# Patient Record
Sex: FEMALE | Race: WHITE | NOT HISPANIC OR LATINO | Employment: OTHER | URBAN - NONMETROPOLITAN AREA
[De-identification: names, ages, dates, MRNs, and addresses within clinical notes are randomized per-mention and may not be internally consistent; named-entity substitution may affect disease eponyms.]

---

## 2023-01-01 ENCOUNTER — APPOINTMENT (OUTPATIENT)
Dept: GENERAL RADIOLOGY | Facility: HOSPITAL | Age: 88
End: 2023-01-01
Payer: MEDICARE

## 2023-01-01 ENCOUNTER — DOCUMENTATION (OUTPATIENT)
Dept: SOCIAL WORK | Facility: HOSPITAL | Age: 88
End: 2023-01-01
Payer: MEDICARE

## 2023-01-01 ENCOUNTER — APPOINTMENT (OUTPATIENT)
Dept: CT IMAGING | Facility: HOSPITAL | Age: 88
End: 2023-01-01
Payer: MEDICARE

## 2023-01-01 ENCOUNTER — TELEPHONE (OUTPATIENT)
Dept: CARDIOLOGY | Facility: CLINIC | Age: 88
End: 2023-01-01

## 2023-01-01 ENCOUNTER — HOSPITAL ENCOUNTER (EMERGENCY)
Facility: HOSPITAL | Age: 88
End: 2023-10-27
Attending: STUDENT IN AN ORGANIZED HEALTH CARE EDUCATION/TRAINING PROGRAM
Payer: MEDICARE

## 2023-01-01 ENCOUNTER — HOSPITAL ENCOUNTER (EMERGENCY)
Facility: HOSPITAL | Age: 88
Discharge: HOME | End: 2023-10-24
Attending: EMERGENCY MEDICINE | Admitting: EMERGENCY MEDICINE
Payer: MEDICARE

## 2023-01-01 VITALS
HEIGHT: 65 IN | TEMPERATURE: 97.8 F | OXYGEN SATURATION: 52 % | DIASTOLIC BLOOD PRESSURE: 104 MMHG | BODY MASS INDEX: 27.49 KG/M2 | WEIGHT: 165 LBS | RESPIRATION RATE: 10 BRPM | SYSTOLIC BLOOD PRESSURE: 166 MMHG | HEART RATE: 95 BPM

## 2023-01-01 VITALS
RESPIRATION RATE: 20 BRPM | BODY MASS INDEX: 27.49 KG/M2 | DIASTOLIC BLOOD PRESSURE: 75 MMHG | SYSTOLIC BLOOD PRESSURE: 113 MMHG | TEMPERATURE: 97.9 F | HEIGHT: 65 IN | WEIGHT: 165 LBS | HEART RATE: 60 BPM | OXYGEN SATURATION: 95 %

## 2023-01-01 DIAGNOSIS — K59.00 CONSTIPATION, UNSPECIFIED CONSTIPATION TYPE: ICD-10-CM

## 2023-01-01 DIAGNOSIS — I46.9 DEATH DUE TO CARDIAC ARREST: Primary | ICD-10-CM

## 2023-01-01 DIAGNOSIS — I25.10 ATHEROSCLEROSIS OF CORONARY ARTERY OF NATIVE HEART, UNSPECIFIED VESSEL OR LESION TYPE, UNSPECIFIED WHETHER ANGINA PRESENT: ICD-10-CM

## 2023-01-01 DIAGNOSIS — N18.4 STAGE 4 CHRONIC KIDNEY DISEASE: ICD-10-CM

## 2023-01-01 DIAGNOSIS — R33.8 ACUTE URINARY RETENTION: Primary | ICD-10-CM

## 2023-01-01 LAB
ALBUMIN SERPL-MCNC: 3.4 G/DL (ref 3.5–5.2)
ALBUMIN/GLOB SERPL: 1 G/DL
ALP SERPL-CCNC: 86 U/L (ref 39–117)
ALT SERPL W P-5'-P-CCNC: 11 U/L (ref 1–33)
ANION GAP SERPL CALCULATED.3IONS-SCNC: 10.4 MMOL/L (ref 5–15)
AST SERPL-CCNC: 19 U/L (ref 1–32)
BASOPHILS # BLD AUTO: 0.03 10*3/MM3 (ref 0–0.2)
BASOPHILS NFR BLD AUTO: 0.3 % (ref 0–1.5)
BILIRUB SERPL-MCNC: 0.3 MG/DL (ref 0–1.2)
BILIRUB UR QL STRIP: NEGATIVE
BUN SERPL-MCNC: 42 MG/DL (ref 8–23)
BUN/CREAT SERPL: 43.3 (ref 7–25)
CALCIUM SPEC-SCNC: 9.5 MG/DL (ref 8.6–10.5)
CHLORIDE SERPL-SCNC: 110 MMOL/L (ref 98–107)
CLARITY UR: CLEAR
CO2 SERPL-SCNC: 20.6 MMOL/L (ref 22–29)
COLOR UR: YELLOW
CREAT SERPL-MCNC: 0.97 MG/DL (ref 0.57–1)
CRP SERPL-MCNC: 2.38 MG/DL (ref 0–0.5)
DEPRECATED RDW RBC AUTO: 50.1 FL (ref 37–54)
EGFRCR SERPLBLD CKD-EPI 2021: 56.3 ML/MIN/1.73
EOSINOPHIL # BLD AUTO: 0.21 10*3/MM3 (ref 0–0.4)
EOSINOPHIL NFR BLD AUTO: 2.4 % (ref 0.3–6.2)
ERYTHROCYTE [DISTWIDTH] IN BLOOD BY AUTOMATED COUNT: 14.9 % (ref 12.3–15.4)
FLUAV RNA RESP QL NAA+PROBE: NOT DETECTED
FLUBV RNA RESP QL NAA+PROBE: NOT DETECTED
GEN 5 2HR TROPONIN T REFLEX: 12 NG/L
GLOBULIN UR ELPH-MCNC: 3.4 GM/DL
GLUCOSE SERPL-MCNC: 135 MG/DL (ref 65–99)
GLUCOSE UR STRIP-MCNC: ABNORMAL MG/DL
HCT VFR BLD AUTO: 50.4 % (ref 34–46.6)
HGB BLD-MCNC: 15.7 G/DL (ref 12–15.9)
HGB UR QL STRIP.AUTO: NEGATIVE
HOLD SPECIMEN: NORMAL
HOLD SPECIMEN: NORMAL
IMM GRANULOCYTES # BLD AUTO: 0.03 10*3/MM3 (ref 0–0.05)
IMM GRANULOCYTES NFR BLD AUTO: 0.3 % (ref 0–0.5)
KETONES UR QL STRIP: NEGATIVE
LEUKOCYTE ESTERASE UR QL STRIP.AUTO: NEGATIVE
LYMPHOCYTES # BLD AUTO: 1.15 10*3/MM3 (ref 0.7–3.1)
LYMPHOCYTES NFR BLD AUTO: 13.2 % (ref 19.6–45.3)
MCH RBC QN AUTO: 28.3 PG (ref 26.6–33)
MCHC RBC AUTO-ENTMCNC: 31.2 G/DL (ref 31.5–35.7)
MCV RBC AUTO: 91 FL (ref 79–97)
MONOCYTES # BLD AUTO: 0.5 10*3/MM3 (ref 0.1–0.9)
MONOCYTES NFR BLD AUTO: 5.7 % (ref 5–12)
NEUTROPHILS NFR BLD AUTO: 6.79 10*3/MM3 (ref 1.7–7)
NEUTROPHILS NFR BLD AUTO: 78.1 % (ref 42.7–76)
NITRITE UR QL STRIP: NEGATIVE
NRBC BLD AUTO-RTO: 0 /100 WBC (ref 0–0.2)
PH UR STRIP.AUTO: 5.5 [PH] (ref 5–8)
PLATELET # BLD AUTO: 240 10*3/MM3 (ref 140–450)
PMV BLD AUTO: 10.5 FL (ref 6–12)
POTASSIUM SERPL-SCNC: 4.2 MMOL/L (ref 3.5–5.2)
PROT SERPL-MCNC: 6.8 G/DL (ref 6–8.5)
PROT UR QL STRIP: NEGATIVE
QT INTERVAL: 424 MS
QT INTERVAL: 452 MS
QTC INTERVAL: 440 MS
QTC INTERVAL: 452 MS
RBC # BLD AUTO: 5.54 10*6/MM3 (ref 3.77–5.28)
SARS-COV-2 RNA RESP QL NAA+PROBE: NOT DETECTED
SODIUM SERPL-SCNC: 141 MMOL/L (ref 136–145)
SP GR UR STRIP: 1.03 (ref 1–1.03)
TROPONIN T DELTA: -1 NG/L
TROPONIN T SERPL HS-MCNC: 13 NG/L
UROBILINOGEN UR QL STRIP: ABNORMAL
WBC NRBC COR # BLD: 8.71 10*3/MM3 (ref 3.4–10.8)
WHOLE BLOOD HOLD COAG: NORMAL
WHOLE BLOOD HOLD SPECIMEN: NORMAL

## 2023-01-01 PROCEDURE — 84484 ASSAY OF TROPONIN QUANT: CPT | Performed by: PHYSICIAN ASSISTANT

## 2023-01-01 PROCEDURE — 71045 X-RAY EXAM CHEST 1 VIEW: CPT

## 2023-01-01 PROCEDURE — 71045 X-RAY EXAM CHEST 1 VIEW: CPT | Performed by: RADIOLOGY

## 2023-01-01 PROCEDURE — 93005 ELECTROCARDIOGRAM TRACING: CPT | Performed by: PHYSICIAN ASSISTANT

## 2023-01-01 PROCEDURE — 25010000002 EPINEPHRINE 1 MG/ML SOLUTION: Performed by: STUDENT IN AN ORGANIZED HEALTH CARE EDUCATION/TRAINING PROGRAM

## 2023-01-01 PROCEDURE — 74177 CT ABD & PELVIS W/CONTRAST: CPT

## 2023-01-01 PROCEDURE — 25010000002 EPINEPHRINE PER 0.1 MG: Performed by: STUDENT IN AN ORGANIZED HEALTH CARE EDUCATION/TRAINING PROGRAM

## 2023-01-01 PROCEDURE — 51702 INSERT TEMP BLADDER CATH: CPT

## 2023-01-01 PROCEDURE — 93010 ELECTROCARDIOGRAM REPORT: CPT | Performed by: SPECIALIST

## 2023-01-01 PROCEDURE — 36415 COLL VENOUS BLD VENIPUNCTURE: CPT

## 2023-01-01 PROCEDURE — 25510000001 IOPAMIDOL 61 % SOLUTION: Performed by: EMERGENCY MEDICINE

## 2023-01-01 PROCEDURE — 99285 EMERGENCY DEPT VISIT HI MDM: CPT

## 2023-01-01 PROCEDURE — 94799 UNLISTED PULMONARY SVC/PX: CPT

## 2023-01-01 PROCEDURE — 85025 COMPLETE CBC W/AUTO DIFF WBC: CPT | Performed by: PHYSICIAN ASSISTANT

## 2023-01-01 PROCEDURE — 92950 HEART/LUNG RESUSCITATION CPR: CPT

## 2023-01-01 PROCEDURE — 86140 C-REACTIVE PROTEIN: CPT | Performed by: PHYSICIAN ASSISTANT

## 2023-01-01 PROCEDURE — 25010000002 DIAZEPAM PER 5 MG: Performed by: EMERGENCY MEDICINE

## 2023-01-01 PROCEDURE — 25010000002 MORPHINE PER 10 MG: Performed by: EMERGENCY MEDICINE

## 2023-01-01 PROCEDURE — 74177 CT ABD & PELVIS W/CONTRAST: CPT | Performed by: RADIOLOGY

## 2023-01-01 PROCEDURE — 96374 THER/PROPH/DIAG INJ IV PUSH: CPT

## 2023-01-01 PROCEDURE — 87636 SARSCOV2 & INF A&B AMP PRB: CPT | Performed by: PHYSICIAN ASSISTANT

## 2023-01-01 PROCEDURE — 25010000002 METHYLPREDNISOLONE PER 125 MG: Performed by: PHYSICIAN ASSISTANT

## 2023-01-01 PROCEDURE — 80053 COMPREHEN METABOLIC PANEL: CPT | Performed by: PHYSICIAN ASSISTANT

## 2023-01-01 PROCEDURE — 96375 TX/PRO/DX INJ NEW DRUG ADDON: CPT

## 2023-01-01 PROCEDURE — 81003 URINALYSIS AUTO W/O SCOPE: CPT | Performed by: PHYSICIAN ASSISTANT

## 2023-01-01 PROCEDURE — 51798 US URINE CAPACITY MEASURE: CPT

## 2023-01-01 PROCEDURE — 93005 ELECTROCARDIOGRAM TRACING: CPT | Performed by: STUDENT IN AN ORGANIZED HEALTH CARE EDUCATION/TRAINING PROGRAM

## 2023-01-01 PROCEDURE — 25010000002 ONDANSETRON PER 1 MG: Performed by: EMERGENCY MEDICINE

## 2023-01-01 PROCEDURE — 25810000003 SODIUM CHLORIDE 0.9 % SOLUTION: Performed by: STUDENT IN AN ORGANIZED HEALTH CARE EDUCATION/TRAINING PROGRAM

## 2023-01-01 PROCEDURE — 25010000002 DIAZEPAM PER 5 MG: Performed by: STUDENT IN AN ORGANIZED HEALTH CARE EDUCATION/TRAINING PROGRAM

## 2023-01-01 PROCEDURE — 96376 TX/PRO/DX INJ SAME DRUG ADON: CPT

## 2023-01-01 RX ORDER — OXYBUTYNIN CHLORIDE 5 MG/1
10 TABLET ORAL ONCE
Status: COMPLETED | OUTPATIENT
Start: 2023-01-01 | End: 2023-01-01

## 2023-01-01 RX ORDER — POLYETHYLENE GLYCOL 3350 17 G/17G
17 POWDER, FOR SOLUTION ORAL ONCE
Status: COMPLETED | OUTPATIENT
Start: 2023-01-01 | End: 2023-01-01

## 2023-01-01 RX ORDER — AMOXICILLIN 250 MG
1 CAPSULE ORAL ONCE
Status: COMPLETED | OUTPATIENT
Start: 2023-01-01 | End: 2023-01-01

## 2023-01-01 RX ORDER — DIAZEPAM 5 MG/ML
2.5 INJECTION, SOLUTION INTRAMUSCULAR; INTRAVENOUS ONCE
Status: COMPLETED | OUTPATIENT
Start: 2023-01-01 | End: 2023-01-01

## 2023-01-01 RX ORDER — ONDANSETRON 2 MG/ML
4 INJECTION INTRAMUSCULAR; INTRAVENOUS ONCE
Status: COMPLETED | OUTPATIENT
Start: 2023-01-01 | End: 2023-01-01

## 2023-01-01 RX ORDER — LACTULOSE 10 G/15ML
30 SOLUTION ORAL ONCE
Status: COMPLETED | OUTPATIENT
Start: 2023-01-01 | End: 2023-01-01

## 2023-01-01 RX ORDER — NOREPINEPHRINE BITARTRATE 0.03 MG/ML
INJECTION, SOLUTION INTRAVENOUS
Status: COMPLETED
Start: 2023-01-01 | End: 2023-01-01

## 2023-01-01 RX ORDER — SODIUM CHLORIDE 0.9 % (FLUSH) 0.9 %
10 SYRINGE (ML) INJECTION AS NEEDED
Status: DISCONTINUED | OUTPATIENT
Start: 2023-01-01 | End: 2023-01-01 | Stop reason: HOSPADM

## 2023-01-01 RX ORDER — METHYLPREDNISOLONE SODIUM SUCCINATE 125 MG/2ML
80 INJECTION, POWDER, LYOPHILIZED, FOR SOLUTION INTRAMUSCULAR; INTRAVENOUS ONCE
Status: COMPLETED | OUTPATIENT
Start: 2023-01-01 | End: 2023-01-01

## 2023-01-01 RX ORDER — NOREPINEPHRINE BITARTRATE 0.03 MG/ML
INJECTION, SOLUTION INTRAVENOUS
Status: COMPLETED | OUTPATIENT
Start: 2023-01-01 | End: 2023-01-01

## 2023-01-01 RX ORDER — EPINEPHRINE 1 MG/ML
INJECTION, SOLUTION, CONCENTRATE INTRAVENOUS
Status: COMPLETED | OUTPATIENT
Start: 2023-01-01 | End: 2023-01-01

## 2023-01-01 RX ORDER — MORPHINE SULFATE 2 MG/ML
2 INJECTION, SOLUTION INTRAMUSCULAR; INTRAVENOUS ONCE
Status: COMPLETED | OUTPATIENT
Start: 2023-01-01 | End: 2023-01-01

## 2023-01-01 RX ORDER — OXYBUTYNIN CHLORIDE 10 MG/1
10 TABLET, EXTENDED RELEASE ORAL DAILY
Qty: 30 TABLET | Refills: 0 | Status: SHIPPED | OUTPATIENT
Start: 2023-01-01

## 2023-01-01 RX ORDER — POLYETHYLENE GLYCOL 3350 17 G/17G
17 POWDER, FOR SOLUTION ORAL 2 TIMES DAILY
Qty: 225 G | Refills: 0 | Status: SHIPPED | OUTPATIENT
Start: 2023-01-01

## 2023-01-01 RX ORDER — AMOXICILLIN 250 MG
1 CAPSULE ORAL 2 TIMES DAILY
Qty: 15 TABLET | Refills: 0 | Status: SHIPPED | OUTPATIENT
Start: 2023-01-01

## 2023-01-01 RX ORDER — LACTULOSE 10 G/15ML
20 SOLUTION ORAL 2 TIMES DAILY
Qty: 300 ML | Refills: 0 | Status: SHIPPED | OUTPATIENT
Start: 2023-01-01

## 2023-01-01 RX ADMIN — MORPHINE SULFATE 2 MG: 2 INJECTION, SOLUTION INTRAMUSCULAR; INTRAVENOUS at 16:18

## 2023-01-01 RX ADMIN — Medication 0.02 MCG/KG/MIN: at 14:42

## 2023-01-01 RX ADMIN — EPINEPHRINE 1 MG: 1 INJECTION, SOLUTION, CONCENTRATE INTRAVENOUS at 14:15

## 2023-01-01 RX ADMIN — DIAZEPAM 2.5 MG: 5 INJECTION, SOLUTION INTRAMUSCULAR; INTRAVENOUS at 16:32

## 2023-01-01 RX ADMIN — DOCUSATE SODIUM 50 MG AND SENNOSIDES 8.6 MG 1 TABLET: 8.6; 5 TABLET, FILM COATED ORAL at 21:43

## 2023-01-01 RX ADMIN — ONDANSETRON 4 MG: 2 INJECTION INTRAMUSCULAR; INTRAVENOUS at 16:18

## 2023-01-01 RX ADMIN — Medication 0.02 MCG/KG/MIN: at 14:22

## 2023-01-01 RX ADMIN — LACTULOSE 30 G: 20 SOLUTION ORAL at 21:47

## 2023-01-01 RX ADMIN — EPINEPHRINE 1 MG: 1 INJECTION, SOLUTION, CONCENTRATE INTRAVENOUS at 14:35

## 2023-01-01 RX ADMIN — DIAZEPAM 2.5 MG: 5 INJECTION, SOLUTION INTRAMUSCULAR; INTRAVENOUS at 20:40

## 2023-01-01 RX ADMIN — SODIUM BICARBONATE 50 MEQ: 84 INJECTION INTRAVENOUS at 14:14

## 2023-01-01 RX ADMIN — IOPAMIDOL 100 ML: 612 INJECTION, SOLUTION INTRAVENOUS at 14:29

## 2023-01-01 RX ADMIN — POLYETHYLENE GLYCOL (3350) 17 G: 17 POWDER, FOR SOLUTION ORAL at 21:45

## 2023-01-01 RX ADMIN — EPINEPHRINE 1 MG: 1 INJECTION, SOLUTION, CONCENTRATE INTRAVENOUS at 14:11

## 2023-01-01 RX ADMIN — METHYLPREDNISOLONE SODIUM SUCCINATE 80 MG: 125 INJECTION, POWDER, FOR SOLUTION INTRAMUSCULAR; INTRAVENOUS at 16:45

## 2023-01-01 RX ADMIN — OXYBUTYNIN CHLORIDE 10 MG: 5 TABLET ORAL at 21:43

## 2023-01-15 ENCOUNTER — HOSPITAL ENCOUNTER (INPATIENT)
Facility: HOSPITAL | Age: 88
LOS: 2 days | Discharge: HOME HEALTH | DRG: 391 | End: 2023-01-19
Attending: STUDENT IN AN ORGANIZED HEALTH CARE EDUCATION/TRAINING PROGRAM | Admitting: INTERNAL MEDICINE
Payer: MEDICARE

## 2023-01-15 DIAGNOSIS — R10.32 LEFT LOWER QUADRANT ABDOMINAL PAIN: Primary | ICD-10-CM

## 2023-01-15 DIAGNOSIS — N17.9 AKI (ACUTE KIDNEY INJURY): ICD-10-CM

## 2023-01-15 DIAGNOSIS — R19.7 NAUSEA VOMITING AND DIARRHEA: ICD-10-CM

## 2023-01-15 DIAGNOSIS — I10 PRIMARY HYPERTENSION: ICD-10-CM

## 2023-01-15 DIAGNOSIS — R53.81 DEBILITY: ICD-10-CM

## 2023-01-15 DIAGNOSIS — R11.2 NAUSEA VOMITING AND DIARRHEA: ICD-10-CM

## 2023-01-15 PROCEDURE — 99223 1ST HOSP IP/OBS HIGH 75: CPT | Performed by: PHYSICIAN ASSISTANT

## 2023-01-15 PROCEDURE — 99285 EMERGENCY DEPT VISIT HI MDM: CPT

## 2023-01-16 ENCOUNTER — APPOINTMENT (OUTPATIENT)
Dept: CT IMAGING | Facility: HOSPITAL | Age: 88
DRG: 391 | End: 2023-01-16
Payer: MEDICARE

## 2023-01-16 ENCOUNTER — APPOINTMENT (OUTPATIENT)
Dept: CARDIOLOGY | Facility: HOSPITAL | Age: 88
DRG: 391 | End: 2023-01-16
Payer: MEDICARE

## 2023-01-16 PROBLEM — R10.32 LEFT LOWER QUADRANT ABDOMINAL PAIN: Status: ACTIVE | Noted: 2023-01-16

## 2023-01-16 LAB
027 TOXIN: NORMAL
ADV 40+41 DNA STL QL NAA+NON-PROBE: NOT DETECTED
ALBUMIN SERPL-MCNC: 4.3 G/DL (ref 3.5–5.2)
ALBUMIN/GLOB SERPL: 1.1 G/DL
ALP SERPL-CCNC: 86 U/L (ref 39–117)
ALT SERPL W P-5'-P-CCNC: 11 U/L (ref 1–33)
AMMONIA BLD-SCNC: 23 UMOL/L (ref 11–51)
AMPHET+METHAMPHET UR QL: NEGATIVE
AMPHETAMINES UR QL: NEGATIVE
ANION GAP SERPL CALCULATED.3IONS-SCNC: 19 MMOL/L (ref 5–15)
AST SERPL-CCNC: 17 U/L (ref 1–32)
ASTRO TYP 1-8 RNA STL QL NAA+NON-PROBE: NOT DETECTED
ATMOSPHERIC PRESS: 729 MMHG
BACTERIA UR QL AUTO: ABNORMAL /HPF
BARBITURATES UR QL SCN: NEGATIVE
BASE EXCESS BLDV CALC-SCNC: -1.1 MMOL/L (ref 0–2)
BASOPHILS # BLD AUTO: 0.04 10*3/MM3 (ref 0–0.2)
BASOPHILS NFR BLD AUTO: 0.4 % (ref 0–1.5)
BDY SITE: ABNORMAL
BENZODIAZ UR QL SCN: NEGATIVE
BH CV ECHO MEAS - ACS: 1.8 CM
BH CV ECHO MEAS - AO MAX PG: 6.5 MMHG
BH CV ECHO MEAS - AO MEAN PG: 5 MMHG
BH CV ECHO MEAS - AO ROOT DIAM: 3.1 CM
BH CV ECHO MEAS - AO V2 MAX: 127 CM/SEC
BH CV ECHO MEAS - AO V2 VTI: 18.6 CM
BH CV ECHO MEAS - EDV(CUBED): 110.6 ML
BH CV ECHO MEAS - EDV(MOD-SP4): 40.6 ML
BH CV ECHO MEAS - EF(MOD-SP4): 70.7 %
BH CV ECHO MEAS - ESV(CUBED): 15.6 ML
BH CV ECHO MEAS - ESV(MOD-SP4): 11.9 ML
BH CV ECHO MEAS - FS: 47.9 %
BH CV ECHO MEAS - IVS/LVPW: 0.92 CM
BH CV ECHO MEAS - IVSD: 1.2 CM
BH CV ECHO MEAS - LA DIMENSION: 3.6 CM
BH CV ECHO MEAS - LAT PEAK E' VEL: 12.3 CM/SEC
BH CV ECHO MEAS - LV DIASTOLIC VOL/BSA (35-75): 22.5 CM2
BH CV ECHO MEAS - LV MASS(C)D: 232.2 GRAMS
BH CV ECHO MEAS - LV SYSTOLIC VOL/BSA (12-30): 6.6 CM2
BH CV ECHO MEAS - LVIDD: 4.8 CM
BH CV ECHO MEAS - LVIDS: 2.5 CM
BH CV ECHO MEAS - LVOT AREA: 3.1 CM2
BH CV ECHO MEAS - LVOT DIAM: 2 CM
BH CV ECHO MEAS - LVPWD: 1.3 CM
BH CV ECHO MEAS - MED PEAK E' VEL: 8.9 CM/SEC
BH CV ECHO MEAS - MV A MAX VEL: 111 CM/SEC
BH CV ECHO MEAS - MV E MAX VEL: 48.7 CM/SEC
BH CV ECHO MEAS - MV E/A: 0.44
BH CV ECHO MEAS - PA ACC TIME: 0.09 SEC
BH CV ECHO MEAS - PA PR(ACCEL): 37.6 MMHG
BH CV ECHO MEAS - RAP SYSTOLE: 10 MMHG
BH CV ECHO MEAS - RVSP: 35 MMHG
BH CV ECHO MEAS - SI(MOD-SP4): 15.9 ML/M2
BH CV ECHO MEAS - SV(MOD-SP4): 28.7 ML
BH CV ECHO MEAS - TAPSE (>1.6): 1.99 CM
BH CV ECHO MEAS - TR MAX PG: 25 MMHG
BH CV ECHO MEAS - TR MAX VEL: 250 CM/SEC
BH CV ECHO MEASUREMENTS AVERAGE E/E' RATIO: 4.59
BILIRUB SERPL-MCNC: 0.6 MG/DL (ref 0–1.2)
BILIRUB UR QL STRIP: NEGATIVE
BODY TEMPERATURE: 37 C
BUN SERPL-MCNC: 50 MG/DL (ref 8–23)
BUN/CREAT SERPL: 26.9 (ref 7–25)
BUPRENORPHINE SERPL-MCNC: NEGATIVE NG/ML
C CAYETANENSIS DNA STL QL NAA+NON-PROBE: NOT DETECTED
C COLI+JEJ+UPSA DNA STL QL NAA+NON-PROBE: NOT DETECTED
C DIFF TOX GENS STL QL NAA+PROBE: NEGATIVE
CALCIUM SPEC-SCNC: 11 MG/DL (ref 8.6–10.5)
CANNABINOIDS SERPL QL: NEGATIVE
CHLORIDE SERPL-SCNC: 97 MMOL/L (ref 98–107)
CLARITY UR: CLEAR
CO2 BLDA-SCNC: 23.1 MMOL/L (ref 22–33)
CO2 SERPL-SCNC: 21 MMOL/L (ref 22–29)
COCAINE UR QL: NEGATIVE
COHGB MFR BLD: 0.9 % (ref 0–5)
COLOR UR: YELLOW
CREAT SERPL-MCNC: 1.86 MG/DL (ref 0.57–1)
CREAT UR-MCNC: 72.7 MG/DL
CRP SERPL-MCNC: 0.81 MG/DL (ref 0–0.5)
CRYPTOSP DNA STL QL NAA+NON-PROBE: NOT DETECTED
D-LACTATE SERPL-SCNC: 2 MMOL/L (ref 0.5–2)
D-LACTATE SERPL-SCNC: 2.4 MMOL/L (ref 0.5–2)
DEPRECATED RDW RBC AUTO: 47.4 FL (ref 37–54)
E HISTOLYT DNA STL QL NAA+NON-PROBE: NOT DETECTED
EAEC PAA PLAS AGGR+AATA ST NAA+NON-PRB: NOT DETECTED
EC STX1+STX2 GENES STL QL NAA+NON-PROBE: NOT DETECTED
EGFRCR SERPLBLD CKD-EPI 2021: 25.9 ML/MIN/1.73
EOSINOPHIL # BLD AUTO: 0.03 10*3/MM3 (ref 0–0.4)
EOSINOPHIL NFR BLD AUTO: 0.3 % (ref 0.3–6.2)
EPEC EAE GENE STL QL NAA+NON-PROBE: NOT DETECTED
ERYTHROCYTE [DISTWIDTH] IN BLOOD BY AUTOMATED COUNT: 13.6 % (ref 12.3–15.4)
ERYTHROCYTE [SEDIMENTATION RATE] IN BLOOD: 24 MM/HR (ref 0–30)
ETEC LTA+ST1A+ST1B TOX ST NAA+NON-PROBE: NOT DETECTED
FLUAV RNA RESP QL NAA+PROBE: NOT DETECTED
FLUBV RNA RESP QL NAA+PROBE: NOT DETECTED
G LAMBLIA DNA STL QL NAA+NON-PROBE: NOT DETECTED
GLOBULIN UR ELPH-MCNC: 4 GM/DL
GLUCOSE BLDC GLUCOMTR-MCNC: 115 MG/DL (ref 70–130)
GLUCOSE SERPL-MCNC: 134 MG/DL (ref 65–99)
GLUCOSE UR STRIP-MCNC: NEGATIVE MG/DL
HBA1C MFR BLD: 5.5 % (ref 4.8–5.6)
HCO3 BLDV-SCNC: 22.2 MMOL/L (ref 22–28)
HCT VFR BLD AUTO: 50.7 % (ref 34–46.6)
HGB BLD-MCNC: 16 G/DL (ref 12–15.9)
HGB BLDA-MCNC: 14.1 G/DL (ref 13.5–17.5)
HGB UR QL STRIP.AUTO: NEGATIVE
HYALINE CASTS UR QL AUTO: ABNORMAL /LPF
HYPOCHROMIA BLD QL: NORMAL
IMM GRANULOCYTES # BLD AUTO: 0.04 10*3/MM3 (ref 0–0.05)
IMM GRANULOCYTES NFR BLD AUTO: 0.4 % (ref 0–0.5)
INHALED O2 CONCENTRATION: 21 %
KETONES UR QL STRIP: NEGATIVE
LARGE PLATELETS: NORMAL
LEUKOCYTE ESTERASE UR QL STRIP.AUTO: ABNORMAL
LYMPHOCYTES # BLD AUTO: 2.01 10*3/MM3 (ref 0.7–3.1)
LYMPHOCYTES NFR BLD AUTO: 19.5 % (ref 19.6–45.3)
Lab: ABNORMAL
MAGNESIUM SERPL-MCNC: 2 MG/DL (ref 1.6–2.4)
MAXIMAL PREDICTED HEART RATE: 133 BPM
MCH RBC QN AUTO: 29.9 PG (ref 26.6–33)
MCHC RBC AUTO-ENTMCNC: 31.6 G/DL (ref 31.5–35.7)
MCV RBC AUTO: 94.6 FL (ref 79–97)
METHADONE UR QL SCN: NEGATIVE
METHGB BLD QL: 0.3 % (ref 0–3)
MODALITY: ABNORMAL
MONOCYTES # BLD AUTO: 0.62 10*3/MM3 (ref 0.1–0.9)
MONOCYTES NFR BLD AUTO: 6 % (ref 5–12)
NEUTROPHILS NFR BLD AUTO: 7.56 10*3/MM3 (ref 1.7–7)
NEUTROPHILS NFR BLD AUTO: 73.4 % (ref 42.7–76)
NITRITE UR QL STRIP: NEGATIVE
NOROVIRUS GI+II RNA STL QL NAA+NON-PROBE: NOT DETECTED
NOTIFIED BY: ABNORMAL
NOTIFIED WHO: ABNORMAL
NRBC BLD AUTO-RTO: 0 /100 WBC (ref 0–0.2)
OPIATES UR QL: POSITIVE
OXYCODONE UR QL SCN: NEGATIVE
OXYHGB MFR BLDV: 89.9 % (ref 45–75)
P SHIGELLOIDES DNA STL QL NAA+NON-PROBE: NOT DETECTED
PCO2 BLDV: 32.2 MM HG (ref 41–51)
PCP UR QL SCN: NEGATIVE
PH BLDV: 7.45 PH UNITS (ref 7.32–7.42)
PH UR STRIP.AUTO: <=5 [PH] (ref 5–8)
PLATELET # BLD AUTO: 269 10*3/MM3 (ref 140–450)
PMV BLD AUTO: 11.6 FL (ref 6–12)
PO2 BLDV: 57.1 MM HG (ref 27–53)
POTASSIUM SERPL-SCNC: 3.9 MMOL/L (ref 3.5–5.2)
PROCALCITONIN SERPL-MCNC: 0.08 NG/ML (ref 0–0.25)
PROPOXYPH UR QL: NEGATIVE
PROT ?TM UR-MCNC: 11 MG/DL
PROT ?TM UR-MCNC: 8.3 MG/DL
PROT SERPL-MCNC: 8.3 G/DL (ref 6–8.5)
PROT UR QL STRIP: NEGATIVE
PROT/CREAT UR: 114.2 MG/G CREA (ref 0–200)
QT INTERVAL: 372 MS
QT INTERVAL: 374 MS
QTC INTERVAL: 474 MS
QTC INTERVAL: 477 MS
RBC # BLD AUTO: 5.36 10*6/MM3 (ref 3.77–5.28)
RBC # UR STRIP: ABNORMAL /HPF
REF LAB TEST METHOD: ABNORMAL
RVA RNA STL QL NAA+NON-PROBE: NOT DETECTED
S ENT+BONG DNA STL QL NAA+NON-PROBE: NOT DETECTED
SAO2 % BLDCOV: 91 % (ref 45–75)
SAPO I+II+IV+V RNA STL QL NAA+NON-PROBE: NOT DETECTED
SARS-COV-2 RNA RESP QL NAA+PROBE: NOT DETECTED
SHIGELLA SP+EIEC IPAH ST NAA+NON-PROBE: NOT DETECTED
SODIUM SERPL-SCNC: 137 MMOL/L (ref 136–145)
SP GR UR STRIP: >1.03 (ref 1–1.03)
SQUAMOUS #/AREA URNS HPF: ABNORMAL /HPF
STRESS TARGET HR: 113 BPM
TRICYCLICS UR QL SCN: NEGATIVE
TROPONIN T SERPL-MCNC: <0.01 NG/ML (ref 0–0.03)
TSH SERPL DL<=0.05 MIU/L-ACNC: 0.78 UIU/ML (ref 0.27–4.2)
UROBILINOGEN UR QL STRIP: ABNORMAL
V CHOL+PARA+VUL DNA STL QL NAA+NON-PROBE: NOT DETECTED
V CHOLERAE DNA STL QL NAA+NON-PROBE: NOT DETECTED
VENTILATOR MODE: ABNORMAL
WBC # UR STRIP: ABNORMAL /HPF
WBC NRBC COR # BLD: 10.3 10*3/MM3 (ref 3.4–10.8)
Y ENTEROCOL DNA STL QL NAA+NON-PROBE: NOT DETECTED

## 2023-01-16 PROCEDURE — G0378 HOSPITAL OBSERVATION PER HR: HCPCS

## 2023-01-16 PROCEDURE — 97166 OT EVAL MOD COMPLEX 45 MIN: CPT

## 2023-01-16 PROCEDURE — 85025 COMPLETE CBC W/AUTO DIFF WBC: CPT | Performed by: STUDENT IN AN ORGANIZED HEALTH CARE EDUCATION/TRAINING PROGRAM

## 2023-01-16 PROCEDURE — 86140 C-REACTIVE PROTEIN: CPT | Performed by: STUDENT IN AN ORGANIZED HEALTH CARE EDUCATION/TRAINING PROGRAM

## 2023-01-16 PROCEDURE — 87493 C DIFF AMPLIFIED PROBE: CPT | Performed by: STUDENT IN AN ORGANIZED HEALTH CARE EDUCATION/TRAINING PROGRAM

## 2023-01-16 PROCEDURE — 92610 EVALUATE SWALLOWING FUNCTION: CPT

## 2023-01-16 PROCEDURE — 25010000002 HEPARIN (PORCINE) PER 1000 UNITS: Performed by: INTERNAL MEDICINE

## 2023-01-16 PROCEDURE — 25010000002 IOPAMIDOL 61 % SOLUTION: Performed by: STUDENT IN AN ORGANIZED HEALTH CARE EDUCATION/TRAINING PROGRAM

## 2023-01-16 PROCEDURE — 36415 COLL VENOUS BLD VENIPUNCTURE: CPT

## 2023-01-16 PROCEDURE — 87636 SARSCOV2 & INF A&B AMP PRB: CPT | Performed by: STUDENT IN AN ORGANIZED HEALTH CARE EDUCATION/TRAINING PROGRAM

## 2023-01-16 PROCEDURE — 87507 IADNA-DNA/RNA PROBE TQ 12-25: CPT | Performed by: INTERNAL MEDICINE

## 2023-01-16 PROCEDURE — 94799 UNLISTED PULMONARY SVC/PX: CPT

## 2023-01-16 PROCEDURE — 84145 PROCALCITONIN (PCT): CPT | Performed by: STUDENT IN AN ORGANIZED HEALTH CARE EDUCATION/TRAINING PROGRAM

## 2023-01-16 PROCEDURE — 83036 HEMOGLOBIN GLYCOSYLATED A1C: CPT | Performed by: PHYSICIAN ASSISTANT

## 2023-01-16 PROCEDURE — 80053 COMPREHEN METABOLIC PANEL: CPT | Performed by: STUDENT IN AN ORGANIZED HEALTH CARE EDUCATION/TRAINING PROGRAM

## 2023-01-16 PROCEDURE — 93005 ELECTROCARDIOGRAM TRACING: CPT | Performed by: STUDENT IN AN ORGANIZED HEALTH CARE EDUCATION/TRAINING PROGRAM

## 2023-01-16 PROCEDURE — 84484 ASSAY OF TROPONIN QUANT: CPT | Performed by: EMERGENCY MEDICINE

## 2023-01-16 PROCEDURE — 93005 ELECTROCARDIOGRAM TRACING: CPT | Performed by: PHYSICIAN ASSISTANT

## 2023-01-16 PROCEDURE — 82140 ASSAY OF AMMONIA: CPT | Performed by: STUDENT IN AN ORGANIZED HEALTH CARE EDUCATION/TRAINING PROGRAM

## 2023-01-16 PROCEDURE — 82962 GLUCOSE BLOOD TEST: CPT

## 2023-01-16 PROCEDURE — 93306 TTE W/DOPPLER COMPLETE: CPT

## 2023-01-16 PROCEDURE — 93306 TTE W/DOPPLER COMPLETE: CPT | Performed by: INTERNAL MEDICINE

## 2023-01-16 PROCEDURE — 93010 ELECTROCARDIOGRAM REPORT: CPT | Performed by: INTERNAL MEDICINE

## 2023-01-16 PROCEDURE — 25010000002 MORPHINE PER 10 MG: Performed by: STUDENT IN AN ORGANIZED HEALTH CARE EDUCATION/TRAINING PROGRAM

## 2023-01-16 PROCEDURE — 99232 SBSQ HOSP IP/OBS MODERATE 35: CPT | Performed by: HOSPITALIST

## 2023-01-16 PROCEDURE — 84443 ASSAY THYROID STIM HORMONE: CPT | Performed by: PHYSICIAN ASSISTANT

## 2023-01-16 PROCEDURE — 82805 BLOOD GASES W/O2 SATURATION: CPT

## 2023-01-16 PROCEDURE — 84156 ASSAY OF PROTEIN URINE: CPT | Performed by: PHYSICIAN ASSISTANT

## 2023-01-16 PROCEDURE — 81001 URINALYSIS AUTO W/SCOPE: CPT | Performed by: STUDENT IN AN ORGANIZED HEALTH CARE EDUCATION/TRAINING PROGRAM

## 2023-01-16 PROCEDURE — 82820 HEMOGLOBIN-OXYGEN AFFINITY: CPT

## 2023-01-16 PROCEDURE — 87040 BLOOD CULTURE FOR BACTERIA: CPT | Performed by: STUDENT IN AN ORGANIZED HEALTH CARE EDUCATION/TRAINING PROGRAM

## 2023-01-16 PROCEDURE — 84484 ASSAY OF TROPONIN QUANT: CPT | Performed by: PHYSICIAN ASSISTANT

## 2023-01-16 PROCEDURE — 74177 CT ABD & PELVIS W/CONTRAST: CPT

## 2023-01-16 PROCEDURE — 25010000002 CYANOCOBALAMIN PER 1000 MCG: Performed by: HOSPITALIST

## 2023-01-16 PROCEDURE — 25010000002 ONDANSETRON PER 1 MG: Performed by: STUDENT IN AN ORGANIZED HEALTH CARE EDUCATION/TRAINING PROGRAM

## 2023-01-16 PROCEDURE — 85652 RBC SED RATE AUTOMATED: CPT | Performed by: STUDENT IN AN ORGANIZED HEALTH CARE EDUCATION/TRAINING PROGRAM

## 2023-01-16 PROCEDURE — 82570 ASSAY OF URINE CREATININE: CPT | Performed by: PHYSICIAN ASSISTANT

## 2023-01-16 PROCEDURE — 93005 ELECTROCARDIOGRAM TRACING: CPT | Performed by: EMERGENCY MEDICINE

## 2023-01-16 PROCEDURE — 85007 BL SMEAR W/DIFF WBC COUNT: CPT | Performed by: STUDENT IN AN ORGANIZED HEALTH CARE EDUCATION/TRAINING PROGRAM

## 2023-01-16 PROCEDURE — 97161 PT EVAL LOW COMPLEX 20 MIN: CPT

## 2023-01-16 PROCEDURE — 83605 ASSAY OF LACTIC ACID: CPT | Performed by: STUDENT IN AN ORGANIZED HEALTH CARE EDUCATION/TRAINING PROGRAM

## 2023-01-16 PROCEDURE — 84484 ASSAY OF TROPONIN QUANT: CPT | Performed by: STUDENT IN AN ORGANIZED HEALTH CARE EDUCATION/TRAINING PROGRAM

## 2023-01-16 PROCEDURE — 83735 ASSAY OF MAGNESIUM: CPT | Performed by: HOSPITALIST

## 2023-01-16 PROCEDURE — 80306 DRUG TEST PRSMV INSTRMNT: CPT | Performed by: PHYSICIAN ASSISTANT

## 2023-01-16 RX ORDER — HEPARIN SODIUM 5000 [USP'U]/ML
5000 INJECTION, SOLUTION INTRAVENOUS; SUBCUTANEOUS EVERY 12 HOURS SCHEDULED
Status: DISCONTINUED | OUTPATIENT
Start: 2023-01-16 | End: 2023-01-19 | Stop reason: HOSPADM

## 2023-01-16 RX ORDER — ACETAMINOPHEN 325 MG/1
650 TABLET ORAL EVERY 6 HOURS PRN
Status: DISCONTINUED | OUTPATIENT
Start: 2023-01-16 | End: 2023-01-19 | Stop reason: HOSPADM

## 2023-01-16 RX ORDER — LOSARTAN POTASSIUM 25 MG/1
25 TABLET ORAL 2 TIMES DAILY
COMMUNITY
End: 2023-01-19 | Stop reason: HOSPADM

## 2023-01-16 RX ORDER — CYANOCOBALAMIN 1000 UG/ML
1000 INJECTION, SOLUTION INTRAMUSCULAR; SUBCUTANEOUS
COMMUNITY

## 2023-01-16 RX ORDER — SODIUM CHLORIDE 9 MG/ML
50 INJECTION, SOLUTION INTRAVENOUS CONTINUOUS
Status: DISCONTINUED | OUTPATIENT
Start: 2023-01-16 | End: 2023-01-18

## 2023-01-16 RX ORDER — MORPHINE SULFATE 2 MG/ML
2 INJECTION, SOLUTION INTRAMUSCULAR; INTRAVENOUS ONCE
Status: COMPLETED | OUTPATIENT
Start: 2023-01-16 | End: 2023-01-16

## 2023-01-16 RX ORDER — NITROGLYCERIN 0.4 MG/1
0.4 TABLET SUBLINGUAL
Status: DISCONTINUED | OUTPATIENT
Start: 2023-01-16 | End: 2023-01-19 | Stop reason: HOSPADM

## 2023-01-16 RX ORDER — ATORVASTATIN CALCIUM 40 MG/1
40 TABLET, FILM COATED ORAL NIGHTLY
Status: DISCONTINUED | OUTPATIENT
Start: 2023-01-16 | End: 2023-01-19 | Stop reason: HOSPADM

## 2023-01-16 RX ORDER — CHLORAL HYDRATE 500 MG
1000 CAPSULE ORAL 2 TIMES DAILY WITH MEALS
COMMUNITY

## 2023-01-16 RX ORDER — CALCIUM CARBONATE 200(500)MG
2 TABLET,CHEWABLE ORAL 3 TIMES DAILY PRN
Status: DISCONTINUED | OUTPATIENT
Start: 2023-01-16 | End: 2023-01-19 | Stop reason: HOSPADM

## 2023-01-16 RX ORDER — AMLODIPINE BESYLATE 5 MG/1
2.5 TABLET ORAL DAILY
Status: CANCELLED | OUTPATIENT
Start: 2023-01-17

## 2023-01-16 RX ORDER — NAPROXEN 250 MG/1
500 TABLET ORAL 2 TIMES DAILY PRN
Status: CANCELLED | OUTPATIENT
Start: 2023-01-16

## 2023-01-16 RX ORDER — NISOLDIPINE 8.5 MG/1
8.5 TABLET, FILM COATED, EXTENDED RELEASE ORAL 2 TIMES DAILY
Status: ON HOLD | COMMUNITY
End: 2023-01-19 | Stop reason: SDUPTHER

## 2023-01-16 RX ORDER — CHOLECALCIFEROL (VITAMIN D3) 125 MCG
10 CAPSULE ORAL NIGHTLY PRN
Status: DISCONTINUED | OUTPATIENT
Start: 2023-01-16 | End: 2023-01-17

## 2023-01-16 RX ORDER — CIPROFLOXACIN 500 MG/1
500 TABLET, FILM COATED ORAL 2 TIMES DAILY
COMMUNITY
End: 2023-01-19 | Stop reason: HOSPADM

## 2023-01-16 RX ORDER — LOSARTAN POTASSIUM 25 MG/1
25 TABLET ORAL 2 TIMES DAILY
Status: CANCELLED | OUTPATIENT
Start: 2023-01-16

## 2023-01-16 RX ORDER — ASPIRIN 81 MG/1
81 TABLET, CHEWABLE ORAL DAILY
Status: DISCONTINUED | OUTPATIENT
Start: 2023-01-17 | End: 2023-01-19 | Stop reason: HOSPADM

## 2023-01-16 RX ORDER — POLYETHYLENE GLYCOL 3350 17 G/17G
17 POWDER, FOR SOLUTION ORAL 2 TIMES DAILY PRN
Status: DISCONTINUED | OUTPATIENT
Start: 2023-01-16 | End: 2023-01-19 | Stop reason: HOSPADM

## 2023-01-16 RX ORDER — SODIUM CHLORIDE 0.9 % (FLUSH) 0.9 %
3-10 SYRINGE (ML) INJECTION AS NEEDED
Status: DISCONTINUED | OUTPATIENT
Start: 2023-01-16 | End: 2023-01-19 | Stop reason: HOSPADM

## 2023-01-16 RX ORDER — ASPIRIN 81 MG/1
324 TABLET, CHEWABLE ORAL ONCE
Status: COMPLETED | OUTPATIENT
Start: 2023-01-16 | End: 2023-01-16

## 2023-01-16 RX ORDER — DICLOFENAC SODIUM 75 MG/1
75 TABLET, DELAYED RELEASE ORAL 2 TIMES DAILY PRN
COMMUNITY
End: 2023-01-19 | Stop reason: HOSPADM

## 2023-01-16 RX ORDER — HYDRALAZINE HYDROCHLORIDE 20 MG/ML
20 INJECTION INTRAMUSCULAR; INTRAVENOUS ONCE
Status: DISCONTINUED | OUTPATIENT
Start: 2023-01-16 | End: 2023-01-16

## 2023-01-16 RX ORDER — CETIRIZINE HYDROCHLORIDE 10 MG/1
10 TABLET ORAL 2 TIMES DAILY
Status: ON HOLD | COMMUNITY
End: 2023-01-19 | Stop reason: SDUPTHER

## 2023-01-16 RX ORDER — ONDANSETRON 2 MG/ML
4 INJECTION INTRAMUSCULAR; INTRAVENOUS ONCE
Status: COMPLETED | OUTPATIENT
Start: 2023-01-16 | End: 2023-01-16

## 2023-01-16 RX ORDER — CETIRIZINE HYDROCHLORIDE 10 MG/1
5 TABLET ORAL DAILY
Status: DISCONTINUED | OUTPATIENT
Start: 2023-01-17 | End: 2023-01-19 | Stop reason: HOSPADM

## 2023-01-16 RX ORDER — ONDANSETRON 4 MG/1
4 TABLET, ORALLY DISINTEGRATING ORAL 2 TIMES DAILY PRN
Status: CANCELLED | OUTPATIENT
Start: 2023-01-16

## 2023-01-16 RX ORDER — SODIUM CHLORIDE 9 MG/ML
40 INJECTION, SOLUTION INTRAVENOUS AS NEEDED
Status: DISCONTINUED | OUTPATIENT
Start: 2023-01-16 | End: 2023-01-19 | Stop reason: HOSPADM

## 2023-01-16 RX ORDER — METRONIDAZOLE 500 MG/1
500 TABLET ORAL 2 TIMES DAILY
COMMUNITY
End: 2023-01-19 | Stop reason: HOSPADM

## 2023-01-16 RX ORDER — SODIUM CHLORIDE 0.9 % (FLUSH) 0.9 %
3 SYRINGE (ML) INJECTION EVERY 12 HOURS SCHEDULED
Status: DISCONTINUED | OUTPATIENT
Start: 2023-01-16 | End: 2023-01-19 | Stop reason: HOSPADM

## 2023-01-16 RX ORDER — CYANOCOBALAMIN 1000 UG/ML
1000 INJECTION, SOLUTION INTRAMUSCULAR; SUBCUTANEOUS
Status: DISCONTINUED | OUTPATIENT
Start: 2023-01-16 | End: 2023-01-19 | Stop reason: HOSPADM

## 2023-01-16 RX ORDER — AMLODIPINE BESYLATE 5 MG/1
2.5 TABLET ORAL 2 TIMES DAILY
Status: CANCELLED | OUTPATIENT
Start: 2023-01-16

## 2023-01-16 RX ORDER — ONDANSETRON 4 MG/1
4 TABLET, ORALLY DISINTEGRATING ORAL 2 TIMES DAILY PRN
COMMUNITY

## 2023-01-16 RX ORDER — HYDROXYZINE HYDROCHLORIDE 25 MG/1
25 TABLET, FILM COATED ORAL 3 TIMES DAILY PRN
Status: DISCONTINUED | OUTPATIENT
Start: 2023-01-16 | End: 2023-01-19 | Stop reason: HOSPADM

## 2023-01-16 RX ORDER — POLYETHYLENE GLYCOL 3350 17 G/17G
17 POWDER, FOR SOLUTION ORAL 2 TIMES DAILY PRN
COMMUNITY

## 2023-01-16 RX ORDER — ONDANSETRON 2 MG/ML
4 INJECTION INTRAMUSCULAR; INTRAVENOUS EVERY 6 HOURS PRN
Status: DISCONTINUED | OUTPATIENT
Start: 2023-01-16 | End: 2023-01-19 | Stop reason: HOSPADM

## 2023-01-16 RX ADMIN — HEPARIN SODIUM 5000 UNITS: 5000 INJECTION INTRAVENOUS; SUBCUTANEOUS at 20:01

## 2023-01-16 RX ADMIN — CYANOCOBALAMIN 1000 MCG: 1000 INJECTION, SOLUTION INTRAMUSCULAR; SUBCUTANEOUS at 22:15

## 2023-01-16 RX ADMIN — ATORVASTATIN CALCIUM 40 MG: 40 TABLET, FILM COATED ORAL at 20:01

## 2023-01-16 RX ADMIN — MORPHINE SULFATE 2 MG: 2 INJECTION, SOLUTION INTRAMUSCULAR; INTRAVENOUS at 01:38

## 2023-01-16 RX ADMIN — SODIUM CHLORIDE, PRESERVATIVE FREE 3 ML: 5 INJECTION INTRAVENOUS at 20:01

## 2023-01-16 RX ADMIN — SODIUM CHLORIDE, PRESERVATIVE FREE 3 ML: 5 INJECTION INTRAVENOUS at 09:36

## 2023-01-16 RX ADMIN — SODIUM CHLORIDE 1000 ML: 9 INJECTION, SOLUTION INTRAVENOUS at 00:33

## 2023-01-16 RX ADMIN — HEPARIN SODIUM 5000 UNITS: 5000 INJECTION INTRAVENOUS; SUBCUTANEOUS at 09:34

## 2023-01-16 RX ADMIN — ASPIRIN 324 MG: 81 TABLET, CHEWABLE ORAL at 04:46

## 2023-01-16 RX ADMIN — IOPAMIDOL 60 ML: 612 INJECTION, SOLUTION INTRAVENOUS at 01:47

## 2023-01-16 RX ADMIN — ONDANSETRON 4 MG: 2 INJECTION INTRAMUSCULAR; INTRAVENOUS at 01:38

## 2023-01-16 RX ADMIN — SODIUM CHLORIDE 75 ML/HR: 9 INJECTION, SOLUTION INTRAVENOUS at 09:34

## 2023-01-16 NOTE — THERAPY EVALUATION
Acute Care - Occupational Therapy Initial Evaluation   [facility]     Patient Name: Azra Ohara  : 1935  MRN: [id number]  Today's Date: 2023             Admit Date: 1/15/2023       ICD-10-CM ICD-9-CM   1. Left lower quadrant abdominal pain  R10.32 789.04   2. Nausea vomiting and diarrhea  R11.2 787.91    R19.7 787.01   3. CLAYTON (acute kidney injury) (HCC)  N17.9 584.9     Patient Active Problem List   Diagnosis   • Left lower quadrant abdominal pain     Past Medical History:   Diagnosis Date   • Essential hypertension    • History of colon cancer      Past Surgical History:   Procedure Laterality Date   • COLON RESECTION     • PARATHYROIDECTOMY Right     Parathyroid adenoma         OT ASSESSMENT FLOWSHEET (last 12 hours)     OT Evaluation and Treatment     Row Name 23 1054                   OT Time and Intention    Document Type evaluation  -KR        Mode of Treatment occupational therapy  -KR        Patient Effort good  -KR           Living Environment    Current Living Arrangements home  -KR        People in Home other relative(s)  per pt report  -KR           Cognition    Affect/Mental Status (Cognition) WFL  -KR        Behavioral Issues (Cognition) --  pt became emotional when discussing  family members  -KR        Orientation Status (Cognition) oriented to;person;place;situation  -KR        Follows Commands (Cognition) WFL  -KR           Range of Motion Comprehensive    Comment, General Range of Motion BUE WFL  -KR           Strength Comprehensive (MMT)    Comment, General Manual Muscle Testing (MMT) Assessment BUE WFL  -KR           Activities of Daily Living    BADL Assessment/Intervention bathing;upper body dressing;lower body dressing;grooming;feeding;toileting  -KR           Bathing Assessment/Intervention    Bastian Level (Bathing) bathing skills;minimum assist (75% patient effort)  -KR           Upper Body Dressing Assessment/Training    Bastian Level (Upper Body  Dressing) upper body dressing skills;set up  -KR           Lower Body Dressing Assessment/Training    Robeson Level (Lower Body Dressing) lower body dressing skills;minimum assist (75% patient effort)  -KR           Grooming Assessment/Training    Robeson Level (Grooming) grooming skills;set up  -KR           Self-Feeding Assessment/Training    Robeson Level (Feeding) feeding skills;set up  -KR           Toileting Assessment/Training    Robeson Level (Toileting) toileting skills;minimum assist (75% patient effort)  -KR           Plan of Care Review    Plan of Care Reviewed With patient  -KR           Therapy Assessment/Plan (OT)    Planned Therapy Interventions (OT) adaptive equipment training;BADL retraining  -KR           OT Goals    Dressing Goal Selection (OT) dressing, OT goal 1  -KR           Dressing Goal 1 (OT)    Activity/Device (Dressing Goal 1, OT) dressing skills, all  -KR        Robeson/Cues Needed (Dressing Goal 1, OT) standby assist  -KR        Time Frame (Dressing Goal 1, OT) by discharge  -KR           Patient Education Goal (OT)    Activity (Patient Education Goal, OT) AE/DME training to enhance safety/independence in home environment  -KR        Robeson/Cues/Accuracy (Memory Goal 2, OT) verbalizes understanding  -KR        Time Frame (Patient Education Goal, OT) by discharge  -KR              User Key  (r) = Recorded By, (t) = Taken By, (c) = Cosigned By    Initials Name Effective Dates    Dalton Woodward OT 06/16/21 -                        OT Recommendation and Plan  Planned Therapy Interventions (OT): adaptive equipment training, BADL retraining  Plan of Care Review  Plan of Care Reviewed With: patient  Plan of Care Reviewed With: patient        Time Calculation:     Therapy Charges for Today     Code Description Service Date Service Provider Modifiers Qty    71157574674  OT EVAL MOD COMPLEXITY 4 1/16/2023 Dalton Calle OT GO 1               Dalton Calle  OT  1/16/2023

## 2023-01-16 NOTE — H&P
"     [facility] Medicine Services  HISTORY & PHYSICAL    Patient Identification:  Name:  Azra Ohara  Age:  87 y.o.  Sex:  female  :  1935  MRN:  [id number]   Visit Number:  [id number]  Admit Date: 1/15/2023   Primary Care Physician:  Minal Salazar APRN     Subjective     Chief complaint:   Chief Complaint   Patient presents with   • Nausea   • Abdominal Pain     History of presenting illness:   Patient is a 87 y.o. female with past medical history significant for essential hypertension that presented to the [facility] emergency department for evaluation of nausea and abdominal pain.    The patient states she developed a sharp left lower quadrant abdominal pain around 7 days ago.  She saw her PCP who stated she had a \"abdominal infection\" and gave her 2 prescriptions for antibiotics and a solution to \"make her poop.\"  She reports despite taking the medication her abdominal discomfort has continued to worsen.  She endorses diarrhea for the past 2 weeks and believes she has around 2 episodes of watery diarrhea a day.  She denies taking any antibiotics prior to having the abdominal discomfort.  She also denies any recent travel or sick contacts.  She admits to a decreased appetite, diaphoresis, nausea, vomiting, and generalized weakness.  She denies any fever, chills, hematochezia/melena, dizziness or lightheadedness.    Upon arrival to the ED, vitals were temperature 97.4 °F, pulse 111, respirations 20, /88, SPO2 98% room air    In the emergency department the patient received p.o. aspirin 324 mg, IV morphine 2 mg, IV Zofran 4 mg, NS 1 L bolus.    Patient has been admitted to the telemetry floor as an observation patient for further evaluation and treatment.  ---------------------------------------------------------------------------------------------------------------------   Review of Systems   Constitutional: Positive for appetite change (decreased) and " diaphoresis. Negative for fever.   HENT: Negative for congestion and sore throat.    Respiratory: Negative for cough, shortness of breath and wheezing.    Cardiovascular: Negative for chest pain and palpitations.   Gastrointestinal: Positive for abdominal pain (LLQ), diarrhea, nausea and vomiting. Negative for constipation.   Genitourinary: Negative for dysuria and frequency.   Musculoskeletal: Positive for gait problem (uses cane ).   Skin: Negative for wound.   Neurological: Positive for weakness (generalized). Negative for dizziness and light-headedness.   Psychiatric/Behavioral: Negative for confusion.      ---------------------------------------------------------------------------------------------------------------------   Past Medical History:   Diagnosis Date   • Essential hypertension    • History of colon cancer      Past Surgical History:   Procedure Laterality Date   • COLON RESECTION  2004   • PARATHYROIDECTOMY Right     Parathyroid adenoma     Family History   Problem Relation Age of Onset   • Heart disease Mother    • Heart disease Father      Social History     Socioeconomic History   • Marital status: Single   Tobacco Use   • Smoking status: Never   Substance and Sexual Activity   • Alcohol use: Never   • Drug use: Never     ---------------------------------------------------------------------------------------------------------------------   Allergies:  Penicillins  ---------------------------------------------------------------------------------------------------------------------   Medications below are reported home medications pulling from within the system; at this time, these medications have not been reconciled unless otherwise specified and are in the verification process for further verifcation as current home medications.    Prior to Admission Medications     Prescriptions Last Dose Informant Patient Reported? Taking?    ciprofloxacin (CIPRO) 500 MG tablet   Yes No    Take 500 mg by mouth  2 (Two) Times a Day.    losartan (COZAAR) 25 MG tablet   Yes No    Take 25 mg by mouth 2 (Two) Times a Day.    metroNIDAZOLE (FLAGYL) 500 MG tablet   Yes No    Take 500 mg by mouth 2 (Two) Times a Day.    nisoldipine (SULAR) 8.5 MG 24 hr tablet   Yes No    Take 8.5 mg by mouth 3 (Three) Times a Day.        ---------------------------------------------------------------------------------------------------------------------    Objective     Hospital Scheduled Meds:    No current facility-administered medications for this encounter.      Current listed hospital scheduled medications may not yet reflect those currently placed in orders that are signed and held, awaiting patient's arrival to floor/unit.    ---------------------------------------------------------------------------------------------------------------------   Vital Signs:  Temp:  [97.4 °F (36.3 °C)] 97.4 °F (36.3 °C)  Heart Rate:  [] 97  Resp:  [20] 20  BP: (108-129)/(62-88) 125/66  Mean Arterial Pressure (Non-Invasive) for the past 24 hrs (Last 3 readings):   Noninvasive MAP (mmHg)   01/16/23 0426 84   01/16/23 0411 93   01/16/23 0356 83     SpO2 Percentage    01/16/23 0356 01/16/23 0411 01/16/23 0426   SpO2: 97% 94% 97%     SpO2:  [94 %-100 %] 97 %  on   ;   Device (Oxygen Therapy): room air    Body mass index is 26.63 kg/m².  Wt Readings from Last 3 Encounters:   01/15/23 72.6 kg (160 lb)     ---------------------------------------------------------------------------------------------------------------------   Physical Exam:  Physical Exam  Constitutional:       General: She is awake.      Appearance: Normal appearance. She is well-developed and normal weight.   HENT:      Head: Normocephalic and atraumatic.   Eyes:      General: Lids are normal.   Cardiovascular:      Rate and Rhythm: Normal rate and regular rhythm.      Pulses: Normal pulses.           Dorsalis pedis pulses are 2+ on the right side and 2+ on the left side.        Posterior  tibial pulses are 2+ on the right side and 2+ on the left side.      Heart sounds: Normal heart sounds. No murmur heard.    No friction rub. No gallop.   Pulmonary:      Effort: Pulmonary effort is normal.      Breath sounds: Normal breath sounds.   Abdominal:      General: Bowel sounds are normal.      Palpations: Abdomen is soft.      Tenderness: There is abdominal tenderness (Mild) in the left lower quadrant. There is no guarding.   Musculoskeletal:      Right lower leg: No edema.      Left lower leg: No edema.   Skin:     Findings: No ecchymosis or erythema.   Neurological:      General: No focal deficit present.      Mental Status: She is alert and oriented to person, place, and time. Mental status is at baseline.   Psychiatric:         Speech: Speech normal.         Behavior: Behavior is cooperative.         Cognition and Memory: Cognition normal.       ---------------------------------------------------------------------------------------------------------------------  EKG:    Pending cardiology read.  Per my evaluation, initial EKG with sinus rhythm with PACs, left bundle branch block, heart rate 98, QTc 474 MS.  Repeat EKG shows normal sinus rhythm with left bundle branch block, heart rate 98, QTc 477 MS.  No prior EKGs to compare to    Telemetry:    Normal sinus rhythm with occasional PACs, heart rate 90, SPO2 94% on room air    I have personally reviewed the EKG/Telemetry strip  ---------------------------------------------------------------------------------------------------------------------   Results from last 7 days   Lab Units 01/16/23  0444 01/16/23  0030   TROPONIN T ng/mL <0.010 <0.010           Results from last 7 days   Lab Units 01/16/23  0334 01/16/23  0030   CRP mg/dL  --  0.81*   LACTATE mmol/L 2.0 2.4*   WBC 10*3/mm3  --  10.30   HEMOGLOBIN g/dL  --  16.0*   HEMATOCRIT %  --  50.7*   MCV fL  --  94.6   MCHC g/dL  --  31.6   PLATELETS 10*3/mm3  --  269     Results from last 7 days   Lab  Units 01/16/23  0030   SODIUM mmol/L 137   POTASSIUM mmol/L 3.9   CHLORIDE mmol/L 97*   CO2 mmol/L 21.0*   BUN mg/dL 50*   CREATININE mg/dL 1.86*   CALCIUM mg/dL 11.0*   GLUCOSE mg/dL 134*   ALBUMIN g/dL 4.3   BILIRUBIN mg/dL 0.6   ALK PHOS U/L 86   AST (SGOT) U/L 17   ALT (SGPT) U/L 11   Estimated Creatinine Clearance: 21.3 mL/min (A) (by C-G formula based on SCr of 1.86 mg/dL (H)).  Ammonia   Date Value Ref Range Status   01/16/2023 23 11 - 51 umol/L Final     Pain Management Panel    There is no flowsheet data to display.       I have personally reviewed the above laboratory results.   ---------------------------------------------------------------------------------------------------------------------  Imaging Results (Last 7 Days)     Procedure Component Value Units Date/Time    CT Abdomen Pelvis With Contrast [[id number]] Collected: 01/16/23 0155     Updated: 01/16/23 0157    Narrative:      CT Abdomen Pelvis W    INDICATION:   Left lower quadrant pain with nausea and diarrhea    TECHNIQUE:   CT of the abdomen and pelvis with 100 cc of Isovue-300 IV contrast. Coronal and sagittal reconstructions were obtained.  Radiation dose reduction techniques included automated exposure control or exposure modulation based on body size. Count of known CT  and cardiac nuc med studies performed in previous 12 months: 0.     COMPARISON:   None available.    FINDINGS:  Abdomen: There is atelectasis with volume loss at both lung bases. 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 No upper abdominal solid organ abnormalities are seen.  Noted incidentally are bilateral renal cysts. No evidence of hydronephrosis or kidney stone disease. No evidence of retroperitoneal adenopathy. No distended bowel loops. No inflammatory changes around the gallbladder.    Pelvis: Normal appendix. Diverticulosis is seen but no acute inflammatory changes are seen in  the bowel. Postoperative changes are seen in the sigmoid. There are a few diverticula. There is advanced degenerative change in the lower thoracic and lumbar  discs. There is a grade 2 spondylolisthesis at L5-S1 with bilateral L5 pars defects.      Impression:      Diverticulosis. Postoperative abdomen. No acute inflammatory changes are seen.          Signer Name: Mumtaz Farias MD   Signed: 1/16/2023 1:55 AM   Workstation Name: [id number]    [facility]        I have personally reviewed the above radiology results.     ---------------------------------------------------------------------------------------------------------------------    Assessment & Plan      Active Hospital Problems    Diagnosis  POA   • **Left lower quadrant abdominal pain [R10.32]  Yes     #? gastroenteritis   #Metabolic anion gap acidosis  #Lactic acidosis  #History of colon cancer status post partial colon resection (2004), full details unknown  -Etiology of N/V/D is unclear at this time  -CT of abdomen/pelvis unremarkable  -GI panel and C. difficile ordered; patient has been unable to provide stool sample thus far  -Hold on antibiotics for now  -Anion gap is elevated at 19.0, suspect secondary to lactic acidosis  -Obtain venous blood gas to rule out systemic acidosis  -Initial lactate 2.4, has improved to 2.0  -Maintenance fluids ordered at 75 mL/h  -Repeat a.m. CMP and monitor for anion gap closure  -Supportive care for N/B with IV Zofran (corrected  MS in setting of LBBB)  -Monitor closely    #CLAYTON versus CKD stage IV  -No prior labs to compare to  -Creatinine admission 1.86, GFR 25.9  -Urine electrolytes ordered  -Continue with maintenance fluids at 75 mL/h as outlined above  -Attempt to obtain records from PCP to determine baseline creatinine  -Repeat a.m. CMP monitor renal function closely  -Avoid nephrotoxic agents as much as possible    #?  New left bundle branch block  -Initial and repeat EKG shows left  bundle branch block; no prior EKGs to compare to determine if this is new  -Patient denies any chest pain; troponin T negative x2  -Received loading dose aspirin in the ED  -Continue with daily aspirin and statin  -A.m. lipid panel ordered  -As stated above we will attempt to obtain records from PCP  -Monitor on telemetry  -May consider obtaining cardiology consult    #Hyperglycemia with no known history of diabetes mellitus  -Obtain hemoglobin A1c    #Polycythemia  -RBC 5.36, hemoglobin 16, hematocrit 50.7; suspect secondary to intravascular depletion  -Continue with maintenance fluids as outlined above  -Obtain peripheral blood smear    #Essential hypertension  -Review home medications once reconciled per pharmacy    #Advanced age  #Generalized weakness  -PT/OT   -Up with assistance, fall precautions    F/E/N: NS 75 mL/h. Replace electrolytes as necessary.  Clear liquid diet, advance as tolerated  ---------------------------------------------------  DVT Prophylaxis: Subcutaneous heparin  GI Prophylaxis: Protonix  Activity: Up with assistance, fall precautions    OBSERVATION status, however if further evaluation or treatment plans warrant, status may change.  Based upon current information, I predict patient's care encounter to be less than or equal to 2 midnights.    Code Status: FULL CODE     Disposition/Discharge planning: Plan for home at discharge.  Pending clinical course    I have discussed the patient's assessment and plan with the patient and attending physician      Alyce Lau PA-C  Hospitalist Service -- [facility]       01/16/23  05:57 EST    Attending Physician: Rick Dejesus MD

## 2023-01-16 NOTE — THERAPY EVALUATION
Acute Care - Speech Language Pathology   Swallow Initial Evaluation [facility]   CLINICAL DYSPHAGIA ASSESSMENT     Patient Name: Azra Ohara  : 1935  MRN: [id number]  Today's Date: 2023  Onset of Illness/Injury or Date of Surgery: 01/15/23     Referring Physician: Dr. Dejesus    Admit Date: 1/15/2023    Azra Ohara  is seen at bedside this pm on 3N to assess safety/efficacy of swallowing fnx, determine safest/least restrictive diet tolerance.     Ms. Ohara presented to [facility] with nausea and vomiting, left lower quadrant abdominal discomfort. She is admitted for further evaluation and management. She is currently pending C-Difficile rule out, in contact spore isolation.     PMH is significant for HTN. No previous SLP notes found in [facility] EMR.     She is referred to rule out dysphagia. She is currently on a full liquids po diet per GI status.     Social History     Socioeconomic History   • Marital status: Single   Tobacco Use   • Smoking status: Never   Substance and Sexual Activity   • Alcohol use: Never   • Drug use: Never      Imaging:   CT Abdomen Pelvis W     INDICATION:   Left lower quadrant pain with nausea and diarrhea     TECHNIQUE:   CT of the abdomen and pelvis with 100 cc of Isovue-300 IV contrast. Coronal and sagittal reconstructions were obtained.  Radiation dose reduction techniques included automated exposure control or exposure modulation based on body size. Count of known CT  and cardiac nuc med studies performed in previous 12 months: 0.      COMPARISON:   None available.     FINDINGS:  Abdomen: There is atelectasis with volume loss at both lung bases. 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 No upper abdominal solid organ abnormalities are seen.  Noted incidentally are bilateral renal cysts. No evidence of hydronephrosis or kidney stone disease. No evidence of retroperitoneal adenopathy. No  "distended bowel loops. No inflammatory changes around the gallbladder.     Pelvis: Normal appendix. Diverticulosis is seen but no acute inflammatory changes are seen in the bowel. Postoperative changes are seen in the sigmoid. There are a few diverticula. There is advanced degenerative change in the lower thoracic and lumbar  discs. There is a grade 2 spondylolisthesis at L5-S1 with bilateral L5 pars defects.     IMPRESSION:  Diverticulosis. Postoperative abdomen. No acute inflammatory changes are seen.      Signer Name: Mumtaz Farias MD   Signed: 1/16/2023 1:55 AM   Workstation Name: [id number]    [facility]    Diet Orders (active) (From admission, onward)     Start     Ordered    01/16/23 0710  Diet: Liquid Diets; Clear Liquid; Texture: Regular Texture (IDDSI 7); Fluid Consistency: Thin (IDDSI 0)  Diet Effective Now         01/16/23 0709              She is observed on ra w/o complications.     Ms. Ohara is positioned upright and centered in bed to accept multiple po presentations of ice chips, jell-o from her lunch tray, and thin water via spoon, cup and straw.  She is able to self feed.     Facial/oral structures are symmetrical upon observation. Lingual protrusion reveals no deviation. Oral mucosa are moist, pink, and clean. Secretions are clear, thin, and well controlled. OROM/BLAINE is generally weak to imitate oral postures. Gag is not assessed. Volitional cough is intact w/ adequate  intensity, clear in quality, non-productive. Voice is adequate in intensity, clear in quality w/ intelligible speech. She is a/a and interactive, follows directives, participates in conversational exchanges. She denies any h/o dysphagia, current dysphagia or odynophagia. She does endorse recent nausea with po intake,however, she states, \"I'm about 100% better today than I was before I came in here.\"     Upon po presentations, adequate bolus anticipation and acceptance w/ good labial seal for bolus " clearance via spoon bowl, cup rim stability and suction via straw. Bolus formation, manipulation and control are wfl. A-p transit is timely w/o oral residue. No overt s/s aspiration before the swallow.     Pharyngeal swallow is timely w/ adequate hyolaryngeal elevation per palpation. No overt s/s aspiration evidenced across this evaluation. No silent aspiration suspected. She denies odynophagia.    Visit Dx:     ICD-10-CM ICD-9-CM   1. Left lower quadrant abdominal pain  R10.32 789.04   2. Nausea vomiting and diarrhea  R11.2 787.91    R19.7 787.01   3. CLAYTON (acute kidney injury) (HCC)  N17.9 584.9     Patient Active Problem List   Diagnosis   • Left lower quadrant abdominal pain     Past Medical History:   Diagnosis Date   • Essential hypertension    • History of colon cancer      Past Surgical History:   Procedure Laterality Date   • COLON RESECTION  2004   • PARATHYROIDECTOMY Right     Parathyroid adenoma     EDUCATION  The patient has been educated in the following areas:   Dysphagia (Swallowing Impairment) Oral Care/Hydration upright for all po intake.    Impression: Ms. Ohara presents w/ wfl oropharyngeal swallow w/o s/s aspiration per this limited assessment with liquids/jell-o per current modified po diet per GI status. No s/s indicative of silent aspiration.  No odynophagia reported.      She is felt to most benefit from continued liquids po diet, advance as allowed per MD only as her GI symptoms allow. Advance to mechanical soft textures, whole foods, thin liquids. Medications whole in puree/thin liquids, single pill presentations only, please.Upright and centered for all po intake.     SLP Recommendation and Plan    1. Continue thin liquids po diet, advance as allowed per MD as her GI symptoms allow. Advance to mechanical soft textures, whole foods, thin liquids. This is her reported premorbid baseline po diet.   2. Medications whole in puree/thins. Single pill presentations only, please.   3. Upright and  centered for all po intake with universal aspiration precautions.   4. JEWELL precautions.  5. Oral care protocol.    D/w Ms. Ohara results and recommendations w/ verbal agreement.    Thank you for allowing me to participate in the care of your patient-  Jane Ventura M.S,. CCC/SLP                                                                                               Time Calculation:       Therapy Charges for Today     Code Description Service Date Service Provider Modifiers Qty    80984099901 HC ST EVAL ORAL PHARYNG SWALLOW 3 1/16/2023 Jane Ventura, MS CCC-SLP GN 1               Jane Ventura, MS CCC-SLP  1/16/2023

## 2023-01-16 NOTE — PLAN OF CARE
Goal Outcome Evaluation:           Progress: no change  Outcome Evaluation: Patient was a new admit this AM from the ER.  Assessment complete.  Continue current plan of care.

## 2023-01-16 NOTE — ED NOTES
Called [facility] medical records spoke with Luciana requesting labs and EKG per Dr. Dejesus from Pre epic 2008. Unable to obtain labs or EKG at this time.

## 2023-01-16 NOTE — PROGRESS NOTES
[facility] HOSPITALIST PROGRESS NOTE     Patient Identification:  Name:  Azra Ohara  Age:  87 y.o.  Sex:  female  :  1935  MRN:  [id number]  Visit Number:  [id number]  ROOM: [address] [place] [address]     Primary Care Provider:  Minal Salazar APRN    Length of stay:  0    Subjective        Chief Compliant Follow up left lower quadrant abdominal pain with nausea vomiting and diarrhea    Admitted after midnight. Patient seen and examined this afternoon with no family at bedside.  Patient states that she feels much better.  Left lower quadrant abdominal pain is almost resolved and denies any nausea vomiting.  No diarrhea today.  Tolerating diet.  Would like to advance to regular diet.  Afebrile no events overnight.  On room air.  Getting out of bed independently.       Objective     Current Hospital Meds:[START ON 2023] aspirin, 81 mg, Oral, Daily  atorvastatin, 40 mg, Oral, Nightly  [START ON 2023] cetirizine, 5 mg, Oral, Daily  cyanocobalamin, 1,000 mcg, Intramuscular, Q30 Days  heparin (porcine), 5,000 Units, Subcutaneous, Q12H  sodium chloride, 3 mL, Intravenous, Q12H    sodium chloride, 60 mL/hr, Last Rate: 60 mL/hr (23 4005)      ----------------------------------------------------------------------------------------------------------------------  Vital Signs:  Temp:  [97.2 °F (36.2 °C)-98.9 °F (37.2 °C)] 97.2 °F (36.2 °C)  Heart Rate:  [] 93  Resp:  [18-20] 18  BP: ()/(53-88) 96/66  SpO2:  [90 %-100 %] 90 %  on   ;   Device (Oxygen Therapy): room air  Body mass index is 26.92 kg/m².    Wt Readings from Last 3 Encounters:   23 73.4 kg (161 lb 12.8 oz)       Intake/Output Summary (Last 24 hours) at 2023  Last data filed at 2023  Gross per 24 hour   Intake 1960 ml   Output 20 ml   Net 1940 ml     Diet: Regular/House Diet, Cardiac Diets; Healthy Heart (2-3 Na+); Texture: Regular Texture (IDDSI 7); Fluid Consistency: Thin (IDDSI 0)  NPO Diet NPO  Type: Strict NPO  ----------------------------------------------------------------------------------------------------------------------  Physical exam:  General: Comfortable, Not in distress.  Well-developed and well-nourished.   HENT:  Head:  Normocephalic and atraumatic.  Mouth:  Moist mucous membranes.    Eyes:  Conjunctivae and EOM are normal.  Pupils are equal, round, and reactive to light.  No scleral icterus.    Neck:  Neck supple.  No JVD present.    Cardiovascular:  Normal rate, regular rhythm with no murmur.  Pulmonary/Chest:  No respiratory distress, no wheezes, no crackles, with normal breath sounds and good air movement.  Abdomen:  Soft.  Bowel sounds are normal.  No distension and no tenderness.   Musculoskeletal:  no tenderness, and no deformity.  No red or swollen joints anywhere.    Neurological:  Alert and oriented to person, place, and time.  No cranial nerve deficit. No focal deficits. No facial droop.  No slurred speech.   Skin:  Skin is warm and dry. No rash noted. No pallor.   Peripheral vascular:  pulses in all 4 extremities with no clubbing, no cyanosis, no edema.  Genitourinary: No grimaldo  ----------------------------------------------------------------------------------------------------------------------  ----------------------------------------------------------------------------------------------------------------------  Results from last 7 days   Lab Units 01/16/23  0334 01/16/23  0030   CRP mg/dL  --  0.81*   LACTATE mmol/L 2.0 2.4*   WBC 10*3/mm3  --  10.30   HEMOGLOBIN g/dL  --  16.0*   HEMATOCRIT %  --  50.7*   MCV fL  --  94.6   MCHC g/dL  --  31.6   PLATELETS 10*3/mm3  --  269     Results from last 7 days   Lab Units 01/16/23  0815 01/16/23  0030   SODIUM mmol/L  --  137   POTASSIUM mmol/L  --  3.9   MAGNESIUM mg/dL 2.0  --    CHLORIDE mmol/L  --  97*   CO2 mmol/L  --  21.0*   BUN mg/dL  --  50*   CREATININE mg/dL  --  1.86*   CALCIUM mg/dL  --  11.0*   GLUCOSE mg/dL  --  134*    ALBUMIN g/dL  --  4.3   BILIRUBIN mg/dL  --  0.6   ALK PHOS U/L  --  86   AST (SGOT) U/L  --  17   ALT (SGPT) U/L  --  11   Estimated Creatinine Clearance: 21.4 mL/min (A) (by C-G formula based on SCr of 1.86 mg/dL (H)).  Results from last 7 days   Lab Units 01/16/23  0815 01/16/23  0444 01/16/23  0030   TROPONIN T ng/mL <0.010 <0.010 <0.010     Hemoglobin A1C   Date/Time Value Ref Range Status   01/16/2023 0815 5.50 4.80 - 5.60 % Final     Glucose   Date/Time Value Ref Range Status   01/16/2023 0712 115 70 - 130 mg/dL Final     Comment:     Meter: [id number] : [id number] ADI CAGE     Ammonia   Date Value Ref Range Status   01/16/2023 23 11 - 51 umol/L Final       Results from last 7 days   Lab Units 01/16/23  0334   NITRITE UA  Negative   WBC UA /HPF 3-5*   BACTERIA UA /HPF None Seen   SQUAM EPITHEL UA /HPF 0-2     No results found for: BLOODCXNo results found for: RESPCXNo results found for: URINECXNo results found for: WOUNDCXNo results found for: BODYFLDCXNo results found for: STOOLCX  I have personally looked at the labs and they are summarized above.  ----------------------------------------------------------------------------------------------------------------------  Imaging Results (Last 24 Hours)     Procedure Component Value Units Date/Time    CT Abdomen Pelvis With Contrast [[id number]] Collected: 01/16/23 0155     Updated: 01/16/23 0157    Narrative:      CT Abdomen Pelvis W    INDICATION:   Left lower quadrant pain with nausea and diarrhea    TECHNIQUE:   CT of the abdomen and pelvis with 100 cc of Isovue-300 IV contrast. Coronal and sagittal reconstructions were obtained.  Radiation dose reduction techniques included automated exposure control or exposure modulation based on body size. Count of known CT  and cardiac nuc med studies performed in previous 12 months: 0.     COMPARISON:   None available.    FINDINGS:  Abdomen: There is atelectasis with volume loss at both lung bases. 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 No upper abdominal solid organ abnormalities are seen.  Noted incidentally are bilateral renal cysts. No evidence of hydronephrosis or kidney stone disease. No evidence of retroperitoneal adenopathy. No distended bowel loops. No inflammatory changes around the gallbladder.    Pelvis: Normal appendix. Diverticulosis is seen but no acute inflammatory changes are seen in the bowel. Postoperative changes are seen in the sigmoid. There are a few diverticula. There is advanced degenerative change in the lower thoracic and lumbar  discs. There is a grade 2 spondylolisthesis at L5-S1 with bilateral L5 pars defects.      Impression:      Diverticulosis. Postoperative abdomen. No acute inflammatory changes are seen.          Signer Name: Mumtaz Farias MD   Signed: 1/16/2023 1:55 AM   Workstation Name: [id number]    [facility] have personally reviewed the radiology images and read the final radiology report.    Assessment & Plan      Assessment:  Abdominal pain with diarrhea with H/O colon cancer, s/p partial colon resection (2004), full details unknown  CLAYTON with AG  Dehydration  LBBB on EKG  Essential HTN  Advanced age  Generalized weakness    Plan:  Abdominal pain with diarrhea with H/O colon cancer, s/p partial colon resection (2004), full details unknown, currently symptoms resolved.  We will advance diet to regular diet and monitor.  CT abdomen unrevealing.  C. difficile PCR and gastro PCR done this evening, report pending. patient afebrile and VSS.  No leukocytosis.  CRP minimally elevated and Pro-Carlos A normal.  Blood cultures pending.  Patient did take 2 rounds of antibiotic outpatient.     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  Baseline creatinine 1.4 in 09/22.  Currently 1.8.  We will continue with IV fluids and decrease it to 6 cc an hour.  Monitor labs in a.m. takes Voltaren  at home which will be discontinued at the time of discharge.    Dehydration, continue with IV fluids.  Monitor    LBBB on EKG, no chest pain and troponin negative*3.  No previous EKG available for comparison. continue with aspirin and statin.  2D echocardiogram with EF 56 to 60%.The following left ventricular wall segments are hypokinetic: apical anterior, apical lateral, apical inferior, apical septal and apex hypokinetic.  Since 2D echo with wall motion abnormality, will keep patient n.p.o. after midnight and order stress test for a.m. consult cardiology.  Systolic blood pressure 90 this evening so we will hold off on starting beta-blocker.     Essential HTN, BP overall controlled.  Hold losartan and amlodipine.     Generalized weakness, improving. Monitor.    Heparin subcu for DVT prophylaxis.    Management and plans discussed in detail with patient and nursing.     The patient is high risk due to the following diagnoses/reasons:  Abdominal pain with diarrhea    Disposition Home in 48 hrs if stable.     Trinh Toro MD  01/16/23  21:02 EST

## 2023-01-16 NOTE — ED PROVIDER NOTES
"Subjective   History of Present Illness  87-year-old female who is fully independent and lives at home alone presents by EMS for \"severe abdominal pain that I could just not take it anymore.\"  Patient states that she went to her PCP on Friday and patient physical examination was diagnosed with possible diverticulitis and placed on Cipro and Flagyl.  She been taking the medication daily as prescribed but states that she has continued to get worse and the pain is not improving.  She also reports nausea, watery diarrhea and decreased intake for roughly 6 to 7 days. Denies a fever or flank pain, or difficulty voiding.  Reports that she is very weak and dizzy when she attempts to stand.  She specifically denies chest pain or shortness of breath.  She denies a smoking history.  Patient's sister and brother also present at bedside who have been assisting in trying to take care of her for the past several days and states that patient has progressively worsened not been to tolerate oral intake due to the recurrent nausea and diarrhea.        Review of Systems   Constitutional: Positive for appetite change and fatigue.   Gastrointestinal: Positive for abdominal pain, diarrhea and nausea.   Neurological: Positive for weakness.   All other systems reviewed and are negative.      No past medical history on file.    Allergies   Allergen Reactions   • Penicillins Hives       No past surgical history on file.    No family history on file.    Social History     Socioeconomic History   • Marital status: Single           Objective   Physical Exam  Vitals and nursing note reviewed.   Constitutional:       General: She is not in acute distress.     Appearance: She is well-developed. She is not diaphoretic.      Comments: Patient does not appear clinically ill, but does express that she is in significant amount of pain on the left side of her abdomen.   HENT:      Head: Normocephalic and atraumatic.      Right Ear: External ear normal. "      Left Ear: External ear normal.      Nose: Nose normal.      Mouth/Throat:      Comments: Oral cavity is extremely dry with cracking and scaling lips  Eyes:      Extraocular Movements: Extraocular movements intact.      Conjunctiva/sclera: Conjunctivae normal.      Pupils: Pupils are equal, round, and reactive to light.   Neck:      Vascular: No JVD.      Trachea: No tracheal deviation.   Cardiovascular:      Rate and Rhythm: Normal rate and regular rhythm.      Heart sounds: Normal heart sounds. No murmur heard.  Pulmonary:      Effort: Pulmonary effort is normal. No respiratory distress.      Breath sounds: Normal breath sounds. No wheezing.   Abdominal:      General: Abdomen is flat. Bowel sounds are normal.      Palpations: Abdomen is soft.      Tenderness: There is abdominal tenderness in the left lower quadrant. There is no guarding or rebound.   Musculoskeletal:         General: No deformity. Normal range of motion.      Cervical back: Normal range of motion and neck supple.   Skin:     General: Skin is warm and dry.      Coloration: Skin is not pale.      Findings: No erythema or rash.   Neurological:      Mental Status: She is alert and oriented to person, place, and time.      Cranial Nerves: No cranial nerve deficit.   Psychiatric:         Behavior: Behavior normal.         Thought Content: Thought content normal.         Procedures        Results for orders placed or performed during the hospital encounter of 01/15/23   COVID-19 and FLU A/B PCR - Swab, Nasopharynx    Specimen: Nasopharynx; Swab   Result Value Ref Range    COVID19 Not Detected Not Detected - Ref. Range    Influenza A PCR Not Detected Not Detected    Influenza B PCR Not Detected Not Detected   Comprehensive Metabolic Panel    Specimen: Arm, Right; Blood   Result Value Ref Range    Glucose 134 (H) 65 - 99 mg/dL    BUN 50 (H) 8 - 23 mg/dL    Creatinine 1.86 (H) 0.57 - 1.00 mg/dL    Sodium 137 136 - 145 mmol/L    Potassium 3.9 3.5 - 5.2  mmol/L    Chloride 97 (L) 98 - 107 mmol/L    CO2 21.0 (L) 22.0 - 29.0 mmol/L    Calcium 11.0 (H) 8.6 - 10.5 mg/dL    Total Protein 8.3 6.0 - 8.5 g/dL    Albumin 4.3 3.5 - 5.2 g/dL    ALT (SGPT) 11 1 - 33 U/L    AST (SGOT) 17 1 - 32 U/L    Alkaline Phosphatase 86 39 - 117 U/L    Total Bilirubin 0.6 0.0 - 1.2 mg/dL    Globulin 4.0 gm/dL    A/G Ratio 1.1 g/dL    BUN/Creatinine Ratio 26.9 (H) 7.0 - 25.0    Anion Gap 19.0 (H) 5.0 - 15.0 mmol/L    eGFR 25.9 (L) >60.0 mL/min/1.73   Ammonia    Specimen: Hand, Right; Blood   Result Value Ref Range    Ammonia 23 11 - 51 umol/L   Lactic Acid, Plasma    Specimen: Arm, Right; Blood   Result Value Ref Range    Lactate 2.4 (C) 0.5 - 2.0 mmol/L   Procalcitonin    Specimen: Arm, Right; Blood   Result Value Ref Range    Procalcitonin 0.08 0.00 - 0.25 ng/mL   Sedimentation Rate    Specimen: Arm, Right; Blood   Result Value Ref Range    Sed Rate 24 0 - 30 mm/hr   C-reactive Protein    Specimen: Arm, Right; Blood   Result Value Ref Range    C-Reactive Protein 0.81 (H) 0.00 - 0.50 mg/dL   CBC Auto Differential    Specimen: Arm, Right; Blood   Result Value Ref Range    WBC 10.30 3.40 - 10.80 10*3/mm3    RBC 5.36 (H) 3.77 - 5.28 10*6/mm3    Hemoglobin 16.0 (H) 12.0 - 15.9 g/dL    Hematocrit 50.7 (H) 34.0 - 46.6 %    MCV 94.6 79.0 - 97.0 fL    MCH 29.9 26.6 - 33.0 pg    MCHC 31.6 31.5 - 35.7 g/dL    RDW 13.6 12.3 - 15.4 %    RDW-SD 47.4 37.0 - 54.0 fl    MPV 11.6 6.0 - 12.0 fL    Platelets 269 140 - 450 10*3/mm3    Neutrophil % 73.4 42.7 - 76.0 %    Lymphocyte % 19.5 (L) 19.6 - 45.3 %    Monocyte % 6.0 5.0 - 12.0 %    Eosinophil % 0.3 0.3 - 6.2 %    Basophil % 0.4 0.0 - 1.5 %    Immature Grans % 0.4 0.0 - 0.5 %    Neutrophils, Absolute 7.56 (H) 1.70 - 7.00 10*3/mm3    Lymphocytes, Absolute 2.01 0.70 - 3.10 10*3/mm3    Monocytes, Absolute 0.62 0.10 - 0.90 10*3/mm3    Eosinophils, Absolute 0.03 0.00 - 0.40 10*3/mm3    Basophils, Absolute 0.04 0.00 - 0.20 10*3/mm3    Immature Grans, Absolute  0.04 0.00 - 0.05 10*3/mm3    nRBC 0.0 0.0 - 0.2 /100 WBC   Scan Slide    Specimen: Arm, Right; Blood   Result Value Ref Range    Hypochromia Slight/1+ None Seen    Large Platelets Slight/1+ None Seen   ECG 12 Lead Other; abd pain   Result Value Ref Range    QT Interval 372 ms    QTC Interval 474 ms           ED Course  ED Course as of 01/25/23 1622   Mon Jan 16, 2023   0048 ECG 12 Lead Other; abd pain  Normal sinus rhythm with fusion complexes and premature atrial complexes with aberrant conduction  Left axis deviation with a left bundle branch block  Patient does not have a pacemaker  Ventricular rate 98     Electronically signed by Arabella Manrique DO, 01/16/23, 12:48 AM EST.    No previous EKG for comparison   [LK]   0101 Patient was given 4 mg of Zofran IVP x1 and 2 mg of morphine IVP x1 she continues to complain of lower abdominal pain but also complains of knee pain which is a chronic medical condition.      [LK]   0101 Patient was extremely dry on physical examination with grossly tracking the lips.  1 L of normal saline has been initiated also in the setting of decreased fluid intake and diarrhea for several days. [LK]   0123 Patient is creatinine today is 1.86 with a GFR 26 there is no previous labs for comparison however patient has had prominent amount of diarrhea in the past several days    Pt is currently receiving 1 L of normal saline  CT abdomen pelvis performed with contrast in the setting of severe left-sided abdominal pain that is worsening per patient's report and patient is acutely tender on examination.    Radiology confirmed that I wanted to proceed with contrast despite GFR of 26.  Patient is likely intravascularly volume depleted and hemoconcentrated given hemoglobin of 16 hematocrit 50.7, patiently patient's anion gap is elevated at 19 supporting the patient has intravascular volume depletion.  Lactic acid was also slightly elevated at 2.4. [LK]   0152 Sign out to   [facility]  -CT abdomen pelvis is pending-left lower quadrant laverne pain possible diverticulitis    Receiving 1 L of normal saline    Received 2 mg IV morphine for pain and 4 mg of Zofran [LK]   7581 I assumed patient's care from Dr. Manrique at the end of her shift.  Patient is resting, awake and alert, in no apparent acute distress.  There is moderate tenderness in the left lower quadrant, no other tenderness, no acute peritoneal signs.  I texted Dr. Dejesus for admission.  We have no previous labs on file for the patient.  Dr. 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 [CM]   2797 [facility] does not have these records anymore.  Patient states she has not been to any other facilities in the interim.  Dr. Dejesus now that this left bundle branch block has to be treated like an MI.  States that she needs aspirin and repeat EKG and troponin.  Patient has had no chest symptoms of any kind.  I offered to call cardiology for her, she did not request for me to do this. [CM]   5379 EKG #204 40 showed sinus rhythm, rate 98.  CT interval 186, QRS duration 134, QTc 477 ms.  Baseline artifact.  Left bundle branch block.  No evidence for STEMI. [CM]   0510 Second troponin is also less than 0.010.  I spoke with cardiology on-call, Dr. Padilla regarding the patient's situation.  He advises that he is not concerned about an acute cardiac event.  Dr. Dejesus notified of this. [CM]   9098 Oleg called, we discussed the case.  She is admitting patient to the hospitalist service. [CM]      ED Course User Index  [CM] Amador Escoto MD  [LK] Arabella Manrique DO                                           [facility]    Final diagnoses:   Left lower quadrant abdominal pain   Nausea   Decreased oral intake    Functional diarrhea       ED Disposition  ED Disposition     None          No follow-up provider specified.       Medication List      No changes were made to your prescriptions during this visit.          Arabella Manrique DO  01/25/23 4030

## 2023-01-16 NOTE — THERAPY EVALUATION
"Acute Care - Physical Therapy Initial Evaluation   [facility]     Patient Name: Azra Ohara  : 1935  MRN: [id number]  Today's Date: 2023   Onset of Illness/Injury or Date of Surgery: 01/15/23  Visit Dx:     ICD-10-CM ICD-9-CM   1. Left lower quadrant abdominal pain  R10.32 789.04   2. Nausea vomiting and diarrhea  R11.2 787.91    R19.7 787.01   3. LCAYTON (acute kidney injury) ([facility])  N17.9 584.9     Patient Active Problem List   Diagnosis   • Left lower quadrant abdominal pain     Past Medical History:   Diagnosis Date   • Essential hypertension    • History of colon cancer      Past Surgical History:   Procedure Laterality Date   • COLON RESECTION     • PARATHYROIDECTOMY Right     Parathyroid adenoma     PT Assessment (last 12 hours)     PT Evaluation and Treatment     Row Name 23 1319          Physical Therapy Time and Intention    Subjective Information no complaints  pt. reports she feelings \"100% better today\" and states [place] [facility] [place]  -AG     Document Type evaluation  -AG     Mode of Treatment physical therapy  -AG     Patient Effort good  -AG     Symptoms Noted During/After Treatment fatigue  -AG     Row Name 23 1319          General Information    Patient Profile Reviewed yes  -AG     Onset of Illness/Injury or Date of Surgery 01/15/23  -AG     Referring Physician Dr. Dejesus  -     Patient Observations agree to therapy;cooperative;alert  -AG     Prior Level of Function independent:;community mobility  reports PLOF as community distances with straight cane; drove, performed ADL independently.  -AG     Equipment Currently Used at Home cane, straight  -AG     Pertinent History of Current Functional Problem pt. admitted with L lower quadrant abdominal pain  -AG     Existing Precautions/Restrictions fall  -AG     Equipment Issued to Patient gait belt  -AG     Risks Reviewed patient:;increased discomfort;LOB;dizziness  -AG     Benefits Reviewed " patient:;improve function;increase independence;increase strength;increase balance;increase knowledge;decrease risk of DVT  -AG     Barriers to Rehab hearing deficit  -AG     Row Name 01/16/23 1319          Previous Level of Function/Home Environm    BADLs, Premorbid Functional Level independent  -AG     Bed Mobility, Premorbid Functional Level independent  -AG     Transfers, Premorbid Functional Level independent  -AG     Household Ambulation, Premorbid Functional Level independent;uses device or equipment  -AG     Community Ambulation, Premorbid Functional Level independent;uses device or equipment  -AG     Row Name 01/16/23 1319          Living Environment    Current Living Arrangements home  -AG     People in Home sibling(s)  -AG     Primary Care Provided by self  -AG     Row Name 01/16/23 1319          Home Use of Assistive/Adaptive Equipment    Equipment Currently Used at Home canewillian  -     Row Name 01/16/23 1319          Pain    Additional Documentation Pain Scale: FACES Pre/Post-Treatment (Group)  -AG     Row Name 01/16/23 1319          Pain Scale: FACES Pre/Post-Treatment    Pain: FACES Scale, Pretreatment 0-->no hurt  -AG     Posttreatment Pain Rating 0-->no hurt  -AG     Row Name 01/16/23 1319          Cognition    Affect/Mental Status (Cognition) WFL  -AG     Follows Commands (Cognition) WFL  -AG     Personal Safety Interventions fall prevention program maintained;gait belt;nonskid shoes/slippers when out of bed;supervised activity  -     Row Name 01/16/23 1319          Range of Motion (ROM)    Range of Motion --  B UE AROM WFL; B knee extension limited d/t flexion contractures and pain.  Pt. reports she has been told she needs knee replacements.  -AG     Row Name 01/16/23 1319          Sensory    Hearing Status hearing impairment, bilaterally  -AG     Row Name 01/16/23 1319          Vision Assessment/Intervention    Visual Impairment/Limitations WFL  -AG     Row Name 01/16/23 1319           Sensory Assessment (Somatosensory)    Sensory Assessment (Somatosensory) LE sensation intact  -AG     Row Name 01/16/23 1319          Mobility    Extremity Weight-bearing Status left lower extremity;right lower extremity  -AG     Left Lower Extremity (Weight-bearing Status) full weight-bearing (FWB)  -AG     Right Lower Extremity (Weight-bearing Status) full weight-bearing (FWB)  -AG     Row Name 01/16/23 1319          Bed Mobility    Bed Mobility rolling left;rolling right;scooting/bridging;supine-sit;sit-supine  -AG     Rolling Left [place] (Bed Mobility) independent  -AG     Scooting/Bridging [place] (Bed Mobility) independent  -AG     Supine-Sit [place] (Bed Mobility) independent  -AG     Assistive Device (Bed Mobility) bed rails  -AG     Row Name 01/16/23 1319          Transfers    Transfers sit-stand transfer;stand-sit transfer;stand pivot/stand step transfer  -AG     Row Name 01/16/23 1319          Sit-Stand Transfer    Sit-Stand [place] (Transfers) minimum assist (75% patient effort)  -AG     Assistive Device (Sit-Stand Transfers) walker, front-wheeled  -AG     Row Name 01/16/23 1319          Stand-Sit Transfer    Stand-Sit [place] (Transfers) nonverbal cues (demo/gesture);verbal cues;minimum assist (75% patient effort)  -AG     Assistive Device (Stand-Sit Transfers) walker, front-wheeled  -AG     Row Name 01/16/23 1319          Stand Pivot/Stand Step Transfer    Stand Pivot/Stand Step [place] (Transfers) verbal cues;nonverbal cues (demo/gesture);contact guard  -AG     Assistive Device (Stand Pivot Stand Step Transfer) walker, front-wheeled  -AG     Row Name 01/16/23 1319          Gait/Stairs (Locomotion)    [place] Level (Gait) verbal cues;nonverbal cues (demo/gesture);contact guard  -AG     Assistive Device (Gait) walker, front-wheeled  -AG     Distance in Feet (Gait) 40  -AG     Pattern (Gait) step-through  -AG     Deviations/Abnormal Patterns (Gait) base of support,  wide;stride length decreased  -AG     Bilateral Gait Deviations forward flexed posture  -AG     Comment, (Gait/Stairs) pt. ambulated numerous laps within room d/t contact/ spore precautions.  Verbal cues required to keep JAZLYN within walker and for safe navigation around obstacles.  One occasional of mild LOB while turning with CGA/ min A to correct.  -     Row Name 01/16/23 1319          Safety Issues, Functional Mobility    Impairments Affecting Function (Mobility) balance;endurance/activity tolerance;range of motion (ROM)  -     Row Name 01/16/23 1319          Balance    Balance Assessment sitting static balance;sitting dynamic balance;sit to stand dynamic balance;standing dynamic balance;standing static balance  -     Static Sitting Balance independent  -AG     Position, Sitting Balance sitting edge of bed;sitting in chair  -AG     Static Standing Balance verbal cues;non-verbal cues (demo/gesture);contact guard;standby assist  -AG     Dynamic Standing Balance verbal cues;non-verbal cues (demo/gesture);contact guard  -AG     Position/Device Used, Standing Balance walker, front-wheeled  -     Row Name 01/16/23 1319          Motor Skills    Motor Skills coordination;neuro-muscular function;motor control/coordination interventions  -     Coordination gross motor deficit;bilateral;minimal impairment  -     Row Name 01/16/23 1319          Coping    Observed Emotional State calm;cooperative  -     Verbalized Emotional State acceptance  -     Trust Relationship/Rapport care explained;choices provided;thoughts/feelings acknowledged  -     Family/Support Persons family;sibling  -AG     Involvement in Care not present at bedside  -     Family/Support System Care support provided;self-care encouraged  -     Row Name 01/16/23 1319          Plan of Care Review    Plan of Care Reviewed With patient  -AG     Outcome Evaluation PT evaluation complete.  Pt. independent for bed mobility, min A for STS and  ambulate within room w/ RW, cues for balance and gait safety.  Pt. will benefit from continued skilled PT to reduce fall risk.  Recommend outpatient PT following d/c if transportation is available.  -AG     Row Name 01/16/23 1319          Positioning and Restraints    Pre-Treatment Position in bed  -AG     Post Treatment Position chair  -AG     In Chair sitting;call light within reach;encouraged to call for assist;notified nsg  -AG     Row Name 01/16/23 1319          Therapy Assessment/Plan (PT)    Patient/Family Therapy Goals Statement (PT) return home with assistance from family  -AG     Functional Level at Time of Evaluation (PT) min A  -AG     PT Diagnosis (PT) decr balance and gait safety  -AG     Rehab Potential (PT) good, to achieve stated therapy goals  -AG     Criteria for Skilled Interventions Met (PT) yes;meets criteria  -AG     Therapy Frequency (PT) 3 times/wk  -AG     Predicted Duration of Therapy Intervention (PT) LOS  -AG     Problem List (PT) problems related to;balance;mobility;hearing;strength;range of motion (ROM);coordination  -AG     Activity Limitations Related to Problem List (PT) unable to ambulate safely;unable to transfer safely;BADLs not performed adequately or safely  -AG     Row Name 01/16/23 1319          Therapy Plan Review/Discharge Plan (PT)    Therapy Plan Review (PT) evaluation/treatment results reviewed;care plan/treatment goals reviewed;risks/benefits reviewed;current/potential barriers reviewed;participants voiced agreement with care plan;participants included;patient  -AG     Patient/Family Concerns, Equipment Needs at Discharge (PT) needs RW, BS  -AG     Row Name 01/16/23 1319          Physical Therapy Goals    Transfer Goal Selection (PT) transfer, PT goal 1  -AG     Gait Training Goal Selection (PT) gait training, PT goal 1  -AG     Row Name 01/16/23 1319          Transfer Goal 1 (PT)    Activity/Assistive Device (Transfer Goal 1, PT)  sit-to-stand/stand-to-sit;bed-to-chair/chair-to-bed;toilet  -AG     [place] Level/Cues Needed (Transfer Goal 1, PT) standby assist  -AG     Time Frame (Transfer Goal 1, PT) by discharge  -     Row Name 01/16/23 1319          Gait Training Goal 1 (PT)    Activity/Assistive Device (Gait Training Goal 1, PT) gait (walking locomotion);assistive device use;decrease fall risk;diminish gait deviation;improve balance and speed;increase endurance/gait distance;walker, rolling  -AG     [place] Level (Gait Training Goal 1, PT) standby assist  -AG     Distance (Gait Training Goal 1, PT) 60  -AG     Time Frame (Gait Training Goal 1, PT) by discharge  -AG           User Key  (r) = Recorded By, (t) = Taken By, (c) = Cosigned By    Initials Name Provider Type    Montserrat Nails, PT Physical Therapist                Physical Therapy Education     Title: PT OT SLP Therapies (Done)     Topic: Physical Therapy (Done)     Point: Mobility training (Done)     Learning Progress Summary           Patient Acceptance, E,D, VU,NR by  at 1/16/2023 1318                   Point: Home exercise program (Done)     Learning Progress Summary           Patient Acceptance, E,D, VU,NR by  at 1/16/2023 1318                   Point: Body mechanics (Done)     Learning Progress Summary           Patient Acceptance, E,D, VU,NR by  at 1/16/2023 1318                   Point: Precautions (Done)     Learning Progress Summary           Patient Acceptance, E,D, VU,NR by  at 1/16/2023 1318                               User Key     Initials Effective Dates Name Provider Type [facility] 06/16/21 -  Montserrat Gale, PT Physical Therapist PT              PT Recommendation and Plan  Anticipated Discharge Disposition (PT): home with assist, home with outpatient therapy services  Planned Therapy Interventions (PT): balance training, bed mobility training, gait training, home exercise program, transfer training, strengthening, ROM (range of  motion), postural re-education, patient/family education, neuromuscular re-education  Therapy Frequency (PT): 3 times/wk  Plan of Care Reviewed With: patient  Outcome Evaluation: PT evaluation complete.  Pt. independent for bed mobility, min A for STS and ambulate within room w/ RW, cues for balance and gait safety.  Pt. will benefit from continued skilled PT to reduce fall risk.  Recommend outpatient PT following d/c if transportation is available.       Time Calculation:    PT Charges     Row Name 01/16/23 1317             Time Calculation    PT Received On 01/16/23 -AG      PT Goal Re-Cert Due Date 01/30/23 -AG            User Key  (r) = Recorded By, (t) = Taken By, (c) = Cosigned By    Initials Name Provider Type     Montserrat Gale, PT Physical Therapist              Therapy Charges for Today     Code Description Service Date Service Provider Modifiers Qty    29312145599 HC PT EVAL LOW COMPLEXITY 4 1/16/2023 Montserrat Gale, PT GP 1          PT G-Codes  AM-PAC 6 Clicks Score (PT): 18    Montserrat Gale PT  1/16/2023

## 2023-01-16 NOTE — ED NOTES
Report given to Emily on [address], nurse requesting for me to give her time to get room clean, she would call back once it is clean to bring patient up.

## 2023-01-17 LAB
A-A DO2: 21.4 MMHG (ref 0–300)
ALBUMIN SERPL-MCNC: 2 G/DL (ref 3.5–5.2)
ALBUMIN/GLOB SERPL: 0.7 G/DL
ALP SERPL-CCNC: 72 U/L (ref 39–117)
ALT SERPL W P-5'-P-CCNC: 11 U/L (ref 1–33)
AMMONIA BLD-SCNC: 13 UMOL/L (ref 11–51)
ANION GAP SERPL CALCULATED.3IONS-SCNC: 10 MMOL/L (ref 5–15)
ARTERIAL PATENCY WRIST A: ABNORMAL
AST SERPL-CCNC: 21 U/L (ref 1–32)
ATMOSPHERIC PRESS: 722 MMHG
BASE EXCESS BLDA CALC-SCNC: -1 MMOL/L (ref 0–2)
BASOPHILS # BLD AUTO: 0.03 10*3/MM3 (ref 0–0.2)
BASOPHILS NFR BLD AUTO: 0.3 % (ref 0–1.5)
BDY SITE: ABNORMAL
BILIRUB SERPL-MCNC: 0.2 MG/DL (ref 0–1.2)
BODY TEMPERATURE: 0 C
BUN SERPL-MCNC: 41 MG/DL (ref 8–23)
BUN/CREAT SERPL: 26.3 (ref 7–25)
CALCIUM SPEC-SCNC: 8 MG/DL (ref 8.6–10.5)
CHLORIDE SERPL-SCNC: 114 MMOL/L (ref 98–107)
CHOLEST SERPL-MCNC: 78 MG/DL (ref 0–200)
CO2 BLDA-SCNC: 23.2 MMOL/L (ref 22–33)
CO2 SERPL-SCNC: 16 MMOL/L (ref 22–29)
COHGB MFR BLD: 0.8 % (ref 0–5)
CREAT SERPL-MCNC: 1.56 MG/DL (ref 0.57–1)
DEPRECATED RDW RBC AUTO: 47.7 FL (ref 37–54)
EGFRCR SERPLBLD CKD-EPI 2021: 32 ML/MIN/1.73
EOSINOPHIL # BLD AUTO: 0.11 10*3/MM3 (ref 0–0.4)
EOSINOPHIL NFR BLD AUTO: 1.1 % (ref 0.3–6.2)
ERYTHROCYTE [DISTWIDTH] IN BLOOD BY AUTOMATED COUNT: 14 % (ref 12.3–15.4)
GLOBULIN UR ELPH-MCNC: 2.8 GM/DL
GLUCOSE SERPL-MCNC: 96 MG/DL (ref 65–99)
HCO3 BLDA-SCNC: 22.2 MMOL/L (ref 20–26)
HCT VFR BLD AUTO: 40.2 % (ref 34–46.6)
HCT VFR BLD CALC: 43.7 % (ref 38–51)
HDLC SERPL-MCNC: 35 MG/DL (ref 40–60)
HGB BLD-MCNC: 13.1 G/DL (ref 12–15.9)
HGB BLDA-MCNC: 14.3 G/DL (ref 13.5–17.5)
IMM GRANULOCYTES # BLD AUTO: 0.04 10*3/MM3 (ref 0–0.05)
IMM GRANULOCYTES NFR BLD AUTO: 0.4 % (ref 0–0.5)
INHALED O2 CONCENTRATION: 21 %
LDLC SERPL CALC-MCNC: 24 MG/DL (ref 0–100)
LDLC/HDLC SERPL: 0.67 {RATIO}
LYMPHOCYTES # BLD AUTO: 2.06 10*3/MM3 (ref 0.7–3.1)
LYMPHOCYTES NFR BLD AUTO: 21.3 % (ref 19.6–45.3)
Lab: ABNORMAL
MAGNESIUM SERPL-MCNC: 2 MG/DL (ref 1.6–2.4)
MCH RBC QN AUTO: 30.5 PG (ref 26.6–33)
MCHC RBC AUTO-ENTMCNC: 32.6 G/DL (ref 31.5–35.7)
MCV RBC AUTO: 93.7 FL (ref 79–97)
METHGB BLD QL: 0.6 % (ref 0–3)
MODALITY: ABNORMAL
MONOCYTES # BLD AUTO: 0.58 10*3/MM3 (ref 0.1–0.9)
MONOCYTES NFR BLD AUTO: 6 % (ref 5–12)
NEUTROPHILS NFR BLD AUTO: 6.87 10*3/MM3 (ref 1.7–7)
NEUTROPHILS NFR BLD AUTO: 70.9 % (ref 42.7–76)
NOTE: ABNORMAL
NRBC BLD AUTO-RTO: 0 /100 WBC (ref 0–0.2)
OXYHGB MFR BLDV: 95.6 % (ref 94–99)
PCO2 BLDA: 32.3 MM HG (ref 35–45)
PCO2 TEMP ADJ BLD: ABNORMAL MM[HG]
PH BLDA: 7.45 PH UNITS (ref 7.35–7.45)
PH, TEMP CORRECTED: ABNORMAL
PLATELET # BLD AUTO: 208 10*3/MM3 (ref 140–450)
PMV BLD AUTO: 11.1 FL (ref 6–12)
PO2 BLDA: 83.9 MM HG (ref 83–108)
PO2 TEMP ADJ BLD: ABNORMAL MM[HG]
POTASSIUM SERPL-SCNC: 4.1 MMOL/L (ref 3.5–5.2)
PROT SERPL-MCNC: 4.8 G/DL (ref 6–8.5)
QT INTERVAL: 380 MS
QTC INTERVAL: 454 MS
RBC # BLD AUTO: 4.29 10*6/MM3 (ref 3.77–5.28)
SAO2 % BLDCOA: 97 % (ref 94–99)
SODIUM SERPL-SCNC: 140 MMOL/L (ref 136–145)
TRIGL SERPL-MCNC: 98 MG/DL (ref 0–150)
VENTILATOR MODE: ABNORMAL
VLDLC SERPL-MCNC: 19 MG/DL (ref 5–40)
WBC NRBC COR # BLD: 9.69 10*3/MM3 (ref 3.4–10.8)

## 2023-01-17 PROCEDURE — 85025 COMPLETE CBC W/AUTO DIFF WBC: CPT | Performed by: PHYSICIAN ASSISTANT

## 2023-01-17 PROCEDURE — 83050 HGB METHEMOGLOBIN QUAN: CPT

## 2023-01-17 PROCEDURE — 82805 BLOOD GASES W/O2 SATURATION: CPT

## 2023-01-17 PROCEDURE — 25010000002 HEPARIN (PORCINE) PER 1000 UNITS: Performed by: INTERNAL MEDICINE

## 2023-01-17 PROCEDURE — 82375 ASSAY CARBOXYHB QUANT: CPT

## 2023-01-17 PROCEDURE — 99232 SBSQ HOSP IP/OBS MODERATE 35: CPT | Performed by: HOSPITALIST

## 2023-01-17 PROCEDURE — 83735 ASSAY OF MAGNESIUM: CPT | Performed by: PHYSICIAN ASSISTANT

## 2023-01-17 PROCEDURE — 80053 COMPREHEN METABOLIC PANEL: CPT | Performed by: PHYSICIAN ASSISTANT

## 2023-01-17 PROCEDURE — 99222 1ST HOSP IP/OBS MODERATE 55: CPT | Performed by: INTERNAL MEDICINE

## 2023-01-17 PROCEDURE — 80061 LIPID PANEL: CPT | Performed by: PHYSICIAN ASSISTANT

## 2023-01-17 PROCEDURE — 82140 ASSAY OF AMMONIA: CPT | Performed by: PHYSICIAN ASSISTANT

## 2023-01-17 PROCEDURE — 36600 WITHDRAWAL OF ARTERIAL BLOOD: CPT

## 2023-01-17 RX ORDER — CHOLECALCIFEROL (VITAMIN D3) 125 MCG
5 CAPSULE ORAL NIGHTLY
Status: DISCONTINUED | OUTPATIENT
Start: 2023-01-17 | End: 2023-01-19 | Stop reason: HOSPADM

## 2023-01-17 RX ORDER — OLANZAPINE 5 MG/1
5 TABLET, ORALLY DISINTEGRATING ORAL NIGHTLY PRN
Status: DISCONTINUED | OUTPATIENT
Start: 2023-01-17 | End: 2023-01-19 | Stop reason: HOSPADM

## 2023-01-17 RX ADMIN — ATORVASTATIN CALCIUM 40 MG: 40 TABLET, FILM COATED ORAL at 21:03

## 2023-01-17 RX ADMIN — SODIUM CHLORIDE, PRESERVATIVE FREE 3 ML: 5 INJECTION INTRAVENOUS at 08:49

## 2023-01-17 RX ADMIN — HEPARIN SODIUM 5000 UNITS: 5000 INJECTION INTRAVENOUS; SUBCUTANEOUS at 08:49

## 2023-01-17 RX ADMIN — SODIUM CHLORIDE, PRESERVATIVE FREE 3 ML: 5 INJECTION INTRAVENOUS at 21:04

## 2023-01-17 RX ADMIN — SODIUM CHLORIDE 50 ML/HR: 9 INJECTION, SOLUTION INTRAVENOUS at 17:47

## 2023-01-17 NOTE — PLAN OF CARE
Goal Outcome Evaluation:  Pt resting in bed. Pt confused this shift. Pt pulled out IV and tried several times to get out of bed. Will continue plan of care.

## 2023-01-17 NOTE — PLAN OF CARE
"Goal Outcome Evaluation:              Outcome Evaluation: Pt AxOx4, but making comments about relatives and other people that are not in the room. Pt denies visual hallucinations, when asked pt if she was hearing things, pt said \"not anymore.\" PA notified; see orders. No other acute changes noted. Will cont POC  "

## 2023-01-17 NOTE — CONSULTS
"Date of Admit: 1/15/2023  Date of Consult: 01/17/23  No ref. provider found          Assessment      1. Left bundle branch block of unknown duration.  Patient denies any recent anginal symptoms  2. Abnormal echo Doppler study with regional wall motion abnormalities with hypokinesis of the apical anterior, apical lateral and apical inferior myocardial segments which in part could be due to left bundle branch block although cannot exclude underlying occult ischemic/coronary artery disease  3. Intermittent confusion of unclear etiology.  4. Abdominal pains of unclear etiology.    Recommendations     1. With patient's confusion and acute kidney injury and having no anginal symptoms and negative cardiac markers x3, there is no urgent need for ischemic evaluation at this time.  We will consider this later on as patient's mental status and kidney function improves adequately.  2. For now we will go ahead and add a low-dose aspirin as tolerated.  3. Check fasting lipid panel and consider adding a statin if needed.    Reason for consultation: Abnormal ECHO and LBBB    Subjective     Subjective     History of Present Illness    Azra Ohara is a 87 y.o. 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 Patient presented to [facility] ([facility] emergency room (ER) on 1/15/2023 with complaints of nausea and left lower abdominal pain that started about 7 days PTA. She reported she seen her PCP for this and was treated for an \"abodominal infection\" with antibiotics and some solution to help her have a bowel movement. Despite this treatment, her symptoms did not get better and has actually got worse over time.  She did report a history of diarrhea x 2 weeks prior to seeing her PCP.  She reported she had at least 2 episodes of watery diarrhea / day.  She denies prior antibiotics other than " "the ones in the last week, denies recent travel or exposures to illnesses, fever, chills dizziness, lightheadedness, hematochezia or melena.   EKG x 2 in the ER revealed a LBBB and were SR with PACs noted. Patient was admitted. Along with the LBBB, she had an abnormal ECHO done on 01/15/2023 and Cardiology has been consulted for further evaluation and management.   Patient is lying in bed resting quietly with her eyes closed. She was easily awakened with verbal and tactile stimuli.  She denies chest pain and shortness of breath at this time. She denies any past heart conditions such as an MI or heart blockages, She reports being see here at [facility] about 6 weeks ago for diarrhea but there no record of that visit in the chart. She reports she moved to [place], [place] from [place] in June of 2022 and started to almost cry when she mentioned this. She repeatedly mentions how she cannot trust people stating, \"I don't associate with people I cannot trust and you cannot trust just anyone.\" Her nurse come to bedside and reports she has had increased confusion since the evening on 01/16/2023.     Cardiac risk factors:hypertension    Last Echo: Results for orders placed during the hospital encounter of 01/15/23    Adult Transthoracic Echo Complete W/ Cont if Necessary Per Protocol    Interpretation Summary  •  Normal left ventricular cavity size and wall thickness noted  •  Left ventricular ejection fraction appears to be 56 - 60%.  •  The following left ventricular wall segments are hypokinetic: apical anterior, apical lateral, apical inferior, apical septal and apex hypokinetic.  •  Left ventricular diastolic function is consistent with (grade I) impaired relaxation.  •  There is mild calcification of the aortic valve. There is mild thickening of the non-coronary, left coronary and right coronary cusp(s) of the aortic valve. The aortic valve appears trileaflet.  •  There is mild calcification of the mitral valve posterior " leaflet(s). Trace mitral valve regurgitation is present. No significant mitral valve stenosis is present.  •  Trace tricuspid valve regurgitation is present. Estimated right ventricular systolic pressure from tricuspid regurgitation is mildly elevated (35-45 mmHg).  •  There is no evidence of pericardial effusion.     Last Stress: No results found for this or any previous visit.    Last Cath: No results found for this or any previous visit.     Past Medical History:   Diagnosis Date   • Essential hypertension    • History of colon cancer      Past Surgical History:   Procedure Laterality Date   • COLON RESECTION  2004   • PARATHYROIDECTOMY Right     Parathyroid adenoma     Family History   Problem Relation Age of Onset   • Heart disease Mother    • Heart disease Father      Social History     Tobacco Use   • Smoking status: Never   Substance Use Topics   • Alcohol use: Never   • Drug use: Never     Medications Prior to Admission   Medication Sig Dispense Refill Last Dose   • cetirizine (zyrTEC) 10 MG tablet Take 10 mg by mouth 2 (Two) Times a Day.   Unknown   • ciprofloxacin (CIPRO) 500 MG tablet Take 500 mg by mouth 2 (Two) Times a Day.   Unknown   • cyanocobalamin 1000 MCG/ML injection Inject 1,000 mcg into the appropriate muscle as directed by prescriber Every 30 (Thirty) Days.   Unknown   • diclofenac (VOLTAREN) 75 MG EC tablet Take 75 mg by mouth 2 (Two) Times a Day As Needed.   Unknown   • Diclofenac Sodium (VOLTAREN) 1 % gel gel Apply 2 g topically to the appropriate area as directed 4 (Four) Times a Day As Needed.   Unknown   • losartan (COZAAR) 25 MG tablet Take 25 mg by mouth 2 (Two) Times a Day.   Unknown   • metroNIDAZOLE (FLAGYL) 500 MG tablet Take 500 mg by mouth 2 (Two) Times a Day.   Unknown   • nisoldipine (SULAR) 8.5 MG 24 hr tablet Take 8.5 mg by mouth 2 (Two) Times a Day.   Unknown   • Omega-3 Fatty Acids (fish oil) 1000 MG capsule capsule Take 1,000 mg by mouth 2 (Two) Times a Day With Meals.    "Unknown   • ondansetron ODT (ZOFRAN-ODT) 4 MG disintegrating tablet Place 4 mg on the tongue 2 (Two) Times a Day As Needed for Nausea or Vomiting.   Unknown   • polyethylene glycol (MIRALAX) 17 g packet Take 17 g by mouth 2 (Two) Times a Day As Needed.   Unknown     Allergies:  Penicillins    Review of Systems   Constitutional: Positive for chills and fever. Negative for activity change and diaphoresis.        Patient reported intermittent fever and chills \"all the time.\"   HENT: Negative for facial swelling and trouble swallowing.    Eyes: Negative for visual disturbance.   Respiratory: Negative for shortness of breath.    Cardiovascular: Positive for chest pain. Negative for palpitations and leg swelling.   Gastrointestinal: Positive for abdominal pain, diarrhea, nausea and vomiting.   Genitourinary: Negative for hematuria.   Musculoskeletal: Negative for arthralgias.   Skin: Negative for color change.   Neurological: Negative for dizziness, syncope and light-headedness.   Hematological: Negative for adenopathy.   Psychiatric/Behavioral: Negative for agitation and behavioral problems.       Objective     Objective      Vital Signs  Temp:  [97.2 °F (36.2 °C)] 97.2 °F (36.2 °C)  Heart Rate:  [] 88  Resp:  [18] 18  BP: ()/(65-74) 133/65  Vital Signs (last 72 hrs)       01/14 0700  01/15 0659 01/15 0700  01/16 0659 01/16 0700  01/17 0659 01/17 0700  01/17 0757   Most Recent      Temp (°F)     97.4    97.2 -  98.9       97.2 (36.2) 01/17 0247    Heart Rate   90 -  111    87 -  110       88 01/17 0247    Resp     20    18 -  20       18 01/17 0247    BP   108/67 -  129/72    96/66 -  148/74       133/65 01/17 0247    SpO2 (%)   94 -  100    90 -  96       90 01/16 1845        Body mass index is 27.59 kg/m².    Intake/Output Summary (Last 24 hours) at 1/17/2023 0757  Last data filed at 1/16/2023 2100  Gross per 24 hour   Intake 1080 ml   Output 20 ml   Net 1060 ml     Physical Exam  Constitutional:       " Appearance: Normal appearance.   HENT:      Head: Normocephalic and atraumatic.      Comments: Hard of hearing     Nose: Nose normal.      Mouth/Throat:      Mouth: Mucous membranes are moist.      Pharynx: Oropharynx is clear.   Eyes:      Conjunctiva/sclera: Conjunctivae normal.   Cardiovascular:      Rate and Rhythm: Normal rate and regular rhythm.      Pulses: Normal pulses.      Heart sounds: Normal heart sounds.   Pulmonary:      Effort: Pulmonary effort is normal.      Breath sounds: Normal breath sounds.   Abdominal:      General: Bowel sounds are normal.      Palpations: Abdomen is soft.   Musculoskeletal:         General: Normal range of motion.      Cervical back: Normal range of motion.   Skin:     General: Skin is warm and dry.   Neurological:      General: No focal deficit present.      Mental Status: She is alert.      Comments: Seems alert and oriented to person and some past events    Psychiatric:         Mood and Affect: Mood normal.         Behavior: Behavior normal.           Results review     Results Review:    I have reviewed the patient's new clinical results.  Results from last 7 days   Lab Units 01/16/23  0815 01/16/23  0444 01/16/23  0030   TROPONIN T ng/mL <0.010 <0.010 <0.010     Results from last 7 days   Lab Units 01/17/23  0304 01/16/23  0030   WBC 10*3/mm3 9.69 10.30   HEMOGLOBIN g/dL 13.1 16.0*   PLATELETS 10*3/mm3 208 269     Results from last 7 days   Lab Units 01/17/23  0231 01/16/23  0030   SODIUM mmol/L 140 137   POTASSIUM mmol/L 4.1 3.9   CHLORIDE mmol/L 114* 97*   CO2 mmol/L 16.0* 21.0*   BUN mg/dL 41* 50*   CREATININE mg/dL 1.56* 1.86*   CALCIUM mg/dL 8.0* 11.0*   GLUCOSE mg/dL 96 134*   ALT (SGPT) U/L 11 11   AST (SGOT) U/L 21 17     No results found for: INR  Lab Results   Component Value Date    MG 2.0 01/17/2023    MG 2.0 01/16/2023     Lab Results   Component Value Date    TSH 0.784 01/16/2023    TRIG 98 01/17/2023    HDL 35 (L) 01/17/2023    LDL 24 01/17/2023      No  results found for: PROBNP    EC2023 @ 00:48      2023 @ 13:07      ECG/EMG Results (last 24 hours)     Procedure Component Value Units Date/Time    ECG 12 Lead Other; abd pain [[id number]] Collected: 23 0048     Updated: 23 1213     QT Interval 372 ms      QTC Interval 474 ms     Narrative:      Test Reason : Other~  Blood Pressure :   */*   mmHG  Vent. Rate :  98 BPM     Atrial Rate :  98 BPM     P-R Int : 186 ms          QRS Dur : 140 ms      QT Int : 372 ms       P-R-T Axes :  -8 -65  90 degrees     QTc Int : 474 ms    Sinus rhythm with premature atrial complexes with aberrant conduction  Left axis deviation  Left bundle branch block  Abnormal ECG  No previous ECGs available  Confirmed by Nichole Jean () on 2023 12:13:17 PM    Referred By:            Confirmed By: Nichole Jean    ECG 12 Lead Other; Requested by Dr. Dejesus [[id number]] Collected: 23     Updated: 23     QT Interval 374 ms      QTC Interval 477 ms     Narrative:      Test Reason : Other~  Blood Pressure :   */*   mmHG  Vent. Rate :  98 BPM     Atrial Rate :  98 BPM     P-R Int : 186 ms          QRS Dur : 134 ms      QT Int : 374 ms       P-R-T Axes :   2 -51  97 degrees     QTc Int : 477 ms    Normal sinus rhythm  Left axis deviation  Left bundle branch block  Abnormal ECG  When compared with ECG of 2023 00:48, (Unconfirmed)  fusion complexes are no longer present  aberrant conduction is no longer present  Confirmed by Nichole Jean () on 2023 12:17:48 PM    Referred By: AURELIA           Confirmed By: Nichole Jean    ECG 12 Lead Other; ? new LBBB [[id number]] Collected: 23 1307     Updated: 23 1412     QT Interval 380 ms      QTC Interval 454 ms     Narrative:      Test Reason : Other~  Blood Pressure :   */*   mmHG  Vent. Rate :  86 BPM     Atrial Rate :  86 BPM     P-R Int : 176 ms          QRS Dur : 136 ms      QT Int : 380 ms       P-R-T  Axes :  64 -21 122 degrees     QTc Int : 454 ms    Sinus rhythm with occasional premature ventricular complexes  Left bundle branch block  Abnormal ECG  When compared with ECG of 16-JAN-2023 13:07, (Unconfirmed)  fusion complexes are no longer present    Referred By: KRANTHI           Confirmed By:     Adult Transthoracic Echo Complete W/ Cont if Necessary Per Protocol [[id number]] Resulted: 01/16/23 1826     Updated: 01/16/23 1838     Target HR (85%) 113 bpm      Max. Pred. HR (100%) 133 bpm      LVIDd 4.8 cm      LVIDs 2.5 cm      IVSd 1.20 cm      LVPWd 1.30 cm      FS 47.9 %      IVS/LVPW 0.92 cm      ESV(cubed) 15.6 ml      LV Sys Vol (BSA corrected) 6.6 cm2      EDV(cubed) 110.6 ml      LV Chavez Vol (BSA corrected) 22.5 cm2      LVOT area 3.1 cm2      LV mass(C)d 232.2 grams      LVOT diam 2.00 cm      EDV(MOD-sp4) 40.6 ml      ESV(MOD-sp4) 11.9 ml      SV(MOD-sp4) 28.7 ml      SI(MOD-sp4) 15.9 ml/m2      EF(MOD-sp4) 70.7 %      MV E max delmy 48.7 cm/sec      MV A max delmy 111.0 cm/sec      MV E/A 0.44     Med Peak E' Delmy 8.9 cm/sec      Lat Peak E' Delmy 12.3 cm/sec      Avg E/e' ratio 4.59     TAPSE (>1.6) 1.99 cm      LA dimension (2D)  3.6 cm      Ao pk delmy 127.0 cm/sec      Ao max PG 6.5 mmHg      Ao mean PG 5.0 mmHg      Ao V2 VTI 18.6 cm      TR max delmy 250.0 cm/sec      TR max PG 25.0 mmHg      RVSP(TR) 35.0 mmHg      RAP systole 10.0 mmHg      PA acc time 0.09 sec      PA pr(Accel) 37.6 mmHg      Ao root diam 3.1 cm      ACS 1.80 cm     Narrative:      •  Normal left ventricular cavity size and wall thickness noted  •  Left ventricular ejection fraction appears to be 56 - 60%.  •  The following left ventricular wall segments are hypokinetic: apical   anterior, apical lateral, apical inferior, apical septal and apex   hypokinetic.  •  Left ventricular diastolic function is consistent with (grade I)   impaired relaxation.  •  There is mild calcification of the aortic valve. There is mild   thickening of the  non-coronary, left coronary and right coronary cusp(s)   of the aortic valve. The aortic valve appears trileaflet.  •  There is mild calcification of the mitral valve posterior leaflet(s).   Trace mitral valve regurgitation is present. No significant mitral valve   stenosis is present.  •  Trace tricuspid valve regurgitation is present. Estimated right   ventricular systolic pressure from tricuspid regurgitation is mildly   elevated (35-45 mmHg).  •  There is no evidence of pericardial effusion.          TELEMETRY: SR 90's with a BBB      Patient had her telemetry off and laying on her bedside table during her exam today.    Imaging Results (Last 72 Hours)     Procedure Component Value Units Date/Time    CT Abdomen Pelvis With Contrast [[id number]] Collected: 01/16/23 0155     Updated: 01/16/23 0157    Narrative:      CT Abdomen Pelvis W    INDICATION:   Left lower quadrant pain with nausea and diarrhea    TECHNIQUE:   CT of the abdomen and pelvis with 100 cc of Isovue-300 IV contrast. Coronal and sagittal reconstructions were obtained.  Radiation dose reduction techniques included automated exposure control or exposure modulation based on body size. Count of known CT  and cardiac nuc med studies performed in previous 12 months: 0.     COMPARISON:   None available.    FINDINGS:  Abdomen: There is atelectasis with volume loss at both lung bases. 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 No upper abdominal solid organ abnormalities are seen.  Noted incidentally are bilateral renal cysts. No evidence of hydronephrosis or kidney stone disease. No evidence of retroperitoneal adenopathy. No distended bowel loops. No inflammatory changes around the gallbladder.    Pelvis: Normal appendix. Diverticulosis is seen but no acute inflammatory changes are seen in the bowel. Postoperative changes are seen in the sigmoid. There are a few  diverticula. There is advanced degenerative change in the lower thoracic and lumbar  discs. There is a grade 2 spondylolisthesis at L5-S1 with bilateral L5 pars defects.      Impression:      Diverticulosis. Postoperative abdomen. No acute inflammatory changes are seen.          Signer Name: Mumtaz Farias MD   Signed: 1/16/2023 1:55 AM   Workstation Name: [email or web address]    [facility]          I have discussed my impression and recommendations with the patient and family.    Thank you very much for asking us to be involved in this patient's care.  We will follow along with you.    Electronically signed by WASHINGTON [place], 01/17/23, 1:09 PM EST.    Electronically signed by Sampson Garvin MD, 01/17/23, 5:23 PM EST.            Please note that portions of this note were copied and has been reviewed and is accurate as of date.      Please note that portions of this note were completed with a voice recognition program.

## 2023-01-17 NOTE — PROGRESS NOTES
[facility] HOSPITALIST PROGRESS NOTE     Patient Identification:  Name:  Azra Ohara  Age:  87 y.o.  Sex:  female  :  1935  MRN:  [id number]  Visit Number:  [id number]  ROOM: [address] [place] [address]     Primary Care Provider:  Minal Salazar APRN    Length of stay:  0    Subjective        Chief Compliant Follow up left lower quadrant abdominal pain with nausea vomiting and diarrhea    Patient seen and examined this morning with RN ED and no family at bedside.  Last night patient noted to be agitated. Pulled her iv out. Patient is awake, alert to her name but confused. She does not know where she is. She thinks she is at her brothers house. dnies any nausea vomiting.  No diarrhea today.  Tolerating diet. Denies chest pain and sob. Afebrile no events overnight.  On room air.        Objective     Current Hospital Meds:aspirin, 81 mg, Oral, Daily  atorvastatin, 40 mg, Oral, Nightly  cetirizine, 5 mg, Oral, Daily  cyanocobalamin, 1,000 mcg, Intramuscular, Q30 Days  heparin (porcine), 5,000 Units, Subcutaneous, Q12H  melatonin, 5 mg, Oral, Nightly  sodium chloride, 3 mL, Intravenous, Q12H    sodium chloride, 50 mL/hr, Last Rate: 50 mL/hr (23)      ----------------------------------------------------------------------------------------------------------------------  Vital Signs:  Temp:  [97.2 °F (36.2 °C)-98 °F (36.7 °C)] 98 °F (36.7 °C)  Heart Rate:  [88-93] 91  Resp:  [18] 18  BP: ()/(65-78) 155/78  SpO2:  [90 %] 90 %  on   ;   Device (Oxygen Therapy): room air  Body mass index is 27.59 kg/m².    Wt Readings from Last 3 Encounters:   23 75.2 kg (165 lb 12.8 oz)       Intake/Output Summary (Last 24 hours) at 2023 1833  Last data filed at 2023 1700  Gross per 24 hour   Intake 600 ml   Output 120 ml   Net 480 ml     Diet: Cardiac Diets; Healthy Heart (2-3 Na+); Texture: Regular Texture (IDDSI 7); Fluid Consistency: Thin (IDDSI 0)  NPO Diet NPO Type: Strict  NPO  ----------------------------------------------------------------------------------------------------------------------  Physical exam:  General: Comfortable, Not in distress.  Well-developed and well-nourished.   HENT:  Head:  Normocephalic and atraumatic.  Mouth:  Moist mucous membranes.    Eyes:  Conjunctivae and EOM are normal.  Pupils are equal, round, and reactive to light.  No scleral icterus.    Neck:  Neck supple.  No JVD present.    Cardiovascular:  Normal rate, regular rhythm with no murmur.  Pulmonary/Chest:  No respiratory distress, no wheezes, no crackles, with normal breath sounds and good air movement.  Abdomen:  Soft.  Bowel sounds are normal.  No distension and no tenderness.   Musculoskeletal:  no tenderness, and no deformity.  No red or swollen joints anywhere.    Neurological:  Alert, awake and confused. No cranial nerve deficit. No focal deficits. No facial droop.  No slurred speech.   Skin:  Skin is warm and dry. No rash noted. No pallor.   Peripheral vascular:  pulses in all 4 extremities with no clubbing, no cyanosis, no edema.  Genitourinary: No grimaldo  ----------------------------------------------------------------------------------------------------------------------  ----------------------------------------------------------------------------------------------------------------------  Results from last 7 days   Lab Units 01/17/23  0304 01/16/23  0334 01/16/23  0030   CRP mg/dL  --   --  0.81*   LACTATE mmol/L  --  2.0 2.4*   WBC 10*3/mm3 9.69  --  10.30   HEMOGLOBIN g/dL 13.1  --  16.0*   HEMATOCRIT % 40.2  --  50.7*   MCV fL 93.7  --  94.6   MCHC g/dL 32.6  --  31.6   PLATELETS 10*3/mm3 208  --  269     Results from last 7 days   Lab Units 01/17/23  0231 01/16/23  0815 01/16/23  0030   SODIUM mmol/L 140  --  137   POTASSIUM mmol/L 4.1  --  3.9   MAGNESIUM mg/dL 2.0 2.0  --    CHLORIDE mmol/L 114*  --  97*   CO2 mmol/L 16.0*  --  21.0*   BUN mg/dL 41*  --  50*   CREATININE mg/dL  1.56*  --  1.86*   CALCIUM mg/dL 8.0*  --  11.0*   GLUCOSE mg/dL 96  --  134*   ALBUMIN g/dL 2.0*  --  4.3   BILIRUBIN mg/dL 0.2  --  0.6   ALK PHOS U/L 72  --  86   AST (SGOT) U/L 21  --  17   ALT (SGPT) U/L 11  --  11   Estimated Creatinine Clearance: 25.8 mL/min (A) (by C-G formula based on SCr of 1.56 mg/dL (H)).  Results from last 7 days   Lab Units 01/16/23  0815 01/16/23  0444 01/16/23  0030   TROPONIN T ng/mL <0.010 <0.010 <0.010     Hemoglobin A1C   Date/Time Value Ref Range Status   01/16/2023 0815 5.50 4.80 - 5.60 % Final     Glucose   Date/Time Value Ref Range Status   01/16/2023 0712 115 70 - 130 mg/dL Final     Comment:     Meter: [id number] : [id number] [facility]   Date Value Ref Range Status   01/17/2023 13 11 - 51 umol/L Final   01/16/2023 23 11 - 51 umol/L Final     Results from last 7 days   Lab Units 01/17/23  0231   CHOLESTEROL mg/dL 78   TRIGLYCERIDES mg/dL 98   HDL CHOL mg/dL 35*   LDL CHOL mg/dL 24     Results from last 7 days   Lab Units 01/16/23  0334   NITRITE UA  Negative   WBC UA /HPF 3-5*   BACTERIA UA /HPF None Seen   SQUAM EPITHEL UA /HPF 0-2     No results found for: BLOODCXNo results found for: RESPCXNo results found for: URINECXNo results found for: WOUNDCXNo results found for: BODYFLDCXNo results found for: STOOLCX  I have personally looked at the labs and they are summarized above.  ----------------------------------------------------------------------------------------------------------------------  Imaging Results (Last 24 Hours)     ** No results found for the last 24 hours. **        I have personally reviewed the radiology images and read the final radiology report.    Assessment & Plan      Assessment:  Abdominal pain with diarrhea with H/O colon cancer, s/p partial colon resection (2004), full details unknown  LBBB on EKG with abnormal Echo  CLAYTON with AG  Acute metabolic encephalopathy  Dehydration  LBBB on EKG  Essential HTN  Advanced age  Generalized  weakness    Plan:  Abdominal pain with diarrhea with H/O colon cancer, s/p partial colon resection (2004), full details unknown, currently symptoms resolved.  tolerating diet.  CT abdomen unrevealing.  C. difficile PCR and gastro PCR negative. patient afebrile and VSS.  No leukocytosis.  CRP minimally elevated and Pro-Carlos A normal.  Blood cultures prelim no growth.  Patient did take 2 rounds of antibiotic outpatient.     LBBB on EKG, no chest pain and troponin negative*3.  No previous EKG available for comparison. continue with aspirin and statin.  2D echocardiogram with EF 56 to 60%.The following left ventricular wall segments are hypokinetic: apical anterior, apical lateral, apical inferior, apical septal and apex hypokinetic.  Stress test could not be done since patient is confused today. will keep patient n.p.o. after midnight and plan stress test in a.m. cardiology on board.  Systolic blood pressure fluctuating so we will hold off on starting beta-blocker. Lipid panel with low LDL.     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  Baseline creatinine 1.4 in 09/22. Improving to 1.5 from 1.8.  On IV fluids and decrease it to 50 cc an hour.  Monitor labs in a.m. takes Voltaren at home which will be discontinued at the time of discharge.    Acute metabolic encephalopathy, ABG normal this am. Ammonia normal. Will start on melatonin nightly and add Zyprexa prn.     Dehydration, continue with IV fluids.  Monitor    Essential HTN, BP overall controlled.  Hold losartan and amlodipine.     Generalized weakness, improving. Monitor.    Heparin subcu for DVT prophylaxis.  Change to inpatient.     Management and plans discussed in detail with nursing.     The patient is high risk due to the following diagnoses/reasons:  Abdominal pain with diarrhea    Disposition Home in 48 hrs if stable.     Trinh Toro MD  01/17/23  18:33 EST

## 2023-01-17 NOTE — CASE MANAGEMENT/SOCIAL WORK
Discharge Planning Assessment  [facility]     Patient Name: Azra Ohara  MRN: [id number]  Today's Date: 1/17/2023    Admit Date: 1/15/2023    Plan: Pt lives alone. Pt is alert and oriented at baseline per sister. Sister plans for pt to go home with her and her brother in law for a few days after discharge ([address] [place], [place]). Pt didn't utilize home health services prior to admission. Pt has an quad cane via [employer]. Pt will need a rolling walker and bedside commode arranged prior to returning home. Sister does not have a preference for DME company. Pt would benefit from home health services and sister does not have a preference of agency. Home health to be arranged with order prior to discharge. Pt receives house cleaning once every two weeks that is paid for by her sister. Sister and brother in law do pt's grocery shopping. PCP is Minal Salazar. Pt does not have a POA or living will. SS to follow and assist as needed with discharge planning.   Discharge Needs Assessment     Row Name 01/17/23 1357       Living Environment    People in Home alone    Primary Care Provided by self    Family Caregiver if Needed other relative(s);sibling(s)    Quality of Family Relationships helpful;involved;supportive    Able to Return to Prior Arrangements other (see comments)  Sister plans for pt to go home with her and her  for a few days prior to returning home.       Transition Planning    Patient/Family Anticipates Transition to home with family;home with help/services    Transportation Anticipated family or friend will provide       Discharge Needs Assessment    Equipment Currently Used at Home cane, quad  [employer]    Equipment Needed After Discharge commode;walker, rolling  no DME company preference               Discharge Plan     Row Name 01/17/23 1400       Plan    Plan Pt lives alone. Pt is alert and oriented at baseline per sister. Sister plans for pt to go home with her and her brother in law for a few  days after discharge ([address] [place], [place]). Pt didn't utilize home health services prior to admission. Pt has an quad cane via [employer]. Pt will need a rolling walker and bedside commode arranged prior to returning home. Sister does not have a preference for DME company. Pt would benefit from home health services and sister does not have a preference of agency. Home health to be arranged with order prior to discharge. Pt receives house cleaning once every two weeks that is paid for by her sister. Sister and brother in law do pt's grocery shopping. PCP is Minal Salazar. Pt does not have a POA or living will. SS to follow and assist as needed with discharge planning.    Patient/Family in Agreement with Plan yes                Expected Discharge Date and Time     Expected Discharge Date Expected Discharge Time    Jan 18, 2023          Demographic Summary     Row Name 01/17/23 8485       General Information    Referral Source physician    Reason for Consult --  SS received consult for Advanced age; may need discharge disposition services help. SS visited pt and was informed by RN that pt is confused. SS contacted sister, Doreen Schmidt at [phone].              ANNEL [place]

## 2023-01-17 NOTE — PROGRESS NOTES
"ROUNDING NOTE    Name: Azra Ohara  Age/Sex: 87 y.o. female  :  1935        PCP: Minal Salazar APRN  REF: No ref. provider found  3340/1S  Admission Diagnosis: Principal Problem:    Left lower quadrant abdominal pain      Reason for Consult: Abnormal ECHO and LBBB    Interval HX:  2023  Azra Ohara is a 87 y.o. 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 Patient presented to [facility] ([facility] [place]) emergency room (ER) on 1/15/2023 with complaints of nausea and abdominal pain that started about 7 days PTA. She reported she seen her PCP for this and was treated for an \"ABodominal infection\" with antibiotics and some solution to help her have a bowel movement. Despite this treatment, her symptoms did not get better and has actually got worse over time.  She did report a history of diarrhea x 2 weeks prior to seeing her PCP.  She reported she had at least 2 episodes of watery diarrhea / day.  She denies prior antibiotics than the ones in the last week, denies recent travel or exposures to illnesses, fever, chills dizziness, lightheadedness, hematochezia or melena.   EKG x 2 in the ER revealed a LBBB and were SR with PACs noted. Patient was admitted. Along with the LBBB, she had an abnormal ECHO done on 01/15/2023 and Cardiology has been consulted for further evaluation and management.       2023  Her HGB dropped from 16.0 to 13.1overnight. CRT is 0.99 which is improved from 1.86 on admission. Her flow sheet reveals a +190 ml. Only events noted for her overnight is she has remained confused per nursing notes. Patient has meka NPO since midnight planning for a Stress Test today if patient can cooperate for the test.      PMH:  Past Medical History:   Diagnosis Date   • Essential hypertension    • History of colon cancer         Echo: Results for orders " placed during the hospital encounter of 01/15/23    Adult Transthoracic Echo Complete W/ Cont if Necessary Per Protocol    Interpretation Summary  •  Normal left ventricular cavity size and wall thickness noted  •  Left ventricular ejection fraction appears to be 56 - 60%.  •  The following left ventricular wall segments are hypokinetic: apical anterior, apical lateral, apical inferior, apical septal and apex hypokinetic.  •  Left ventricular diastolic function is consistent with (grade I) impaired relaxation.  •  There is mild calcification of the aortic valve. There is mild thickening of the non-coronary, left coronary and right coronary cusp(s) of the aortic valve. The aortic valve appears trileaflet.  •  There is mild calcification of the mitral valve posterior leaflet(s). Trace mitral valve regurgitation is present. No significant mitral valve stenosis is present.  •  Trace tricuspid valve regurgitation is present. Estimated right ventricular systolic pressure from tricuspid regurgitation is mildly elevated (35-45 mmHg).  •  There is no evidence of pericardial effusion.       Stress Test: No results found for this or any previous visit.   Scheduled for today (01/18/2023) if she can cooperate.     Heart Cath: No results found for this or any previous visit.        Vital Signs  Temp:  [97.5 °F (36.4 °C)-98.3 °F (36.8 °C)] 98.3 °F (36.8 °C)  Heart Rate:  [76-93] 81  Resp:  [18] 18  BP: (123-144)/(76-82) 144/82  Vital Signs (last 72 hrs)       01/14 0700  01/15 0659 01/15 0700  01/16 0659 01/16 0700  01/17 0659 01/17 0700  01/17 0814   Most Recent      Temp (°F)     97.4    97.2 -  98.9       98 (36.7) 01/17 0600    Heart Rate   90 -  111    87 -  110       91 01/17 0600    Resp     20    18 -  20       18 01/17 0600    BP   108/67 -  129/72    96/66 -  155/78       155/78 01/17 0600    SpO2 (%)   94 -  100    90 -  96       90 01/16 1845        Body mass index is 26.61 kg/m².    Intake/Output Summary (Last 24  hours) at 1/18/2023 0835  Last data filed at 1/18/2023 0326  Gross per 24 hour   Intake 540 ml   Output 350 ml   Net 190 ml     Objective    Labs:  Results from last 7 days   Lab Units 01/17/23  0304 01/16/23  0030   WBC 10*3/mm3 9.69 10.30   HEMOGLOBIN g/dL 13.1 16.0*   PLATELETS 10*3/mm3 208 269     Results from last 7 days   Lab Units 01/18/23  0211 01/17/23  0231 01/16/23  0030   SODIUM mmol/L 142 140 137   POTASSIUM mmol/L 3.6 4.1 3.9   CHLORIDE mmol/L 108* 114* 97*   CO2 mmol/L 22.8 16.0* 21.0*   BUN mg/dL 30* 41* 50*   CREATININE mg/dL 0.99 1.56* 1.86*   CALCIUM mg/dL 9.2 8.0* 11.0*   GLUCOSE mg/dL 98 96 134*   ALT (SGPT) U/L  --  11 11   AST (SGOT) U/L  --  21 17     Results from last 7 days   Lab Units 01/16/23  0815 01/16/23  0444 01/16/23  0030   TROPONIN T ng/mL <0.010 <0.010 <0.010     No results found for: INR  Lab Results   Component Value Date    MG 2.0 01/17/2023    MG 2.0 01/16/2023     No results found for: PROBNP     Lab Results   Component Value Date    TSH 0.784 01/16/2023    TRIG 98 01/17/2023    HDL 35 (L) 01/17/2023    LDL 24 01/17/2023        Current Medications: aspirin, 81 mg, Oral, Daily  atorvastatin, 40 mg, Oral, Nightly  cetirizine, 5 mg, Oral, Daily  cyanocobalamin, 1,000 mcg, Intramuscular, Q30 Days  heparin (porcine), 5,000 Units, Subcutaneous, Q12H  melatonin, 5 mg, Oral, Nightly  OLANZapine zydis, 5 mg, Oral, Once  sodium chloride, 3 mL, Intravenous, Q12H       Home Medications:   Medications Prior to Admission   Medication Sig Dispense Refill Last Dose   • cetirizine (zyrTEC) 10 MG tablet Take 10 mg by mouth 2 (Two) Times a Day.   Unknown   • ciprofloxacin (CIPRO) 500 MG tablet Take 500 mg by mouth 2 (Two) Times a Day.   Unknown   • cyanocobalamin 1000 MCG/ML injection Inject 1,000 mcg into the appropriate muscle as directed by prescriber Every 30 (Thirty) Days.   Unknown   • diclofenac (VOLTAREN) 75 MG EC tablet Take 75 mg by mouth 2 (Two) Times a Day As Needed.   Unknown   •  Diclofenac Sodium (VOLTAREN) 1 % gel gel Apply 2 g topically to the appropriate area as directed 4 (Four) Times a Day As Needed.   Unknown   • losartan (COZAAR) 25 MG tablet Take 25 mg by mouth 2 (Two) Times a Day.   Unknown   • metroNIDAZOLE (FLAGYL) 500 MG tablet Take 500 mg by mouth 2 (Two) Times a Day.   Unknown   • nisoldipine (SULAR) 8.5 MG 24 hr tablet Take 8.5 mg by mouth 2 (Two) Times a Day.   Unknown   • Omega-3 Fatty Acids (fish oil) 1000 MG capsule capsule Take 1,000 mg by mouth 2 (Two) Times a Day With Meals.   Unknown   • ondansetron ODT (ZOFRAN-ODT) 4 MG disintegrating tablet Place 4 mg on the tongue 2 (Two) Times a Day As Needed for Nausea or Vomiting.   Unknown   • polyethylene glycol (MIRALAX) 17 g packet Take 17 g by mouth 2 (Two) Times a Day As Needed.   Unknown         TELEMETRY:    01/18/202  SR with BBB 90's- Pt is on and off the monitor which maybe R/T her noted confusion      Electronically signed by WASHINGTON Henderson, 01/18/23, 8:42 AM EST.

## 2023-01-18 ENCOUNTER — APPOINTMENT (OUTPATIENT)
Dept: NUCLEAR MEDICINE | Facility: HOSPITAL | Age: 88
DRG: 391 | End: 2023-01-18
Payer: MEDICARE

## 2023-01-18 ENCOUNTER — APPOINTMENT (OUTPATIENT)
Dept: CARDIOLOGY | Facility: HOSPITAL | Age: 88
DRG: 391 | End: 2023-01-18
Payer: MEDICARE

## 2023-01-18 LAB
ANION GAP SERPL CALCULATED.3IONS-SCNC: 11.2 MMOL/L (ref 5–15)
BUN SERPL-MCNC: 30 MG/DL (ref 8–23)
BUN/CREAT SERPL: 30.3 (ref 7–25)
CALCIUM SPEC-SCNC: 9.2 MG/DL (ref 8.6–10.5)
CHLORIDE SERPL-SCNC: 108 MMOL/L (ref 98–107)
CO2 SERPL-SCNC: 22.8 MMOL/L (ref 22–29)
CREAT SERPL-MCNC: 0.99 MG/DL (ref 0.57–1)
EGFRCR SERPLBLD CKD-EPI 2021: 55.3 ML/MIN/1.73
GLUCOSE SERPL-MCNC: 98 MG/DL (ref 65–99)
POTASSIUM SERPL-SCNC: 3.6 MMOL/L (ref 3.5–5.2)
QT INTERVAL: 402 MS
QTC INTERVAL: 458 MS
SODIUM SERPL-SCNC: 142 MMOL/L (ref 136–145)

## 2023-01-18 PROCEDURE — 97116 GAIT TRAINING THERAPY: CPT

## 2023-01-18 PROCEDURE — 93010 ELECTROCARDIOGRAM REPORT: CPT | Performed by: INTERNAL MEDICINE

## 2023-01-18 PROCEDURE — 93017 CV STRESS TEST TRACING ONLY: CPT

## 2023-01-18 PROCEDURE — 0 TECHNETIUM SESTAMIBI: Performed by: HOSPITALIST

## 2023-01-18 PROCEDURE — 93018 CV STRESS TEST I&R ONLY: CPT | Performed by: INTERNAL MEDICINE

## 2023-01-18 PROCEDURE — 99231 SBSQ HOSP IP/OBS SF/LOW 25: CPT | Performed by: INTERNAL MEDICINE

## 2023-01-18 PROCEDURE — A9500 TC99M SESTAMIBI: HCPCS | Performed by: HOSPITALIST

## 2023-01-18 PROCEDURE — 25010000002 HEPARIN (PORCINE) PER 1000 UNITS: Performed by: INTERNAL MEDICINE

## 2023-01-18 PROCEDURE — 78451 HT MUSCLE IMAGE SPECT SING: CPT

## 2023-01-18 PROCEDURE — 93005 ELECTROCARDIOGRAM TRACING: CPT | Performed by: HOSPITALIST

## 2023-01-18 PROCEDURE — 25010000002 REGADENOSON 0.4 MG/5ML SOLUTION: Performed by: HOSPITALIST

## 2023-01-18 PROCEDURE — 97110 THERAPEUTIC EXERCISES: CPT

## 2023-01-18 PROCEDURE — 80048 BASIC METABOLIC PNL TOTAL CA: CPT | Performed by: HOSPITALIST

## 2023-01-18 PROCEDURE — 78451 HT MUSCLE IMAGE SPECT SING: CPT | Performed by: INTERNAL MEDICINE

## 2023-01-18 PROCEDURE — 99232 SBSQ HOSP IP/OBS MODERATE 35: CPT | Performed by: HOSPITALIST

## 2023-01-18 RX ORDER — OLANZAPINE 5 MG/1
5 TABLET, ORALLY DISINTEGRATING ORAL ONCE
Status: COMPLETED | OUTPATIENT
Start: 2023-01-18 | End: 2023-01-18

## 2023-01-18 RX ORDER — POTASSIUM CHLORIDE 20 MEQ/1
40 TABLET, EXTENDED RELEASE ORAL ONCE
Status: COMPLETED | OUTPATIENT
Start: 2023-01-18 | End: 2023-01-18

## 2023-01-18 RX ADMIN — TECHNETIUM TC 99M SESTAMIBI 1 DOSE: 1 INJECTION INTRAVENOUS at 12:50

## 2023-01-18 RX ADMIN — HEPARIN SODIUM 5000 UNITS: 5000 INJECTION INTRAVENOUS; SUBCUTANEOUS at 20:51

## 2023-01-18 RX ADMIN — SODIUM CHLORIDE, PRESERVATIVE FREE 3 ML: 5 INJECTION INTRAVENOUS at 20:51

## 2023-01-18 RX ADMIN — REGADENOSON 0.4 MG: 0.08 INJECTION, SOLUTION INTRAVENOUS at 12:50

## 2023-01-18 RX ADMIN — POTASSIUM CHLORIDE 40 MEQ: 1500 TABLET, EXTENDED RELEASE ORAL at 20:51

## 2023-01-18 RX ADMIN — HEPARIN SODIUM 5000 UNITS: 5000 INJECTION INTRAVENOUS; SUBCUTANEOUS at 09:06

## 2023-01-18 RX ADMIN — TECHNETIUM TC 99M SESTAMIBI 1 DOSE: 1 INJECTION INTRAVENOUS at 11:45

## 2023-01-18 RX ADMIN — ATORVASTATIN CALCIUM 40 MG: 40 TABLET, FILM COATED ORAL at 20:51

## 2023-01-18 RX ADMIN — OLANZAPINE 5 MG: 5 TABLET, ORALLY DISINTEGRATING ORAL at 09:06

## 2023-01-18 RX ADMIN — Medication 5 MG: at 20:51

## 2023-01-18 NOTE — PLAN OF CARE
Goal Outcome Evaluation:              Outcome Evaluation: Pt remains confused this shift. Pt has removed IV access, telemetry, and gown several times this shift. Pt refusing an IV at this time; PA aware. Pt continues to get out of bed; bed alarm on. No other acute changes noted. Will cont POC

## 2023-01-18 NOTE — PROGRESS NOTES
" LOS: 1 day     Name: Azra Ohara  Age/Sex: 87 y.o. female  :  1935        PCP: Minal Salazar APRN  REF: No ref. provider found    Principal Problem:    Left lower quadrant abdominal pain      Reason for follow-up: Abnormal ECHO and LBBB    Subjective   HPI:  Subjective   Azra Ohara is a 87 y.o. 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 Patient presented to [facility] ([facility] emergency room (ER) on 1/15/2023 with complaints of nausea and left lower abdominal pain that started about 7 days PTA. She reported she seen her PCP for this and was treated for an \"abodominal infection\" with antibiotics and some solution to help her have a bowel movement. Despite this treatment, her symptoms did not get better and has actually got worse over time.  She did report a history of diarrhea x 2 weeks prior to seeing her PCP.  She reported she had at least 2 episodes of watery diarrhea / day.  She denied prior antibiotics other than the ones in the last week. She denied recent travel or exposures to illnesses, fever, chills, dizziness, lightheadedness, hematochezia or melena.   EKG x 2 in the ER revealed a LBBB and were SR with PACs noted. No previous EKG's to compare to. Patient was admitted. Along with the LBBB, she had an abnormal ECHO done on 01/15/2023 and Cardiology has been consulted for further evaluation and management.     Interval History:   Patient is lying in bed resting quietly. Head of bed is at about 15 degrees. No acute distress is noted. She denies chest pain, shortness of breath, palpitations, nausea, vomiting or abdominal pain. She reports, \"I am doing better\". She currently is oriented to person, place and some events. She denies being afraid of people today like she was reporting yesterday. We will go forward with planning to try to have the " stress test today as planned.     Vital Signs  Temp:  [97.5 °F (36.4 °C)-98.3 °F (36.8 °C)] 98.3 °F (36.8 °C)  Heart Rate:  [76-93] 81  Resp:  [18] 18  BP: (123-144)/(76-82) 144/82  Vital Signs (last 72 hrs)       01/15 0700  01/16 0659 01/16 0700  01/17 0659 01/17 0700  01/18 0659 01/18 0700  01/18 0844   Most Recent      Temp (°F)   97.4    97.2 -  98.9    97.5 -  98.3       98.3 (36.8) 01/18 0626    Heart Rate 90 -  111    87 -  110    76 -  93       81 01/18 0626    Resp   20    18 -  20      18       18 01/18 0626    /67 -  129/72    96/66 -  155/78    123/76 -  144/82       144/82 01/18 0626    SpO2 (%) 94 -  100    90 -  96         90 01/16 1845        Body mass index is 26.61 kg/m².    Intake/Output Summary (Last 24 hours) at 1/18/2023 0844  Last data filed at 1/18/2023 0326  Gross per 24 hour   Intake 540 ml   Output 350 ml   Net 190 ml     Objective  Objective     I have seen and examined Ms. Ohara today.  Physical Exam:     General Appearance:    Alert, cooperative, in no acute distress. Hard of hearing.    Head:    Normocephalic, without obvious abnormality, atraumatic.   Eyes:                          Conjunctivae and sclerae normal, no icterus, no pallor, corneas clear.   Neck:   No adenopathy, supple, trachea midline, no thyromegaly, no carotid bruit, no JVD.   Lungs:     Clear to auscultation, respirations regular, even and             unlabored.    Heart:    Regular rhythm and normal rate, normal S1 and S2, no          murmur, no gallop, no rub, no click   Chest Wall:    No abnormalities observed.   Abdomen:     Normal bowel sounds, no masses, no organomegaly, soft      non-tender, non-distended, no guarding, no rebound                tenderness.   Extremities:   Moves all extremities well, no edema, no cyanosis, no            redness.   Pulses:   Pulses palpable and equal bilaterally.   Skin:   No bleeding, bruising or rash.   Neurologic:   Alert and Oriented person, place and some events,  Speech Clear & comprehensive.       Results review   Results Review:   Results from last 7 days   Lab Units 01/17/23  0304 01/16/23  0030   WBC 10*3/mm3 9.69 10.30   HEMOGLOBIN g/dL 13.1 16.0*   PLATELETS 10*3/mm3 208 269     Results from last 7 days   Lab Units 01/18/23  0211 01/17/23  0231 01/16/23  0030   SODIUM mmol/L 142 140 137   POTASSIUM mmol/L 3.6 4.1 3.9   CHLORIDE mmol/L 108* 114* 97*   CO2 mmol/L 22.8 16.0* 21.0*   BUN mg/dL 30* 41* 50*   CREATININE mg/dL 0.99 1.56* 1.86*   CALCIUM mg/dL 9.2 8.0* 11.0*   GLUCOSE mg/dL 98 96 134*   ALT (SGPT) U/L  --  11 11   AST (SGOT) U/L  --  21 17     Results from last 7 days   Lab Units 01/16/23  0815 01/16/23  0444 01/16/23  0030   TROPONIN T ng/mL <0.010 <0.010 <0.010     No results found for: PROBNP     No results found for: INR  Lab Results   Component Value Date    MG 2.0 01/17/2023    MG 2.0 01/16/2023     Lab Results   Component Value Date    TSH 0.784 01/16/2023    TRIG 98 01/17/2023    HDL 35 (L) 01/17/2023    LDL 24 01/17/2023      Imaging Results (Last 48 Hours)     ** No results found for the last 48 hours. **        ECHO:  Results for orders placed during the hospital encounter of 01/15/23    Adult Transthoracic Echo Complete W/ Cont if Necessary Per Protocol    Interpretation Summary  •  Normal left ventricular cavity size and wall thickness noted  •  Left ventricular ejection fraction appears to be 56 - 60%.  •  The following left ventricular wall segments are hypokinetic: apical anterior, apical lateral, apical inferior, apical septal and apex hypokinetic.  •  Left ventricular diastolic function is consistent with (grade I) impaired relaxation.  •  There is mild calcification of the aortic valve. There is mild thickening of the non-coronary, left coronary and right coronary cusp(s) of the aortic valve. The aortic valve appears trileaflet.  •  There is mild calcification of the mitral valve posterior leaflet(s). Trace mitral valve regurgitation is  present. No significant mitral valve stenosis is present.  •  Trace tricuspid valve regurgitation is present. Estimated right ventricular systolic pressure from tricuspid regurgitation is mildly elevated (35-45 mmHg).  •  There is no evidence of pericardial effusion.    [id number]      Telemetry:  SR with BBB 90's- Pt is on and off the monitor which maybe R/T her noted confusion.    I reviewed the patient's new clinical results.    Medication Review:   aspirin, 81 mg, Oral, Daily  atorvastatin, 40 mg, Oral, Nightly  cetirizine, 5 mg, Oral, Daily  cyanocobalamin, 1,000 mcg, Intramuscular, Q30 Days  heparin (porcine), 5,000 Units, Subcutaneous, Q12H  melatonin, 5 mg, Oral, Nightly  OLANZapine zydis, 5 mg, Oral, Once  sodium chloride, 3 mL, Intravenous, Q12H         Assessment      1. Left bundle branch block of unknown duration.  Patient denies any recent anginal symptoms  2. Abnormal echo Doppler study with regional wall motion abnormalities with hypokinesis of the apical anterior, apical lateral and apical inferior myocardial segments which in part could be due to left bundle branch block although cannot exclude underlying occult ischemic/coronary artery disease  3. Intermittent confusion of unclear etiology.  4. Abdominal pains of unclear etiology, being evaluated by the hospitalist service..      Plan     1. Continue with low-dose aspirin and statin  2. Evaluate further with a Lexiscan sestamibi study today as patient is alert and oriented x3 today.      I discussed the patients findings and my recommendations with patient.    Electronically signed by WASHINGTON Henderson, 23, 11:04 AM EST.    Electronically signed by Sampson Garvin MD, 23, 12:12 PM EST.            Please note that portions of this note were completed with a voice recognition program.    Please note that portions of this note were copied and has been reviewed and is accurate as of date.

## 2023-01-18 NOTE — THERAPY TREATMENT NOTE
Acute Care - Physical Therapy Treatment Note   [place]     Patient Name: Azra Ohara  : 1935  MRN: [id number]  Today's Date: 2023   Onset of Illness/Injury or Date of Surgery: 01/15/23  Visit Dx:     ICD-10-CM ICD-9-CM   1. Left lower quadrant abdominal pain  R10.32 789.04   2. Nausea vomiting and diarrhea  R11.2 787.91    R19.7 787.01   3. [place] (acute kidney injury) (HCC)  N17.9 584.9     Patient Active Problem List   Diagnosis   • Left lower quadrant abdominal pain     Past Medical History:   Diagnosis Date   • Essential hypertension    • History of colon cancer      Past Surgical History:   Procedure Laterality Date   • COLON RESECTION     • PARATHYROIDECTOMY Right     Parathyroid adenoma     PT Assessment (last 12 hours)     PT Evaluation and Treatment     Row Name 23 1448          Physical Therapy Time and Intention    Subjective Information complains of;weakness;fatigue  -HR     Document Type therapy note (daily note)  -HR     Mode of Treatment physical therapy  -HR     Patient Effort good  -HR     Comment Pt walked aprox 80' with RW min A. BSC transfer min/mod A. Pt fatiqued quickly and was unsteady with standing and walking. EOB exercises completed until tolerance.  -HR     Row Name 23 1448          General Information    Patient Profile Reviewed yes  -HR     Equipment Currently Used at Home cane, straight  -HR     Existing Precautions/Restrictions fall  -HR     Row Name 23 1448          Living Environment    Primary Care Provided by self  -HR     Row Name 23 1448          Home Use of Assistive/Adaptive Equipment    Equipment Currently Used at Home cane, straight  -HR     Row Name 23 1448          Pain Scale: FACES Pre/Post-Treatment    Pain: FACES Scale, Pretreatment 0-->no hurt  -HR     Posttreatment Pain Rating 0-->no hurt  -HR     Row Name 23 1448          Cognition    Affect/Mental Status (Cognition) WFL  -HR     Follows Commands (Cognition) WFL  -HR      Personal Safety Interventions fall prevention program maintained;gait belt;nonskid shoes/slippers when out of bed;supervised activity  -HR     Row Name 01/18/23 1448          Sensory    Hearing Status hearing impairment, bilaterally  -HR     [facility] Name 01/18/23 1448          Vision Assessment/Intervention    Visual Impairment/Limitations WFL  -HR     Row Name 01/18/23 1448          Mobility    Extremity Weight-bearing Status left lower extremity;right lower extremity  -HR     Left Lower Extremity (Weight-bearing Status) full weight-bearing (FWB)  -HR     Right Lower Extremity (Weight-bearing Status) full weight-bearing (FWB)  -HR     [facility] Name 01/18/23 1448          Bed Mobility    Rolling Left [place] (Bed Mobility) independent  -HR     Supine-Sit [place] (Bed Mobility) minimum assist (75% patient effort);contact guard  -HR     Sit-Supine [place] (Bed Mobility) minimum assist (75% patient effort)  -HR     Assistive Device (Bed Mobility) bed rails  -HR     Row Name 01/18/23 1448          Transfers    Transfers sit-stand transfer;stand-sit transfer;stand pivot/stand step transfer;toilet transfer  -HR     Row Name 01/18/23 1448          Sit-Stand Transfer    Sit-Stand [place] (Transfers) minimum assist (75% patient effort)  -HR     Assistive Device (Sit-Stand Transfers) walker, front-wheeled  -HR     Row Name 01/18/23 1448          Stand-Sit Transfer    Stand-Sit [place] (Transfers) nonverbal cues (demo/gesture);verbal cues;minimum assist (75% patient effort)  -HR     Assistive Device (Stand-Sit Transfers) walker, front-wheeled  -HR     Row Name 01/18/23 1448          Stand Pivot/Stand Step Transfer    Stand Pivot/Stand Step [place] (Transfers) verbal cues;nonverbal cues (demo/gesture);contact guard;minimum assist (75% patient effort)  -HR     Assistive Device (Stand Pivot Stand Step Transfer) walker, front-wheeled  -HR     Row Name 01/18/23 1448          Toilet Transfer    Type (Toilet  Transfer) stand pivot/stand step  -HR     [place] (Toilet Transfer) minimum assist (75% patient effort)  -HR     Row Name 01/18/23 1448          Gait/Stairs (Locomotion)    [place] (Gait) verbal cues;nonverbal cues (demo/gesture);minimum assist (75% patient effort);contact guard  -HR     Assistive Device (Gait) walker, front-wheeled  -HR     Distance in Feet (Gait) 80' aprox  -HR     Pattern (Gait) step-through  -HR     Deviations/Abnormal Patterns (Gait) base of support, wide;stride length decreased  -HR     Bilateral Gait Deviations forward flexed posture  -HR     Row Name 01/18/23 1448          Safety Issues, Functional Mobility    Impairments Affecting Function (Mobility) balance;endurance/activity tolerance;range of motion (ROM)  -HR     Row Name 01/18/23 1448          Motor Skills    Therapeutic Exercise other (see comments)  EOB: AP, LAQ, March, Knees in/out  -HR     Row Name 01/18/23 1448          Coping    Observed Emotional State calm;cooperative  -HR     Verbalized Emotional State acceptance  -HR     Family/Support Persons family;sibling  -HR     Involvement in Care not present at bedside  -HR     Row Name 01/18/23 1448          Positioning and Restraints    Pre-Treatment Position in bed  -HR     Post Treatment Position bed  -HR     In Bed notified nsg;fowlers;call light within reach;encouraged to call for assist;side rails up x2  -HR     Row Name 01/18/23 1448          Therapy Assessment/Plan (PT)    Rehab Potential (PT) good, to achieve stated therapy goals  -HR     Criteria for Skilled Interventions Met (PT) yes;meets criteria  -HR     Therapy Frequency (PT) 3 times/wk  -HR     Problem List (PT) problems related to;balance;mobility;hearing;strength;range of motion (ROM);coordination  -HR     Activity Limitations Related to Problem List (PT) unable to ambulate safely;unable to transfer safely;BADLs not performed adequately or safely  -HR     Row Name 01/18/23 1448          Physical  Therapy Goals    Transfer Goal Selection (PT) transfer, PT goal 1  -HR     Gait Training Goal Selection (PT) gait training, PT goal 1  -HR     Row Name 01/18/23 1448          Transfer Goal 1 (PT)    Activity/Assistive Device (Transfer Goal 1, PT) sit-to-stand/stand-to-sit;bed-to-chair/chair-to-bed;toilet  -HR     [place] Level/Cues Needed (Transfer Goal 1, PT) standby assist  -HR     Time Frame (Transfer Goal 1, PT) by discharge  -HR     Row Name 01/18/23 1448          Gait Training Goal 1 (PT)    Activity/Assistive Device (Gait Training Goal 1, PT) gait (walking locomotion);assistive device use;decrease fall risk;diminish gait deviation;improve balance and speed;increase endurance/gait distance;walker, rolling  -HR     [place] Level (Gait Training Goal 1, PT) standby assist  -HR     Distance (Gait Training Goal 1, PT) 60  -HR     Time Frame (Gait Training Goal 1, PT) by discharge  -HR           User Key  (r) = Recorded By, (t) = Taken By, (c) = Cosigned By    Initials Name Provider Type    HR Ingris Rdz PTA Physical Therapist Assistant                Physical Therapy Education     Title: PT OT SLP Therapies (Done)     Topic: Physical Therapy (Done)     Point: Mobility training (Done)     Learning Progress Summary           Patient Acceptance, E, VU by EB at 1/17/2023 1704    Acceptance, E,D, VU,NR by AG at 1/16/2023 1318                   Point: Home exercise program (Done)     Learning Progress Summary           Patient Acceptance, E, VU by EB at 1/17/2023 1704    Acceptance, E,D, VU,NR by AG at 1/16/2023 1318                   Point: Body mechanics (Done)     Learning Progress Summary           Patient Acceptance, E, VU by EB at 1/17/2023 1704    Acceptance, E,D, VU,NR by AG at 1/16/2023 1318                   Point: Precautions (Done)     Learning Progress Summary           Patient Acceptance, E, VU by EB at 1/17/2023 1704    Acceptance, E,D, VU,NR by AG at 1/16/2023 1318                                User Key     Initials Effective Dates Name Provider Type Discipline    AG 06/16/21 -  Montserrat Gale, PT Physical Therapist PT    EB 09/22/22 -  Brendan Capone, RN Registered Nurse Nurse              PT Recommendation and Plan  Anticipated Discharge Disposition (PT): home with assist, home with outpatient therapy services  Therapy Frequency (PT): 3 times/wk          Time Calculation:    PT Charges     Row Name 01/18/23 1458             Time Calculation    Start Time 1115  -HR      PT Received On 01/18/23  -HR         Time Calculation- PT    Total Timed Code Minutes- PT 24 minute(s)  -HR            User Key  (r) = Recorded By, (t) = Taken By, (c) = Cosigned By    Initials Name Provider Type    HR Ingirs Rdz PTA Physical Therapist Assistant              Therapy Charges for Today     Code Description Service Date Service Provider Modifiers Qty    61105342632 HC GAIT TRAINING EA 15 MIN 1/18/2023 Ingris Rdz PTA GP, CQ 1    82598126843 HC PT THER PROC EA 15 MIN 1/18/2023 Ingris Rdz PTA GP, CQ 1          PT G-Codes  AM-PAC 6 Clicks Score (PT): 18    Ingris Rdz PTA  1/18/2023

## 2023-01-18 NOTE — PLAN OF CARE
Goal Outcome Evaluation:  Pt resting in bed. Pt received one time dose of zyprexa this shift. Stress test completed. Pt more oriented this shift. Will continue plan of care.

## 2023-01-18 NOTE — CASE MANAGEMENT/SOCIAL WORK
Discharge Planning Assessment   [place]     Patient Name: Azra Ohara  MRN: [id number]  Today's Date: 1/18/2023    Admit Date: 1/15/2023     Discharge Plan     Row Name 01/18/23 0936       Plan    Plan SS completed initial consult yesterday. SS discussed pt with RN and pt continues to be confused. Pt lives alone. Pt is alert and oriented at baseline per sister. Sister plans for pt to go home with her and her brother in law for a few days after discharge ([address] [place], [place]). Pt didn't utilize home health services prior to admission. Pt has an quad cane via [employer]. Pt will need a rolling walker and bedside commode arranged prior to returning home. Sister does not have a preference for DME company. Pt would benefit from home health services and sister does not have a preference of agency. Home health to be arranged with order prior to discharge. Pt receives house cleaning once every two weeks that is paid for by her sister. Sister and brother in law do pt's grocery shopping. PCP is Minal Salazar. Pt does not have a POA or living will. SS to follow and assist as needed with discharge planning.              Expected Discharge Date and Time     Expected Discharge Date Expected Discharge Time    Jan 19, 2023         [place]

## 2023-01-19 VITALS
HEART RATE: 77 BPM | TEMPERATURE: 98.2 F | OXYGEN SATURATION: 98 % | DIASTOLIC BLOOD PRESSURE: 71 MMHG | SYSTOLIC BLOOD PRESSURE: 145 MMHG | WEIGHT: 158.51 LBS | RESPIRATION RATE: 16 BRPM | BODY MASS INDEX: 26.41 KG/M2 | HEIGHT: 65 IN

## 2023-01-19 LAB
ANION GAP SERPL CALCULATED.3IONS-SCNC: 12.2 MMOL/L (ref 5–15)
BASOPHILS # BLD AUTO: 0.05 10*3/MM3 (ref 0–0.2)
BASOPHILS NFR BLD AUTO: 0.5 % (ref 0–1.5)
BH CV NUCLEAR PRIOR STUDY: 3
BH CV REST NUCLEAR ISOTOPE DOSE: 8.9 MCI
BH CV STRESS BP STAGE 1: NORMAL
BH CV STRESS COMMENTS STAGE 1: NORMAL
BH CV STRESS DOSE REGADENOSON STAGE 1: 0.4
BH CV STRESS DURATION MIN STAGE 1: 0
BH CV STRESS DURATION SEC STAGE 1: 10
BH CV STRESS HR STAGE 1: 127
BH CV STRESS NUCLEAR ISOTOPE DOSE: 28.8 MCI
BH CV STRESS PROTOCOL 1: NORMAL
BH CV STRESS RECOVERY BP: NORMAL MMHG
BH CV STRESS RECOVERY HR: 116 BPM
BH CV STRESS STAGE 1: 1
BUN SERPL-MCNC: 30 MG/DL (ref 8–23)
BUN/CREAT SERPL: 31.9 (ref 7–25)
CALCIUM SPEC-SCNC: 9.4 MG/DL (ref 8.6–10.5)
CHLORIDE SERPL-SCNC: 111 MMOL/L (ref 98–107)
CO2 SERPL-SCNC: 18.8 MMOL/L (ref 22–29)
CREAT SERPL-MCNC: 0.94 MG/DL (ref 0.57–1)
DEPRECATED RDW RBC AUTO: 50.6 FL (ref 37–54)
EGFRCR SERPLBLD CKD-EPI 2021: 58.8 ML/MIN/1.73
EOSINOPHIL # BLD AUTO: 0.07 10*3/MM3 (ref 0–0.4)
EOSINOPHIL NFR BLD AUTO: 0.7 % (ref 0.3–6.2)
ERYTHROCYTE [DISTWIDTH] IN BLOOD BY AUTOMATED COUNT: 14.3 % (ref 12.3–15.4)
GLUCOSE SERPL-MCNC: 90 MG/DL (ref 65–99)
HCT VFR BLD AUTO: 41.5 % (ref 34–46.6)
HGB BLD-MCNC: 13 G/DL (ref 12–15.9)
IMM GRANULOCYTES # BLD AUTO: 0.05 10*3/MM3 (ref 0–0.05)
IMM GRANULOCYTES NFR BLD AUTO: 0.5 % (ref 0–0.5)
LYMPHOCYTES # BLD AUTO: 1.88 10*3/MM3 (ref 0.7–3.1)
LYMPHOCYTES NFR BLD AUTO: 18.2 % (ref 19.6–45.3)
MAXIMAL PREDICTED HEART RATE: 133 BPM
MCH RBC QN AUTO: 30.2 PG (ref 26.6–33)
MCHC RBC AUTO-ENTMCNC: 31.3 G/DL (ref 31.5–35.7)
MCV RBC AUTO: 96.5 FL (ref 79–97)
MONOCYTES # BLD AUTO: 0.66 10*3/MM3 (ref 0.1–0.9)
MONOCYTES NFR BLD AUTO: 6.4 % (ref 5–12)
NEUTROPHILS NFR BLD AUTO: 7.61 10*3/MM3 (ref 1.7–7)
NEUTROPHILS NFR BLD AUTO: 73.7 % (ref 42.7–76)
NRBC BLD AUTO-RTO: 0 /100 WBC (ref 0–0.2)
PERCENT MAX PREDICTED HR: 95.49 %
PLATELET # BLD AUTO: 206 10*3/MM3 (ref 140–450)
PMV BLD AUTO: 11.9 FL (ref 6–12)
POTASSIUM SERPL-SCNC: 4.1 MMOL/L (ref 3.5–5.2)
RBC # BLD AUTO: 4.3 10*6/MM3 (ref 3.77–5.28)
SODIUM SERPL-SCNC: 142 MMOL/L (ref 136–145)
STRESS BASELINE BP: NORMAL MMHG
STRESS BASELINE HR: 91 BPM
STRESS PERCENT HR: 112 %
STRESS POST PEAK BP: NORMAL MMHG
STRESS POST PEAK HR: 127 BPM
STRESS TARGET HR: 113 BPM
WBC NRBC COR # BLD: 10.32 10*3/MM3 (ref 3.4–10.8)

## 2023-01-19 PROCEDURE — 99239 HOSP IP/OBS DSCHRG MGMT >30: CPT | Performed by: HOSPITALIST

## 2023-01-19 PROCEDURE — 25010000002 HEPARIN (PORCINE) PER 1000 UNITS: Performed by: INTERNAL MEDICINE

## 2023-01-19 PROCEDURE — 85025 COMPLETE CBC W/AUTO DIFF WBC: CPT | Performed by: HOSPITALIST

## 2023-01-19 PROCEDURE — 80048 BASIC METABOLIC PNL TOTAL CA: CPT | Performed by: HOSPITALIST

## 2023-01-19 RX ORDER — NISOLDIPINE 8.5 MG/1
17 TABLET, FILM COATED, EXTENDED RELEASE ORAL DAILY
Start: 2023-01-19

## 2023-01-19 RX ORDER — ATORVASTATIN CALCIUM 20 MG/1
20 TABLET, FILM COATED ORAL NIGHTLY
Qty: 30 TABLET | Refills: 0 | Status: SHIPPED | OUTPATIENT
Start: 2023-01-19 | End: 2023-01-19 | Stop reason: HOSPADM

## 2023-01-19 RX ORDER — CETIRIZINE HYDROCHLORIDE 10 MG/1
10 TABLET ORAL DAILY
Start: 2023-01-19

## 2023-01-19 RX ORDER — NISOLDIPINE 8.5 MG/1
17 TABLET, FILM COATED, EXTENDED RELEASE ORAL DAILY
COMMUNITY
End: 2023-01-19

## 2023-01-19 RX ORDER — NISOLDIPINE 8.5 MG/1
8.5 TABLET, FILM COATED, EXTENDED RELEASE ORAL
COMMUNITY
End: 2023-01-19

## 2023-01-19 RX ORDER — METOPROLOL SUCCINATE 25 MG/1
25 TABLET, EXTENDED RELEASE ORAL
Qty: 30 TABLET | Refills: 0 | Status: SHIPPED | OUTPATIENT
Start: 2023-01-19

## 2023-01-19 RX ORDER — ASPIRIN 81 MG/1
81 TABLET, CHEWABLE ORAL DAILY
Qty: 30 TABLET | Refills: 0 | Status: SHIPPED | OUTPATIENT
Start: 2023-01-20

## 2023-01-19 RX ORDER — METOPROLOL SUCCINATE 25 MG/1
25 TABLET, EXTENDED RELEASE ORAL
Status: DISCONTINUED | OUTPATIENT
Start: 2023-01-19 | End: 2023-01-19 | Stop reason: HOSPADM

## 2023-01-19 RX ADMIN — DICLOFENAC 2 G: 10 GEL TOPICAL at 08:30

## 2023-01-19 RX ADMIN — SODIUM CHLORIDE, PRESERVATIVE FREE 3 ML: 5 INJECTION INTRAVENOUS at 08:22

## 2023-01-19 RX ADMIN — ASPIRIN 81 MG: 81 TABLET, CHEWABLE ORAL at 08:21

## 2023-01-19 RX ADMIN — HEPARIN SODIUM 5000 UNITS: 5000 INJECTION INTRAVENOUS; SUBCUTANEOUS at 08:21

## 2023-01-19 RX ADMIN — METOPROLOL SUCCINATE 25 MG: 25 TABLET, EXTENDED RELEASE ORAL at 10:08

## 2023-01-19 RX ADMIN — CETIRIZINE HYDROCHLORIDE 5 MG: 10 TABLET, FILM COATED ORAL at 08:21

## 2023-01-19 NOTE — DISCHARGE SUMMARY
[facility] HOSPITALISTS DISCHARGE SUMMARY    Patient Identification:  Name:  Azra Ohara  Age:  87 y.o.  Sex:  female  :  1935  MRN:  [id number]  Visit Number:  [id number]    Date of Admission: 1/15/2023  Date of Discharge:  2023     PCP: Minal Salazar, [place]    DISCHARGE DIAGNOSIS  Abdominal pain with diarrhea with H/O colon cancer, s/p partial colon resection (), full details unknown, resolved  LBBB on EKG with abnormal Echo with Intermediate Nuclear stress test  CLAYTON with AG on CKD stage 3A, resolved  Acute metabolic encephalopathy, CLAYTON with AG resolved  Dehydration, resolved  Essential HTN  Advanced age  Generalized weakness, resolved      CONSULTS   Cardiology      PROCEDURES PERFORMED  Nuclear Stress Test    HOSPITAL COURSE  Ms. Ohara is a 87 y.o. female with past medical history significant for essential hypertension that presented to the [facility] emergency department for evaluation of nausea and abdominal pain.  Please see the admitting history and physical for further details.  Admitted with Abdominal pain with diarrhea with H/O colon cancer, s/p partial colon resection (), full details unknown and CLAYTON with AG. Started on IVF.  Had CT abdomen and pelvis which showed Diverticulosis. Postoperative abdomen. No acute inflammatory changes are seen.  Symptoms completely resolved.  Patient tolerating diet.  Noted to have LBBB on EKG with no symptom of chest pain.  Troponin x3 negative. No previous EKG available for comparison. started on aspirin and statin.  2D echocardiogram with EF 56 to 60%.The following left ventricular wall segments are hypokinetic: apical anterior, apical lateral, apical inferior, apical septal and apex hypokinetic.  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 Gated SPECT scanning was of suboptimal quality and hence  could not comment upon the LV wall motion and systolic function accurately. Impressions are consistent with an intermediate risk study.  Discussed with Dr. Garvin and he advised medical management. lipid panel with low LDL, and patient has history of intolerance to statin and does not want to take it, so statin was discontinued at the time of discharge.  For CLAYTON on CkD stage 3A with AG.  Improved to 0.94 from 1.8. takes Voltaren at home which was discontinued at the time of discharge.  Patient did have episode of acute metabolic encephalopathy, completely resolved with 1 dose of sublingual Zyprexa.  Started on beta-blocker and blood pressure remained overall controlled.  Continued on home Nisoldipine and losartan discontinued at the time of discharge.  Patient remained afebrile VSS and improved symptomatically significantly and ambulating.  Medication changes were discussed in detail with patient and she verbalized understanding. Discussed with sister Doreen regarding the discharge planning and she is agreeable.  Patient to stay with her sister after discharge.  Since patient is vitally stable, improved symptomatically and cleared by cardiology and would like to go home, it was decided to discharge patient to home with home health.  Discharge disposition stable.  Patient was advised to follow-up outpatient with cardiology.         VITAL SIGNS:  Temp:  [97.9 °F (36.6 °C)-98.8 °F (37.1 °C)] 98.2 °F (36.8 °C)  Heart Rate:  [77-94] 77  Resp:  [16-20] 16  BP: (115-158)/(70-99) 145/71  SpO2:  [96 %-98 %] 98 %  on   ;   Device (Oxygen Therapy): room air    Body mass index is 26.38 kg/m².  Wt Readings from Last 3 Encounters:   01/19/23 71.9 kg (158 lb 8.2 oz)       PHYSICAL EXAM:  General: Comfortable, Not in distress.  Well-developed and well-nourished.   HENT:  Head:  Normocephalic and atraumatic.  Mouth:  Moist mucous membranes.    Eyes:  Conjunctivae and EOM are normal.  Pupils are equal, round, and reactive to light.   No scleral icterus.    Neck:  Neck supple.  No JVD present.    Cardiovascular:  Normal rate, regular rhythm with no murmur.  Pulmonary/Chest:  No respiratory distress, no wheezes, no crackles, with normal breath sounds and good air movement.  Abdomen:  Soft.  Bowel sounds are normal.  No distension and no tenderness.   Musculoskeletal:  no tenderness, and no deformity.  No red or swollen joints anywhere.    Neurological:  Alert, awake and oriented. No cranial nerve deficit. No focal deficits. No facial droop.  No slurred speech.   Skin:  Skin is warm and dry. No rash noted. No pallor.   Peripheral vascular:  pulses in all 4 extremities with no clubbing, no cyanosis, no edema.  Genitourinary: No grimaldo    DISCHARGE DISPOSITION   Home with     DISCHARGE MEDICATIONS:     Discharge Medications      New Medications      Instructions Start Date   aspirin 81 MG chewable tablet   Chew and swallow 1 tablet Daily.   Start Date: January 20, 2023     metoprolol succinate XL 25 MG 24 hr tablet  Commonly known as: TOPROL-XL   25 mg, Oral, Every 24 Hours Scheduled         Changes to Medications      Instructions Start Date   cetirizine 10 MG tablet  Commonly known as: zyrTEC  What changed: when to take this   10 mg, Oral, Daily      nisoldipine 8.5 MG 24 hr tablet  Commonly known as: SULAR  What changed:   · how much to take  · when to take this  · Another medication with the same name was removed. Continue taking this medication, and follow the directions you see here.   17 mg, Oral, Daily         Continue These Medications      Instructions Start Date   cyanocobalamin 1000 MCG/ML injection   1,000 mcg, Intramuscular, Every 30 Days      Diclofenac Sodium 1 % gel gel  Commonly known as: VOLTAREN   2 g, Topical, 4 Times Daily PRN      fish oil 1000 MG capsule capsule   1,000 mg, Oral, 2 Times Daily With Meals      ondansetron ODT 4 MG disintegrating tablet  Commonly known as: ZOFRAN-ODT   4 mg, Translingual, 2 Times Daily PRN       polyethylene glycol 17 g packet  Commonly known as: MIRALAX   17 g, Oral, 2 Times Daily PRN         Stop These Medications    ciprofloxacin 500 MG tablet  Commonly known as: CIPRO     diclofenac 75 MG EC tablet  Commonly known as: VOLTAREN     losartan 25 MG tablet  Commonly known as: COZAAR     metroNIDAZOLE 500 MG tablet  Commonly known as: FLAGYL            Diet Instructions     Diet: Cardiac; Thin      Discharge Diet: Cardiac    Fluid Consistency: Thin        Activity Instructions     Activity as Tolerated          Future Appointments   Date Time Provider Department Center   2/21/2023 12:45 PM Sampson Garvin MD [facility]     Your Scheduled Appointments    Feb 21, 2023 12:45 PM  Follow Up with Sampson Garvin MD  [facility] CARDIOLOGY ([place]) [address] [place] [address] [phone]   -Bring photo ID, insurance card, and list of medications to appointment  -If testing was completed outside of [facility] then patient must bring images on a disc  -Copay will be collected at time of appointment  -Established patients should arrive 10 minutes prior to appointment     Additional instructions:    You have a follow-up appointment with Ainsley Hernandez on 1-26-23 at 2:00. Office number [phone]         Additional Instructions for the Follow-ups that You Need to Schedule     Ambulatory Referral to Home Health   As directed      Face to Face Visit Date: 1/19/2023    Follow-up provider for Plan of Care?: I treated the patient in an acute care facility and will not continue treatment after discharge.    Follow-up provider: AINSLEY MCCAIN [[id number]]    Reason/Clinical Findings: debility    Describe mobility limitations that make leaving home difficult: debility    Nursing/Therapeutic Services Requested: Physical Therapy    PT orders: Therapeutic exercise Gait Training Transfer training Strengthening    Weight Bearing Status: As Tolerated    Frequency: Other (2-3  times per week)         Discharge Follow-up with PCP   As directed       Currently Documented PCP:    Minal Salazar APRN    PCP Phone Number:    [phone]     Follow Up Details: Within 1 week         Discharge Follow-up with Specialty: Cardiology Dr. Garvin; 1 Month   As directed      Specialty: Cardiology Dr. Garvin    Follow Up: 1 Month    Follow Up Details: abnormal Echo            Follow-up Information     Minal Salazar APRN .    Specialty: Family Medicine  Why: Within 1 week  Contact information:  [address]  [place] [address]  [phone]                          TEST  RESULTS PENDING AT DISCHARGE  Pending Labs     Order Current Status    PERIPHERAL SMEAR, [facility] In process    Blood Culture - Blood, Arm, Right Preliminary result    Blood Culture - Blood, Hand, Right Preliminary result           CODE STATUS  Code Status and Medical Interventions:   Ordered at: 01/16/23 0543     Level Of Support Discussed With:    Patient     Code Status (Patient has no pulse and is not breathing):    CPR (Attempt to Resuscitate)     Medical Interventions (Patient has pulse or is breathing):    Full Support       The ASCVD Risk score (Zackary DK, et al., 2019) failed to calculate for the following reasons:    The 2019 ASCVD risk score is only valid for ages 40 to 79     Trinh Toro MD  01/19/23  11:23 EST     Time: I spent  35  minutes on this discharge activity which included: face-to-face encounter with the patient, reviewing the data in the system, coordination of the care with the nursing staff as well as consultants, documentation, and entering orders.  '  Please note that this discharge summary required more than 30 minutes to complete.    Please send a copy of this dictation to the following providers:  Minal Salazar APRN

## 2023-01-19 NOTE — CASE MANAGEMENT/SOCIAL WORK
Discharge Planning Assessment   [place]     Patient Name: Azra Ohara  MRN: [id number]  Today's Date: 1/19/2023    Admit Date: 1/15/2023     Discharge Plan     Row Name 01/19/23 1148       Plan    Final Note Pt is being discharged home today. SS received a home health order for PT services. SS contacted sisterDoreen who states she will provide transportation for pt and does not have a preference of home health agency. Pt will be going home with her sister and brother in law for a few days. SS faxed home health referral to [facility]. SS notified Angila at [facility]. No other needs identified.               Continued Care and Services - Admitted Since 1/15/2023     Home Medical Care     Service Provider Request Status Selected Services Address Phone Fax Patient Preferred    [facility]  Selected Home Health Services [address] [facility] [address] [phone] --              Expected Discharge Date and Time     Expected Discharge Date Expected Discharge Time    Jan 19, 2023         ANNEL [place]

## 2023-01-19 NOTE — PLAN OF CARE
Goal Outcome Evaluation:               Pt being dcd home with sister. Sister here for transportation home.

## 2023-01-19 NOTE — CASE MANAGEMENT/SOCIAL WORK
Discharge Planning Assessment   [place]     Patient Name: Azra Ohara  MRN: [id number]  Today's Date: 1/19/2023    Admit Date: 1/15/2023       Discharge Plan     Row Name 01/19/23 1119       Plan    Final Note CM received orders for b/p cuff, RW and BSC. CM spoke to pt's sister Doreen and she does not have a preference for DME provider. CM contacted [facility] to arrange and faxed referral. DME to be delivered to sister's home at [address] [place] [address]. Pt's sister has a b/p cuff that she can use until they can get her one. Message left with pharmacy to check on b/p cuff. Lead RN Zoila sutherland.              Expected Discharge Date and Time     Expected Discharge Date Expected Discharge Time    Jan 19, 2023         Mulu Chavarria, RN

## 2023-01-19 NOTE — PROGRESS NOTES
" LOS: 2 days     Name: Azra Ohara  Age/Sex: 87 y.o. female  :  1935        PCP: Minal Salazar APRN  REF: No ref. provider found    Principal Problem:    Left lower quadrant abdominal pain      Reason for follow-up: LBBB and Abnormal ECHO    Subjective   HPI:  Subjective   Azra Ohara is a 87 y.o. 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 Patient presented to [facility] emergency room (ER) on 1/15/2023 with complaints of nausea and left lower abdominal pain that started about 7 days PTA. She reported she seen her PCP for this and was treated for an \"abodominal infection\" with antibiotics and some solution to help her have a bowel movement. Despite this treatment, her symptoms did not get better and has actually got worse over time.  She did report a history of diarrhea x 2 weeks prior to seeing her PCP.  She reported she had at least 2 episodes of watery diarrhea / day.  She denied prior antibiotics other than the ones in the last week. She denied recent travel or exposures to illnesses, fever, chills, dizziness, lightheadedness, hematochezia or melena.   EKG x 2 in the ER revealed a LBBB and were SR with PACs noted. No previous EKG's to compare to. Patient was admitted. Along with the LBBB, she had an abnormal ECHO done on 01/15/2023 and Cardiology has been consulted for further evaluation and management.    Interval History:   Patient was able to complete a stress test on 2023. No noted events overnight in the nurses notes or on Telemetry strips. Vital signs and lab results are stable. Patient is lying in bed resting quietly. Head of bed is elevated to about 75 degrees. She denies any chest pain, shortness of breath or palpitations. She is anticipating going home today and is trying to understand the medication changes. Pharmacy, me " and Dr. Garvin have discussed these with her today.     Vital Signs  Temp:  [97.9 °F (36.6 °C)-98.8 °F (37.1 °C)] 98.2 °F (36.8 °C)  Heart Rate:  [77-94] 77  Resp:  [16-20] 16  BP: (115-158)/(70-99) 145/71  Vital Signs (last 72 hrs)       01/16 0700  01/17 0659 01/17 0700  01/18 0659 01/18 0700  01/19 0659 01/19 0700  01/19 0734   Most Recent      Temp (°F) 97.2 -  98.9    97.5 -  98.3    97.9 -  98.8       98.2 (36.8) 01/19 0623    Heart Rate 87 -  110    76 -  93    77 -  94       77 01/19 0623    Resp 18 -  20      18    16 -  20       16 01/19 0623    BP 96/66 -  155/78    123/76 -  144/82    115/70 -  158/76       145/71 01/19 0623    SpO2 (%) 90 -  96      96 -  98       98 01/19 0623        Body mass index is 26.38 kg/m².    Intake/Output Summary (Last 24 hours) at 1/19/2023 1053  Last data filed at 1/19/2023 0800  Gross per 24 hour   Intake 480 ml   Output 400 ml   Net 80 ml     Objective  Objective     Physical Exam:      General Appearance:    Alert, cooperative, in no acute distress.   Head:    Normocephalic, without obvious abnormality, atraumatic.   Eyes:                          Conjunctivae and sclerae normal, no icterus, no pallor, corneas clear.   Neck:   No adenopathy, supple, trachea midline, no thyromegaly, no carotid bruit, no JVD.   Lungs:     Clear to auscultation, respirations regular, even and             unlabored.    Heart:    Regular rhythm and normal rate, normal S1 and S2, no          murmur, no gallop, no rub, no click   Chest Wall:    No abnormalities observed.   Abdomen:     Normal bowel sounds, no masses, no organomegaly, soft      non-tender, non-distended, no guarding, no rebound                tenderness.   Extremities:   Moves all extremities well, no edema, no cyanosis, no            redness.   Pulses:   Pulses palpable and equal bilaterally.   Skin:   No bleeding, bruising or rash.   Neurologic:   Alert and Oriented x 3 today, Speech Clear & comprehensive.       Results review    Results Review:   Results from last 7 days   Lab Units 01/19/23  0201 01/17/23  0304 01/16/23  0030   WBC 10*3/mm3 10.32 9.69 10.30   HEMOGLOBIN g/dL 13.0 13.1 16.0*   PLATELETS 10*3/mm3 206 208 269     Results from last 7 days   Lab Units 01/19/23  0201 01/18/23  0211 01/17/23  0231 01/16/23  0030   SODIUM mmol/L 142 142 140 137   POTASSIUM mmol/L 4.1 3.6 4.1 3.9   CHLORIDE mmol/L 111* 108* 114* 97*   CO2 mmol/L 18.8* 22.8 16.0* 21.0*   BUN mg/dL 30* 30* 41* 50*   CREATININE mg/dL 0.94 0.99 1.56* 1.86*   CALCIUM mg/dL 9.4 9.2 8.0* 11.0*   GLUCOSE mg/dL 90 98 96 134*   ALT (SGPT) U/L  --   --  11 11   AST (SGOT) U/L  --   --  21 17     Results from last 7 days   Lab Units 01/16/23  0815 01/16/23  0444 01/16/23  0030   TROPONIN T ng/mL <0.010 <0.010 <0.010     No results found for: PROBNP     No results found for: INR  Lab Results   Component Value Date    MG 2.0 01/17/2023    MG 2.0 01/16/2023     Lab Results   Component Value Date    TSH 0.784 01/16/2023    TRIG 98 01/17/2023    HDL 35 (L) 01/17/2023    LDL 24 01/17/2023      Imaging Results (Last 48 Hours)     ** No results found for the last 48 hours. **        ECHO:  Results for orders placed during the hospital encounter of 01/15/23    Adult Transthoracic Echo Complete W/ Cont if Necessary Per Protocol    Interpretation Summary  •  Normal left ventricular cavity size and wall thickness noted  •  Left ventricular ejection fraction appears to be 56 - 60%.  •  The following left ventricular wall segments are hypokinetic: apical anterior, apical lateral, apical inferior, apical septal and apex hypokinetic.  •  Left ventricular diastolic function is consistent with (grade I) impaired relaxation.  •  There is mild calcification of the aortic valve. There is mild thickening of the non-coronary, left coronary and right coronary cusp(s) of the aortic valve. The aortic valve appears trileaflet.  •  There is mild calcification of the mitral valve posterior leaflet(s).  Trace mitral valve regurgitation is present. No significant mitral valve stenosis is present.  •  Trace tricuspid valve regurgitation is present. Estimated right ventricular systolic pressure from tricuspid regurgitation is mildly elevated (35-45 mmHg).  •  There is no evidence of pericardial effusion.    STRESS TEST:     01/18/2023.      Telemetry: SR 60's with a BBB.      I reviewed the patient's new clinical results.    Medication Review:   aspirin, 81 mg, Oral, Daily  atorvastatin, 40 mg, Oral, Nightly  cetirizine, 5 mg, Oral, Daily  cyanocobalamin, 1,000 mcg, Intramuscular, Q30 Days  heparin (porcine), 5,000 Units, Subcutaneous, Q12H  melatonin, 5 mg, Oral, Nightly  metoprolol succinate XL, 25 mg, Oral, Q24H  sodium chloride, 3 mL, Intravenous, Q12H         Assessment    Assessment:  1.          Plan   Recommendations:  1.      I discussed the patients findings and my recommendations with patient and family.    Electronically signed by WASHINGTON Henderson, 01/19/23, 12:04 PM EST.          Please note that portions of this note were completed with a voice recognition program.    Please note that portions of this note were copied and has been reviewed and is accurate as of date.

## 2023-01-19 NOTE — PROGRESS NOTES
[facility] HOSPITALIST PROGRESS NOTE     Patient Identification:  Name:  Azra Ohara  Age:  87 y.o.  Sex:  female  :  1935  MRN:  [id number]  Visit Number:  [id number]  ROOM: [address] [place] [address]     Primary Care Provider:  Minal Salazar APRN    Length of stay:  1    Subjective        Chief Compliant Follow up left lower quadrant abdominal pain with nausea vomiting and diarrhea    Patient seen and examined this morning with RN Ed and no family at bedside.  Last night patient noted to be confused and confused this am.  Received a dose of sublingual Zyprexa this morning and currently patient during my evaluation   Is completely alert awake oriented x4.  Patient had nuclear stress test done today and tolerated well.  Denies any chest pain shortness of breath cough.  Denies any nausea vomiting abdominal pain diarrhea.  Afebrile and no events overnight.  On room air.       Objective     Current Hospital Meds:aspirin, 81 mg, Oral, Daily  atorvastatin, 40 mg, Oral, Nightly  cetirizine, 5 mg, Oral, Daily  cyanocobalamin, 1,000 mcg, Intramuscular, Q30 Days  heparin (porcine), 5,000 Units, Subcutaneous, Q12H  melatonin, 5 mg, Oral, Nightly  sodium chloride, 3 mL, Intravenous, Q12H       ----------------------------------------------------------------------------------------------------------------------  Vital Signs:  Temp:  [97.5 °F (36.4 °C)-98.3 °F (36.8 °C)] 97.9 °F (36.6 °C)  Heart Rate:  [76-94] 94  Resp:  [18] 18  BP: (115-144)/(70-82) 115/70  SpO2:  [96 %] 96 %  on   ;   Device (Oxygen Therapy): room air  Body mass index is 26.61 kg/m².    Wt Readings from Last 3 Encounters:   23 72.5 kg (159 lb 14.4 oz)       Intake/Output Summary (Last 24 hours) at 2023  Last data filed at 2023 0900  Gross per 24 hour   Intake 60 ml   Output 450 ml   Net -390 ml     Diet: Cardiac Diets; Healthy Heart (2-3 Na+); Texture: Regular Texture (IDDSI 7); Fluid Consistency: Thin (IDDSI  0)  ----------------------------------------------------------------------------------------------------------------------  Physical exam:  General: Comfortable, Not in distress.  Well-developed and well-nourished.   HENT:  Head:  Normocephalic and atraumatic.  Mouth:  Moist mucous membranes.    Eyes:  Conjunctivae and EOM are normal.  Pupils are equal, round, and reactive to light.  No scleral icterus.    Neck:  Neck supple.  No JVD present.    Cardiovascular:  Normal rate, regular rhythm with no murmur.  Pulmonary/Chest:  No respiratory distress, no wheezes, no crackles, with normal breath sounds and good air movement.  Abdomen:  Soft.  Bowel sounds are normal.  No distension and no tenderness.   Musculoskeletal:  no tenderness, and no deformity.  No red or swollen joints anywhere.    Neurological:  Alert, awake and oriented. No cranial nerve deficit. No focal deficits. No facial droop.  No slurred speech.   Skin:  Skin is warm and dry. No rash noted. No pallor.   Peripheral vascular:  pulses in all 4 extremities with no clubbing, no cyanosis, no edema.  Genitourinary: No grimaldo  ----------------------------------------------------------------------------------------------------------------------  ----------------------------------------------------------------------------------------------------------------------  Results from last 7 days   Lab Units 01/17/23  0304 01/16/23  0334 01/16/23  0030   CRP mg/dL  --   --  0.81*   LACTATE mmol/L  --  2.0 2.4*   WBC 10*3/mm3 9.69  --  10.30   HEMOGLOBIN g/dL 13.1  --  16.0*   HEMATOCRIT % 40.2  --  50.7*   MCV fL 93.7  --  94.6   MCHC g/dL 32.6  --  31.6   PLATELETS 10*3/mm3 208  --  269     Results from last 7 days   Lab Units 01/18/23  0211 01/17/23  0231 01/16/23  0815 01/16/23  0030   SODIUM mmol/L 142 140  --  137   POTASSIUM mmol/L 3.6 4.1  --  3.9   MAGNESIUM mg/dL  --  2.0 2.0  --    CHLORIDE mmol/L 108* 114*  --  97*   CO2 mmol/L 22.8 16.0*  --  21.0*   BUN mg/dL  30* 41*  --  50*   CREATININE mg/dL 0.99 1.56*  --  1.86*   CALCIUM mg/dL 9.2 8.0*  --  11.0*   GLUCOSE mg/dL 98 96  --  134*   ALBUMIN g/dL  --  2.0*  --  4.3   BILIRUBIN mg/dL  --  0.2  --  0.6   ALK PHOS U/L  --  72  --  86   AST (SGOT) U/L  --  21  --  17   ALT (SGPT) U/L  --  11  --  11   Estimated Creatinine Clearance: 39.9 mL/min (by C-G formula based on SCr of 0.99 mg/dL).  Results from last 7 days   Lab Units 01/16/23  0815 01/16/23  0444 01/16/23  0030   TROPONIN T ng/mL <0.010 <0.010 <0.010     Hemoglobin A1C   Date/Time Value Ref Range Status   01/16/2023 0815 5.50 4.80 - 5.60 % Final     Glucose   Date/Time Value Ref Range Status   01/16/2023 0712 115 70 - 130 mg/dL Final     Comment:     Meter: [id number] : [id number]   Date Value Ref Range Status   01/17/2023 13 11 - 51 umol/L Final   01/16/2023 23 11 - 51 umol/L Final     Results from last 7 days   Lab Units 01/17/23  0231   CHOLESTEROL mg/dL 78   TRIGLYCERIDES mg/dL 98   HDL CHOL mg/dL 35*   LDL CHOL mg/dL 24     Results from last 7 days   Lab Units 01/16/23  0334   NITRITE UA  Negative   WBC UA /HPF 3-5*   BACTERIA UA /HPF None Seen   SQUAM EPITHEL UA /HPF 0-2     No results found for: BLOODCXNo results found for: RESPCXNo results found for: URINECXNo results found for: WOUNDCXNo results found for: BODYFLDCXNo results found for: STOOLCX  I have personally looked at the labs and they are summarized above.  ----------------------------------------------------------------------------------------------------------------------  Imaging Results (Last 24 Hours)     ** No results found for the last 24 hours. **        I have personally reviewed the radiology images and read the final radiology report.    Assessment & Plan      Assessment:  Abdominal pain with diarrhea with H/O colon cancer, s/p partial colon resection (2004), full details unknown  LBBB on EKG with abnormal Echo  CLAYTON with AG  Acute metabolic  encephalopathy  Dehydration  LBBB on EKG  Essential HTN  Advanced age  Generalized weakness    Plan:  Abdominal pain with diarrhea with H/O colon cancer, s/p partial colon resection (2004), full details unknown, currently symptoms resolved.  tolerating diet.  CT abdomen unrevealing.  C. difficile PCR and gastro PCR negative. patient afebrile and VSS.  No leukocytosis.  CRP minimally elevated and Pro-Carlos A normal.  Blood cultures prelim no growth.  Patient did take 2 rounds of antibiotic outpatient.     LBBB on EKG, no chest pain and troponin negative*3.  No previous EKG available for comparison. continue with aspirin and statin.  2D echocardiogram with EF 56 to 60%.The following left ventricular wall segments are hypokinetic: apical anterior, apical lateral, apical inferior, apical septal and apex hypokinetic.  Stress test done today and report pending. cardiology on board.  Systolic blood pressure fluctuating so we will hold off on starting beta-blocker. Lipid panel with low LDL.     CLAYTON on CkD stage 3A with AG.  Improving to 0.99 from 1.8.  DC IV fluids and  Monitor labs in a.m. takes Voltaren at home which will be discontinued at the time of discharge.    Acute metabolic encephalopathy, resolved after receiving 1 dose of sublingual Zyprexa.  ABG normal this am. Ammonia normal. on melatonin nightly and add Zyprexa prn.     Dehydration, received IVF.  Monitor    Essential HTN, BP overall controlled.  Hold losartan and amlodipine.     Generalized weakness, improving. Monitor.    Heparin subcu for DVT prophylaxis.    Management and plans discussed in detail with patient and nursing.     The patient is high risk due to the following diagnoses/reasons:  Abdominal pain with diarrhea    Disposition Home in      Trinh Toro MD  01/18/23  19:54 EST

## 2023-01-19 NOTE — DISCHARGE PLACEMENT REQUEST
"Wenceslao Gómez (87 y.o. Female)     Date of Birth   1935    Social Security Number       Address   [address] [id number]    Home Phone   [phone]    MRN   [id number]       Taoism   None    Marital Status   Single                            Admission Date   1/15/23    Admission Type   Emergency    Admitting Provider   Rick Dejesus MD    Attending Provider   Trinh Toro MD    Department, Room/Bed   [address], 3340/1S       Discharge Date       [place] Dis[place] [facility]                               Attending Provider: Trinh Toro MD    Allergies: Lipitor [Atorvastatin], Penicillins    Isolation: None   Infection: None   Code Status: CPR    Ht: 165.1 cm (65\")   Wt: 71.9 kg (158 lb 8.2 oz)    Admission Cmt: None   Principal Problem: Left lower quadrant abdominal pain [R10.32]                 Active Insurance as of 1/15/2023     Primary Coverage     Payor Plan Insurance Group Employer/Plan Group    UNITED HE[employer] MEDICARE REPLACEMENT UNITED [employer]ALTHCARE MEDICARE REPLACEMENT [id number]     Payor Plan Address Payor Plan Phone Number Payor Plan Fax Number Effective Dates    [address] [address]   2023 - None Entered    [facility] [id number]       Subscriber Name Subscriber Birth Date Member ID       WENCESLAO GÓMEZ 1935 [id number]                 Emergency Contacts      (Rel.) Home Phone Work Phone Mobile Phone    MELISSA MCGOVERN (Sister) [phone] -- --        [address] [address]  Phone:  [phone]  Fax:  [phone] Date: 2023      Ambulatory Referral to Home Health     Patient:  Wenceslao Gómez MRN:  [id number]   [address] [facility] [address] :  1935  SSN:    Phone: [phone] Sex:  F      INSURANCE PAYOR PLAN GROUP # SUBSCRIBER ID   Primary:    [place] [facility] MEDICARE REPLACEMENT [id number]    "   Referring Provider Information:  FRIDA TORO Phone: [phone] Fax: [phone]       Referral Information:   # Visits:  999 Referral Type: Home Health [42]   Urgency:  Routine Referral Reason: Specialty Services Required   Start Date: Jan 19, 2023 End Date:  To be determined by Insurer   Diagnosis: Debility (R53.81 [ICD-10-CM] 799.3 [ICD-9-CM])      Refer to Dept:   Refer to Provider:   Refer to Provider Phone:   Refer to Facility:       Face to Face Visit Date: 1/19/2023  Follow-up provider for Plan of Care? I treated the patient in an acute care facility and will not continue treatment after discharge.  Follow-up provider: AINSLEY MCCAIN [[id number]]  Reason/Clinical Findings: debility  Describe mobility limitations that make leaving home difficult: debility  Nursing/Therapeutic Services Requested: Physical Therapy  PT orders: Therapeutic exercise  PT orders: Gait Training  PT orders: Transfer training  PT orders: Strengthening  Weight Bearing Status: As Tolerated  Frequency: Other (2-3 times per week)     This document serves as a request of services and does not constitute Insurance authorization or approval of services.  To determine eligibility, please contact the members Insurance carrier to verify and review coverage.     If you have medical questions regarding this request for services. Please contact [address] at [phone] during normal business hours.        Authorizing Provider:Frida Toro MD  Authorizing Provider's NPI: [id number]  Order Entered By: Frida Toro MD 1/19/2023  9:22 AM     Electronically signed by: Frida Toro MD 1/19/2023  9:22 AM            History & Physical      Judi, Alyce Osorio PA-C at 01/16/23 0590     Attestation signed by Rick Dejesus MD at 01/16/23 0647    I have reviewed this documentation and agree.  I have discussed and formulated the assessment and plan with NEHEMIAS Osorio.                       [facility]  "[facility] Medicine Services  HISTORY & PHYSICAL    Patient Identification:  Name:  Azra Ohara  Age:  87 y.o.  Sex:  female  :  1935  MRN:  [id number]   Visit Number:  [id number]  Admit Date: 1/15/2023   Primary Care Physician:  Minal Salazar APRN     Subjective     Chief complaint:   Chief Complaint   Patient presents with   • Nausea   • Abdominal Pain     History of presenting illness:   Patient is a 87 y.o. female with past medical history significant for essential hypertension that presented to the [facility] emergency department for evaluation of nausea and abdominal pain.    The patient states she developed a sharp left lower quadrant abdominal pain around 7 days ago.  She saw her PCP who stated she had a \"abdominal infection\" and gave her 2 prescriptions for antibiotics and a solution to \"make her poop.\"  She reports despite taking the medication her abdominal discomfort has continued to worsen.  She endorses diarrhea for the past 2 weeks and believes she has around 2 episodes of watery diarrhea a day.  She denies taking any antibiotics prior to having the abdominal discomfort.  She also denies any recent travel or sick contacts.  She admits to a decreased appetite, diaphoresis, nausea, vomiting, and generalized weakness.  She denies any fever, chills, hematochezia/melena, dizziness or lightheadedness.    Upon arrival to the ED, vitals were temperature 97.4 °F, pulse 111, respirations 20, /88, SPO2 98% room air    In the emergency department the patient received p.o. aspirin 324 mg, IV morphine 2 mg, IV Zofran 4 mg, NS 1 L bolus.    Patient has been admitted to the telemetry floor as an observation patient for further evaluation and treatment.  ---------------------------------------------------------------------------------------------------------------------   Review of Systems   Constitutional: Positive for appetite change (decreased) and diaphoresis. " Negative for fever.   HENT: Negative for congestion and sore throat.    Respiratory: Negative for cough, shortness of breath and wheezing.    Cardiovascular: Negative for chest pain and palpitations.   Gastrointestinal: Positive for abdominal pain (LLQ), diarrhea, nausea and vomiting. Negative for constipation.   Genitourinary: Negative for dysuria and frequency.   Musculoskeletal: Positive for gait problem (uses cane ).   Skin: Negative for wound.   Neurological: Positive for weakness (generalized). Negative for dizziness and light-headedness.   Psychiatric/Behavioral: Negative for confusion.      ---------------------------------------------------------------------------------------------------------------------   Past Medical History:   Diagnosis Date   • Essential hypertension    • History of colon cancer      Past Surgical History:   Procedure Laterality Date   • COLON RESECTION  2004   • PARATHYROIDECTOMY Right     Parathyroid adenoma     Family History   Problem Relation Age of Onset   • Heart disease Mother    • Heart disease Father      Social History     Socioeconomic History   • Marital status: Single   Tobacco Use   • Smoking status: Never   Substance and Sexual Activity   • Alcohol use: Never   • Drug use: Never     ---------------------------------------------------------------------------------------------------------------------   Allergies:  Penicillins  ---------------------------------------------------------------------------------------------------------------------   Medications below are reported home medications pulling from within the system; at this time, these medications have not been reconciled unless otherwise specified and are in the verification process for further verifcation as current home medications.    Prior to Admission Medications     Prescriptions Last Dose Informant Patient Reported? Taking?    ciprofloxacin (CIPRO) 500 MG tablet   Yes No    Take 500 mg by mouth 2 (Two)  Times a Day.    losartan (COZAAR) 25 MG tablet   Yes No    Take 25 mg by mouth 2 (Two) Times a Day.    metroNIDAZOLE (FLAGYL) 500 MG tablet   Yes No    Take 500 mg by mouth 2 (Two) Times a Day.    nisoldipine (SULAR) 8.5 MG 24 hr tablet   Yes No    Take 8.5 mg by mouth 3 (Three) Times a Day.        ---------------------------------------------------------------------------------------------------------------------    Objective     Hospital Scheduled Meds:    No current facility-administered medications for this encounter.      Current listed hospital scheduled medications may not yet reflect those currently placed in orders that are signed and held, awaiting patient's arrival to floor/unit.    ---------------------------------------------------------------------------------------------------------------------   Vital Signs:  Temp:  [97.4 °F (36.3 °C)] 97.4 °F (36.3 °C)  Heart Rate:  [] 97  Resp:  [20] 20  BP: (108-129)/(62-88) 125/66  Mean Arterial Pressure (Non-Invasive) for the past 24 hrs (Last 3 readings):   Noninvasive MAP (mmHg)   01/16/23 0426 84   01/16/23 0411 93   01/16/23 0356 83     SpO2 Percentage    01/16/23 0356 01/16/23 0411 01/16/23 0426   SpO2: 97% 94% 97%     SpO2:  [94 %-100 %] 97 %  on   ;   Device (Oxygen Therapy): room air    Body mass index is 26.63 kg/m².  Wt Readings from Last 3 Encounters:   01/15/23 72.6 kg (160 lb)     ---------------------------------------------------------------------------------------------------------------------   Physical Exam:  Physical Exam  Constitutional:       General: She is awake.      Appearance: Normal appearance. She is well-developed and normal weight.   HENT:      Head: Normocephalic and atraumatic.   Eyes:      General: Lids are normal.   Cardiovascular:      Rate and Rhythm: Normal rate and regular rhythm.      Pulses: Normal pulses.           Dorsalis pedis pulses are 2+ on the right side and 2+ on the left side.        Posterior tibial pulses  are 2+ on the right side and 2+ on the left side.      Heart sounds: Normal heart sounds. No murmur heard.    No friction rub. No gallop.   Pulmonary:      Effort: Pulmonary effort is normal.      Breath sounds: Normal breath sounds.   Abdominal:      General: Bowel sounds are normal.      Palpations: Abdomen is soft.      Tenderness: There is abdominal tenderness (Mild) in the left lower quadrant. There is no guarding.   Musculoskeletal:      Right lower leg: No edema.      Left lower leg: No edema.   Skin:     Findings: No ecchymosis or erythema.   Neurological:      General: No focal deficit present.      Mental Status: She is alert and oriented to person, place, and time. Mental status is at baseline.   Psychiatric:         Speech: Speech normal.         Behavior: Behavior is cooperative.         Cognition and Memory: Cognition normal.       ---------------------------------------------------------------------------------------------------------------------  EKG:    Pending cardiology read.  Per my evaluation, initial EKG with sinus rhythm with PACs, left bundle branch block, heart rate 98, QTc 474 MS.  Repeat EKG shows normal sinus rhythm with left bundle branch block, heart rate 98, QTc 477 MS.  No prior EKGs to compare to    Telemetry:    Normal sinus rhythm with occasional PACs, heart rate 90, SPO2 94% on room air    I have personally reviewed the EKG/Telemetry strip  ---------------------------------------------------------------------------------------------------------------------   Results from last 7 days   Lab Units 01/16/23  0444 01/16/23  0030   TROPONIN T ng/mL <0.010 <0.010           Results from last 7 days   Lab Units 01/16/23  0334 01/16/23  0030   CRP mg/dL  --  0.81*   LACTATE mmol/L 2.0 2.4*   WBC 10*3/mm3  --  10.30   HEMOGLOBIN g/dL  --  16.0*   HEMATOCRIT %  --  50.7*   MCV fL  --  94.6   MCHC g/dL  --  31.6   PLATELETS 10*3/mm3  --  269     Results from last 7 days   Lab Units  01/16/23  0030   SODIUM mmol/L 137   POTASSIUM mmol/L 3.9   CHLORIDE mmol/L 97*   CO2 mmol/L 21.0*   BUN mg/dL 50*   CREATININE mg/dL 1.86*   CALCIUM mg/dL 11.0*   GLUCOSE mg/dL 134*   ALBUMIN g/dL 4.3   BILIRUBIN mg/dL 0.6   ALK PHOS U/L 86   AST (SGOT) U/L 17   ALT (SGPT) U/L 11   Estimated Creatinine Clearance: 21.3 mL/min (A) (by C-G formula based on SCr of 1.86 mg/dL (H)).  Ammonia   Date Value Ref Range Status   01/16/2023 23 11 - 51 umol/L Final     Pain Management Panel    There is no flowsheet data to display.       I have personally reviewed the above laboratory results.   ---------------------------------------------------------------------------------------------------------------------  Imaging Results (Last 7 Days)     Procedure Component Value Units Date/Time    CT Abdomen Pelvis With Contrast [[id number]] Collected: 01/16/23 0155     Updated: 01/16/23 0157    Narrative:      CT Abdomen Pelvis W    INDICATION:   Left lower quadrant pain with nausea and diarrhea    TECHNIQUE:   CT of the abdomen and pelvis with 100 cc of Isovue-300 IV contrast. Coronal and sagittal reconstructions were obtained.  Radiation dose reduction techniques included automated exposure control or exposure modulation based on body size. Count of known CT  and cardiac nuc med studies performed in previous 12 months: 0.     COMPARISON:   None available.    FINDINGS:  Abdomen: There is atelectasis with volume loss at both lung bases. 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 No upper abdominal solid organ abnormalities are seen.  Noted incidentally are bilateral renal cysts. No evidence of hydronephrosis or kidney stone disease. No evidence of retroperitoneal adenopathy. No distended bowel loops. No inflammatory changes around the gallbladder.    Pelvis: Normal appendix. Diverticulosis is seen but no acute inflammatory changes are seen in the  bowel. Postoperative changes are seen in the sigmoid. There are a few diverticula. There is advanced degenerative change in the lower thoracic and lumbar  discs. There is a grade 2 spondylolisthesis at L5-S1 with bilateral L5 pars defects.      Impression:      Diverticulosis. Postoperative abdomen. No acute inflammatory changes are seen.          Signer Name: Mumtaz Farias MD   Signed: 1/16/2023 1:55 AM   Workstation Name: [id number]    [facility]        I have personally reviewed the above radiology results.     ---------------------------------------------------------------------------------------------------------------------    Assessment & Plan      Active Hospital Problems    Diagnosis  POA   • **Left lower quadrant abdominal pain [R10.32]  Yes     #? gastroenteritis   #Metabolic anion gap acidosis  #Lactic acidosis  #History of colon cancer status post partial colon resection (2004), full details unknown  -Etiology of N/V/D is unclear at this time  -CT of abdomen/pelvis unremarkable  -GI panel and C. difficile ordered; patient has been unable to provide stool sample thus far  -Hold on antibiotics for now  -Anion gap is elevated at 19.0, suspect secondary to lactic acidosis  -Obtain venous blood gas to rule out systemic acidosis  -Initial lactate 2.4, has improved to 2.0  -Maintenance fluids ordered at 75 mL/h  -Repeat a.m. CMP and monitor for anion gap closure  -Supportive care for N/B with IV Zofran (corrected  MS in setting of LBBB)  -Monitor closely    #CLAYTON versus CKD stage IV  -No prior labs to compare to  -Creatinine admission 1.86, GFR 25.9  -Urine electrolytes ordered  -Continue with maintenance fluids at 75 mL/h as outlined above  -Attempt to obtain records from PCP to determine baseline creatinine  -Repeat a.m. CMP monitor renal function closely  -Avoid nephrotoxic agents as much as possible    #?  New left bundle branch block  -Initial and repeat EKG shows left  bundle branch block; no prior EKGs to compare to determine if this is new  -Patient denies any chest pain; troponin T negative x2  -Received loading dose aspirin in the ED  -Continue with daily aspirin and statin  -A.m. lipid panel ordered  -As stated above we will attempt to obtain records from PCP  -Monitor on telemetry  -May consider obtaining cardiology consult    #Hyperglycemia with no known history of diabetes mellitus  -Obtain hemoglobin A1c    #Polycythemia  -RBC 5.36, hemoglobin 16, hematocrit 50.7; suspect secondary to intravascular depletion  -Continue with maintenance fluids as outlined above  -Obtain peripheral blood smear    #Essential hypertension  -Review home medications once reconciled per pharmacy    #Advanced age  #Generalized weakness  -PT/OT   -Up with assistance, fall precautions    F/E/N: NS 75 mL/h. Replace electrolytes as necessary.  Clear liquid diet, advance as tolerated  ---------------------------------------------------  DVT Prophylaxis: Subcutaneous heparin  GI Prophylaxis: Protonix  Activity: Up with assistance, fall precautions    OBSERVATION status, however if further evaluation or treatment plans warrant, status may change.  Based upon current information, I predict patient's care encounter to be less than or equal to 2 midnights.    Code Status: FULL CODE     Disposition/Discharge planning: Plan for home at discharge.  Pending clinical course    I have discussed the patient's assessment and plan with the patient and attending physician      Alyce Lau PA-C  Hospitalist Service -- [facility]       01/16/23  05:57 EST    Attending Physician: Rick Dejesus MD       Electronically signed by Rick Dejesus MD at 01/16/23 0638       Vital Signs (last day)     Date/Time Temp Temp src Pulse Resp BP Patient Position SpO2    01/19/23 0623 98.2 (36.8) Oral 77 16 145/71 Lying 98    01/19/23 0300 98.8 (37.1) Oral 85 20 158/76 Lying 98    01/18/23 2300 98.2  (36.8) Oral 91 18 141/99 Lying 97    01/18/23 1800 97.9 (36.6) Oral 94 18 115/70 Lying 96    01/18/23 0626 98.3 (36.8) Oral 81 18 144/82 Lying --    01/18/23 0207 97.5 (36.4) Oral 76 18 123/76 Lying --          Intake & Output (last day)       01/18 0701  01/19 0700 01/19 0701  01/20 0700    P.O. 240 240    Total Intake(mL/kg) 240 (3.3) 240 (3.3)    Urine (mL/kg/hr) 300 (0.2) 400 (1.2)    Total Output 300 400    Net -60 -160          Urine Unmeasured Occurrence 2 x 1 x        Lines, Drains & Airways     Active LDAs     Name Placement date Placement time Site Days    Peripheral IV 01/18/23 1020 Right Antecubital 01/18/23  1020  Antecubital  1                  Current Facility-Administered Medications   Medication Dose Route Frequency Provider Last Rate Last Admin   • acetaminophen (TYLENOL) tablet 650 mg  650 mg Oral Q6H PRN Alyce Lau PA-C       • aspirin chewable tablet 81 mg  81 mg Oral Daily Alyce Lau PA-C   81 mg at 01/19/23 0821   • atorvastatin (LIPITOR) tablet 40 mg  40 mg Oral Nightly Alyce Lau PA-C   40 mg at 01/18/23 2051   • calcium carbonate (TUMS) chewable tablet 500 mg (200 mg elemental)  2 tablet Oral TID PRN Alyce Lau PA-C       • cetirizine (zyrTEC) tablet 5 mg  5 mg Oral Daily Trinh Toro MD   5 mg at 01/19/23 0821   • cyanocobalamin injection 1,000 mcg  1,000 mcg Intramuscular Q30 Days Trinh Toro MD   1,000 mcg at 01/16/23 2215   • Diclofenac Sodium (VOLTAREN) 1 % gel 2 g  2 g Topical 4x Daily PRN Trinh Toro MD   2 g at 01/19/23 0830   • heparin (porcine) 5000 UNIT/ML injection 5,000 Units  5,000 Units Subcutaneous Q12H Rick Dejesus MD   5,000 Units at 01/19/23 0821   • hydrOXYzine (ATARAX) tablet 25 mg  25 mg Oral TID PRN Alyce Lau PA-C       • melatonin tablet 5 mg  5 mg Oral Nightly Trinh Toro MD   5 mg at 01/18/23 2051   • metoprolol succinate XL (TOPROL-XL) 24 hr tablet 25 mg  25 mg Oral Q24H  Trinh Toro MD   25 mg at 01/19/23 1008   • nitroglycerin (NITROSTAT) SL tablet 0.4 mg  0.4 mg Sublingual Q5 Min PRN Rick Dejesus MD       • OLANZapine zydis (zyPREXA) disintegrating tablet 5 mg  5 mg Oral Nightly PRN Trinh Toro MD       • ondansetron (ZOFRAN) injection 4 mg  4 mg Intravenous Q6H PRN Alyce Lau PA-C       • polyethylene glycol (MIRALAX) packet 17 g  17 g Oral BID PRN Trinh Toro MD       • sodium chloride 0.9 % flush 3 mL  3 mL Intravenous Q12H Rick Dejesus MD   3 mL at 01/19/23 0822   • sodium chloride 0.9 % flush 3-10 mL  3-10 mL Intravenous PRN Rick Dejesus MD       • sodium chloride 0.9 % infusion 40 mL  40 mL Intravenous PRN Rick Dejesus MD         Lab Results (most recent)     Procedure Component Value Units Date/Time    Basic Metabolic Panel [[id number]]  (Abnormal) Collected: 01/19/23 0201    Specimen: Blood Updated: 01/19/23 0433     Glucose 90 mg/dL      BUN 30 mg/dL      Creatinine 0.94 mg/dL      Sodium 142 mmol/L      Potassium 4.1 mmol/L      Comment: Slight hemolysis detected by analyzer. Results may be affected.        Chloride 111 mmol/L      CO2 18.8 mmol/L      Calcium 9.4 mg/dL      BUN/Creatinine Ratio 31.9     Anion Gap 12.2 mmol/L      eGFR 58.8 mL/min/1.73     Narrative:      GFR Normal >60  Chronic Kidney Disease <60  Kidney Failure <15    The GFR formula is only valid for adults with stable renal function between ages 18 and 70.    CBC & Differential [[id number]]  (Abnormal) Collected: 01/19/23 0201    Specimen: Blood Updated: 01/19/23 0320    Narrative:      The following orders were created for panel order CBC & Differential.  Procedure                               Abnormality         Status                     ---------                               -----------         ------                     CBC Auto Differential[[id number]]        Abnormal            Final result                 Please view results for these  tests on the individual orders.    CBC Auto Differential [[id number]]  (Abnormal) Collected: 01/19/23 0201    Specimen: Blood Updated: 01/19/23 0320     WBC 10.32 10*3/mm3      RBC 4.30 10*6/mm3      Hemoglobin 13.0 g/dL      Hematocrit 41.5 %      MCV 96.5 fL      MCH 30.2 pg      MCHC 31.3 g/dL      RDW 14.3 %      RDW-SD 50.6 fl      MPV 11.9 fL      Platelets 206 10*3/mm3      Neutrophil % 73.7 %      Lymphocyte % 18.2 %      Monocyte % 6.4 %      Eosinophil % 0.7 %      Basophil % 0.5 %      Immature Grans % 0.5 %      Neutrophils, Absolute 7.61 10*3/mm3      Lymphocytes, Absolute 1.88 10*3/mm3      Monocytes, Absolute 0.66 10*3/mm3      Eosinophils, Absolute 0.07 10*3/mm3      Basophils, Absolute 0.05 10*3/mm3      Immature Grans, Absolute 0.05 10*3/mm3      nRBC 0.0 /100 WBC     Blood Culture - Blood, Arm, Right [[id number]]  (Normal) Collected: 01/16/23 0030    Specimen: Blood from Arm, Right Updated: 01/19/23 0100     Blood Culture No growth at 3 days    Blood Culture - Blood, Hand, Right [[id number]]  (Normal) Collected: 01/16/23 0041    Specimen: Blood from Hand, Right Updated: 01/19/23 0100     Blood Culture No growth at 3 days    Basic Metabolic Panel [[id number]]  (Abnormal) Collected: 01/18/23 0211    Specimen: Blood Updated: 01/18/23 0253     Glucose 98 mg/dL      BUN 30 mg/dL      Creatinine 0.99 mg/dL      Sodium 142 mmol/L      Potassium 3.6 mmol/L      Chloride 108 mmol/L      CO2 22.8 mmol/L      Calcium 9.2 mg/dL      BUN/Creatinine Ratio 30.3     Anion Gap 11.2 mmol/L      eGFR 55.3 mL/min/1.73     Narrative:      GFR Normal >60  Chronic Kidney Disease <60  Kidney Failure <15    The GFR formula is only valid for adults with stable renal function between ages 18 and 70.    Blood Gas, Arterial With Co-Ox [[id number]]  (Abnormal) Collected: 01/17/23 0604    Specimen: Arterial Blood Updated: 01/17/23 0608     Site Left Radial     Chai's Test N/A     pH, Arterial 7.447 pH units      pCO2, Arterial 32.3  mm Hg      pO2, Arterial 83.9 mm Hg      HCO3, Arterial 22.2 mmol/L      Base Excess, Arterial -1.0 mmol/L      O2 Saturation, Arterial 97.0 %      Hemoglobin, Blood Gas 14.3 g/dL      Hematocrit, Blood Gas 43.7 %      Oxyhemoglobin 95.6 %      Methemoglobin 0.60 %      Carboxyhemoglobin 0.8 %      A-a DO2 21.4 mmHg      CO2 Content 23.2 mmol/L      Temperature 0.0 C      Barometric Pressure for Blood Gas 722 mmHg      Modality Room Air     FIO2 21 %      Ventilator Mode NA     Note --     Collected by [id number]     Comment: Meter: [id number]     :  [id number]        pH, Temp Corrected --     pCO2, Temperature Corrected --     pO2, Temperature Corrected --    Ammonia [[id number]]  (Normal) Collected: 01/17/23 0431    Specimen: Blood Updated: 01/17/23 0455     Ammonia 13 umol/L     CBC & Differential [[id number]] Collected: 01/17/23 0231    Specimen: Blood Updated: 01/17/23 0318    Narrative:      The following orders were created for panel order CBC & Differential.  Procedure                               Abnormality         Status                     ---------                               -----------         ------                     CBC Auto Differential[[id number]]        Normal              Final result               Scan Slide[[id number]]                                                                    Please view results for these tests on the individual orders.    CBC Auto Differential [[id number]]  (Normal) Collected: 01/17/23 0304    Specimen: Blood Updated: 01/17/23 0318     WBC 9.69 10*3/mm3      RBC 4.29 10*6/mm3      Hemoglobin 13.1 g/dL      Hematocrit 40.2 %      MCV 93.7 fL      MCH 30.5 pg      MCHC 32.6 g/dL      RDW 14.0 %      RDW-SD 47.7 fl      MPV 11.1 fL      Platelets 208 10*3/mm3      Neutrophil % 70.9 %      Lymphocyte % 21.3 %      Monocyte % 6.0 %      Eosinophil % 1.1 %      Basophil % 0.3 %      Immature Grans % 0.4 %      Neutrophils, Absolute 6.87 10*3/mm3      Lymphocytes,  Absolute 2.06 10*3/mm3      Monocytes, Absolute 0.58 10*3/mm3      Eosinophils, Absolute 0.11 10*3/mm3      Basophils, Absolute 0.03 10*3/mm3      Immature Grans, Absolute 0.04 10*3/mm3      nRBC 0.0 /100 WBC     Comprehensive Metabolic Panel [[id number]]  (Abnormal) Collected: 01/17/23 0231    Specimen: Blood Updated: 01/17/23 0302     Glucose 96 mg/dL      BUN 41 mg/dL      Creatinine 1.56 mg/dL      Sodium 140 mmol/L      Potassium 4.1 mmol/L      Comment: Slight hemolysis detected by analyzer. Results may be affected.        Chloride 114 mmol/L      CO2 16.0 mmol/L      Calcium 8.0 mg/dL      Total Protein 4.8 g/dL      Albumin 2.0 g/dL      ALT (SGPT) 11 U/L      AST (SGOT) 21 U/L      Alkaline Phosphatase 72 U/L      Total Bilirubin 0.2 mg/dL      Globulin 2.8 gm/dL      A/G Ratio 0.7 g/dL      BUN/Creatinine Ratio 26.3     Anion Gap 10.0 mmol/L      eGFR 32.0 mL/min/1.73      Comment: National Kidney Foundation and American Society of Nephrology (ASN) Task Force recommended calculation based on the Chronic Kidney Disease Epidemiology Collaboration (CKD-EPI) equation refit without adjustment for race.       Narrative:      GFR Normal >60  Chronic Kidney Disease <60  Kidney Failure <15    The GFR formula is only valid for adults with stable renal function between ages 18 and 70.    Lipid Panel [[id number]]  (Abnormal) Collected: 01/17/23 0231    Specimen: Blood Updated: 01/17/23 0300     Total Cholesterol 78 mg/dL      Triglycerides 98 mg/dL      HDL Cholesterol 35 mg/dL      LDL Cholesterol  24 mg/dL      VLDL Cholesterol 19 mg/dL      LDL/HDL Ratio 0.67    Narrative:      Cholesterol Reference Ranges  (U.S. Department of Health and Human Services ATP III Classifications)    Desirable          <200 mg/dL  Borderline High    200-239 mg/dL  High Risk          >240 mg/dL      Triglyceride Reference Ranges  (U.S. Department of Health and Human Services ATP III Classifications)    Normal           <150  mg/dL  Borderline High  150-199 mg/dL  High             200-499 mg/dL  Very High        >500 mg/dL    HDL Reference Ranges  (U.S. Department of Health and Human Services ATP III Classifications)    Low     <40 mg/dl (major risk factor for CHD)  High    >60 mg/dl ('negative' risk factor for CHD)        LDL Reference Ranges  (U.S. Department of Health and Human Services ATP III Classifications)    Optimal          <100 mg/dL  Near Optimal     100-129 mg/dL  Borderline High  130-159 mg/dL  High             160-189 mg/dL  Very High        >189 mg/dL    Magnesium [[id number]]  (Normal) Collected: 01/17/23 0231    Specimen: Blood Updated: 01/17/23 0300     Magnesium 2.0 mg/dL     Gastrointestinal Panel, PCR - Stool, Per Rectum [[id number]]  (Normal) Collected: 01/16/23 2025    Specimen: Stool from Per Rectum Updated: 01/16/23 2218     Campylobacter Not Detected     Plesiomonas shigelloides Not Detected     Salmonella Not Detected     Vibrio Not Detected     Vibrio cholerae Not Detected     Yersinia enterocolitica Not Detected     Enteroaggregative E. coli (EAEC) Not Detected     Enteropathogenic E. coli (EPEC) Not Detected     Enterotoxigenic E. coli (ETEC) lt/st Not Detected     Shiga-like toxin-producing E. coli (STEC) stx1/stx2 Not Detected     Shigella/Enteroinvasive E. coli (EIEC) Not Detected     Cryptosporidium Not Detected     Cyclospora cayetanensis Not Detected     Entamoeba histolytica Not Detected     Giardia lamblia Not Detected     Adenovirus F40/41 Not Detected     Astrovirus Not Detected     Norovirus GI/GII Not Detected     Rotavirus A Not Detected     Sapovirus (I, II, IV or V) Not Detected    Clostridioides difficile Toxin - Stool, Per Rectum [[id number]] Collected: 01/16/23 2025    Specimen: Stool from Per Rectum Updated: 01/16/23 2148    Narrative:      The following orders were created for panel order Clostridioides difficile Toxin - Stool, Per Rectum.  Procedure                                Abnormality         Status                     ---------                               -----------         ------                     Clostridioides difficile...[[id number]]                      Final result                 Please view results for these tests on the individual orders.    Clostridioides difficile Toxin, PCR - Stool, Per Rectum [[id number]] Collected: 01/16/23 2025    Specimen: Stool from Per Rectum Updated: 01/16/23 2148     C. Difficile Toxins by PCR Negative     027 Toxin Presumptive Negative    Narrative:      The result indicates the absence of toxigenic C. difficile from stool specimen.     Protein / Creatinine Ratio, Urine - Urine, Clean Catch [[id number]] Collected: 01/16/23 0334    Specimen: Urine, Clean Catch Updated: 01/16/23 1913     Protein/Creatinine Ratio, Urine 114.2 mg/G Crea      Creatinine, Urine 72.7 mg/dL      Total Protein, Urine 8.3 mg/dL     Magnesium [[id number]]  (Normal) Collected: 01/16/23 0815    Specimen: Blood Updated: 01/16/23 1231     Magnesium 2.0 mg/dL     Blood Gas, Venous With Co-Ox [[id number]]  (Abnormal) Collected: 01/16/23 0941    Specimen: Venous Blood Updated: 01/16/23 0943     Site Lab     pH, Venous 7.446 pH Units      pCO2, Venous 32.2 mm Hg      Comment: 84 Value below reference range        pO2, Venous 57.1 mm Hg      Comment: 83 Value above reference range        HCO3, Venous 22.2 mmol/L      Base Excess, Venous -1.1 mmol/L      O2 Saturation, Venous 91.0 %      Comment: 83 Value above reference range        Hemoglobin, Blood Gas 14.1 g/dL      CO2 Content 23.1 mmol/L      Temperature 37.0 C      Barometric Pressure for Blood Gas 729 mmHg      Modality Room Air     FIO2 21 %      Ventilator Mode NA     Notified Who DR PATEL     Notified By [id number]     Collected by [id number]     Comment: Meter: [id number]     :  [id number]        Oxyhemoglobin Venous 89.9 %      Comment: 83 Value above reference range        Carboxyhemoglobin Venous 0.9 %       Comment: 84 Value below reference range        Methemoglobin Venous 0.3 %     Protein, Urine, Random - Urine, Clean Catch [[id number]] Collected: 01/16/23 0334    Specimen: Urine, Clean Catch Updated: 01/16/23 0908     Total Protein, Urine 11.0 mg/dL     Narrative:      Reference intervals for random urine have not been established.  Clinical usage is dependent upon physician's interpretation in combination with other laboratory tests.       Troponin [[id number]]  (Normal) Collected: 01/16/23 0815    Specimen: Blood Updated: 01/16/23 0903     Troponin T <0.010 ng/mL     Narrative:      Troponin T Reference Range:  <= 0.03 ng/mL-   Negative for AMI  >0.03 ng/mL-     Abnormal for myocardial necrosis.  Clinicians would have to utilize clinical acumen, EKG, Troponin and serial changes to determine if it is an Acute Myocardial Infarction or myocardial injury due to an underlying chronic condition.       Results may be falsely decreased if patient taking Biotin.      TSH [[id number]]  (Normal) Collected: 01/16/23 0815    Specimen: Blood Updated: 01/16/23 0903     TSH 0.784 uIU/mL     Hemoglobin A1c [[id number]]  (Normal) Collected: 01/16/23 0815    Specimen: Blood Updated: 01/16/23 0833     Hemoglobin A1C 5.50 %     Narrative:      Hemoglobin A1C Ranges:    Increased Risk for Diabetes  5.7% to 6.4%  Diabetes                     >= 6.5%  Diabetic Goal                < 7.0%    PERIPHERAL SMEAR, [facility] [[id number]] Collected: 01/16/23 0815    Specimen: Blood Updated: 01/16/23 0818    Urine Drug Screen - Urine, Clean Catch [[id number]]  (Abnormal) Collected: 01/16/23 0334    Specimen: Urine, Clean Catch Updated: 01/16/23 0753     THC, Screen, Urine Negative     Phencyclidine (PCP), Urine Negative     Cocaine Screen, Urine Negative     Methamphetamine, Ur Negative     Opiate Screen Positive     Amphetamine Screen, Urine Negative     Benzodiazepine Screen, Urine Negative     Tricyclic Antidepressants Screen Negative     Methadone  Screen, Urine Negative     Barbiturates Screen, Urine Negative     Oxycodone Screen, Urine Negative     Propoxyphene Screen Negative     Buprenorphine, Screen, Urine Negative    Narrative:      Cutoff For Drugs Screened:    Amphetamines               500 ng/ml  Barbiturates               200 ng/ml  Benzodiazepines            150 ng/ml  Cocaine                    150 ng/ml  Methadone                  200 ng/ml  Opiates                    100 ng/ml  Phencyclidine               25 ng/ml  THC                            50 ng/ml  Methamphetamine            500 ng/ml  Tricyclic Antidepressants  300 ng/ml  Oxycodone                  100 ng/ml  Propoxyphene               300 ng/ml  Buprenorphine               10 ng/ml    The normal value for all drugs tested is negative. This report includes unconfirmed screening results, with the cutoff values listed, to be used for medical treatment purposes only.  Unconfirmed results must not be used for non-medical purposes such as employment or legal testing.  Clinical consideration should be applied to any drug of abuse test, particularly when unconfirmed results are used.      POC Glucose Once [[id number]]  (Normal) Collected: 01/16/23 0712    Specimen: Blood Updated: 01/16/23 0718     Glucose 115 mg/dL      Comment: Meter: [id number] : [id number] ADI CAGE       Troponin [[id number]]  (Normal) Collected: 01/16/23 0444    Specimen: Blood from Hand, Left Updated: 01/16/23 0507     Troponin T <0.010 ng/mL     Narrative:      Troponin T Reference Range:  <= 0.03 ng/mL-   Negative for AMI  >0.03 ng/mL-     Abnormal for myocardial necrosis.  Clinicians would have to utilize clinical acumen, EKG, Troponin and serial changes to determine if it is an Acute Myocardial Infarction or myocardial injury due to an underlying chronic condition.       Results may be falsely decreased if patient taking Biotin.      STAT Lactic Acid, Reflex [[id number]]  (Normal) Collected: 01/16/23 0339     "Specimen: Blood from Hand, Left Updated: 01/16/23 0354     Lactate 2.0 mmol/L     Urinalysis With Culture If Indicated - Urine, Clean Catch [[id number]]  (Abnormal) Collected: 01/16/23 0334    Specimen: Urine, Clean Catch Updated: 01/16/23 0348     Color, UA Yellow     Appearance, UA Clear     pH, UA <=5.0     Specific Gravity, UA >1.030     Glucose, UA Negative     Ketones, UA Negative     Bilirubin, UA Negative     Blood, UA Negative     Protein, UA Negative     Leuk Esterase, UA Trace     Nitrite, UA Negative     Urobilinogen, UA 0.2 E.U./dL    Narrative:      In absence of clinical symptoms, the presence of pyuria, bacteria, and/or nitrites on the urinalysis result does not correlate with infection.    Urinalysis, Microscopic Only - Urine, Clean Catch [[id number]]  (Abnormal) Collected: 01/16/23 0334    Specimen: Urine, Clean Catch Updated: 01/16/23 0348     RBC, UA 3-5 /HPF      WBC, UA 3-5 /HPF      Comment: Urine culture not indicated.        Bacteria, UA None Seen /HPF      Squamous Epithelial Cells, UA 0-2 /HPF      Hyaline Casts, UA 3-6 /LPF      Methodology Automated Microscopy    Procalcitonin [[id number]]  (Normal) Collected: 01/16/23 0030    Specimen: Blood from Arm, Right Updated: 01/16/23 0119     Procalcitonin 0.08 ng/mL     Narrative:      As a Marker for Sepsis (Non-Neonates):    1. <0.5 ng/mL represents a low risk of severe sepsis and/or septic shock.  2. >2 ng/mL represents a high risk of severe sepsis and/or septic shock.    As a Marker for Lower Respiratory Tract Infections that require antibiotic therapy:    PCT on Admission    Antibiotic Therapy       6-12 Hrs later    >0.5                Strongly Recommended  >0.25 - <0.5        Recommended   0.1 - 0.25          Discouraged              Remeasure/reassess PCT  <0.1                Strongly Discouraged     Remeasure/reassess PCT    As 28 day mortality risk marker: \"Change in Procalcitonin Result\" (>80% or <=80%) if Day 0 (or Day 1) and Day 4 " values are available. Refer to http://www.[facility]-pct-calculator.com    Change in PCT <=80%  A decrease of PCT levels below or equal to 80% defines a positive change in PCT test result representing a higher risk for 28-day all-cause mortality of patients diagnosed with severe sepsis for septic shock.    Change in PCT >80%  A decrease of PCT levels of more than 80% defines a negative change in PCT result representing a lower risk for 28-day all-cause mortality of patients diagnosed with severe sepsis or septic shock.       Scan Slide [[id number]] Collected: 01/16/23 0030    Specimen: Blood from Arm, Right Updated: 01/16/23 0116     Hypochromia Slight/1+     Large Platelets Slight/1+    Ammonia [[id number]]  (Normal) Collected: 01/16/23 0041    Specimen: Blood from Hand, Right Updated: 01/16/23 0116     Ammonia 23 umol/L     Sedimentation Rate [[id number]]  (Normal) Collected: 01/16/23 0030    Specimen: Blood from Arm, Right Updated: 01/16/23 0116     Sed Rate 24 mm/hr     Lactic Acid, Plasma [[id number]]  (Abnormal) Collected: 01/16/23 0030    Specimen: Blood from Arm, Right Updated: 01/16/23 0115     Lactate 2.4 mmol/L     Comprehensive Metabolic Panel [[id number]]  (Abnormal) Collected: 01/16/23 0030    Specimen: Blood from Arm, Right Updated: 01/16/23 0113     Glucose 134 mg/dL      BUN 50 mg/dL      Creatinine 1.86 mg/dL      Sodium 137 mmol/L      Potassium 3.9 mmol/L      Comment: Slight hemolysis detected by analyzer. Results may be affected.        Chloride 97 mmol/L      CO2 21.0 mmol/L      Calcium 11.0 mg/dL      Total Protein 8.3 g/dL      Albumin 4.3 g/dL      ALT (SGPT) 11 U/L      AST (SGOT) 17 U/L      Alkaline Phosphatase 86 U/L      Total Bilirubin 0.6 mg/dL      Globulin 4.0 gm/dL      A/G Ratio 1.1 g/dL      BUN/Creatinine Ratio 26.9     Anion Gap 19.0 mmol/L      eGFR 25.9 mL/min/1.73      Comment: National Kidney Foundation and American Society of Nephrology (ASN) Task Force recommended calculation  based on the Chronic Kidney Disease Epidemiology Collaboration (CKD-EPI) equation refit without adjustment for race.       Narrative:      GFR Normal >60  Chronic Kidney Disease <60  Kidney Failure <15    The GFR formula is only valid for adults with stable renal function between ages 18 and 70.    COVID PRE-OP / PRE-PROCEDURE SCREENING ORDER (NO ISOLATION) - Swab, Nasopharynx [[id number]]  (Normal) Collected: 01/16/23 0043    Specimen: Swab from Nasopharynx Updated: 01/16/23 0113    Narrative:      The following orders were created for panel order COVID PRE-OP / PRE-PROCEDURE SCREENING ORDER (NO ISOLATION) - Swab, Nasopharynx.  Procedure                               Abnormality         Status                     ---------                               -----------         ------                     COVID-19 and FLU A/B PCR...[[id number]]  Normal              Final result                 Please view results for these tests on the individual orders.    COVID-19 and FLU A/B PCR - Swab, Nasopharynx [[id number]]  (Normal) Collected: 01/16/23 0043    Specimen: Swab from Nasopharynx Updated: 01/16/23 0113     COVID19 Not Detected     Influenza A PCR Not Detected     Influenza B PCR Not Detected    Narrative:      Fact sheet for providers: https://www.fda.gov/media/634884/download    Fact sheet for patients: https://www.fda.gov/media/113126/download    Test performed by PCR.    C-reactive Protein [[id number]]  (Abnormal) Collected: 01/16/23 0030    Specimen: Blood from Arm, Right Updated: 01/16/23 0113     C-Reactive Protein 0.81 mg/dL           Imaging Results (Most Recent)     Procedure Component Value Units Date/Time    CT Abdomen Pelvis With Contrast [[id number]] Collected: 01/16/23 0155     Updated: 01/16/23 0157    Narrative:      CT Abdomen Pelvis W    INDICATION:   Left lower quadrant pain with nausea and diarrhea    TECHNIQUE:   CT of the abdomen and pelvis with 100 cc of Isovue-300 IV contrast. Coronal and sagittal  reconstructions were obtained.  Radiation dose reduction techniques included automated exposure control or exposure modulation based on body size. Count of known CT  and cardiac nuc med studies performed in previous 12 months: 0.     COMPARISON:   None available.    FINDINGS:  Abdomen: There is atelectasis with volume loss at both lung bases. 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 No upper abdominal solid organ abnormalities are seen.  Noted incidentally are bilateral renal cysts. No evidence of hydronephrosis or kidney stone disease. No evidence of retroperitoneal adenopathy. No distended bowel loops. No inflammatory changes around the gallbladder.    Pelvis: Normal appendix. Diverticulosis is seen but no acute inflammatory changes are seen in the bowel. Postoperative changes are seen in the sigmoid. There are a few diverticula. There is advanced degenerative change in the lower thoracic and lumbar  discs. There is a grade 2 spondylolisthesis at L5-S1 with bilateral L5 pars defects.      Impression:      Diverticulosis. Postoperative abdomen. No acute inflammatory changes are seen.          Signer Name: Mumtaz Farias MD   Signed: 2023 1:55 AM   Workstation Name: [id number]    [facility]        Operative/Procedure Notes (most recent note)    No notes of this type exist for this encounter.            Physician Progress Notes (most recent note)      Trinh Toro MD at 23              [facility]     Patient Identification:  Name:  Azra Ohara  Age:  87 y.o.  Sex:  female  :  1935  MRN:  [id number]  Visit Number:  [id number]  ROOM: [address] [place] [address]     Primary Care Provider:  Minal Salazar APRN    Length of stay:  1    Subjective        Chief Compliant Follow up left lower quadrant abdominal pain with nausea vomiting and  diarrhea    Patient seen and examined this morning with RN Ed and no family at bedside.  Last night patient noted to be confused and confused this am.  Received a dose of sublingual Zyprexa this morning and currently patient during my evaluation   Is completely alert awake oriented x4.  Patient had nuclear stress test done today and tolerated well.  Denies any chest pain shortness of breath cough.  Denies any nausea vomiting abdominal pain diarrhea.  Afebrile and no events overnight.  On room air.       Objective     Current Hospital Meds:aspirin, 81 mg, Oral, Daily  atorvastatin, 40 mg, Oral, Nightly  cetirizine, 5 mg, Oral, Daily  cyanocobalamin, 1,000 mcg, Intramuscular, Q30 Days  heparin (porcine), 5,000 Units, Subcutaneous, Q12H  melatonin, 5 mg, Oral, Nightly  sodium chloride, 3 mL, Intravenous, Q12H       ----------------------------------------------------------------------------------------------------------------------  Vital Signs:  Temp:  [97.5 °F (36.4 °C)-98.3 °F (36.8 °C)] 97.9 °F (36.6 °C)  Heart Rate:  [76-94] 94  Resp:  [18] 18  BP: (115-144)/(70-82) 115/70  SpO2:  [96 %] 96 %  on   ;   Device (Oxygen Therapy): room air  Body mass index is 26.61 kg/m².    Wt Readings from Last 3 Encounters:   01/18/23 72.5 kg (159 lb 14.4 oz)       Intake/Output Summary (Last 24 hours) at 1/18/2023 1954  Last data filed at 1/18/2023 0900  Gross per 24 hour   Intake 60 ml   Output 450 ml   Net -390 ml     Diet: Cardiac Diets; Healthy Heart (2-3 Na+); Texture: Regular Texture (IDDSI 7); Fluid Consistency: Thin (IDDSI 0)  ----------------------------------------------------------------------------------------------------------------------  Physical exam:  General: Comfortable, Not in distress.  Well-developed and well-nourished.   HENT:  Head:  Normocephalic and atraumatic.  Mouth:  Moist mucous membranes.    Eyes:  Conjunctivae and EOM are normal.  Pupils are equal, round, and reactive to light.  No scleral icterus.     Neck:  Neck supple.  No JVD present.    Cardiovascular:  Normal rate, regular rhythm with no murmur.  Pulmonary/Chest:  No respiratory distress, no wheezes, no crackles, with normal breath sounds and good air movement.  Abdomen:  Soft.  Bowel sounds are normal.  No distension and no tenderness.   Musculoskeletal:  no tenderness, and no deformity.  No red or swollen joints anywhere.    Neurological:  Alert, awake and oriented. No cranial nerve deficit. No focal deficits. No facial droop.  No slurred speech.   Skin:  Skin is warm and dry. No rash noted. No pallor.   Peripheral vascular:  pulses in all 4 extremities with no clubbing, no cyanosis, no edema.  Genitourinary: No grimaldo  ----------------------------------------------------------------------------------------------------------------------  ----------------------------------------------------------------------------------------------------------------------  Results from last 7 days   Lab Units 01/17/23  0304 01/16/23  0334 01/16/23  0030   CRP mg/dL  --   --  0.81*   LACTATE mmol/L  --  2.0 2.4*   WBC 10*3/mm3 9.69  --  10.30   HEMOGLOBIN g/dL 13.1  --  16.0*   HEMATOCRIT % 40.2  --  50.7*   MCV fL 93.7  --  94.6   MCHC g/dL 32.6  --  31.6   PLATELETS 10*3/mm3 208  --  269     Results from last 7 days   Lab Units 01/18/23  0211 01/17/23  0231 01/16/23  0815 01/16/23  0030   SODIUM mmol/L 142 140  --  137   POTASSIUM mmol/L 3.6 4.1  --  3.9   MAGNESIUM mg/dL  --  2.0 2.0  --    CHLORIDE mmol/L 108* 114*  --  97*   CO2 mmol/L 22.8 16.0*  --  21.0*   BUN mg/dL 30* 41*  --  50*   CREATININE mg/dL 0.99 1.56*  --  1.86*   CALCIUM mg/dL 9.2 8.0*  --  11.0*   GLUCOSE mg/dL 98 96  --  134*   ALBUMIN g/dL  --  2.0*  --  4.3   BILIRUBIN mg/dL  --  0.2  --  0.6   ALK PHOS U/L  --  72  --  86   AST (SGOT) U/L  --  21  --  17   ALT (SGPT) U/L  --  11  --  11   Estimated Creatinine Clearance: 39.9 mL/min (by C-G formula based on SCr of 0.99 mg/dL).  Results from last 7  days   Lab Units 01/16/23  0815 01/16/23  0444 01/16/23  0030   TROPONIN T ng/mL <0.010 <0.010 <0.010     Hemoglobin A1C   Date/Time Value Ref Range Status   01/16/2023 0815 5.50 4.80 - 5.60 % Final     Glucose   Date/Time Value Ref Range Status   01/16/2023 0712 115 70 - 130 mg/dL Final     Comment:     Meter: [id number] : [id number] ADI CAGE     Ammonia   Date Value Ref Range Status   01/17/2023 13 11 - 51 umol/L Final   01/16/2023 23 11 - 51 umol/L Final     Results from last 7 days   Lab Units 01/17/23  0231   CHOLESTEROL mg/dL 78   TRIGLYCERIDES mg/dL 98   HDL CHOL mg/dL 35*   LDL CHOL mg/dL 24     Results from last 7 days   Lab Units 01/16/23  0334   NITRITE UA  Negative   WBC UA /HPF 3-5*   BACTERIA UA /HPF None Seen   SQUAM EPITHEL UA /HPF 0-2     No results found for: BLOODCXNo results found for: RESPCXNo results found for: URINECXNo results found for: WOUNDCXNo results found for: BODYFLDCXNo results found for: STOOLCX  I have personally looked at the labs and they are summarized above.  ----------------------------------------------------------------------------------------------------------------------  Imaging Results (Last 24 Hours)     ** No results found for the last 24 hours. **        I have personally reviewed the radiology images and read the final radiology report.    Assessment & Plan      Assessment:  Abdominal pain with diarrhea with H/O colon cancer, s/p partial colon resection (2004), full details unknown  LBBB on EKG with abnormal Echo  CLAYTON with AG  Acute metabolic encephalopathy  Dehydration  LBBB on EKG  Essential HTN  Advanced age  Generalized weakness    Plan:  Abdominal pain with diarrhea with H/O colon cancer, s/p partial colon resection (2004), full details unknown, currently symptoms resolved.  tolerating diet.  CT abdomen unrevealing.  C. difficile PCR and gastro PCR negative. patient afebrile and VSS.  No leukocytosis.  CRP minimally elevated and Pro-Carlos A normal.  Blood  cultures prelim no growth.  Patient did take 2 rounds of antibiotic outpatient.     LBBB on EKG, no chest pain and troponin negative*3.  No previous EKG available for comparison. continue with aspirin and statin.  2D echocardiogram with EF 56 to 60%.The following left ventricular wall segments are hypokinetic: apical anterior, apical lateral, apical inferior, apical septal and apex hypokinetic.  Stress test done today and report pending. cardiology on board.  Systolic blood pressure fluctuating so we will hold off on starting beta-blocker. Lipid panel with low LDL.     CLAYTON on CkD stage 3A with AG.  Improving to 0.99 from 1.8.  DC IV fluids and  Monitor labs in a.m. takes Voltaren at home which will be discontinued at the time of discharge.    Acute metabolic encephalopathy, resolved after receiving 1 dose of sublingual Zyprexa.  ABG normal this am. Ammonia normal. on melatonin nightly and add Zyprexa prn.     Dehydration, received IVF.  Monitor    Essential HTN, BP overall controlled.  Hold losartan and amlodipine.     Generalized weakness, improving. Monitor.    Heparin subcu for DVT prophylaxis.    Management and plans discussed in detail with patient and nursing.     The patient is high risk due to the following diagnoses/reasons:  Abdominal pain with diarrhea    Disposition Home in am     Trinh Toro MD  01/18/23  19:54 EST    Electronically signed by Trinh Toro MD at 01/18/23 1954          Consult Notes (most recent note)      Sampson Garvin MD at 01/17/23 0756      Consult Orders    1. Inpatient Cardiology Consult [[id number]] ordered by Trinh Toro MD at 01/16/23 2100               Date of Admit: 1/15/2023  Date of Consult: 01/17/23  No ref. provider found          Assessment      1. Left bundle branch block of unknown duration.  Patient denies any recent anginal symptoms  2. Abnormal echo Doppler study with regional wall motion abnormalities with hypokinesis of the apical anterior,  "apical lateral and apical inferior myocardial segments which in part could be due to left bundle branch block although cannot exclude underlying occult ischemic/coronary artery disease  3. Intermittent confusion of unclear etiology.  4. Abdominal pains of unclear etiology.    Recommendations     1. With patient's confusion and acute kidney injury and having no anginal symptoms and negative cardiac markers x3, there is no urgent need for ischemic evaluation at this time.  We will consider this later on as patient's mental status and kidney function improves adequately.  2. For now we will go ahead and add a low-dose aspirin as tolerated.  3. Check fasting lipid panel and consider adding a statin if needed.    Reason for consultation: Abnormal ECHO and LBBB    Subjective     Subjective     History of Present Illness    Azra Ohara is a 87 y.o. 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 Patient presented to [facility] emergency room (ER) on 1/15/2023 with complaints of nausea and left lower abdominal pain that started about 7 days PTA. She reported she seen her PCP for this and was treated for an \"abodominal infection\" with antibiotics and some solution to help her have a bowel movement. Despite this treatment, her symptoms did not get better and has actually got worse over time.  She did report a history of diarrhea x 2 weeks prior to seeing her PCP.  She reported she had at least 2 episodes of watery diarrhea / day.  She denies prior antibiotics other than the ones in the last week, denies recent travel or exposures to illnesses, fever, chills dizziness, lightheadedness, hematochezia or melena.   EKG x 2 in the ER revealed a LBBB and were SR with PACs noted. Patient was admitted. Along with the LBBB, she had an abnormal ECHO done on 01/15/2023 and Cardiology " "has been consulted for further evaluation and management.   Patient is lying in bed resting quietly with her eyes closed. She was easily awakened with verbal and tactile stimuli.  She denies chest pain and shortness of breath at this time. She denies any past heart conditions such as an MI or heart blockages, She reports being see here at [facility] about 6 weeks ago for diarrhea but there no record of that visit in the chart. She reports she moved to [place], [place] from [place] in June of 2022 and started to almost cry when she mentioned this. She repeatedly mentions how she cannot trust people stating, \"I don't associate with people I cannot trust and you cannot trust just anyone.\" Her nurse come to bedside and reports she has had increased confusion since the evening on 01/16/2023.     Cardiac risk factors:hypertension    Last Echo: Results for orders placed during the hospital encounter of 01/15/23    Adult Transthoracic Echo Complete W/ Cont if Necessary Per Protocol    Interpretation Summary  •  Normal left ventricular cavity size and wall thickness noted  •  Left ventricular ejection fraction appears to be 56 - 60%.  •  The following left ventricular wall segments are hypokinetic: apical anterior, apical lateral, apical inferior, apical septal and apex hypokinetic.  •  Left ventricular diastolic function is consistent with (grade I) impaired relaxation.  •  There is mild calcification of the aortic valve. There is mild thickening of the non-coronary, left coronary and right coronary cusp(s) of the aortic valve. The aortic valve appears trileaflet.  •  There is mild calcification of the mitral valve posterior leaflet(s). Trace mitral valve regurgitation is present. No significant mitral valve stenosis is present.  •  Trace tricuspid valve regurgitation is present. Estimated right ventricular systolic pressure from tricuspid regurgitation is mildly elevated (35-45 mmHg).  •  There is no evidence of pericardial " effusion.     Last Stress: No results found for this or any previous visit.    Last Cath: No results found for this or any previous visit.     Past Medical History:   Diagnosis Date   • Essential hypertension    • History of colon cancer      Past Surgical History:   Procedure Laterality Date   • COLON RESECTION  2004   • PARATHYROIDECTOMY Right     Parathyroid adenoma     Family History   Problem Relation Age of Onset   • Heart disease Mother    • Heart disease Father      Social History     Tobacco Use   • Smoking status: Never   Substance Use Topics   • Alcohol use: Never   • Drug use: Never     Medications Prior to Admission   Medication Sig Dispense Refill Last Dose   • cetirizine (zyrTEC) 10 MG tablet Take 10 mg by mouth 2 (Two) Times a Day.   Unknown   • ciprofloxacin (CIPRO) 500 MG tablet Take 500 mg by mouth 2 (Two) Times a Day.   Unknown   • cyanocobalamin 1000 MCG/ML injection Inject 1,000 mcg into the appropriate muscle as directed by prescriber Every 30 (Thirty) Days.   Unknown   • diclofenac (VOLTAREN) 75 MG EC tablet Take 75 mg by mouth 2 (Two) Times a Day As Needed.   Unknown   • Diclofenac Sodium (VOLTAREN) 1 % gel gel Apply 2 g topically to the appropriate area as directed 4 (Four) Times a Day As Needed.   Unknown   • losartan (COZAAR) 25 MG tablet Take 25 mg by mouth 2 (Two) Times a Day.   Unknown   • metroNIDAZOLE (FLAGYL) 500 MG tablet Take 500 mg by mouth 2 (Two) Times a Day.   Unknown   • nisoldipine (SULAR) 8.5 MG 24 hr tablet Take 8.5 mg by mouth 2 (Two) Times a Day.   Unknown   • Omega-3 Fatty Acids (fish oil) 1000 MG capsule capsule Take 1,000 mg by mouth 2 (Two) Times a Day With Meals.   Unknown   • ondansetron ODT (ZOFRAN-ODT) 4 MG disintegrating tablet Place 4 mg on the tongue 2 (Two) Times a Day As Needed for Nausea or Vomiting.   Unknown   • polyethylene glycol (MIRALAX) 17 g packet Take 17 g by mouth 2 (Two) Times a Day As Needed.   Unknown     Allergies:  Penicillins    Review of  "Systems   Constitutional: Positive for chills and fever. Negative for activity change and diaphoresis.        Patient reported intermittent fever and chills \"all the time.\"   HENT: Negative for facial swelling and trouble swallowing.    Eyes: Negative for visual disturbance.   Respiratory: Negative for shortness of breath.    Cardiovascular: Positive for chest pain. Negative for palpitations and leg swelling.   Gastrointestinal: Positive for abdominal pain, diarrhea, nausea and vomiting.   Genitourinary: Negative for hematuria.   Musculoskeletal: Negative for arthralgias.   Skin: Negative for color change.   Neurological: Negative for dizziness, syncope and light-headedness.   Hematological: Negative for adenopathy.   Psychiatric/Behavioral: Negative for agitation and behavioral problems.       Objective     Objective      Vital Signs  Temp:  [97.2 °F (36.2 °C)] 97.2 °F (36.2 °C)  Heart Rate:  [] 88  Resp:  [18] 18  BP: ()/(65-74) 133/65  Vital Signs (last 72 hrs)       01/14 0700  01/15 0659 01/15 0700  01/16 0659 01/16 0700  01/17 0659 01/17 0700  01/17 0757   Most Recent      Temp (°F)     97.4    97.2 -  98.9       97.2 (36.2) 01/17 0247    Heart Rate   90 -  111    87 -  110       88 01/17 0247    Resp     20    18 -  20       18 01/17 0247    BP   108/67 -  129/72    96/66 -  148/74       133/65 01/17 0247    SpO2 (%)   94 -  100    90 -  96       90 01/16 1845        Body mass index is 27.59 kg/m².    Intake/Output Summary (Last 24 hours) at 1/17/2023 0757  Last data filed at 1/16/2023 2100  Gross per 24 hour   Intake 1080 ml   Output 20 ml   Net 1060 ml     Physical Exam  Constitutional:       Appearance: Normal appearance.   HENT:      Head: Normocephalic and atraumatic.      Comments: Hard of hearing     Nose: Nose normal.      Mouth/Throat:      Mouth: Mucous membranes are moist.      Pharynx: Oropharynx is clear.   Eyes:      Conjunctiva/sclera: Conjunctivae normal.   Cardiovascular:      " Rate and Rhythm: Normal rate and regular rhythm.      Pulses: Normal pulses.      Heart sounds: Normal heart sounds.   Pulmonary:      Effort: Pulmonary effort is normal.      Breath sounds: Normal breath sounds.   Abdominal:      General: Bowel sounds are normal.      Palpations: Abdomen is soft.   Musculoskeletal:         General: Normal range of motion.      Cervical back: Normal range of motion.   Skin:     General: Skin is warm and dry.   Neurological:      General: No focal deficit present.      Mental Status: She is alert.      Comments: Seems alert and oriented to person and some past events    Psychiatric:         Mood and Affect: Mood normal.         Behavior: Behavior normal.           Results review     Results Review:    I have reviewed the patient's new clinical results.  Results from last 7 days   Lab Units 23  0815 23  0444 23  0030   TROPONIN T ng/mL <0.010 <0.010 <0.010     Results from last 7 days   Lab Units 23  0304 23  0030   WBC 10*3/mm3 9.69 10.30   HEMOGLOBIN g/dL 13.1 16.0*   PLATELETS 10*3/mm3 208 269     Results from last 7 days   Lab Units 23  0231 23  0030   SODIUM mmol/L 140 137   POTASSIUM mmol/L 4.1 3.9   CHLORIDE mmol/L 114* 97*   CO2 mmol/L 16.0* 21.0*   BUN mg/dL 41* 50*   CREATININE mg/dL 1.56* 1.86*   CALCIUM mg/dL 8.0* 11.0*   GLUCOSE mg/dL 96 134*   ALT (SGPT) U/L 11 11   AST (SGOT) U/L 21 17     No results found for: INR  Lab Results   Component Value Date    MG 2.0 2023    MG 2.0 2023     Lab Results   Component Value Date    TSH 0.784 2023    TRIG 98 2023    HDL 35 (L) 2023    LDL 24 2023      No results found for: PROBNP    EC2023 @ 00:48      2023 @ 13:07      ECG/EMG Results (last 24 hours)     Procedure Component Value Units Date/Time    ECG 12 Lead Other; abd pain [[id number]] Collected: 238     Updated: 23 1213     QT Interval 372 ms      QTC Interval 474 ms      Narrative:      Test Reason : Other~  Blood Pressure :   */*   mmHG  Vent. Rate :  98 BPM     Atrial Rate :  98 BPM     P-R Int : 186 ms          QRS Dur : 140 ms      QT Int : 372 ms       P-R-T Axes :  -8 -65  90 degrees     QTc Int : 474 ms    Sinus rhythm with premature atrial complexes with aberrant conduction  Left axis deviation  Left bundle branch block  Abnormal ECG  No previous ECGs available  Confirmed by Nichole Jean (2004) on 1/16/2023 12:13:17 PM    Referred By:            Confirmed By: Nichole Jean    ECG 12 Lead Other; Requested by Dr. Dejesus [[id number]] Collected: 01/16/23 0440     Updated: 01/16/23 1217     QT Interval 374 ms      QTC Interval 477 ms     Narrative:      Test Reason : Other~  Blood Pressure :   */*   mmHG  Vent. Rate :  98 BPM     Atrial Rate :  98 BPM     P-R Int : 186 ms          QRS Dur : 134 ms      QT Int : 374 ms       P-R-T Axes :   2 -51  97 degrees     QTc Int : 477 ms    Normal sinus rhythm  Left axis deviation  Left bundle branch block  Abnormal ECG  When compared with ECG of 16-JAN-2023 00:48, (Unconfirmed)  fusion complexes are no longer present  aberrant conduction is no longer present  Confirmed by Nichole Jean (2004) on 1/16/2023 12:17:48 PM    Referred By: AURELIA           Confirmed By: Nichole Jean    ECG 12 Lead Other; ? new LBBB [[id number]] Collected: 01/16/23 1307     Updated: 01/16/23 1412     QT Interval 380 ms      QTC Interval 454 ms     Narrative:      Test Reason : Other~  Blood Pressure :   */*   mmHG  Vent. Rate :  86 BPM     Atrial Rate :  86 BPM     P-R Int : 176 ms          QRS Dur : 136 ms      QT Int : 380 ms       P-R-T Axes :  64 -21 122 degrees     QTc Int : 454 ms    Sinus rhythm with occasional premature ventricular complexes  Left bundle branch block  Abnormal ECG  When compared with ECG of 16-JAN-2023 13:07, (Unconfirmed)  fusion complexes are no longer present    Referred By: KRANTHI           Confirmed By:     Adult  Transthoracic Echo Complete W/ Cont if Necessary Per Protocol [[id number]] Resulted: 01/16/23 1826     Updated: 01/16/23 1838     Target HR (85%) 113 bpm      Max. Pred. HR (100%) 133 bpm      LVIDd 4.8 cm      LVIDs 2.5 cm      IVSd 1.20 cm      LVPWd 1.30 cm      FS 47.9 %      IVS/LVPW 0.92 cm      ESV(cubed) 15.6 ml      LV Sys Vol (BSA corrected) 6.6 cm2      EDV(cubed) 110.6 ml      LV Chavez Vol (BSA corrected) 22.5 cm2      LVOT area 3.1 cm2      LV mass(C)d 232.2 grams      LVOT diam 2.00 cm      EDV(MOD-sp4) 40.6 ml      ESV(MOD-sp4) 11.9 ml      SV(MOD-sp4) 28.7 ml      SI(MOD-sp4) 15.9 ml/m2      EF(MOD-sp4) 70.7 %      MV E max elgin 48.7 cm/sec      MV A max elgin 111.0 cm/sec      MV E/A 0.44     Med Peak E' Elgin 8.9 cm/sec      Lat Peak E' Elgin 12.3 cm/sec      Avg E/e' ratio 4.59     TAPSE (>1.6) 1.99 cm      LA dimension (2D)  3.6 cm      Ao pk elgin 127.0 cm/sec      Ao max PG 6.5 mmHg      Ao mean PG 5.0 mmHg      Ao V2 VTI 18.6 cm      TR max elgin 250.0 cm/sec      TR max PG 25.0 mmHg      RVSP(TR) 35.0 mmHg      RAP systole 10.0 mmHg      PA acc time 0.09 sec      PA pr(Accel) 37.6 mmHg      Ao root diam 3.1 cm      ACS 1.80 cm     Narrative:      •  Normal left ventricular cavity size and wall thickness noted  •  Left ventricular ejection fraction appears to be 56 - 60%.  •  The following left ventricular wall segments are hypokinetic: apical   anterior, apical lateral, apical inferior, apical septal and apex   hypokinetic.  •  Left ventricular diastolic function is consistent with (grade I)   impaired relaxation.  •  There is mild calcification of the aortic valve. There is mild   thickening of the non-coronary, left coronary and right coronary cusp(s)   of the aortic valve. The aortic valve appears trileaflet.  •  There is mild calcification of the mitral valve posterior leaflet(s).   Trace mitral valve regurgitation is present. No significant mitral valve   stenosis is present.  •  Trace tricuspid  valve regurgitation is present. Estimated right   ventricular systolic pressure from tricuspid regurgitation is mildly   elevated (35-45 mmHg).  •  There is no evidence of pericardial effusion.          TELEMETRY: SR 90's with a BBB      Patient had her telemetry off and laying on her bedside table during her exam today.    Imaging Results (Last 72 Hours)     Procedure Component Value Units Date/Time    CT Abdomen Pelvis With Contrast [[id number]] Collected: 01/16/23 0155     Updated: 01/16/23 0157    Narrative:      CT Abdomen Pelvis W    INDICATION:   Left lower quadrant pain with nausea and diarrhea    TECHNIQUE:   CT of the abdomen and pelvis with 100 cc of Isovue-300 IV contrast. Coronal and sagittal reconstructions were obtained.  Radiation dose reduction techniques included automated exposure control or exposure modulation based on body size. Count of known CT  and cardiac nuc med studies performed in previous 12 months: 0.     COMPARISON:   None available.    FINDINGS:  Abdomen: There is atelectasis with volume loss at both lung bases. 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 No upper abdominal solid organ abnormalities are seen.  Noted incidentally are bilateral renal cysts. No evidence of hydronephrosis or kidney stone disease. No evidence of retroperitoneal adenopathy. No distended bowel loops. No inflammatory changes around the gallbladder.    Pelvis: Normal appendix. Diverticulosis is seen but no acute inflammatory changes are seen in the bowel. Postoperative changes are seen in the sigmoid. There are a few diverticula. There is advanced degenerative change in the lower thoracic and lumbar  discs. There is a grade 2 spondylolisthesis at L5-S1 with bilateral L5 pars defects.      Impression:      Diverticulosis. Postoperative abdomen. No acute inflammatory changes are seen.          Signer Name: Mumtaz Farias MD    Signed: 2023 1:55 AM   Workstation Name: [id number]    [facility]          I have discussed my impression and recommendations with the patient and family.    Thank you very much for asking us to be involved in this patient's care.  We will follow along with you.    Electronically signed by WASHINGTON Henderson, 23, 1:09 PM EST.    Electronically signed by Sampson Garvin MD, 23, 5:23 PM EST.            Please note that portions of this note were copied and has been reviewed and is accurate as of date.      Please note that portions of this note were completed with a voice recognition program.    Electronically signed by Sampson Garvin MD at 23 1723       Nutrition Notes (most recent note)    No notes exist for this encounter.            Physical Therapy Notes (most recent note)      Ingris Rdz PTA at 23 7168  Version 1 of 1         Acute Care - Physical Therapy Treatment Note  [facility]     Patient Name: Azra Ohara  : 1935  MRN: [id number]  Today's Date: 2023   Onset of Illness/Injury or Date of Surgery: 01/15/23  Visit Dx:     ICD-10-CM ICD-9-CM   1. Left lower quadrant abdominal pain  R10.32 789.04   2. Nausea vomiting and diarrhea  R11.2 787.91    R19.7 787.01   3. CLAYTON (acute kidney injury) (HCC)  N17.9 584.9     Patient Active Problem List   Diagnosis   • Left lower quadrant abdominal pain     Past Medical History:   Diagnosis Date   • Essential hypertension    • History of colon cancer      Past Surgical History:   Procedure Laterality Date   • COLON RESECTION     • PARATHYROIDECTOMY Right     Parathyroid adenoma     PT Assessment (last 12 hours)     PT Evaluation and Treatment     Row Name 23 [id number]          Physical Therapy Time and Intention    Subjective Information complains of;weakness;fatigue  -HR     Document Type therapy note (daily note)  -HR     Mode of Treatment physical therapy  -HR     Patient Effort good  -HR      Comment Pt walked aprox 80' with RW min A. BSC transfer min/mod A. Pt fatiqued quickly and was unsteady with standing and walking. EOB exercises completed until tolerance.  -HR     Row Name 01/18/23 1448          General Information    Patient Profile Reviewed yes  -HR     Equipment Currently Used at Home cane, straight  -HR     Existing Precautions/Restrictions fall  -HR     Row Name 01/18/23 1448          Living Environment    Primary Care Provided by self  -HR     Row Name 01/18/23 1448          Home Use of Assistive/Adaptive Equipment    Equipment Currently Used at Home cane, straight  -HR     Row Name 01/18/23 1448          Pain Scale: FACES Pre/Post-Treatment    Pain: FACES Scale, Pretreatment 0-->no hurt  -HR     Posttreatment Pain Rating 0-->no hurt  -HR     Row Name 01/18/23 1448          Cognition    Affect/Mental Status (Cognition) WFL  -HR     Follows Commands (Cognition) WFL  -HR     Personal Safety Interventions fall prevention program maintained;gait belt;nonskid shoes/slippers when out of bed;supervised activity  -HR     Row Name 01/18/23 1448          Sensory    Hearing Status hearing impairment, bilaterally  -HR     Row Name 01/18/23 1448          Vision Assessment/Intervention    Visual Impairment/Limitations WFL  -HR     Row Name 01/18/23 1448          Mobility    Extremity Weight-bearing Status left lower extremity;right lower extremity  -HR     Left Lower Extremity (Weight-bearing Status) full weight-bearing (FWB)  -HR     Right Lower Extremity (Weight-bearing Status) full weight-bearing (FWB)  -HR     Row Name 01/18/23 1448          Bed Mobility    Rolling Left [place] (Bed Mobility) independent  -HR     Supine-Sit [place] (Bed Mobility) minimum assist (75% patient effort);contact guard  -HR     Sit-Supine [place] (Bed Mobility) minimum assist (75% patient effort)  -HR     Assistive Device (Bed Mobility) bed rails  -HR     Row Name 01/18/23 1448          Transfers     Transfers sit-stand transfer;stand-sit transfer;stand pivot/stand step transfer;toilet transfer  -HR     Row Name 01/18/23 1448          Sit-Stand Transfer    Sit-Stand [place]son (Transfers) minimum assist (75% patient effort)  -HR     Assistive Device (Sit-Stand Transfers) walker, front-wheeled  -HR     Row Name 01/18/23 1448          Stand-Sit Transfer    Stand-Sit [place] (Transfers) nonverbal cues (demo/gesture);verbal cues;minimum assist (75% patient effort)  -HR     Assistive Device (Stand-Sit Transfers) walker, front-wheeled  -HR     Row Name 01/18/23 1448          Stand Pivot/Stand Step Transfer    Stand Pivot/Stand Step [place]son (Transfers) verbal cues;nonverbal cues (demo/gesture);contact guard;minimum assist (75% patient effort)  -HR     Assistive Device (Stand Pivot Stand Step Transfer) walker, front-wheeled  -HR     Row Name 01/18/23 1448          Toilet Transfer    Type (Toilet Transfer) stand pivot/stand step  -HR     Rawson Level (Toilet Transfer) minimum assist (75% patient effort)  -HR     Row Name 01/18/23 1448          Gait/Stairs (Locomotion)    [place] Level (Gait) verbal cues;nonverbal cues (demo/gesture);minimum assist (75% patient effort);contact guard  -HR     Assistive Device (Gait) walker, front-wheeled  -HR     Distance in Feet (Gait) 80' aprox  -HR     Pattern (Gait) step-through  -HR     Deviations/Abnormal Patterns (Gait) base of support, wide;stride length decreased  -HR     Bilateral Gait Deviations forward flexed posture  -HR     Row Name 01/18/23 1448          Safety Issues, Functional Mobility    Impairments Affecting Function (Mobility) balance;endurance/activity tolerance;range of motion (ROM)  -HR     Row Name 01/18/23 1448          Motor Skills    Therapeutic Exercise other (see comments)  EOB: AP, LAQ, March, Knees in/out  -HR     Row Name 01/18/23 1448          Coping    Observed Emotional State calm;cooperative  -HR     Verbalized Emotional State  acceptance  -HR     Family/Support Persons family;sibling  -HR     Involvement in Care not present at bedside  -HR     Row Name 01/18/23 1448          Positioning and Restraints    Pre-Treatment Position in bed  -HR     Post Treatment Position bed  -HR     In Bed notified nsg;fowlers;call light within reach;encouraged to call for assist;side rails up x2  -HR     Row Name 01/18/23 1448          Therapy Assessment/Plan (PT)    Rehab Potential (PT) good, to achieve stated therapy goals  -HR     Criteria for Skilled Interventions Met (PT) yes;meets criteria  -HR     Therapy Frequency (PT) 3 times/wk  -HR     Problem List (PT) problems related to;balance;mobility;hearing;strength;range of motion (ROM);coordination  -HR     Activity Limitations Related to Problem List (PT) unable to ambulate safely;unable to transfer safely;BADLs not performed adequately or safely  -HR     Row Name 01/18/23 1448          Physical Therapy Goals    Transfer Goal Selection (PT) transfer, PT goal 1  -HR     Gait Training Goal Selection (PT) gait training, PT goal 1  -HR     Row Name 01/18/23 1448          Transfer Goal 1 (PT)    Activity/Assistive Device (Transfer Goal 1, PT) sit-to-stand/stand-to-sit;bed-to-chair/chair-to-bed;toilet  -HR     Clarke Level/Cues Needed (Transfer Goal 1, PT) standby assist  -HR     Time Frame (Transfer Goal 1, PT) by discharge  -HR     Row Name 01/18/23 1448          Gait Training Goal 1 (PT)    Activity/Assistive Device (Gait Training Goal 1, PT) gait (walking locomotion);assistive device use;decrease fall risk;diminish gait deviation;improve balance and speed;increase endurance/gait distance;walker, rolling  -HR     Clarke Level (Gait Training Goal 1, PT) standby assist  -HR     Distance (Gait Training Goal 1, PT) 60  -HR     Time Frame (Gait Training Goal 1, PT) by discharge  -HR           User Key  (r) = Recorded By, (t) = Taken By, (c) = Cosigned By    Initials Name Provider Type    HR Kendell,  RENAE Amado Physical Therapist Assistant                Physical Therapy Education     Title: PT OT SLP Therapies (Done)     Topic: Physical Therapy (Done)     Point: Mobility training (Done)     Learning Progress Summary           Patient Acceptance, E, VU by  at 1/17/2023 1704    Acceptance, E,D, VU,NR by  at 1/16/2023 1318                   Point: Home exercise program (Done)     Learning Progress Summary           Patient Acceptance, E, VU by  at 1/17/2023 1704    Acceptance, E,D, VU,NR by  at 1/16/2023 1318                   Point: Body mechanics (Done)     Learning Progress Summary           Patient Acceptance, E, VU by  at 1/17/2023 1704    Acceptance, E,D, VU,NR by  at 1/16/2023 1318                   Point: Precautions (Done)     Learning Progress Summary           Patient Acceptance, E, VU by  at 1/17/2023 1704    Acceptance, E,D, VU,NR by  at 1/16/2023 1318                               User Key     Initials Effective Dates Name Provider Type Discipline     06/16/21 -  Montserrat Gale, PT Physical Therapist PT     09/22/22 -  Brendan Capone, RN Registered Nurse Nurse              PT Recommendation and Plan  Anticipated Discharge Disposition (PT): home with assist, home with outpatient therapy services  Therapy Frequency (PT): 3 times/wk          Time Calculation:    PT Charges     Row Name 01/18/23 1458             Time Calculation    Start Time 1115  -HR      PT Received On 01/18/23  -HR         Time Calculation- PT    Total Timed Code Minutes- PT 24 minute(s)  -HR            User Key  (r) = Recorded By, (t) = Taken By, (c) = Cosigned By    Initials Name Provider Type    HR Ingris Rdz PTA Physical Therapist Assistant              Therapy Charges for Today     Code Description Service Date Service Provider Modifiers Qty    93887556088 HC GAIT TRAINING EA 15 MIN 1/18/2023 Ingris Rdz PTA GP, CQ 1    94703598512 HC PT THER PROC EA 15 MIN 1/18/2023 Ingris Rdz PTA GP, CQ 1           PT G-Codes  AM-PAC 6 Clicks Score (PT): 18    Ingris Rdz PTA  2023      Electronically signed by Ingris Rdz PTA at 23 1459          Occupational Therapy Notes (most recent note)      Dalton Calle, OT at 23 1109          Acute Care - Occupational Therapy Initial Evaluation  [facility]     Patient Name: Azra Ohara  : 1935  MRN: [id number]  Today's Date: 2023             Admit Date: 1/15/2023       ICD-10-CM ICD-9-CM   1. Left lower quadrant abdominal pain  R10.32 789.04   2. Nausea vomiting and diarrhea  R11.2 787.91    R19.7 787.01   3. CLAYTON (acute kidney injury) (HCC)  N17.9 584.9     Patient Active Problem List   Diagnosis   • Left lower quadrant abdominal pain     Past Medical History:   Diagnosis Date   • Essential hypertension    • History of colon cancer      Past Surgical History:   Procedure Laterality Date   • COLON RESECTION     • PARATHYROIDECTOMY Right     Parathyroid adenoma         OT ASSESSMENT FLOWSHEET (last 12 hours)     OT Evaluation and Treatment     Row Name 23 1054                   OT Time and Intention    Document Type evaluation  -KR        Mode of Treatment occupational therapy  -KR        Patient Effort good  -KR           Living Environment    Current Living Arrangements home  -KR        People in Home other relative(s)  per pt report  -KR           Cognition    Affect/Mental Status (Cognition) WFL  -KR        Behavioral Issues (Cognition) --  pt became emotional when discussing  family members  -KR        Orientation Status (Cognition) oriented to;person;place;situation  -KR        Follows Commands (Cognition) WFL  -KR           Range of Motion Comprehensive    Comment, General Range of Motion BUE WFL  -KR           Strength Comprehensive (MMT)    Comment, General Manual Muscle Testing (MMT) Assessment BUE WFL  -KR           Activities of Daily Living    BADL Assessment/Intervention bathing;upper body dressing;lower body  dressing;grooming;feeding;toileting  -KR           Bathing Assessment/Intervention    Koppel Level (Bathing) bathing skills;minimum assist (75% patient effort)  -KR           Upper Body Dressing Assessment/Training    Koppel Level (Upper Body Dressing) upper body dressing skills;set up  -KR           Lower Body Dressing Assessment/Training    Koppel Level (Lower Body Dressing) lower body dressing skills;minimum assist (75% patient effort)  -KR           Grooming Assessment/Training    Koppel Level (Grooming) grooming skills;set up  -KR           Self-Feeding Assessment/Training    Koppel Level (Feeding) feeding skills;set up  -KR           Toileting Assessment/Training    Koppel Level (Toileting) toileting skills;minimum assist (75% patient effort)  -KR           Plan of Care Review    Plan of Care Reviewed With patient  -KR           Therapy Assessment/Plan (OT)    Planned Therapy Interventions (OT) adaptive equipment training;BADL retraining  -KR           OT Goals    Dressing Goal Selection (OT) dressing, OT goal 1  -KR           Dressing Goal 1 (OT)    Activity/Device (Dressing Goal 1, OT) dressing skills, all  -KR        Koppel/Cues Needed (Dressing Goal 1, OT) standby assist  -KR        Time Frame (Dressing Goal 1, OT) by discharge  -KR           Patient Education Goal (OT)    Activity (Patient Education Goal, OT) AE/DME training to enhance safety/independence in home environment  -KR        Koppel/Cues/Accuracy (Memory Goal 2, OT) verbalizes understanding  -KR        Time Frame (Patient Education Goal, OT) by discharge  -KR              User Key  (r) = Recorded By, (t) = Taken By, (c) = Cosigned By    Initials Name Effective Dates    KR Dalton Calle, OT 06/16/21 -                        OT Recommendation and Plan  Planned Therapy Interventions (OT): adaptive equipment training, BADL retraining  Plan of Care Review  Plan of Care Reviewed With: patient  Plan of  Care Reviewed With: patient        Time Calculation:     Therapy Charges for Today     Code Description Service Date Service Provider Modifiers Qty    60674663506 HC OT EVAL MOD COMPLEXITY 4 2023 Dalton Calle OT GO 1               Dalton Calle OT  2023    Electronically signed by Dalton Calle OT at 23 1109          Speech Language Pathology Notes (most recent note)      Jane Ventura MS CCC-SLP at 23 7459          Acute Care - Speech Language Pathology   Swallow Initial Evaluation [facility]   CLINICAL DYSPHAGIA ASSESSMENT     Patient Name: Azra Ohara  : 1935  MRN: [id number]  Today's Date: 2023  Onset of Illness/Injury or Date of Surgery: 01/15/23     Referring Physician: Dr. Dejesus    Admit Date: 1/15/2023    Azra Ohara  is seen at bedside this pm on 3N to assess safety/efficacy of swallowing fnx, determine safest/least restrictive diet tolerance.     Ms. Ohara presented to [facility] with nausea and vomiting, left lower quadrant abdominal discomfort. She is admitted for further evaluation and management. She is currently pending C-Difficile rule out, in contact spore isolation.     PMH is significant for HTN. No previous SLP notes found in [facility] EMR.     She is referred to rule out dysphagia. She is currently on a full liquids po diet per GI status.     Social History     Socioeconomic History   • Marital status: Single   Tobacco Use   • Smoking status: Never   Substance and Sexual Activity   • Alcohol use: Never   • Drug use: Never      Imaging:   CT Abdomen Pelvis W     INDICATION:   Left lower quadrant pain with nausea and diarrhea     TECHNIQUE:   CT of the abdomen and pelvis with 100 cc of Isovue-300 IV contrast. Coronal and sagittal reconstructions were obtained.  Radiation dose reduction techniques included automated exposure control or exposure modulation based on body size. Count of known CT  and cardiac nuc med studies performed in previous 12 months: 0.       COMPARISON:   None available.     FINDINGS:  Abdomen: There is atelectasis with volume loss at both lung bases. 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 No upper abdominal solid organ abnormalities are seen.  Noted incidentally are bilateral renal cysts. No evidence of hydronephrosis or kidney stone disease. No evidence of retroperitoneal adenopathy. No distended bowel loops. No inflammatory changes around the gallbladder.     Pelvis: Normal appendix. Diverticulosis is seen but no acute inflammatory changes are seen in the bowel. Postoperative changes are seen in the sigmoid. There are a few diverticula. There is advanced degenerative change in the lower thoracic and lumbar  discs. There is a grade 2 spondylolisthesis at L5-S1 with bilateral L5 pars defects.     IMPRESSION:  Diverticulosis. Postoperative abdomen. No acute inflammatory changes are seen.      Signer Name: Mumtaz Farias MD   Signed: 1/16/2023 1:55 AM   Workstation Name: [facility]    [facility]    Diet Orders (active) (From admission, onward)     Start     Ordered    01/16/23 0710  Diet: Liquid Diets; Clear Liquid; Texture: Regular Texture (IDDSI 7); Fluid Consistency: Thin (IDDSI 0)  Diet Effective Now         01/16/23 0709              She is observed on ra w/o complications.     Ms. Ohara is positioned upright and centered in bed to accept multiple po presentations of ice chips, jell-o from her lunch tray, and thin water via spoon, cup and straw.  She is able to self feed.     Facial/oral structures are symmetrical upon observation. Lingual protrusion reveals no deviation. Oral mucosa are moist, pink, and clean. Secretions are clear, thin, and well controlled. OROM/BLAINE is generally weak to imitate oral postures. Gag is not assessed. Volitional cough is intact w/ adequate  intensity, clear in quality, non-productive. Voice is  "adequate in intensity, clear in quality w/ intelligible speech. She is a/a and interactive, follows directives, participates in conversational exchanges. She denies any h/o dysphagia, current dysphagia or odynophagia. She does endorse recent nausea with po intake,however, she states, \"I'm about 100% better today than I was before I came in here.\"     Upon po presentations, adequate bolus anticipation and acceptance w/ good labial seal for bolus clearance via spoon bowl, cup rim stability and suction via straw. Bolus formation, manipulation and control are wfl. A-p transit is timely w/o oral residue. No overt s/s aspiration before the swallow.     Pharyngeal swallow is timely w/ adequate hyolaryngeal elevation per palpation. No overt s/s aspiration evidenced across this evaluation. No silent aspiration suspected. She denies odynophagia.    Visit Dx:     ICD-10-CM ICD-9-CM   1. Left lower quadrant abdominal pain  R10.32 789.04   2. Nausea vomiting and diarrhea  R11.2 787.91    R19.7 787.01   3. CLAYTON (acute kidney injury) (HCC)  N17.9 584.9     Patient Active Problem List   Diagnosis   • Left lower quadrant abdominal pain     Past Medical History:   Diagnosis Date   • Essential hypertension    • History of colon cancer      Past Surgical History:   Procedure Laterality Date   • COLON RESECTION  2004   • PARATHYROIDECTOMY Right     Parathyroid adenoma     EDUCATION  The patient has been educated in the following areas:   Dysphagia (Swallowing Impairment) Oral Care/Hydration upright for all po intake.    Impression: Ms. Ohara presents w/ wfl oropharyngeal swallow w/o s/s aspiration per this limited assessment with liquids/jell-o per current modified po diet per GI status. No s/s indicative of silent aspiration.  No odynophagia reported.      She is felt to most benefit from continued liquids po diet, advance as allowed per MD only as her GI symptoms allow. Advance to mechanical soft textures, whole foods, thin liquids. " Medications whole in puree/thin liquids, single pill presentations only, please.Upright and centered for all po intake.     SLP Recommendation and Plan    1. Continue thin liquids po diet, advance as allowed per MD as her GI symptoms allow. Advance to mechanical soft textures, whole foods, thin liquids. This is her reported premorbid baseline po diet.   2. Medications whole in puree/thins. Single pill presentations only, please.   3. Upright and centered for all po intake with universal aspiration precautions.   4. JEWELL precautions.  5. Oral care protocol.    D/w Ms. Ohara results and recommendations w/ verbal agreement.    Thank you for allowing me to participate in the care of your patient-  Jane Ventura M.S,. CCC/SLP                                                                                               Time Calculation:       Therapy Charges for Today     Code Description Service Date Service Provider Modifiers Qty    [id number] HC ST EVAL ORAL PHARYNG SWALLOW 3 1/16/2023 Jane Ventura, MS CCC-SLP GN 1               Jane Ventura MS CCC-SLP  1/16/2023    Electronically signed by Jane Ventura, MS CCC-SLP at 01/16/23 1510       Respiratory Therapy Notes (all)    No notes exist for this encounter.         ADL Documentation (most recent)    Flowsheet Row Most Recent Value   Transferring 2 - assistive person   Toileting 2 - assistive person   Bathing 2 - assistive person   Dressing 0 - independent   Eating 0 - independent   Communication 0 - understands/communicates without difficulty   Swallowing 0 - swallows foods/liquids without difficulty   Equipment Currently Used at Home cane, straight          Discharge Summary    No notes of this type exist for this encounter.         Discharge Order (From admission, onward)     Start     Ordered    01/19/23 0916  Discharge patient  Once        Expected Discharge Date: 01/19/23    Discharge Disposition: Home-Health Care [facility] – [place], [place]     Physician of Record for Attribution - Please select from Treatment Team: FRIDA PATEL [[id number]]    Review needed by CMO to determine Physician of Record: No       Question Answer Comment   Physician of Record for Attribution - Please select from Treatment Team FRIDA PATEL    Review needed by CMO to determine Physician of Record No        01/19/23 0922

## 2023-01-19 NOTE — DISCHARGE PLACEMENT REQUEST
"Wenceslao Gómez (87 y.o. Female)     Date of Birth   1935    Social Security Number       Address   [address] [id number]    Home Phone   [phone]    MRN   [id number]       Amish   None    Marital Status   Single                            Admission Date   1/15/23    Admission Type   Emergency    Admitting Provider   Rick Dejesus MD    Attending Provider   Trinh Toro MD    Department, Room/Bed   [address], 3340/1S       Discharge Date       Discharge Disposition   Home-Health Care [facility]    Discharge Destination                               Attending Provider: Trinh Toro MD    Allergies: Lipitor [Atorvastatin], Penicillins    Isolation: None   Infection: None   Code Status: CPR    Ht: 165.1 cm (65\")   Wt: 71.9 kg (158 lb 8.2 oz)    Admission Cmt: None   Principal Problem: Left lower quadrant abdominal pain [R10.32]                 Active Insurance as of 1/15/2023     Primary Coverage     Payor Plan Insurance Group Employer/Plan Group    [facility] MEDICARE REPLACEMENT [facility] MEDICARE REPLACEMENT [id number]     Payor Plan Address Payor Plan Phone Number Payor Plan Fax Number Effective Dates    [address] [address]   2023 - None Entered    [facility] [id number]       Subscriber Name Subscriber Birth Date Member ID       WENCESLAO GÓMEZ 1935 [id number]                 Emergency Contacts      (Rel.) Home Phone Work Phone Mobile Phone    MELISSA MCGOVERN (Sister) [phone] -- --        [address]  [facility] [address]  Dept. Phone:  [phone]  Dept. Fax:  [phone] Date Ordered: 2023         Patient:  Wenceslao Gómez MRN:  [id number]   [address] [facility] [address] :  1935  SSN:    Phone: [phone] Sex:  F     Weight: 71.9 kg (158 lb 8.2 oz)         Ht Readings from Last 1 Encounters:   23 165.1 cm (65\")         Miscellaneous DME   (Order ID: " "[id number])    Diagnosis:  Primary hypertension (I10 [ICD-10-CM] 401.9 [ICD-9-CM])   Quantity:  1     Type of DME: BP machine  Length of Need (99 Months = Lifetime): 99 Months = Lifetime        Authorizing Provider's Phone: [phone]  Authorizing Provider:Trinh Toro MD  Authorizing Provider's NPI: [id number]  Order Entered By: Trinh Toro MD 2023  9:22 AM     Electronically signed by: Trinh Toro MD 2023  9:22 AM     [employer] [address] [place] [address]  [place] [place] [address]  Dept. Phone:  [phone]  Dept. Fax:  [phone] Date Ordered: 2023         Patient:  Azra Ohara MRN:  [id number]   [address] [address] :  1935  SSN:    Phone: [phone] Sex:  F     Weight: 71.9 kg (158 lb 8.2 oz)         Ht Readings from Last 1 Encounters:   23 165.1 cm (65\")         Commode Chair          (Order ID: [id number])    Diagnosis:  Debility (R53.81 [ICD-10-CM] 799.3 [ICD-9-CM])   Quantity:  1     Equipment:  Bedside Commode Chair w/Fixed Arms  Length of Need (99 Months = Lifetime): 99 Months = Lifetime        Authorizing Provider's Phone: [phone]  Authorizing Provider:Trinh Toro MD  Authorizing Provider's NPI: [id number]  Order Entered By: Trinh Toro MD 2023 10:30 AM     Electronically signed by: Trinh Toro MD 2023 10:30 AM     [employer] [address] [place] [address]  [place] [address]  Dept. Phone:  [phone]  Dept. Fax:  [phone] Date Ordered: 2023         Patient:  Azra Ohara MRN:  [id number]   [address]  [facility] [address] :  1935  SSN:    Phone: [phone] Sex:  F     Weight: 71.9 kg (158 lb 8.2 oz)         Ht Readings from Last 1 Encounters:   23 165.1 cm (65\")         Walker               (Order ID: [id number])    Diagnosis:  Debility (R53.81 [ICD-10-CM] 799.3 [ICD-9-CM])   Quantity:  1     Equipment:  Walker Folding " "with Wheels  Length of Need (99 Months = Lifetime): 99 Months = Lifetime        Authorizing Provider's Phone: [phone]  Authorizing Provider:Trinh Toro MD  Authorizing Provider's NPI: [id number]  Order Entered By: Trinh Toro MD 2023 10:30 AM     Electronically signed by: Trinh Toro MD 2023 10:30 AM            History & Physical      Judi, Alyce Osorio PA-C at 23 0541     Attestation signed by Rick Dejesus MD at 23 0638    I have reviewed this documentation and agree.  I have discussed and formulated the assessment and plan with NEHEMIAS Osorio.                       [facility]  HISTORY & PHYSICAL    Patient Identification:  Name:  Azra Ohara  Age:  87 y.o.  Sex:  female  :  1935  MRN:  [id number]   Visit Number:  [id number]  Admit Date: 1/15/2023   Primary Care Physician:  Minal Salazar APRN     Subjective     Chief complaint:   Chief Complaint   Patient presents with   • Nausea   • Abdominal Pain     History of presenting illness:   Patient is a 87 y.o. female with past medical history significant for essential hypertension that presented to the [facility] emergency department for evaluation of nausea and abdominal pain.    The patient states she developed a sharp left lower quadrant abdominal pain around 7 days ago.  She saw her PCP who stated she had a \"abdominal infection\" and gave her 2 prescriptions for antibiotics and a solution to \"make her poop.\"  She reports despite taking the medication her abdominal discomfort has continued to worsen.  She endorses diarrhea for the past 2 weeks and believes she has around 2 episodes of watery diarrhea a day.  She denies taking any antibiotics prior to having the abdominal discomfort.  She also denies any recent travel or sick contacts.  She admits to a decreased appetite, diaphoresis, nausea, vomiting, and generalized weakness.  She denies any " fever, chills, hematochezia/melena, dizziness or lightheadedness.    Upon arrival to the ED, vitals were temperature 97.4 °F, pulse 111, respirations 20, /88, SPO2 98% room air    In the emergency department the patient received p.o. aspirin 324 mg, IV morphine 2 mg, IV Zofran 4 mg, NS 1 L bolus.    Patient has been admitted to the telemetry floor as an observation patient for further evaluation and treatment.  ---------------------------------------------------------------------------------------------------------------------   Review of Systems   Constitutional: Positive for appetite change (decreased) and diaphoresis. Negative for fever.   HENT: Negative for congestion and sore throat.    Respiratory: Negative for cough, shortness of breath and wheezing.    Cardiovascular: Negative for chest pain and palpitations.   Gastrointestinal: Positive for abdominal pain (LLQ), diarrhea, nausea and vomiting. Negative for constipation.   Genitourinary: Negative for dysuria and frequency.   Musculoskeletal: Positive for gait problem (uses cane ).   Skin: Negative for wound.   Neurological: Positive for weakness (generalized). Negative for dizziness and light-headedness.   Psychiatric/Behavioral: Negative for confusion.      ---------------------------------------------------------------------------------------------------------------------   Past Medical History:   Diagnosis Date   • Essential hypertension    • History of colon cancer      Past Surgical History:   Procedure Laterality Date   • COLON RESECTION  2004   • PARATHYROIDECTOMY Right     Parathyroid adenoma     Family History   Problem Relation Age of Onset   • Heart disease Mother    • Heart disease Father      Social History     Socioeconomic History   • Marital status: Single   Tobacco Use   • Smoking status: Never   Substance and Sexual Activity   • Alcohol use: Never   • Drug use: Never      ---------------------------------------------------------------------------------------------------------------------   Allergies:  Penicillins  ---------------------------------------------------------------------------------------------------------------------   Medications below are reported home medications pulling from within the system; at this time, these medications have not been reconciled unless otherwise specified and are in the verification process for further verifcation as current home medications.    Prior to Admission Medications     Prescriptions Last Dose Informant Patient Reported? Taking?    ciprofloxacin (CIPRO) 500 MG tablet   Yes No    Take 500 mg by mouth 2 (Two) Times a Day.    losartan (COZAAR) 25 MG tablet   Yes No    Take 25 mg by mouth 2 (Two) Times a Day.    metroNIDAZOLE (FLAGYL) 500 MG tablet   Yes No    Take 500 mg by mouth 2 (Two) Times a Day.    nisoldipine (SULAR) 8.5 MG 24 hr tablet   Yes No    Take 8.5 mg by mouth 3 (Three) Times a Day.        ---------------------------------------------------------------------------------------------------------------------    Objective     Hospital Scheduled Meds:    No current facility-administered medications for this encounter.      Current listed hospital scheduled medications may not yet reflect those currently placed in orders that are signed and held, awaiting patient's arrival to floor/unit.    ---------------------------------------------------------------------------------------------------------------------   Vital Signs:  Temp:  [97.4 °F (36.3 °C)] 97.4 °F (36.3 °C)  Heart Rate:  [] 97  Resp:  [20] 20  BP: (108-129)/(62-88) 125/66  Mean Arterial Pressure (Non-Invasive) for the past 24 hrs (Last 3 readings):   Noninvasive MAP (mmHg)   01/16/23 0426 84   01/16/23 0411 93   01/16/23 0356 83     SpO2 Percentage    01/16/23 0356 01/16/23 0411 01/16/23 0426   SpO2: 97% 94% 97%     SpO2:  [94 %-100 %] 97 %  on   ;   Device  (Oxygen Therapy): room air    Body mass index is 26.63 kg/m².  Wt Readings from Last 3 Encounters:   01/15/23 72.6 kg (160 lb)     ---------------------------------------------------------------------------------------------------------------------   Physical Exam:  Physical Exam  Constitutional:       General: She is awake.      Appearance: Normal appearance. She is well-developed and normal weight.   HENT:      Head: Normocephalic and atraumatic.   Eyes:      General: Lids are normal.   Cardiovascular:      Rate and Rhythm: Normal rate and regular rhythm.      Pulses: Normal pulses.           Dorsalis pedis pulses are 2+ on the right side and 2+ on the left side.        Posterior tibial pulses are 2+ on the right side and 2+ on the left side.      Heart sounds: Normal heart sounds. No murmur heard.    No friction rub. No gallop.   Pulmonary:      Effort: Pulmonary effort is normal.      Breath sounds: Normal breath sounds.   Abdominal:      General: Bowel sounds are normal.      Palpations: Abdomen is soft.      Tenderness: There is abdominal tenderness (Mild) in the left lower quadrant. There is no guarding.   Musculoskeletal:      Right lower leg: No edema.      Left lower leg: No edema.   Skin:     Findings: No ecchymosis or erythema.   Neurological:      General: No focal deficit present.      Mental Status: She is alert and oriented to person, place, and time. Mental status is at baseline.   Psychiatric:         Speech: Speech normal.         Behavior: Behavior is cooperative.         Cognition and Memory: Cognition normal.       ---------------------------------------------------------------------------------------------------------------------  EKG:    Pending cardiology read.  Per my evaluation, initial EKG with sinus rhythm with PACs, left bundle branch block, heart rate 98, QTc 474 MS.  Repeat EKG shows normal sinus rhythm with left bundle branch block, heart rate 98, QTc 477 MS.  No prior EKGs to  compare to    Telemetry:    Normal sinus rhythm with occasional PACs, heart rate 90, SPO2 94% on room air    I have personally reviewed the EKG/Telemetry strip  ---------------------------------------------------------------------------------------------------------------------   Results from last 7 days   Lab Units 01/16/23  0444 01/16/23  0030   TROPONIN T ng/mL <0.010 <0.010           Results from last 7 days   Lab Units 01/16/23  0334 01/16/23  0030   CRP mg/dL  --  0.81*   LACTATE mmol/L 2.0 2.4*   WBC 10*3/mm3  --  10.30   HEMOGLOBIN g/dL  --  16.0*   HEMATOCRIT %  --  50.7*   MCV fL  --  94.6   MCHC g/dL  --  31.6   PLATELETS 10*3/mm3  --  269     Results from last 7 days   Lab Units 01/16/23  0030   SODIUM mmol/L 137   POTASSIUM mmol/L 3.9   CHLORIDE mmol/L 97*   CO2 mmol/L 21.0*   BUN mg/dL 50*   CREATININE mg/dL 1.86*   CALCIUM mg/dL 11.0*   GLUCOSE mg/dL 134*   ALBUMIN g/dL 4.3   BILIRUBIN mg/dL 0.6   ALK PHOS U/L 86   AST (SGOT) U/L 17   ALT (SGPT) U/L 11   Estimated Creatinine Clearance: 21.3 mL/min (A) (by C-G formula based on SCr of 1.86 mg/dL (H)).  Ammonia   Date Value Ref Range Status   01/16/2023 23 11 - 51 umol/L Final     Pain Management Panel    There is no flowsheet data to display.       I have personally reviewed the above laboratory results.   ---------------------------------------------------------------------------------------------------------------------  Imaging Results (Last 7 Days)     Procedure Component Value Units Date/Time    CT Abdomen Pelvis With Contrast [[id number]] Collected: 01/16/23 0155     Updated: 01/16/23 0157    Narrative:      CT Abdomen Pelvis W    INDICATION:   Left lower quadrant pain with nausea and diarrhea    TECHNIQUE:   CT of the abdomen and pelvis with 100 cc of Isovue-300 IV contrast. Coronal and sagittal reconstructions were obtained.  Radiation dose reduction techniques included automated exposure control or exposure modulation based on body size. Count  of known CT  and cardiac nuc med studies performed in previous 12 months: 0.     COMPARISON:   None available.    FINDINGS:  Abdomen: There is atelectasis with volume loss at both lung bases. 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 No upper abdominal solid organ abnormalities are seen.  Noted incidentally are bilateral renal cysts. No evidence of hydronephrosis or kidney stone disease. No evidence of retroperitoneal adenopathy. No distended bowel loops. No inflammatory changes around the gallbladder.    Pelvis: Normal appendix. Diverticulosis is seen but no acute inflammatory changes are seen in the bowel. Postoperative changes are seen in the sigmoid. There are a few diverticula. There is advanced degenerative change in the lower thoracic and lumbar  discs. There is a grade 2 spondylolisthesis at L5-S1 with bilateral L5 pars defects.      Impression:      Diverticulosis. Postoperative abdomen. No acute inflammatory changes are seen.          Signer Name: uMmtaz Farias MD   Signed: 1/16/2023 1:55 AM   Workstation Name: [id number]    [facility]        I have personally reviewed the above radiology results.     ---------------------------------------------------------------------------------------------------------------------    Assessment & Plan      Active Hospital Problems    Diagnosis  POA   • **Left lower quadrant abdominal pain [R10.32]  Yes     #? gastroenteritis   #Metabolic anion gap acidosis  #Lactic acidosis  #History of colon cancer status post partial colon resection (2004), full details unknown  -Etiology of N/V/D is unclear at this time  -CT of abdomen/pelvis unremarkable  -GI panel and C. difficile ordered; patient has been unable to provide stool sample thus far  -Hold on antibiotics for now  -Anion gap is elevated at 19.0, suspect secondary to lactic acidosis  -Obtain venous blood gas  to rule out systemic acidosis  -Initial lactate 2.4, has improved to 2.0  -Maintenance fluids ordered at 75 mL/h  -Repeat a.m. CMP and monitor for anion gap closure  -Supportive care for N/B with IV Zofran (corrected  MS in setting of LBBB)  -Monitor closely    #CLAYTON versus CKD stage IV  -No prior labs to compare to  -Creatinine admission 1.86, GFR 25.9  -Urine electrolytes ordered  -Continue with maintenance fluids at 75 mL/h as outlined above  -Attempt to obtain records from PCP to determine baseline creatinine  -Repeat a.m. CMP monitor renal function closely  -Avoid nephrotoxic agents as much as possible    #?  New left bundle branch block  -Initial and repeat EKG shows left bundle branch block; no prior EKGs to compare to determine if this is new  -Patient denies any chest pain; troponin T negative x2  -Received loading dose aspirin in the ED  -Continue with daily aspirin and statin  -A.m. lipid panel ordered  -As stated above we will attempt to obtain records from PCP  -Monitor on telemetry  -May consider obtaining cardiology consult    #Hyperglycemia with no known history of diabetes mellitus  -Obtain hemoglobin A1c    #Polycythemia  -RBC 5.36, hemoglobin 16, hematocrit 50.7; suspect secondary to intravascular depletion  -Continue with maintenance fluids as outlined above  -Obtain peripheral blood smear    #Essential hypertension  -Review home medications once reconciled per pharmacy    #Advanced age  #Generalized weakness  -PT/OT   -Up with assistance, fall precautions    F/E/N: NS 75 mL/h. Replace electrolytes as necessary.  Clear liquid diet, advance as tolerated  ---------------------------------------------------  DVT Prophylaxis: Subcutaneous heparin  GI Prophylaxis: Protonix  Activity: Up with assistance, fall precautions    OBSERVATION status, however if further evaluation or treatment plans warrant, status may change.  Based upon current information, I predict patient's care encounter to be less  than or equal to 2 midnights.    Code Status: FULL CODE     Disposition/Discharge planning: Plan for home at discharge.  Pending clinical course    I have discussed the patient's assessment and plan with the patient and attending physician      Alyce Lau PA-C  Hospitalist Service -- [facility]       01/16/23  05:57 EST    Attending Physician: Rick Dejesus MD       Electronically signed by Rick Dejesus MD at 01/16/23 0638

## 2023-01-19 NOTE — DISCHARGE INSTR - APPOINTMENTS
You have a follow-up appointment with Minal Hernandez on 1-26-23 at 2:00. Office number [phone]

## 2023-01-19 NOTE — PLAN OF CARE
"Goal Outcome Evaluation:              Outcome Evaluation: Pt has rested in bed throughout the night. Pt complaind of anxiety earlier in the shift and stated she \"had a bad dream about her mommy.\" No further problems noted nor did patient require any medications during anxiety episode. VSS. Will continue to follow plan of care.  "

## 2023-01-20 ENCOUNTER — READMISSION MANAGEMENT (OUTPATIENT)
Dept: CALL CENTER | Facility: HOSPITAL | Age: 88
End: 2023-01-20
Payer: MEDICARE

## 2023-01-20 NOTE — OUTREACH NOTE
Prep Survey    Flowsheet Row Responses   [facility] facility patient discharged from? [place]   Is LACE score < 7 ? No   Eligibility Readm Mgmt   Discharge diagnosis Nausea vomiting and diarrhea   Does the patient have one of the following disease processes/diagnoses(primary or secondary)? Other   Does the patient have Home health ordered? Yes   What is the Home health agency?   [facility]   Is there a DME ordered? No   Prep survey completed? Yes          JJ TOTH - Registered Nurse

## 2023-01-21 LAB
BACTERIA SPEC AEROBE CULT: NORMAL
BACTERIA SPEC AEROBE CULT: NORMAL

## 2023-01-23 ENCOUNTER — READMISSION MANAGEMENT (OUTPATIENT)
Dept: CALL CENTER | Facility: HOSPITAL | Age: 88
End: 2023-01-23
Payer: MEDICARE

## 2023-01-23 NOTE — OUTREACH NOTE
Medical Week 1 Survey    Flowsheet Row Responses   [facility] patient discharged from? [facility]   Does the patient have one of the following disease processes/diagnoses(primary or secondary)? Other   Week 1 attempt successful? Yes   Call start time 1125   Call end time 1136   Discharge diagnosis Nausea vomiting and diarrhea   Meds reviewed with patient/caregiver? Yes   Is the patient having any side effects they believe may be caused by any medication additions or changes? No   Does the patient have all medications ordered at discharge? Yes   Is the patient taking all medications as directed (includes completed medication regime)? Yes   Does the patient have a primary care provider?  Yes   Does the patient have an appointment with their PCP within 7 days of discharge? No   What is preventing the patient from scheduling follow up appointments within 7 days of discharge? Haven't had time, Unsure of when or with whom   Nursing Interventions Advised patient to make appointment   Has the patient kept scheduled appointments due by today? N/A   What is the Home health agency?   [facility]   Has home health visited the patient within 72 hours of discharge? Yes   Psychosocial issues? No   Did the patient receive a copy of their discharge instructions? Yes   Nursing interventions Reviewed instructions with patient   What is the patient's perception of their health status since discharge? Improving   Is the patient/caregiver able to teach back signs and symptoms related to disease process for when to call PCP? Yes   Is the patient/caregiver able to teach back signs and symptoms related to disease process for when to call 911? Yes   Is the patient/caregiver able to teach back the hierarchy of who to call/visit for symptoms/problems? PCP, Specialist, Home health nurse, Urgent Care, ED, 911 Yes   If the patient is a current smoker, are they able to teach back resources for cessation? Not a smoker   Additional teach  back comments states ASA has been causing stomach pain and diarrhea, plans to see PCP to discuss   Week 1 call completed? Yes          SINTIA TOTH - Registered Nurse

## 2023-02-01 ENCOUNTER — READMISSION MANAGEMENT (OUTPATIENT)
Dept: CALL CENTER | Facility: HOSPITAL | Age: 88
End: 2023-02-01
Payer: MEDICARE

## 2023-02-01 NOTE — OUTREACH NOTE
Medical Week 2 Survey    Flowsheet Row Responses   [facility] patient discharged from? [facility]   Does the patient have one of the following disease processes/diagnoses(primary or secondary)? Other   Week 2 attempt successful? Yes   Call start time 1129   Discharge diagnosis Nausea vomiting and diarrhea   Call end time 1131   Meds reviewed with patient/caregiver? Yes   Is the patient taking all medications as directed (includes completed medication regime)? Yes   Does the patient have a primary care provider?  Yes   Does the patient have an appointment with their PCP within 7 days of discharge? No   Nursing Interventions Educated patient on importance of making appointment, Advised patient to make appointment   Has the patient kept scheduled appointments due by today? N/A   What is the Home health agency?   [facility]   Has home health visited the patient within 72 hours of discharge? Yes   Psychosocial issues? No   Comments Pt denies N/V/D or abd pain. Monitoring BP intermittently.   What is the patient's perception of their health status since discharge? Improving   Is the patient/caregiver able to teach back signs and symptoms related to disease process for when to call PCP? Yes   Week 2 Call Completed? Yes   Revoked No further contact(revokes)-requires comment   Graduated/Revoked comments serenity DAVILA - Registered Nurse

## 2023-05-03 ENCOUNTER — APPOINTMENT (OUTPATIENT)
Dept: GENERAL RADIOLOGY | Facility: HOSPITAL | Age: 88
End: 2023-05-03
Payer: MEDICARE

## 2023-05-03 ENCOUNTER — HOSPITAL ENCOUNTER (INPATIENT)
Facility: HOSPITAL | Age: 88
LOS: 7 days | Discharge: NURSING, REHAB OR LONG-TERM CARE | End: 2023-05-11
Attending: STUDENT IN AN ORGANIZED HEALTH CARE EDUCATION/TRAINING PROGRAM | Admitting: INTERNAL MEDICINE
Payer: MEDICARE

## 2023-05-03 DIAGNOSIS — I44.2 COMPLETE HEART BLOCK: ICD-10-CM

## 2023-05-03 DIAGNOSIS — I44.2 THIRD DEGREE ATRIOVENTRICULAR BLOCK: Primary | ICD-10-CM

## 2023-05-03 DIAGNOSIS — I44.2 THIRD DEGREE AV BLOCK: ICD-10-CM

## 2023-05-03 DIAGNOSIS — R52 ACUTE PAIN: ICD-10-CM

## 2023-05-03 LAB
ALBUMIN SERPL-MCNC: 4 G/DL (ref 3.5–5.2)
ALBUMIN/GLOB SERPL: 1.1 G/DL
ALP SERPL-CCNC: 83 U/L (ref 39–117)
ALT SERPL W P-5'-P-CCNC: 14 U/L (ref 1–33)
ANION GAP SERPL CALCULATED.3IONS-SCNC: 13.6 MMOL/L (ref 5–15)
AST SERPL-CCNC: 26 U/L (ref 1–32)
BACTERIA UR QL AUTO: ABNORMAL /HPF
BASOPHILS # BLD AUTO: 0.03 10*3/MM3 (ref 0–0.2)
BASOPHILS NFR BLD AUTO: 0.3 % (ref 0–1.5)
BILIRUB SERPL-MCNC: 0.7 MG/DL (ref 0–1.2)
BILIRUB UR QL STRIP: NEGATIVE
BUN SERPL-MCNC: 47 MG/DL (ref 8–23)
BUN/CREAT SERPL: 41.2 (ref 7–25)
CALCIUM SPEC-SCNC: 10.8 MG/DL (ref 8.6–10.5)
CHLORIDE SERPL-SCNC: 109 MMOL/L (ref 98–107)
CLARITY UR: CLEAR
CO2 SERPL-SCNC: 24.4 MMOL/L (ref 22–29)
COLOR UR: YELLOW
CREAT SERPL-MCNC: 1.14 MG/DL (ref 0.57–1)
CRP SERPL-MCNC: 3.26 MG/DL (ref 0–0.5)
D-LACTATE SERPL-SCNC: 1.8 MMOL/L (ref 0.5–2)
DEPRECATED RDW RBC AUTO: 51 FL (ref 37–54)
EGFRCR SERPLBLD CKD-EPI 2021: 46.4 ML/MIN/1.73
EOSINOPHIL # BLD AUTO: 0 10*3/MM3 (ref 0–0.4)
EOSINOPHIL NFR BLD AUTO: 0 % (ref 0.3–6.2)
ERYTHROCYTE [DISTWIDTH] IN BLOOD BY AUTOMATED COUNT: 14.6 % (ref 12.3–15.4)
GEN 5 2HR TROPONIN T REFLEX: 44 NG/L
GLOBULIN UR ELPH-MCNC: 3.8 GM/DL
GLUCOSE SERPL-MCNC: 112 MG/DL (ref 65–99)
GLUCOSE UR STRIP-MCNC: NEGATIVE MG/DL
HCT VFR BLD AUTO: 44.5 % (ref 34–46.6)
HGB BLD-MCNC: 14.3 G/DL (ref 12–15.9)
HGB UR QL STRIP.AUTO: NEGATIVE
HYALINE CASTS UR QL AUTO: ABNORMAL /LPF
IMM GRANULOCYTES # BLD AUTO: 0.05 10*3/MM3 (ref 0–0.05)
IMM GRANULOCYTES NFR BLD AUTO: 0.5 % (ref 0–0.5)
KETONES UR QL STRIP: ABNORMAL
LEUKOCYTE ESTERASE UR QL STRIP.AUTO: NEGATIVE
LYMPHOCYTES # BLD AUTO: 1.13 10*3/MM3 (ref 0.7–3.1)
LYMPHOCYTES NFR BLD AUTO: 10.3 % (ref 19.6–45.3)
MAGNESIUM SERPL-MCNC: 2 MG/DL (ref 1.6–2.4)
MCH RBC QN AUTO: 30.6 PG (ref 26.6–33)
MCHC RBC AUTO-ENTMCNC: 32.1 G/DL (ref 31.5–35.7)
MCV RBC AUTO: 95.1 FL (ref 79–97)
MONOCYTES # BLD AUTO: 0.66 10*3/MM3 (ref 0.1–0.9)
MONOCYTES NFR BLD AUTO: 6 % (ref 5–12)
NEUTROPHILS NFR BLD AUTO: 82.9 % (ref 42.7–76)
NEUTROPHILS NFR BLD AUTO: 9.07 10*3/MM3 (ref 1.7–7)
NITRITE UR QL STRIP: NEGATIVE
NRBC BLD AUTO-RTO: 0 /100 WBC (ref 0–0.2)
NT-PROBNP SERPL-MCNC: 6565 PG/ML (ref 0–1800)
PH UR STRIP.AUTO: 6 [PH] (ref 5–8)
PLATELET # BLD AUTO: 251 10*3/MM3 (ref 140–450)
PMV BLD AUTO: 11.8 FL (ref 6–12)
POTASSIUM SERPL-SCNC: 4.1 MMOL/L (ref 3.5–5.2)
PROT SERPL-MCNC: 7.8 G/DL (ref 6–8.5)
PROT UR QL STRIP: ABNORMAL
RBC # BLD AUTO: 4.68 10*6/MM3 (ref 3.77–5.28)
RBC # UR STRIP: ABNORMAL /HPF
REF LAB TEST METHOD: ABNORMAL
SODIUM SERPL-SCNC: 147 MMOL/L (ref 136–145)
SP GR UR STRIP: 1.02 (ref 1–1.03)
SQUAMOUS #/AREA URNS HPF: ABNORMAL /HPF
TROPONIN T DELTA: -6 NG/L
TROPONIN T SERPL HS-MCNC: 50 NG/L
UROBILINOGEN UR QL STRIP: ABNORMAL
WBC # UR STRIP: ABNORMAL /HPF
WBC NRBC COR # BLD: 10.94 10*3/MM3 (ref 3.4–10.8)

## 2023-05-03 PROCEDURE — 83880 ASSAY OF NATRIURETIC PEPTIDE: CPT | Performed by: PHYSICIAN ASSISTANT

## 2023-05-03 PROCEDURE — 81001 URINALYSIS AUTO W/SCOPE: CPT | Performed by: PHYSICIAN ASSISTANT

## 2023-05-03 PROCEDURE — 83605 ASSAY OF LACTIC ACID: CPT | Performed by: PHYSICIAN ASSISTANT

## 2023-05-03 PROCEDURE — 86140 C-REACTIVE PROTEIN: CPT | Performed by: PHYSICIAN ASSISTANT

## 2023-05-03 PROCEDURE — 99285 EMERGENCY DEPT VISIT HI MDM: CPT

## 2023-05-03 PROCEDURE — 71045 X-RAY EXAM CHEST 1 VIEW: CPT | Performed by: RADIOLOGY

## 2023-05-03 PROCEDURE — 84439 ASSAY OF FREE THYROXINE: CPT | Performed by: NURSE PRACTITIONER

## 2023-05-03 PROCEDURE — 93041 RHYTHM ECG TRACING: CPT | Performed by: STUDENT IN AN ORGANIZED HEALTH CARE EDUCATION/TRAINING PROGRAM

## 2023-05-03 PROCEDURE — 36415 COLL VENOUS BLD VENIPUNCTURE: CPT

## 2023-05-03 PROCEDURE — 71045 X-RAY EXAM CHEST 1 VIEW: CPT

## 2023-05-03 PROCEDURE — 84484 ASSAY OF TROPONIN QUANT: CPT | Performed by: PHYSICIAN ASSISTANT

## 2023-05-03 PROCEDURE — 84443 ASSAY THYROID STIM HORMONE: CPT | Performed by: NURSE PRACTITIONER

## 2023-05-03 PROCEDURE — 80053 COMPREHEN METABOLIC PANEL: CPT | Performed by: PHYSICIAN ASSISTANT

## 2023-05-03 PROCEDURE — 83735 ASSAY OF MAGNESIUM: CPT | Performed by: PHYSICIAN ASSISTANT

## 2023-05-03 PROCEDURE — 85025 COMPLETE CBC W/AUTO DIFF WBC: CPT | Performed by: PHYSICIAN ASSISTANT

## 2023-05-03 RX ORDER — SODIUM CHLORIDE 0.9 % (FLUSH) 0.9 %
10 SYRINGE (ML) INJECTION AS NEEDED
Status: DISCONTINUED | OUTPATIENT
Start: 2023-05-03 | End: 2023-05-11 | Stop reason: HOSPADM

## 2023-05-03 RX ADMIN — SODIUM CHLORIDE 1000 ML: 9 INJECTION, SOLUTION INTRAVENOUS at 21:37

## 2023-05-03 NOTE — Clinical Note
A 5 fr sheath was  inserted using micropuncture technique into the left subclavian vein. 5F micropuncture sheath

## 2023-05-03 NOTE — Clinical Note
A 6 fr sheath was  inserted using micropuncture technique into the left subclavian vein. 6F vendor supplied sheath

## 2023-05-03 NOTE — Clinical Note
A 6 fr sheath was  inserted using micropuncture technique into the left subclavian vein. 6f sheath vendor supplied

## 2023-05-04 ENCOUNTER — APPOINTMENT (OUTPATIENT)
Dept: CARDIOLOGY | Facility: HOSPITAL | Age: 88
End: 2023-05-04
Payer: MEDICARE

## 2023-05-04 ENCOUNTER — APPOINTMENT (OUTPATIENT)
Dept: ULTRASOUND IMAGING | Facility: HOSPITAL | Age: 88
End: 2023-05-04
Payer: MEDICARE

## 2023-05-04 PROBLEM — N18.4 CHRONIC KIDNEY DISEASE, STAGE 4 (SEVERE): Status: ACTIVE | Noted: 2023-01-20

## 2023-05-04 PROBLEM — C18.9 MALIGNANT NEOPLASM OF LARGE INTESTINE: Status: ACTIVE | Noted: 2023-01-20

## 2023-05-04 PROBLEM — I10 ESSENTIAL HYPERTENSION: Status: ACTIVE | Noted: 2023-01-24

## 2023-05-04 PROBLEM — Z79.82 LONG TERM (CURRENT) USE OF ASPIRIN: Status: ACTIVE | Noted: 2023-01-20

## 2023-05-04 PROBLEM — I44.7 LEFT BUNDLE BRANCH BLOCK: Status: ACTIVE | Noted: 2023-01-20

## 2023-05-04 PROBLEM — R10.32 LEFT LOWER QUADRANT ABDOMINAL PAIN: Status: RESOLVED | Noted: 2023-01-16 | Resolved: 2023-05-04

## 2023-05-04 PROBLEM — I44.2 THIRD DEGREE ATRIOVENTRICULAR BLOCK: Status: ACTIVE | Noted: 2023-05-04

## 2023-05-04 PROBLEM — D45 POLYCYTHEMIA VERA: Status: ACTIVE | Noted: 2023-01-20

## 2023-05-04 LAB
FLUAV RNA RESP QL NAA+PROBE: NOT DETECTED
FLUBV RNA ISLT QL NAA+PROBE: NOT DETECTED
GLUCOSE BLDC GLUCOMTR-MCNC: 74 MG/DL (ref 70–130)
QT INTERVAL: 628 MS
QTC INTERVAL: 518 MS
SARS-COV-2 RNA RESP QL NAA+PROBE: NOT DETECTED
T4 FREE SERPL-MCNC: 1.95 NG/DL (ref 0.93–1.7)
TSH SERPL DL<=0.05 MIU/L-ACNC: 1 UIU/ML (ref 0.27–4.2)

## 2023-05-04 PROCEDURE — 25010000002 HEPARIN (PORCINE) PER 1000 UNITS: Performed by: INTERNAL MEDICINE

## 2023-05-04 PROCEDURE — 25010000002 CEFAZOLIN PER 500 MG: Performed by: INTERNAL MEDICINE

## 2023-05-04 PROCEDURE — 82948 REAGENT STRIP/BLOOD GLUCOSE: CPT

## 2023-05-04 PROCEDURE — 93970 EXTREMITY STUDY: CPT

## 2023-05-04 PROCEDURE — 93970 EXTREMITY STUDY: CPT | Performed by: RADIOLOGY

## 2023-05-04 PROCEDURE — 99223 1ST HOSP IP/OBS HIGH 75: CPT | Performed by: INTERNAL MEDICINE

## 2023-05-04 PROCEDURE — 93306 TTE W/DOPPLER COMPLETE: CPT

## 2023-05-04 PROCEDURE — 93306 TTE W/DOPPLER COMPLETE: CPT | Performed by: SPECIALIST

## 2023-05-04 PROCEDURE — 25010000002 FUROSEMIDE PER 20 MG: Performed by: INTERNAL MEDICINE

## 2023-05-04 PROCEDURE — 87636 SARSCOV2 & INF A&B AMP PRB: CPT | Performed by: EMERGENCY MEDICINE

## 2023-05-04 PROCEDURE — 94799 UNLISTED PULMONARY SVC/PX: CPT

## 2023-05-04 PROCEDURE — 94761 N-INVAS EAR/PLS OXIMETRY MLT: CPT

## 2023-05-04 RX ORDER — ATORVASTATIN CALCIUM 40 MG/1
40 TABLET, FILM COATED ORAL NIGHTLY
Status: DISCONTINUED | OUTPATIENT
Start: 2023-05-04 | End: 2023-05-11 | Stop reason: HOSPADM

## 2023-05-04 RX ORDER — SODIUM CHLORIDE 0.9 % (FLUSH) 0.9 %
10 SYRINGE (ML) INJECTION EVERY 12 HOURS SCHEDULED
Status: DISCONTINUED | OUTPATIENT
Start: 2023-05-04 | End: 2023-05-11 | Stop reason: HOSPADM

## 2023-05-04 RX ORDER — SODIUM CHLORIDE 0.9 % (FLUSH) 0.9 %
10 SYRINGE (ML) INJECTION AS NEEDED
Status: DISCONTINUED | OUTPATIENT
Start: 2023-05-04 | End: 2023-05-11 | Stop reason: HOSPADM

## 2023-05-04 RX ORDER — AMLODIPINE BESYLATE 5 MG/1
2.5 TABLET ORAL DAILY
Status: CANCELLED | OUTPATIENT
Start: 2023-05-04

## 2023-05-04 RX ORDER — TRIAMTERENE AND HYDROCHLOROTHIAZIDE 37.5; 25 MG/1; MG/1
1 TABLET ORAL DAILY
Status: CANCELLED | OUTPATIENT
Start: 2023-05-04

## 2023-05-04 RX ORDER — SODIUM CHLORIDE 9 MG/ML
40 INJECTION, SOLUTION INTRAVENOUS AS NEEDED
Status: DISCONTINUED | OUTPATIENT
Start: 2023-05-04 | End: 2023-05-11 | Stop reason: HOSPADM

## 2023-05-04 RX ORDER — DICLOFENAC SODIUM 75 MG/1
75 TABLET, DELAYED RELEASE ORAL 2 TIMES DAILY PRN
COMMUNITY
End: 2023-05-11 | Stop reason: HOSPADM

## 2023-05-04 RX ORDER — HEPARIN SODIUM 5000 [USP'U]/ML
5000 INJECTION, SOLUTION INTRAVENOUS; SUBCUTANEOUS EVERY 12 HOURS SCHEDULED
Status: DISCONTINUED | OUTPATIENT
Start: 2023-05-04 | End: 2023-05-11 | Stop reason: HOSPADM

## 2023-05-04 RX ORDER — ONDANSETRON 4 MG/1
4 TABLET, ORALLY DISINTEGRATING ORAL 2 TIMES DAILY PRN
Status: CANCELLED | OUTPATIENT
Start: 2023-05-04

## 2023-05-04 RX ORDER — AMLODIPINE BESYLATE 2.5 MG/1
2.5 TABLET ORAL DAILY
COMMUNITY
End: 2023-05-11 | Stop reason: HOSPADM

## 2023-05-04 RX ORDER — TRIAMTERENE AND HYDROCHLOROTHIAZIDE 37.5; 25 MG/1; MG/1
1 CAPSULE ORAL EVERY MORNING
COMMUNITY
End: 2023-05-11 | Stop reason: HOSPADM

## 2023-05-04 RX ORDER — NAPROXEN 250 MG/1
500 TABLET ORAL 2 TIMES DAILY PRN
Status: CANCELLED | OUTPATIENT
Start: 2023-05-04

## 2023-05-04 RX ORDER — FUROSEMIDE 10 MG/ML
40 INJECTION INTRAMUSCULAR; INTRAVENOUS ONCE
Status: COMPLETED | OUTPATIENT
Start: 2023-05-04 | End: 2023-05-04

## 2023-05-04 RX ADMIN — ATORVASTATIN CALCIUM 40 MG: 40 TABLET, FILM COATED ORAL at 05:52

## 2023-05-04 RX ADMIN — Medication 10 ML: at 20:52

## 2023-05-04 RX ADMIN — Medication 10 ML: at 09:18

## 2023-05-04 RX ADMIN — FUROSEMIDE 40 MG: 10 INJECTION, SOLUTION INTRAVENOUS at 05:52

## 2023-05-04 RX ADMIN — HEPARIN SODIUM 5000 UNITS: 5000 INJECTION INTRAVENOUS; SUBCUTANEOUS at 20:50

## 2023-05-04 RX ADMIN — ATORVASTATIN CALCIUM 40 MG: 40 TABLET, FILM COATED ORAL at 20:49

## 2023-05-04 RX ADMIN — HEPARIN SODIUM 5000 UNITS: 5000 INJECTION INTRAVENOUS; SUBCUTANEOUS at 09:17

## 2023-05-04 RX ADMIN — ASPIRIN 81 MG: 81 TABLET, COATED ORAL at 09:17

## 2023-05-04 RX ADMIN — CEFAZOLIN 2 G: 2 INJECTION, POWDER, FOR SOLUTION INTRAMUSCULAR; INTRAVENOUS at 21:12

## 2023-05-04 RX ADMIN — CEFAZOLIN 2 G: 2 INJECTION, POWDER, FOR SOLUTION INTRAMUSCULAR; INTRAVENOUS at 05:51

## 2023-05-04 RX ADMIN — CEFAZOLIN 2 G: 2 INJECTION, POWDER, FOR SOLUTION INTRAMUSCULAR; INTRAVENOUS at 14:03

## 2023-05-04 NOTE — CASE MANAGEMENT/SOCIAL WORK
Discharge Planning Assessment   [facility]     Patient Name: Azra Ohara  MRN: [id number]  Today's Date: 5/4/2023    Admit Date: 5/3/2023     Discharge Needs Assessment     Row Name 05/04/23 1606       Living Environment    People in Home alone    Current Living Arrangements home    Potentially Unsafe Housing Conditions none    Primary Care Provided by self    Provides Primary Care For no one, unable/limited ability to care for self    Family Caregiver if Needed child(mauro), adult;other relative(s)    Family Caregiver Names Sister Doreen Schmidt & Nephew Gonzalez Barrera    Quality of Family Relationships helpful;involved;supportive    Living Arrangement Comments Pt's sister voices concerns that Pt lives alone. Sister states she is 82 Y/O has health issues and cannot help assist Pt as much as is needed. Sister reports she and nephew try to check on Pt at least once a day. Nephew helps Pt get to PCP appts.       Resource/Environmental Concerns    Resource/Environmental Concerns none       Food Insecurity    Within the past 12 months, you worried that your food would run out before you got the money to buy more. Never true    Within the past 12 months, the food you bought just didn't last and you didn't have money to get more. Never true       Transition Planning    Patient/Family Anticipates Transition to home with help/services       Discharge Needs Assessment    Equipment Currently Used at Home none    Concerns to be Addressed discharge planning    Equipment Needed After Discharge cane, straight;walker, rolling;bp cuff;other (see comments)  raised toilet seat               Discharge Plan     Row Name 05/04/23 1610       Plan    Plan Pt was admitted on 05/03/23. 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  Pt has psychiatry consult. SS contacted Pt's sister, doreen [phone] on this date.  Pt lives alone and has some support from sister and nephew. Pt does not utilize home health services. Pt  utilizes a cane, walker, blood pressure cuff and raised toilet seat via unknown provider. Pt's PCP is Minal Salazar. Pt has an unstageable wound on her sacrum, excessive toe nails. Sister states she is not physically capable of providing her sister's care and is recommending SNF placement. Sister states she and Pt's nephew attempts to visit or call Pt everyday. SS to follow.                RAFAEL HamiltonW

## 2023-05-04 NOTE — NURSING NOTE
Wound consult for denuded area to the bilateral buttocks. Area presents with dry stable eschar and a moist, pink and fragile rupali wound skin. Rounded with out patient APRN with new orders to cleanse with NS and pat dry. Apply thera- honey and cover with a silicone border dressing daily. Pressure injury prevention protocols in place.

## 2023-05-04 NOTE — PLAN OF CARE
Goal Outcome Evaluation:     Patient heart rate has been in the 40's most of shift but has dropped into the 30's.  Physicians are aware.  Cardiology consulted for 3rd deg block; pt is to be NPO at midnight for possible pacemaker placement tomorrow.  Sister and nephew have visited today.  Code status changed to DNR/DNI.  Patient has been pleasant and cooperative.  UOP adequate.  No BM.  Unstageable wounds to buttocks. WOCN consulted with orders noted.     Problem: Fall Injury Risk  Goal: Absence of Fall and Fall-Related Injury  Outcome: Ongoing, Progressing  Intervention: Identify and Manage Contributors  Flowsheets  Taken 5/4/2023 1400  Medication Review/Management: medications reviewed  Taken 5/4/2023 0800  Medication Review/Management: medications reviewed  Intervention: Promote Injury-Free Environment  Flowsheets  Taken 5/4/2023 1700  Safety Promotion/Fall Prevention:   safety round/check completed   fall prevention program maintained  Taken 5/4/2023 1600  Safety Promotion/Fall Prevention:   activity supervised   assistive device/personal items within reach   clutter free environment maintained   lighting adjusted   safety round/check completed  Taken 5/4/2023 1500  Safety Promotion/Fall Prevention:   safety round/check completed   fall prevention program maintained  Taken 5/4/2023 1400  Safety Promotion/Fall Prevention:   activity supervised   assistive device/personal items within reach   clutter free environment maintained   fall prevention program maintained   safety round/check completed   room organization consistent   lighting adjusted  Taken 5/4/2023 1300  Safety Promotion/Fall Prevention:   safety round/check completed   fall prevention program maintained  Taken 5/4/2023 1200  Safety Promotion/Fall Prevention:   activity supervised   assistive device/personal items within reach   clutter free environment maintained   fall prevention program maintained   lighting adjusted   safety round/check completed    room organization consistent  Taken 5/4/2023 1100  Safety Promotion/Fall Prevention:   safety round/check completed   fall prevention program maintained   lighting adjusted  Taken 5/4/2023 1000  Safety Promotion/Fall Prevention:   activity supervised   assistive device/personal items within reach   fall prevention program maintained   lighting adjusted   safety round/check completed   room organization consistent  Taken 5/4/2023 0900  Safety Promotion/Fall Prevention:   safety round/check completed   fall prevention program maintained   lighting adjusted  Taken 5/4/2023 0800  Safety Promotion/Fall Prevention:   activity supervised   assistive device/personal items within reach   clutter free environment maintained   fall prevention program maintained   lighting adjusted   safety round/check completed   nonskid shoes/slippers when out of bed  Taken 5/4/2023 0700  Safety Promotion/Fall Prevention:   safety round/check completed   fall prevention program maintained     Problem: Adult Inpatient Plan of Care  Goal: Absence of Hospital-Acquired Illness or Injury  Outcome: Ongoing, Progressing  Intervention: Identify and Manage Fall Risk  Flowsheets  Taken 5/4/2023 1700  Safety Promotion/Fall Prevention:   safety round/check completed   fall prevention program maintained  Taken 5/4/2023 1600  Safety Promotion/Fall Prevention:   activity supervised   assistive device/personal items within reach   clutter free environment maintained   lighting adjusted   safety round/check completed  Taken 5/4/2023 1500  Safety Promotion/Fall Prevention:   safety round/check completed   fall prevention program maintained  Taken 5/4/2023 1400  Safety Promotion/Fall Prevention:   activity supervised   assistive device/personal items within reach   clutter free environment maintained   fall prevention program maintained   safety round/check completed   room organization consistent   lighting adjusted  Taken 5/4/2023 1300  Safety Promotion/Fall  Prevention:   safety round/check completed   fall prevention program maintained  Taken 5/4/2023 1200  Safety Promotion/Fall Prevention:   activity supervised   assistive device/personal items within reach   clutter free environment maintained   fall prevention program maintained   lighting adjusted   safety round/check completed   room organization consistent  Taken 5/4/2023 1100  Safety Promotion/Fall Prevention:   safety round/check completed   fall prevention program maintained   lighting adjusted  Taken 5/4/2023 1000  Safety Promotion/Fall Prevention:   activity supervised   assistive device/personal items within reach   fall prevention program maintained   lighting adjusted   safety round/check completed   room organization consistent  Taken 5/4/2023 0900  Safety Promotion/Fall Prevention:   safety round/check completed   fall prevention program maintained   lighting adjusted  Taken 5/4/2023 0800  Safety Promotion/Fall Prevention:   activity supervised   assistive device/personal items within reach   clutter free environment maintained   fall prevention program maintained   lighting adjusted   safety round/check completed   nonskid shoes/slippers when out of bed  Taken 5/4/2023 0700  Safety Promotion/Fall Prevention:   safety round/check completed   fall prevention program maintained  Intervention: Prevent Skin Injury  Flowsheets  Taken 5/4/2023 1719  Body Position: side-lying  Taken 5/4/2023 1600  Body Position: (pt has been observed moving self around in bed, turning onto back or further over onto side)   right   turned   side-lying  Taken 5/4/2023 1400  Body Position:   left   turned   side-lying  Taken 5/4/2023 1200  Body Position:   right   turned   side-lying  Taken 5/4/2023 1000  Body Position:   turned   left   side-lying  Taken 5/4/2023 0800  Body Position:   right   turned   side-lying  Skin Protection:   adhesive use limited   tubing/devices free from skin contact  Intervention: Prevent and Manage  VTE (Venous Thromboembolism) Risk  Flowsheets  Taken 5/4/2023 1600  Activity Management: bedrest  Taken 5/4/2023 1400  Activity Management: bedrest  VTE Prevention/Management: (see MAR) other (see comments)  Taken 5/4/2023 1200  Activity Management: bedrest  Taken 5/4/2023 1000  Activity Management: bedrest  Taken 5/4/2023 0800  Activity Management: bedrest  VTE Prevention/Management: (see MAR) other (see comments)  Range of Motion: active ROM (range of motion) encouraged  Intervention: Prevent Infection  Flowsheets  Taken 5/4/2023 1600  Infection Prevention:   single patient room provided   rest/sleep promoted   environmental surveillance performed  Taken 5/4/2023 1400  Infection Prevention:   single patient room provided   rest/sleep promoted   environmental surveillance performed  Taken 5/4/2023 1200  Infection Prevention:   environmental surveillance performed   single patient room provided   rest/sleep promoted  Taken 5/4/2023 1000  Infection Prevention:   single patient room provided   rest/sleep promoted   environmental surveillance performed  Taken 5/4/2023 0800  Infection Prevention:   single patient room provided   rest/sleep promoted

## 2023-05-04 NOTE — CONSULTS
"  Consult Note     Patient Identification:  Name:  Azra Ohara  Age:  88 y.o.  Sex:  female  :  1935  MRN:  [id number]   Visit Number:  [id number]  Primary Care Physician:  Minal Salazar APRN     Subjective     Chief complaint:   Chief Complaint   Patient presents with   • Urinary Tract Infection       History of presenting illness:     Patient is a 88 y.o. female with past medical history significant for CKD stage 4. Presented to the [facility] Emergency Department for complaints of UTI. Wound care was consulted to assess bilateral gluteal wounds. Unable to obtain HPI a this time as patient is currently oriented to person. When asked how she developed the wounds she stated \"because we keep turning her.\" Also reported she has been applying \"the same ointment we use here at home\". Wounds appear to be dry stable eschar to bilateral gluteals. Denuded skin to periwound. She does have Purewick in place for urinary incontinence control. Noted to have heart rate in 30-40s (attending aware) all other vitals stable, afebrile.     ---------------------------------------------------------------------------------------------------------------------   Review of Systems:  Review of Systems   Unable to perform ROS: Mental status change      ---------------------------------------------------------------------------------------------------------------------   Past Medical History:   Diagnosis Date   • Chronic kidney disease, stage 4 (severe) 2023   • Essential hypertension    • History of colon cancer    • Left bundle branch block 2023   • Long term (current) use of aspirin 2023   • Polycythemia vera 2023     Past Surgical History:   Procedure Laterality Date   • COLON RESECTION     • PARATHYROIDECTOMY Right     Parathyroid adenoma     Family History   Problem Relation Age of Onset   • Heart disease Mother    • Heart disease Father      Social History     Socioeconomic History   • " Marital status: Single   Tobacco Use   • Smoking status: Never   Vaping Use   • Vaping Use: Never used   Substance and Sexual Activity   • Alcohol use: Never   • Drug use: Never     ---------------------------------------------------------------------------------------------------------------------   Allergies:  Lipitor [atorvastatin] and Penicillins  ---------------------------------------------------------------------------------------------------------------------   Medications below are reported home medications pulling from within the system; at this time, these medications have not been reconciled unless otherwise specified and are in the verification process for further verifcation as current home medications.    Prior to Admission Medications     Prescriptions Last Dose Informant Patient Reported? Taking?    aspirin 81 MG chewable tablet   No No    Chew and swallow 1 tablet Daily.    cetirizine (zyrTEC) 10 MG tablet   No No    Take 1 tablet by mouth Daily.    cyanocobalamin 1000 MCG/ML injection   Yes No    Inject 1,000 mcg into the appropriate muscle as directed by prescriber Every 30 (Thirty) Days.    Diclofenac Sodium (VOLTAREN) 1 % gel gel   Yes No    Apply 2 g topically to the appropriate area as directed 4 (Four) Times a Day As Needed.    metoprolol succinate XL (TOPROL-XL) 25 MG 24 hr tablet   No No    Take 1 tablet by mouth Daily.    nisoldipine (SULAR) 8.5 MG 24 hr tablet   No No    Take 2 tablets by mouth Daily.    Omega-3 Fatty Acids (fish oil) 1000 MG capsule capsule   Yes No    Take 1,000 mg by mouth 2 (Two) Times a Day With Meals.    ondansetron ODT (ZOFRAN-ODT) 4 MG disintegrating tablet   Yes No    Place 4 mg on the tongue 2 (Two) Times a Day As Needed for Nausea or Vomiting.    polyethylene glycol (MIRALAX) 17 g packet   Yes No    Take 17 g by mouth 2 (Two) Times a Day As Needed.         ---------------------------------------------------------------------------------------------------------------------    Objective     Hospital Scheduled Meds:  aspirin, 81 mg, Oral, Daily  atorvastatin, 40 mg, Oral, Nightly  ceFAZolin, 2 g, Intravenous, Q8H  heparin (porcine), 5,000 Units, Subcutaneous, Q12H  sodium chloride, 10 mL, Intravenous, Q12H  sodium chloride, 10 mL, Intravenous, Q12H           Current listed hospital scheduled medications may not yet reflect those currently placed in orders that are signed and held, awaiting patient's arrival to floor/unit.    ---------------------------------------------------------------------------------------------------------------------   Vital Signs:  Temp:  [98.1 °F (36.7 °C)-98.5 °F (36.9 °C)] 98.4 °F (36.9 °C)  Heart Rate:  [36-81] 46  Resp:  [18-32] 21  BP: (132-180)/() 160/54  Mean Arterial Pressure (Non-Invasive) for the past 24 hrs (Last 3 readings):   Noninvasive MAP (mmHg)   05/04/23 1000 105   05/04/23 0900 117   05/04/23 0800 123     SpO2 Percentage    05/04/23 0908 05/04/23 0920 05/04/23 1000   SpO2: 95% 99% 92%     SpO2:  [92 %-99 %] 92 %  on   ;   Device (Oxygen Therapy): room air    Body mass index is 29.09 kg/m².  Wt Readings from Last 3 Encounters:   05/04/23 79.2 kg (174 lb 9.7 oz)   01/19/23 71.9 kg (158 lb 8.2 oz)       ---------------------------------------------------------------------------------------------------------------------   Physical Exam  HENT:      Head: Normocephalic.      Nose: Nose normal.      Mouth/Throat:      Mouth: Mucous membranes are dry.   Cardiovascular:      Rate and Rhythm: Normal rate.      Pulses: Normal pulses.   Pulmonary:      Effort: Pulmonary effort is normal.   Abdominal:      General: Abdomen is flat.   Skin:     General: Skin is warm.      Capillary Refill: Capillary refill takes less than 2 seconds.   Neurological:      Mental Status: She is alert. She is disoriented.      Motor: Weakness present.        Wound Assessment:  Location: Right, gluteal  Dressing Appearance: dressingappearance: clean  Closure: NA  Changes since last exam: this is initial exam  Etiology and classification: Unstageable pressure injury   Wound bed structures/characteristics: full-thickness (subcutaneous tissue is exposed in at least a portion of the wound), black eschar, dry  Edges dry  Periwound characteristics: macerated, denuded  Periwound Temperature: normal turgor and temperature  Drainage characteristics: minimal, serous   Perfusion characteristics: NA  Wound Assessment:  Location: Left, gluteal  Dressing Appearance: dressingappearance: clean  Closure: NA  Changes since last exam: this is initial exam  Etiology and classification: Unstageable pressure injury  Wound bed structures/characteristics: black eschar, dry  Edges dry  Periwound characteristics: dry  Periwound Temperature: normal turgor and temperature  Drainage characteristics: minimal, serous   Perfusion characteristics: NA    Assessment & Plan      Unstageable pressure injury of right gluteal  -For now will apply honeygel to base and secure with silicone border dressing   -Area may require debridement if no improvements  -Recommend frequent turning Q2 hour while in bed and Q15 mintues while up in chair  -Recommend palmira protein diet 0.75g/kg/day along with vitamin C 2000mg/day, vitamin A 5000 Units/day, vitamin D3 5000 Units/day, zinc 50mg/day to help promote wound healing   -Pressure prevention protocol in place  -This condition is associated with a high risk for non-healing, infection, sepsis, loss of function, reduced quality of life, and increased risk of premature mortality. The patient is at high-risk for additional pressure injury, requiring a high level of vigilance and coordination of care, and frequent re-assessment to prevent adverse outcomes. Management of this condition is inherently complex, requiring ongoing optimization of many factors to assure the  highest likelihood of a favorable outcome, including pressure relief, bioburden, multiple aspects of nutrition, infection management, and moisture and mechanical factors relevant to wound healing.     Unstageable pressure injury of left gluteal  -For now will apply honeygel to base and secure with silicone border dressing   -Area may require debridement if no improvements  -Recommend frequent turning Q2 hour while in bed and Q15 mintues while up in chair  -Recommend palmira protein diet 0.75g/kg/day along with vitamin C 2000mg/day, vitamin A 5000 Units/day, vitamin D3 5000 Units/day, zinc 50mg/day to help promote wound healing   -Pressure prevention protocol in place    Peripheral vascular disease  -Currently being treated for cellulitis on Cefazolin per attending  -Venous doppler is pending     Recommend follow-up in outpatient wound clinic once stable for discharge    WASHINGTON Killian   [facility]  05/04/23  10:37 EDT

## 2023-05-04 NOTE — CONSULTS
Palliative Care Initial Consult     Attending Physician: Rebecca Rodrigues DO  Referring Provider: Rick Dejesus MD    Palliative care reason for consult: GOC/ACP  Code Status:   Code Status and Medical Interventions:   Ordered at: 05/04/23 0042     Level Of Support Discussed With:    Patient     Code Status (Patient has no pulse and is not breathing):    CPR (Attempt to Resuscitate)     Medical Interventions (Patient has pulse or is breathing):    Full Support      Advanced Directives: Advance Directive Status: Patient does not have advance directive   Healthcare surrogate: NOK sister Doreen Schmidt  Goals of Care: I discussed with Azra what her goals of care were for herself, while she was able to tell me her name, place, and time, however she was confused and I did not feel she understood, she remains a full code at this time, I will touch base with her family to help in decision making.    HPI:  Azra Ohara is a 88 y.o. female admitted with a urinary tract infection. She has a medical history of apical MI, heart failure with preserved EF, essential hypertension, chronic kidney disease stage IV, LBBB, polycythemia vera, and peripheral vascular disease. The patient was confused and was only able to tell the physician that she was fatigued and was having shortness of breath with activity, she also stated that she had chest pain. Per  EMS their was suspicion of a UTI and pt arrived disheveled and smelled like urine. In the ER pt was found to be in Sinus Bradycardia and EKG showed a new third degree heart block.Dr Nobles recommended admission and he would see pt in the am.      ROS: Negative except as above in HPI.     Past Medical History:   Diagnosis Date   • Chronic kidney disease, stage 4 (severe) 01/20/2023   • Essential hypertension    • History of colon cancer    • Left bundle branch block 01/20/2023   • Long term (current) use of aspirin 01/20/2023   • Polycythemia vera 01/20/2023     Past  Surgical History:   Procedure Laterality Date   • COLON RESECTION  2004   • PARATHYROIDECTOMY Right     Parathyroid adenoma     Social History     Socioeconomic History   • Marital status: Single   Tobacco Use   • Smoking status: Never   Vaping Use   • Vaping Use: Never used   Substance and Sexual Activity   • Alcohol use: Never   • Drug use: Never     Family History   Problem Relation Age of Onset   • Heart disease Mother    • Heart disease Father        Allergies   Allergen Reactions   • Lipitor [Atorvastatin] Myalgia     Patient stated she had tried several of the statins and couldn't tolerate any of them and the doctor told her to continue her fish oil   • Penicillins Hives       Current Facility-Administered Medications   Medication Dose Route Frequency Provider Last Rate Last Admin   • aspirin EC tablet 81 mg  81 mg Oral Daily Rick Dejesus MD   81 mg at 05/04/23 0917   • atorvastatin (LIPITOR) tablet 40 mg  40 mg Oral Nightly Rick Dejesus MD   40 mg at 05/04/23 0552   • ceFAZolin 2 gm IVPB in 100 mL NS (VTB)  2 g Intravenous Q8H Rick Dejesus MD   2 g at 05/04/23 1403   • heparin (porcine) 5000 UNIT/ML injection 5,000 Units  5,000 Units Subcutaneous Q12H Rick Dejesus MD   5,000 Units at 05/04/23 0917   • sodium chloride 0.9 % flush 10 mL  10 mL Intravenous PRN Rick Dejesus MD       • sodium chloride 0.9 % flush 10 mL  10 mL Intravenous Q12H Rick Dejesus MD   10 mL at 05/04/23 0918   • sodium chloride 0.9 % flush 10 mL  10 mL Intravenous PRN Rick Dejesus MD       • sodium chloride 0.9 % flush 10 mL  10 mL Intravenous Q12H Rick Dejesus MD   10 mL at 05/04/23 0918   • sodium chloride 0.9 % flush 10 mL  10 mL Intravenous PRN Rick Dejesus MD       • sodium chloride 0.9 % infusion 40 mL  40 mL Intravenous PRN Rick Dejesus MD       • sodium chloride 0.9 % infusion 40 mL  40 mL Intravenous PRN Rick Dejesus MD            •  [COMPLETED] Insert Peripheral  "IV **AND** sodium chloride  •  sodium chloride  •  sodium chloride  •  sodium chloride  •  sodium chloride    Current medication reviewed for route, type, dose and frequency and are current per MAR.    Palliative Performance Scale Score:     /54   Pulse (!) 46   Temp 98.4 °F (36.9 °C) (Axillary)   Resp 21   Ht 165 cm (64.96\")   Wt 79.2 kg (174 lb 9.7 oz)   SpO2 92%   BMI 29.09 kg/m²     Intake/Output Summary (Last 24 hours) at 5/4/2023 1433  Last data filed at 5/4/2023 0930  Gross per 24 hour   Intake 1000 ml   Output 850 ml   Net 150 ml       PE:  General Appearance:    Chronically ill appearing, alert, confused, and disheveled, NAD   HEENT:    NC/AT, without obvious abnormality, EOMI, anicteric    Neck:   supple, trachea midline, no JVD   Lungs:     Unlabored respirations,no wheezes rhonchi or rales present    Heart:    Bradycardia, normal S1 and S2, no Murmur noted   Abdomen:     Soft, tenderness, ND, NABS    Extremities:   Moves all extremities, 1+ BLE edema, BLE have erythema/psoriasis, warm to touch, BL big toe have nails several inches long curl around and cutting into 2nd toe(giovanna horn in appearance)   Pulses:   Pulses palpable and equal bilaterally   Skin:   Warm, dry   Neurologic:   Alert to person, questionable place, not time, confused   Psych:   Calm, confused       Labs:   Results from last 7 days   Lab Units 05/03/23 2117   WBC 10*3/mm3 10.94*   HEMOGLOBIN g/dL 14.3   HEMATOCRIT % 44.5   PLATELETS 10*3/mm3 251     Results from last 7 days   Lab Units 05/03/23 2117   SODIUM mmol/L 147*   POTASSIUM mmol/L 4.1   CHLORIDE mmol/L 109*   CO2 mmol/L 24.4   BUN mg/dL 47*   CREATININE mg/dL 1.14*   GLUCOSE mg/dL 112*   CALCIUM mg/dL 10.8*     Results from last 7 days   Lab Units 05/03/23 2117   SODIUM mmol/L 147*   POTASSIUM mmol/L 4.1   CHLORIDE mmol/L 109*   CO2 mmol/L 24.4   BUN mg/dL 47*   CREATININE mg/dL 1.14*   CALCIUM mg/dL 10.8*   BILIRUBIN mg/dL 0.7   ALK PHOS U/L 83   ALT (SGPT) U/L " 14   AST (SGOT) U/L 26   GLUCOSE mg/dL 112*     Imaging Results (Last 72 Hours)     Procedure Component Value Units Date/Time    XR Chest 1 View [[id number]] Collected: 05/03/23 2208     Updated: 05/03/23 2212    Narrative:      Procedure: Portable chest x-ray examination performed on 05/03/2023.  Single view. Semiupright position.     HISTORY: Chest pain.     FINDINGS:     Enlarged heart size.  Central pulmonary vascular congestion with mild interstitial edema.  No lobar consolidation, pleural effusion, or pneumothorax.  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  No fracture or foreign body.  No free air in the upper abdomen.       Impression:         1.  Enlarged heart size.  2.  Central pulmonary vascular congestion with trace interstitial edema.  3.  No pleural effusion or pneumothorax.  4.  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  5.  No free air in the upper abdomen.     This report was finalized on 5/3/2023 10:10 PM by Mumtaz Mg MD.             Diagnostics: Reviewed    A: Azra Ohara is a 88 y.o. female admitted with a urinary tract infection. She has a medical history of apical MI, heart failure with preserved EF, essential hypertension, chronic kidney disease stage IV, LBBB, polycythemia vera, and peripheral vascular disease. The patient was confused and was only able to tell the physician that she was fatigued and was having shortness of breath with activity, she also stated that she had chest pain. Per  EMS their was suspicion of a UTI and pt arrived disheveled and smelled like urine. In the ER pt was found to be in Sinus Bradycardia and EKG showed a new third degree heart block.Dr Nobles recommended admission and he would see pt in the am.           P:   I was able to speak with Azra this am regarding her  goals of care, she was alert to self and said she was in a hospital, she was disoriented to time and was making confusing statements. I did not feel that she understood my conversation regarding goals of care and did not have the ability to make this decision. I did speak with sister Doreen, as well as Nephews Gonzalez and Alexandru regarding goals of care for Azra. Doreen states that she would not want her sister to go through CPR and being put on a ventilator as she feels it would cause her harm and she did not want her sister to not have a quality life. I also spoke with Alexandru, Doreen's son at her request. After discussion with Alexandru, he agreed with his mother that Azra should be a DNR/DNI. They do want her to go ahead with pacemaker if cardiology recommends this. Alexandru also expressed concerned for his aunt Azra as he feel as do Gonzalez and Doreen that Azra is not able to take care of herself at home. Alexandru has asked how they could go about trying to get POA. I told him that it would be guardianship and that I would speak with Dr Rodrigues about seeing if Azra is competent or not. I spoke with Dr Rodrigues regarding family's concern and she is going to put a psych referral. I changed code status to DNR/DNI and let Kassidy INGRAM know. I will continue to follow and support pt and family.  -    We appreciate the consult and the opportunity to participate in Azra Ohara's care. We will continue to follow along. Please do not hesitate to contact us regarding further symptom management or goals of care needs, including after hours or on weekends via our on call provider at [phone].     Time: 75 minutes spent reviewing medical and medication records, assessing and examining patient, discussing with family, answering questions, providing some guidance about a plan and documentation of care, and coordinating care with other healthcare members, with > 50% time spent face to face.     Lala Bee, APRN    5/4/2023

## 2023-05-04 NOTE — ED PROVIDER NOTES
Subjective   History of Present Illness  Pt has strong urine odor and dysuria     History provided by:  Patient   used: No    Dysuria  Pain quality:  Aching  Pain severity:  Moderate  Onset quality:  Sudden  Duration:  1 day  Timing:  Constant  Progression:  Worsening      Review of Systems   Genitourinary: Positive for dysuria.       Past Medical History:   Diagnosis Date   • Chronic kidney disease, stage 4 (severe) 01/20/2023   • Essential hypertension    • History of colon cancer    • Left bundle branch block 01/20/2023   • Long term (current) use of aspirin 01/20/2023   • Polycythemia vera 01/20/2023       Allergies   Allergen Reactions   • Lipitor [Atorvastatin] Myalgia     Patient stated she had tried several of the statins and couldn't tolerate any of them and the doctor told her to continue her fish oil   • Penicillins Hives       Past Surgical History:   Procedure Laterality Date   • CARDIAC ELECTROPHYSIOLOGY PROCEDURE N/A 5/5/2023    Procedure: Pacemaker DC new;  Surgeon: Sampson Garvin MD;  Location: [facility] INVASIVE LOCATION;  Service: Cardiology;  Laterality: N/A;   • COLON RESECTION  2004   • PARATHYROIDECTOMY Right     Parathyroid adenoma       Family History   Problem Relation Age of Onset   • Heart disease Mother    • Heart disease Father        Social History     Socioeconomic History   • Marital status: Single   Tobacco Use   • Smoking status: Never   Vaping Use   • Vaping Use: Never used   Substance and Sexual Activity   • Alcohol use: Never   • Drug use: Never           Objective   Physical Exam  Vitals and nursing note reviewed.   Constitutional:       Appearance: She is well-developed.   HENT:      Head: Normocephalic.   Cardiovascular:      Rate and Rhythm: Normal rate and regular rhythm.   Pulmonary:      Effort: Pulmonary effort is normal.      Breath sounds: Normal breath sounds.   Abdominal:      General: Bowel sounds are normal.      Palpations: Abdomen is soft.       Tenderness: There is no abdominal tenderness.   Musculoskeletal:         General: Normal range of motion.      Cervical back: Neck supple.   Skin:     General: Skin is warm and dry.   Neurological:      Mental Status: She is alert and oriented to person, place, and time.   Psychiatric:         Behavior: Behavior normal.         Thought Content: Thought content normal.         Judgment: Judgment normal.         Procedures       Results for orders placed or performed during the hospital encounter of 05/03/23   COVID-19 and FLU A/B PCR - Swab, Nasopharynx    Specimen: Nasopharynx; Swab   Result Value Ref Range    COVID19 Not Detected Not Detected - Ref. Range    Influenza A PCR Not Detected Not Detected    Influenza B PCR Not Detected Not Detected   Blood Culture - Blood, Hand, Left    Specimen: Hand, Left; Blood   Result Value Ref Range    Blood Culture No growth at 3 days    Blood Culture - Blood, Hand, Right    Specimen: Hand, Right; Blood   Result Value Ref Range    Blood Culture No growth at 3 days    Comprehensive Metabolic Panel    Specimen: Arm, Left; Blood   Result Value Ref Range    Glucose 112 (H) 65 - 99 mg/dL    BUN 47 (H) 8 - 23 mg/dL    Creatinine 1.14 (H) 0.57 - 1.00 mg/dL    Sodium 147 (H) 136 - 145 mmol/L    Potassium 4.1 3.5 - 5.2 mmol/L    Chloride 109 (H) 98 - 107 mmol/L    CO2 24.4 22.0 - 29.0 mmol/L    Calcium 10.8 (H) 8.6 - 10.5 mg/dL    Total Protein 7.8 6.0 - 8.5 g/dL    Albumin 4.0 3.5 - 5.2 g/dL    ALT (SGPT) 14 1 - 33 U/L    AST (SGOT) 26 1 - 32 U/L    Alkaline Phosphatase 83 39 - 117 U/L    Total Bilirubin 0.7 0.0 - 1.2 mg/dL    Globulin 3.8 gm/dL    A/G Ratio 1.1 g/dL    BUN/Creatinine Ratio 41.2 (H) 7.0 - 25.0    Anion Gap 13.6 5.0 - 15.0 mmol/L    eGFR 46.4 (L) >60.0 mL/min/1.73   Urinalysis With Culture If Indicated - Urine, Clean Catch    Specimen: Urine, Clean Catch   Result Value Ref Range    Color, UA Yellow Yellow, Straw    Appearance, UA Clear Clear    pH, UA 6.0 5.0 - 8.0     Specific Gravity, UA 1.017 1.005 - 1.030    Glucose, UA Negative Negative    Ketones, UA Trace (A) Negative    Bilirubin, UA Negative Negative    Blood, UA Negative Negative    Protein, UA 30 mg/dL (1+) (A) Negative    Leuk Esterase, UA Negative Negative    Nitrite, UA Negative Negative    Urobilinogen, UA 0.2 E.U./dL 0.2 - 1.0 E.U./dL   CBC Auto Differential    Specimen: Arm, Left; Blood   Result Value Ref Range    WBC 10.94 (H) 3.40 - 10.80 10*3/mm3    RBC 4.68 3.77 - 5.28 10*6/mm3    Hemoglobin 14.3 12.0 - 15.9 g/dL    Hematocrit 44.5 34.0 - 46.6 %    MCV 95.1 79.0 - 97.0 fL    MCH 30.6 26.6 - 33.0 pg    MCHC 32.1 31.5 - 35.7 g/dL    RDW 14.6 12.3 - 15.4 %    RDW-SD 51.0 37.0 - 54.0 fl    MPV 11.8 6.0 - 12.0 fL    Platelets 251 140 - 450 10*3/mm3    Neutrophil % 82.9 (H) 42.7 - 76.0 %    Lymphocyte % 10.3 (L) 19.6 - 45.3 %    Monocyte % 6.0 5.0 - 12.0 %    Eosinophil % 0.0 (L) 0.3 - 6.2 %    Basophil % 0.3 0.0 - 1.5 %    Immature Grans % 0.5 0.0 - 0.5 %    Neutrophils, Absolute 9.07 (H) 1.70 - 7.00 10*3/mm3    Lymphocytes, Absolute 1.13 0.70 - 3.10 10*3/mm3    Monocytes, Absolute 0.66 0.10 - 0.90 10*3/mm3    Eosinophils, Absolute 0.00 0.00 - 0.40 10*3/mm3    Basophils, Absolute 0.03 0.00 - 0.20 10*3/mm3    Immature Grans, Absolute 0.05 0.00 - 0.05 10*3/mm3    nRBC 0.0 0.0 - 0.2 /100 WBC   BNP    Specimen: Arm, Left; Blood   Result Value Ref Range    proBNP 6,565.0 (H) 0.0 - 1,800.0 pg/mL   High Sensitivity Troponin T    Specimen: Arm, Left; Blood   Result Value Ref Range    HS Troponin T 50 (H) <10 ng/L   Lactic Acid, Plasma    Specimen: Arm, Left; Blood   Result Value Ref Range    Lactate 1.8 0.5 - 2.0 mmol/L   C-reactive Protein    Specimen: Arm, Left; Blood   Result Value Ref Range    C-Reactive Protein 3.26 (H) 0.00 - 0.50 mg/dL   Magnesium    Specimen: Arm, Left; Blood   Result Value Ref Range    Magnesium 2.0 1.6 - 2.4 mg/dL   High Sensitivity Troponin T 2Hr    Specimen: Arm, Right; Blood   Result Value  Ref Range    HS Troponin T 44 (H) <10 ng/L    Troponin T Delta -6 (L) >=-4 - <+4 ng/L   Urinalysis, Microscopic Only - Urine, Clean Catch    Specimen: Urine, Clean Catch   Result Value Ref Range    RBC, UA 0-2 None Seen, 0-2 /HPF    WBC, UA 3-5 (A) None Seen, 0-2 /HPF    Bacteria, UA None Seen None Seen /HPF    Squamous Epithelial Cells, UA 0-2 None Seen, 0-2 /HPF    Hyaline Casts, UA 3-6 None Seen /LPF    Methodology Automated Microscopy    TSH    Specimen: Arm, Right; Blood   Result Value Ref Range    TSH 0.998 0.270 - 4.200 uIU/mL   T4, Free    Specimen: Arm, Right; Blood   Result Value Ref Range    Free T4 1.95 (H) 0.93 - 1.70 ng/dL   Comprehensive Metabolic Panel    Specimen: Blood   Result Value Ref Range    Glucose 110 (H) 65 - 99 mg/dL    BUN 42 (H) 8 - 23 mg/dL    Creatinine 1.30 (H) 0.57 - 1.00 mg/dL    Sodium 147 (H) 136 - 145 mmol/L    Potassium 3.2 (L) 3.5 - 5.2 mmol/L    Chloride 112 (H) 98 - 107 mmol/L    CO2 21.1 (L) 22.0 - 29.0 mmol/L    Calcium 9.2 8.6 - 10.5 mg/dL    Total Protein 6.3 6.0 - 8.5 g/dL    Albumin 3.0 (L) 3.5 - 5.2 g/dL    ALT (SGPT) 10 1 - 33 U/L    AST (SGOT) 21 1 - 32 U/L    Alkaline Phosphatase 68 39 - 117 U/L    Total Bilirubin 0.6 0.0 - 1.2 mg/dL    Globulin 3.3 gm/dL    A/G Ratio 0.9 g/dL    BUN/Creatinine Ratio 32.3 (H) 7.0 - 25.0    Anion Gap 13.9 5.0 - 15.0 mmol/L    eGFR 39.6 (L) >60.0 mL/min/1.73   CBC (No Diff)    Specimen: Blood   Result Value Ref Range    WBC 10.77 3.40 - 10.80 10*3/mm3    RBC 4.05 3.77 - 5.28 10*6/mm3    Hemoglobin 12.4 12.0 - 15.9 g/dL    Hematocrit 39.9 34.0 - 46.6 %    MCV 98.5 (H) 79.0 - 97.0 fL    MCH 30.6 26.6 - 33.0 pg    MCHC 31.1 (L) 31.5 - 35.7 g/dL    RDW 14.6 12.3 - 15.4 %    RDW-SD 53.3 37.0 - 54.0 fl    MPV 12.1 (H) 6.0 - 12.0 fL    Platelets 188 140 - 450 10*3/mm3   Magnesium    Specimen: Blood   Result Value Ref Range    Magnesium 1.8 1.6 - 2.4 mg/dL   Lactic Acid, Plasma    Specimen: Blood   Result Value Ref Range    Lactate 1.0 0.5 -  2.0 mmol/L   Potassium    Specimen: Blood   Result Value Ref Range    Potassium 3.3 (L) 3.5 - 5.2 mmol/L   Magnesium    Specimen: Blood   Result Value Ref Range    Magnesium 2.0 1.6 - 2.4 mg/dL   Basic Metabolic Panel    Specimen: Blood   Result Value Ref Range    Glucose 91 65 - 99 mg/dL    BUN 34 (H) 8 - 23 mg/dL    Creatinine 0.91 0.57 - 1.00 mg/dL    Sodium 144 136 - 145 mmol/L    Potassium 3.9 3.5 - 5.2 mmol/L    Chloride 113 (H) 98 - 107 mmol/L    CO2 20.6 (L) 22.0 - 29.0 mmol/L    Calcium 8.7 8.6 - 10.5 mg/dL    BUN/Creatinine Ratio 37.4 (H) 7.0 - 25.0    Anion Gap 10.4 5.0 - 15.0 mmol/L    eGFR 60.8 >60.0 mL/min/1.73   CBC Auto Differential    Specimen: Blood   Result Value Ref Range    WBC 8.56 3.40 - 10.80 10*3/mm3    RBC 3.78 3.77 - 5.28 10*6/mm3    Hemoglobin 11.5 (L) 12.0 - 15.9 g/dL    Hematocrit 36.2 34.0 - 46.6 %    MCV 95.8 79.0 - 97.0 fL    MCH 30.4 26.6 - 33.0 pg    MCHC 31.8 31.5 - 35.7 g/dL    RDW 14.4 12.3 - 15.4 %    RDW-SD 50.5 37.0 - 54.0 fl    MPV 12.6 (H) 6.0 - 12.0 fL    Platelets 173 140 - 450 10*3/mm3    Neutrophil % 77.9 (H) 42.7 - 76.0 %    Lymphocyte % 13.6 (L) 19.6 - 45.3 %    Monocyte % 6.8 5.0 - 12.0 %    Eosinophil % 0.9 0.3 - 6.2 %    Basophil % 0.4 0.0 - 1.5 %    Immature Grans % 0.4 0.0 - 0.5 %    Neutrophils, Absolute 6.68 1.70 - 7.00 10*3/mm3    Lymphocytes, Absolute 1.16 0.70 - 3.10 10*3/mm3    Monocytes, Absolute 0.58 0.10 - 0.90 10*3/mm3    Eosinophils, Absolute 0.08 0.00 - 0.40 10*3/mm3    Basophils, Absolute 0.03 0.00 - 0.20 10*3/mm3    Immature Grans, Absolute 0.03 0.00 - 0.05 10*3/mm3    nRBC 0.0 0.0 - 0.2 /100 WBC   Potassium    Specimen: Blood   Result Value Ref Range    Potassium 4.7 3.5 - 5.2 mmol/L   Basic Metabolic Panel    Specimen: Blood   Result Value Ref Range    Glucose 99 65 - 99 mg/dL    BUN 32 (H) 8 - 23 mg/dL    Creatinine 0.86 0.57 - 1.00 mg/dL    Sodium 142 136 - 145 mmol/L    Potassium 4.1 3.5 - 5.2 mmol/L    Chloride 112 (H) 98 - 107 mmol/L    CO2  21.2 (L) 22.0 - 29.0 mmol/L    Calcium 9.0 8.6 - 10.5 mg/dL    BUN/Creatinine Ratio 37.2 (H) 7.0 - 25.0    Anion Gap 8.8 5.0 - 15.0 mmol/L    eGFR 65.1 >60.0 mL/min/1.73   CBC (No Diff)    Specimen: Blood   Result Value Ref Range    WBC 8.32 3.40 - 10.80 10*3/mm3    RBC 3.73 (L) 3.77 - 5.28 10*6/mm3    Hemoglobin 11.1 (L) 12.0 - 15.9 g/dL    Hematocrit 35.8 34.0 - 46.6 %    MCV 96.0 79.0 - 97.0 fL    MCH 29.8 26.6 - 33.0 pg    MCHC 31.0 (L) 31.5 - 35.7 g/dL    RDW 14.2 12.3 - 15.4 %    RDW-SD 50.0 37.0 - 54.0 fl    MPV 12.5 (H) 6.0 - 12.0 fL    Platelets 168 140 - 450 10*3/mm3   Magnesium    Specimen: Blood   Result Value Ref Range    Magnesium 2.0 1.6 - 2.4 mg/dL   Basic Metabolic Panel    Specimen: Blood   Result Value Ref Range    Glucose 104 (H) 65 - 99 mg/dL    BUN 25 (H) 8 - 23 mg/dL    Creatinine 0.83 0.57 - 1.00 mg/dL    Sodium 139 136 - 145 mmol/L    Potassium 3.8 3.5 - 5.2 mmol/L    Chloride 109 (H) 98 - 107 mmol/L    CO2 22.0 22.0 - 29.0 mmol/L    Calcium 8.7 8.6 - 10.5 mg/dL    BUN/Creatinine Ratio 30.1 (H) 7.0 - 25.0    Anion Gap 8.0 5.0 - 15.0 mmol/L    eGFR 67.9 >60.0 mL/min/1.73   CBC (No Diff)    Specimen: Blood   Result Value Ref Range    WBC 8.32 3.40 - 10.80 10*3/mm3    RBC 3.99 3.77 - 5.28 10*6/mm3    Hemoglobin 11.9 (L) 12.0 - 15.9 g/dL    Hematocrit 38.2 34.0 - 46.6 %    MCV 95.7 79.0 - 97.0 fL    MCH 29.8 26.6 - 33.0 pg    MCHC 31.2 (L) 31.5 - 35.7 g/dL    RDW 14.0 12.3 - 15.4 %    RDW-SD 49.9 37.0 - 54.0 fl    MPV 12.2 (H) 6.0 - 12.0 fL    Platelets 174 140 - 450 10*3/mm3   POC Glucose Once    Specimen: Blood   Result Value Ref Range    Glucose 74 70 - 130 mg/dL   Rhythm ECG 1 - 3 Lead   Result Value Ref Range    QT Interval 628 ms    QTC Interval 518 ms   ECG 12 Lead Other; Rhythm monitoring   Result Value Ref Range    QT Interval 616 ms    QTC Interval 476 ms   ECG 12 Lead   Result Value Ref Range    QT Interval 454 ms    QTC Interval 506 ms   ECG 12 Lead Other; Post pacemaker implantation    Result Value Ref Range    QT Interval 462 ms    QTC Interval 595 ms   Adult Transthoracic Echo Complete W/ Cont if Necessary Per Protocol   Result Value Ref Range    Target HR (85%) 112 bpm    Max. Pred. HR (100%) 132 bpm    LVIDd 4.8 cm    LVIDs 2.6 cm    IVSd 1.10 cm    LVPWd 1.10 cm    FS 45.8 %    IVS/LVPW 1.00 cm    ESV(cubed) 17.6 ml    LV Sys Vol (BSA corrected) 11.3 cm2    EDV(cubed) 110.6 ml    LV Chavez Vol (BSA corrected) 31.1 cm2    LV mass(C)d 194.0 grams    EDV(MOD-sp4) 57.4 ml    ESV(MOD-sp4) 20.9 ml    SV(MOD-sp4) 36.5 ml    SI(MOD-sp4) 19.8 ml/m2    EF(MOD-sp4) 63.6 %    MV E max elgin 104.0 cm/sec    MV A max elgin 118.0 cm/sec    MV E/A 0.88     LA ESV Index (BP) 30.8 ml/m2    Med Peak E' Elgin 6.5 cm/sec    Lat Peak E' [place]gin 10.6 cm/sec    Avg E/e' ratio 12.16     TAPSE (>1.6) 1.81 cm    Ao pk elgin 228.0 cm/sec    Ao max PG 20.8 mmHg    Ao mean PG 10.0 mmHg    Ao V2 VTI 54.6 cm    MR max elgin 461.0 cm/sec    MR max PG 85.1 mmHg    MR mean elgin 373.5 cm/sec    MR mean PG 60.5 mmHg    MR .0 cm    TR max elgin 382.0 cm/sec    TR max PG 58.4 mmHg    RVSP(TR) 68.4 mmHg    RAP systole 10.0 mmHg    Ao root diam 2.9 cm    ACS 1.70 cm         ED Course  ED Course as of 05/08/23 2142   Wed May 03, 2023   2126 EKG at 2105 shows third-degree AV block with junctional escape rhythm.  QRS duration 142, QTc 518 ms.  No evidence for STEMI.  I sent this to cardiology, Dr. Finley.  He advises to admit to hospitalist, hold metoprolol.  Patient doing well, awake and alert, in no apparent distress.  Currently her blood pressure is 176/57, respiratory rate 14, pulse ox 99%.  Electronically signed by Amador Escoto MD, 05/03/23, 9:27 PM EDT.   [CM]   u May 04, 2023   0021 XR Chest 1 View  IMPRESSION:     1.  Enlarged heart size.  2.  Central pulmonary vascular congestion with trace interstitial edema.  3.  No pleural effusion or pneumothorax.  4.  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  5.  No free air in the upper abdomen. [MB]   0021 Case d/w Dr. Dejesus, will plan to admit to her service. [MB]      ED Course User Index  [CM] Amador Escoto MD  [MB] Ute Velarde, [place]                                           [facility]    Final diagnoses:   Third degree AV block       ED Disposition  ED Disposition     ED Disposition   Decision to Admit    Condition   --    Comment   Level of Care: Critical Care [6]   Diagnosis: Third degree atrioventricular block [037001]   Certification: I Certify That Inpatient Hospital Services Are Medically Necessary For Greater Than 2 Midnights               No follow-up provider specified.       Medication List      No changes were made to your prescriptions during this visit.          Jacquelyn Lewis PA  05/08/23 [id number]

## 2023-05-04 NOTE — CONSULTS
[facility]  CONSULT  NOTE      Patient information:  Date of Admit: 5/3/2023  Date of Consult: 05/04/23  Hospitalist:Rick Dejesus MD  PCP: Minal Salazar APRN  MRN:  [id number]  Visit Number:  [id number]    LOS: 0    PROBLEM LIST: Principal Problem:    Third degree atrioventricular block      Assessment    1. 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 Essential hypertension  4. Stage IV CKD  5. History of colon cancer status post resection in 2004  6. Urinary tract infection  7. PAD          Recommendations   1.  We will discontinue beta-blockers for now patient may require permanent pacing  2.  Hold her for n.p.o. after midnight  3.  Blood pressure is mildly elevated will monitor for now  4.  No clinical CHF continue current management          Reason for Cardiology consultation: Third degree heart block     Subjective Data   ADMISSION INFORMATION:  Chief Complaint   Patient presents with   • Urinary Tract Infection     History of Present Illness    Azra Ohara is a 88 y.o. female with a past medical history significant for HFpEF, CKD stage IV, essential hypertension, LBBB, PAD, history of apical MI, history of malignant neoplasm of large intestine, and polycythemia.  Patient presented to [facility] ([facility]) emergency room (ER) on 5/3/2023 with complaints of dysuria.  While in the ER, proBNP was 6565. HS troponin was 50 with repeat being 44.  EKG revealed third-degree AV block with junctional escape rhythm no evidence of STEMI was noted.  Patient was admitted and  Cardiology has been consulted for further evaluation and management.   Patient is in room CCU bed 10 and was examined by Dr. Padilla.  Patient is lying in bed resting quietly.  No acute distress noted at this time.  Telemetry reveals sinus bradycardic 30's  with with third-degree block noted.  Patient denies any chest pain, shortness of breath, or palpitations at this time.       Cardiac risk factors:arteriosclerotic heart disease, hypercholesterolemia, hypertension and peripheral vascular disease      Last Echo: Results for orders placed during the hospital encounter of 01/15/23    Adult Transthoracic Echo Complete W/ Cont if Necessary Per Protocol    Interpretation Summary  •  Normal left ventricular cavity size and wall thickness noted  •  Left ventricular ejection fraction appears to be 56 - 60%.  •  The following left ventricular wall segments are hypokinetic: apical anterior, apical lateral, apical inferior, apical septal and apex hypokinetic.  •  Left ventricular diastolic function is consistent with (grade I) impaired relaxation.  •  There is mild calcification of the aortic valve. There is mild thickening of the non-coronary, left coronary and right coronary cusp(s) of the aortic valve. The aortic valve appears trileaflet.  •  There is mild calcification of the mitral valve posterior leaflet(s). Trace mitral valve regurgitation is present. No significant mitral valve stenosis is present.  •  Trace tricuspid valve regurgitation is present. Estimated right ventricular systolic pressure from tricuspid regurgitation is mildly elevated (35-45 mmHg).  •  There is no evidence of pericardial effusion.         Last Stress: Results for orders placed during the hospital encounter of 01/15/23    Stress Test With Myocardial Perfusion One Day    Interpretation Summary  Images from the original result were not included.    •  A pharmacological stress test was performed using regadenoson without low-level exercise.  •  Findings consistent with an indeterminate ECG stress test.  •  Myocardial perfusion imaging indicates a small-sized infarct located in the apex with no significant ischemia noted.  •  Gated SPECT scanning was of suboptimal quality and hence could not comment  upon the LV wall motion and systolic function accurately.  •  Impressions are consistent with an intermediate risk study.        Last Cath: No results found for this or any previous visit.       Past Medical History:   Diagnosis Date   • Chronic kidney disease, stage 4 (severe) 01/20/2023   • Essential hypertension    • History of colon cancer    • Left bundle branch block 01/20/2023   • Long term (current) use of aspirin 01/20/2023   • Polycythemia vera 01/20/2023     Past Surgical History:   Procedure Laterality Date   • COLON RESECTION  2004   • PARATHYROIDECTOMY Right     Parathyroid adenoma     Family History   Problem Relation Age of Onset   • Heart disease Mother    • Heart disease Father      Social History     Tobacco Use   • Smoking status: Never   Vaping Use   • Vaping Use: Never used   Substance Use Topics   • Alcohol use: Never   • Drug use: Never     Medications Prior to Admission   Medication Sig Dispense Refill Last Dose   • aspirin 81 MG chewable tablet Chew and swallow 1 tablet Daily. 30 tablet 0    • cetirizine (zyrTEC) 10 MG tablet Take 1 tablet by mouth Daily.      • cyanocobalamin 1000 MCG/ML injection Inject 1,000 mcg into the appropriate muscle as directed by prescriber Every 30 (Thirty) Days.      • Diclofenac Sodium (VOLTAREN) 1 % gel gel Apply 2 g topically to the appropriate area as directed 4 (Four) Times a Day As Needed.      • metoprolol succinate XL (TOPROL-XL) 25 MG 24 hr tablet Take 1 tablet by mouth Daily. 30 tablet 0    • nisoldipine (SULAR) 8.5 MG 24 hr tablet Take 2 tablets by mouth Daily.      • Omega-3 Fatty Acids (fish oil) 1000 MG capsule capsule Take 1,000 mg by mouth 2 (Two) Times a Day With Meals.      • ondansetron ODT (ZOFRAN-ODT) 4 MG disintegrating tablet Place 4 mg on the tongue 2 (Two) Times a Day As Needed for Nausea or Vomiting.      • polyethylene glycol (MIRALAX) 17 g packet Take 17 g by mouth 2 (Two) Times a Day As Needed.        Allergies:  Lipitor  [atorvastatin] and Penicillins    Review of Systems   Constitutional: Negative for activity change, appetite change and diaphoresis.   HENT: Negative for facial swelling and trouble swallowing.    Eyes: Negative for visual disturbance.   Respiratory: Negative for shortness of breath.    Cardiovascular: Negative for chest pain, palpitations and leg swelling.   Gastrointestinal: Negative for blood in stool, nausea and vomiting.   Endocrine: Negative.    Genitourinary: Positive for dysuria. Negative for hematuria.   Musculoskeletal: Negative for gait problem.   Skin: Negative for color change.   Neurological: Negative for dizziness, syncope, speech difficulty, weakness, light-headedness and headaches.   Psychiatric/Behavioral: Negative for agitation and behavioral problems.       Objective Data      Vital Signs  Temp:  [98.1 °F (36.7 °C)-98.5 °F (36.9 °C)] 98.4 °F (36.9 °C)  Heart Rate:  [36-81] 46  Resp:  [18-32] 21  BP: (132-180)/() 160/54  Vital Signs (last 72 hrs)       05/01 0700  05/02 0659 05/02 0700  05/03 0659 05/03 0700  05/04 0659 05/04 0700  05/04 1058   Most Recent      Temp (°F)     98.1 -  98.5      98.4     98.4 (36.9) 05/04 0900    Heart Rate     37 -  81    36 -  46     46 05/04 1000    Resp     18 -  20    21 -  32     21 05/04 0900    BP     132/94 -  180/158    149/58 -  167/63     160/54 05/04 1000    SpO2 (%)     94 -  99    92 -  99     92 05/04 1000        Body mass index is 29.09 kg/m².    Intake/Output Summary (Last 24 hours) at 5/4/2023 1058  Last data filed at 5/4/2023 0930  Gross per 24 hour   Intake 1000 ml   Output 850 ml   Net 150 ml         Physical Exam  Constitutional:       Appearance: Normal appearance.   HENT:      Head: Normocephalic and atraumatic.      Nose: Nose normal.      Mouth/Throat:      Mouth: Mucous membranes are moist.      Pharynx: Oropharynx is clear.   Eyes:      Conjunctiva/sclera: Conjunctivae normal.   Cardiovascular:      Rate and Rhythm: Bradycardia  present.      Heart sounds: Murmur heard.      Comments: Grade 2/6 murmur noted heard best aortic area.   Abdominal:      General: Bowel sounds are normal.      Palpations: Abdomen is soft.   Musculoskeletal:         General: Normal range of motion.      Cervical back: Normal range of motion.   Skin:     General: Skin is warm and dry.   Neurological:      General: No focal deficit present.      Mental Status: She is alert and oriented to person, place, and time.   Psychiatric:         Mood and Affect: Mood normal.         Behavior: Behavior normal.         Results review     Results Review:    I have reviewed the patient's new clinical results.  Results from last 7 days   Lab Units 05/03/23  2313 05/03/23 2117   HSTROP T ng/L 44* 50*     Results from last 7 days   Lab Units 05/03/23 2117   WBC 10*3/mm3 10.94*   HEMOGLOBIN g/dL 14.3   PLATELETS 10*3/mm3 251     Results from last 7 days   Lab Units 05/03/23 2117   SODIUM mmol/L 147*   POTASSIUM mmol/L 4.1   CHLORIDE mmol/L 109*   CO2 mmol/L 24.4   BUN mg/dL 47*   CREATININE mg/dL 1.14*   CALCIUM mg/dL 10.8*   GLUCOSE mg/dL 112*   ALT (SGPT) U/L 14   AST (SGOT) U/L 26     No results found for: INR      Lab Results   Component Value Date    MG 2.0 05/03/2023    MG 2.0 01/17/2023    MG 2.0 01/16/2023     Lab Results   Component Value Date    TSH 0.998 05/03/2023    TRIG 98 01/17/2023    HDL 35 (L) 01/17/2023    LDL 24 01/17/2023      Lab Results   Component Value Date    PROBNP 6,565.0 (H) 05/03/2023     No results found.  No results found for: URICACID  Pain Management Panel         Latest Ref Rng & Units 1/16/2023   Pain Management Panel   Creatinine, Urine mg/dL 72.7     Amphetamine, Urine Qual Negative Negative     Barbiturates Screen, Urine Negative Negative     Benzodiazepine Screen, Urine Negative Negative     Buprenorphine, Screen, Urine Negative Negative     Cocaine Screen, Urine Negative Negative     Methadone Screen , Urine Negative Negative      Methamphetamine, Ur Negative Negative                Microbiology Results (last 10 days)     Procedure Component Value - Date/Time    COVID PRE-OP / PRE-PROCEDURE SCREENING ORDER (NO ISOLATION) - Swab, Nasopharynx [[id number]]  (Normal) Collected: 23    Lab Status: Final result Specimen: Swab from Nasopharynx Updated: 23    Narrative:      The following orders were created for panel order COVID PRE-OP / PRE-PROCEDURE SCREENING ORDER (NO ISOLATION) - Swab, Nasopharynx.  Procedure                               Abnormality         Status                     ---------                               -----------         ------                     COVID-19 and FLU A/B PCR...[[id number]]  Normal              Final result                 Please view results for these tests on the individual orders.    COVID-19 and FLU A/B PCR - Swab, Nasopharynx [[id number]]  (Normal) Collected: 23    Lab Status: Final result Specimen: Swab from Nasopharynx Updated: 23     COVID19 Not Detected     Influenza A PCR Not Detected     Influenza B PCR Not Detected    Narrative:      Fact sheet for providers: https://www.fda.gov/media/562920/download    Fact sheet for patients: https://www.fda.gov/media/737552/download    Test performed by PCR.         EC2023          ECG/EMG Results (last 24 hours)     Procedure Component Value Units Date/Time    Rhythm ECG 1 - 3 Lead [[id number]] Collected: 23     Updated: 23     QT Interval 628 ms      QTC Interval 518 ms     Narrative:      Test Reason : hr  Blood Pressure :   */*   mmHG  Vent. Rate :  41 BPM     Atrial Rate :  44 BPM     P-R Int :   * ms          QRS Dur : 142 ms      QT Int : 628 ms       P-R-T Axes :  70 -64  75 degrees     QTc Int : 518 ms    ** Critical Test Result: AV Block  Marked sinus bradycardia with AV dissociation and Wide QRS rhythm  Right bundle branch block  Left anterior fascicular block  ** Bifascicular  block **  Moderate voltage criteria for LVH, may be normal variant  Abnormal ECG  When compared with ECG of 18-JAN-2023 12:57, (Unconfirmed)  Wide QRS rhythm has replaced Sinus rhythm  Vent. rate has decreased BY  76 BPM    Referred By: AURELIA           Confirmed By:     SCANNED - TELEMETRY   [[id number]] Resulted: 05/03/23     Updated: 05/04/23 1015          TELEMETRY:  SB 30's with  3rd degree AV block noted              RADIOLOGY STUDIES:  Imaging Results (Last 72 Hours)     Procedure Component Value Units Date/Time    XR Chest 1 View [[id number]] Collected: 05/03/23 2208     Updated: 05/03/23 2212    Narrative:      Procedure: Portable chest x-ray examination performed on 05/03/2023.  Single view. Semiupright position.     HISTORY: Chest pain.     FINDINGS:     Enlarged heart size.  Central pulmonary vascular congestion with mild interstitial edema.  No lobar consolidation, pleural effusion, or pneumothorax.  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  No fracture or foreign body.  No free air in the upper abdomen.       Impression:         1.  Enlarged heart size.  2.  Central pulmonary vascular congestion with trace interstitial edema.  3.  No pleural effusion or pneumothorax.  4.  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  5.  No free air in the upper abdomen.     This report was finalized on 5/3/2023 10:10 PM by Mumtaz Mg MD.           CURRENT MEDICATIONS:  Current list of medications may not reflect those currently placed in orders that are not signed or are being held.     aspirin, 81 mg, Oral, Daily  atorvastatin, 40 mg, Oral, Nightly  ceFAZolin, 2 g, Intravenous, Q8H  heparin (porcine), 5,000 Units, Subcutaneous, Q12H  sodium chloride, 10 mL, Intravenous, Q12H  sodium chloride, 10 mL, Intravenous, Q12H         Thank you  very much for asking us to be involved in this patient's care.  We will follow along with you.    I have discussed the patients findings and my recommendations with patient.      WASHINGTON Mcqueen (Robin)   [facility] Card[facility] 10:58 EDT  5/4/2023    Electronically signed by WASHINGTON Henderson, 05/04/23, 2:14 PM EDT.  Electronically signed by Balbina Padilla MD, 05/04/23, 2:36 PM EDT.                    Please note that portions of this note were copied and has been reviewed and is accurate as of 5/4/2023 .      Please note that portions of this note were completed with a voice recognition program.

## 2023-05-04 NOTE — PLAN OF CARE
Problem: Fall Injury Risk  Goal: Absence of Fall and Fall-Related Injury  Outcome: Ongoing, Progressing  Intervention: Identify and Manage Contributors  Recent Flowsheet Documentation  Taken 5/4/2023 0600 by Alyce Suarez RN  Medication Review/Management: medications reviewed  Taken 5/4/2023 0500 by Alyce Suarez RN  Medication Review/Management: medications reviewed  Taken 5/4/2023 0400 by Alyce Suarez RN  Medication Review/Management: medications reviewed  Taken 5/4/2023 0300 by Alyce Suarez RN  Medication Review/Management: medications reviewed  Intervention: Promote Injury-Free Environment  Recent Flowsheet Documentation  Taken 5/4/2023 0600 by Alyce Suarez RN  Safety Promotion/Fall Prevention: safety round/check completed  Taken 5/4/2023 0500 by Alyce Suarez RN  Safety Promotion/Fall Prevention: safety round/check completed  Taken 5/4/2023 0400 by Alyce Suarez RN  Safety Promotion/Fall Prevention: safety round/check completed  Taken 5/4/2023 0300 by Alyce Suarez RN  Safety Promotion/Fall Prevention: safety round/check completed     Problem: Adult Inpatient Plan of Care  Goal: Plan of Care Review  Outcome: Ongoing, Progressing  Goal: Patient-Specific Goal (Individualized)  Outcome: Ongoing, Progressing  Goal: Absence of Hospital-Acquired Illness or Injury  Outcome: Ongoing, Progressing  Intervention: Identify and Manage Fall Risk  Recent Flowsheet Documentation  Taken 5/4/2023 0600 by Alyce Suarez RN  Safety Promotion/Fall Prevention: safety round/check completed  Taken 5/4/2023 0500 by Alyce Suarez RN  Safety Promotion/Fall Prevention: safety round/check completed  Taken 5/4/2023 0400 by Alyce Suarez RN  Safety Promotion/Fall Prevention: safety round/check completed  Taken 5/4/2023 0300 by Alyce Suarez RN  Safety Promotion/Fall Prevention: safety round/check completed  Intervention: Prevent Skin Injury  Recent  Flowsheet Documentation  Taken 5/4/2023 0600 by Alyce Suarez RN  Body Position:   turned   left  Taken 5/4/2023 0300 by Alyce Suarez RN  Body Position:   right   turned  Intervention: Prevent and Manage VTE (Venous Thromboembolism) Risk  Recent Flowsheet Documentation  Taken 5/4/2023 0300 by Alyce Suarez RN  VTE Prevention/Management: (see MAR) other (see comments)  Goal: Optimal Comfort and Wellbeing  Outcome: Ongoing, Progressing  Intervention: Provide Person-Centered Care  Recent Flowsheet Documentation  Taken 5/4/2023 0300 by Alyce Suarez RN  Trust Relationship/Rapport:   care explained   choices provided   emotional support provided  Goal: Readiness for Transition of Care  Outcome: Ongoing, Progressing     Problem: Skin Injury Risk Increased  Goal: Skin Health and Integrity  Outcome: Ongoing, Progressing  Intervention: Optimize Skin Protection  Recent Flowsheet Documentation  Taken 5/4/2023 0600 by Alyce Suarez RN  Head of Bed (HOB) Positioning: HOB at 30-45 degrees  Taken 5/4/2023 0400 by Alyce Suarez RN  Head of Bed (HOB) Positioning: HOB at 30-45 degrees  Taken 5/4/2023 0300 by Alyce Suarez RN  Head of Bed (HOB) Positioning: HOB at 30-45 degrees     Problem: Impaired Wound Healing  Goal: Optimal Wound Healing  Outcome: Ongoing, Progressing     Problem: Dysrhythmia  Goal: Normalized Cardiac Rhythm  Outcome: Ongoing, Progressing  Intervention: Monitor and Manage Cardiac Rhythm Effect  Recent Flowsheet Documentation  Taken 5/4/2023 0300 by Alyce Suarez RN  VTE Prevention/Management: (see MAR) other (see comments)     Problem: Hypertension Comorbidity  Goal: Blood Pressure in Desired Range  Outcome: Ongoing, Progressing  Intervention: Maintain Blood Pressure Management  Recent Flowsheet Documentation  Taken 5/4/2023 0600 by Alyce Suarez RN  Medication Review/Management: medications reviewed  Taken 5/4/2023 0500 by Alyce Suarez  RN  Medication Review/Management: medications reviewed  Taken 5/4/2023 0400 by Alyce Suarez, RN  Medication Review/Management: medications reviewed  Taken 5/4/2023 0300 by Alyce Suarez, RN  Medication Review/Management: medications reviewed   Goal Outcome Evaluation:

## 2023-05-04 NOTE — PROGRESS NOTES
Patient seen by Dr. Dejesus earlier this morning for possible third-degree heart block.  The patient was briefly seen and examined in CCU bed 10.  Patient denies any chest pains or shortness of air during my visit.  Patient remains alert and oriented to self only during my exam.  Patient's heart rate noted to be in the 40s to 60s during my visit; I did not appreciate any significant murmur.  Lungs were diminished but clear anteriorly.  Patient with excoriations and chronic appearing closed lesions on her legs; I did not appreciate any significant erythema or warmth.    Await cardiology evaluation regarding further work-up and management.  Dr. Dejesus has also consulted palliative care for goals of care discussion.  We will continue to monitor in the critical care unit as the patient remains on the Zoll's monitor and intermittently bradycardic.  I will continue with the cefazolin that was started by Dr. Dejesus for now.  She remains n.p.o. awaiting further cardiology recommendations.  Echocardiogram has been ordered in addition to a bilateral lower extremity venous Doppler ultrasound.  We will repeat the patient's laboratory studies in a.m.

## 2023-05-04 NOTE — H&P
"    [facility]    Patient Identification:  Name:  Azra Ohara  Age:  88 y.o.  Sex:  female  :  1935  MRN:  [id number]   Visit Number:  [id number]  Admit Date: 5/3/2023   Room number:  CC10/1C  Primary Care Physician:  Minal Salazar, [place]    Date of Admission: 5/3/2023     Subjective     Chief complaint:    Chief Complaint   Patient presents with   • Urinary Tract Infection     History of presenting illness:  88 y.o. female who was admitted on 5/3/2023 from the ED with suspected UTI.  The patient has a past medical history of apical MI, heart failure with preserved EF, essential hypertension, chronic kidney disease stage IV, LBBB, polycythemia vera, and peripheral vascular disease.  The patient is a very poor historian and as a result I am unable to obtain a history of presenting illness from her.  When asked why she came here tonight, her answer was that \"they\" begging her to come into treat her virus.  I directly asked her about symptoms and she could only tell me that she has had fatigue, cough, dyspnea on exertion, chest pain, and generalized weakness.  The patient was unable to tell me anything else.  When asked orientation questions, she stated that the year was .  She then was attempting to get out of bed to go home as the \"heart doctor will not be here till later today so I need to go home\".  Per my review of the emergency department records and my discussion with the emergency department provider, the patient was brought in via [facility] for suspicion of UTI.  The patient arrived disheveled and smelled like urine.  Further evaluation in the emergency department found that she had sinus bradycardia; EKG showed a new third-degree heart block.  Patient did not have any suspicion for sepsis; urinalysis was not very impressive and was not indicative of an acute urinary tract infection.  The emergency department contacted Dr. Nobles " with interventional cardiology and he recommended admission; he told the emergency department that he would see the patient in the morning.  Thus, I have admitted the patient to the critical care unit for further evaluation and treatment.  ---------------------------------------------------------------------------------------------------------------------   Review of Systems   Unable to perform ROS: Other   Constitutional: Positive for fatigue.   Respiratory: Positive for cough and shortness of breath (DODGE).    Cardiovascular: Positive for chest pain.   Neurological: Positive for weakness.     Disoriented to situation/past medical history.  ---------------------------------------------------------------------------------------------------------------------   Past Medical History:   Diagnosis Date   • Chronic kidney disease, stage 4 (severe) 01/20/2023   • Essential hypertension    • History of colon cancer    • Left bundle branch block 01/20/2023   • Long term (current) use of aspirin 01/20/2023   • Polycythemia vera 01/20/2023     Past Surgical History:   Procedure Laterality Date   • COLON RESECTION  2004   • PARATHYROIDECTOMY Right     Parathyroid adenoma     Family History   Problem Relation Age of Onset   • Heart disease Mother    • Heart disease Father      Social History     Socioeconomic History   • Marital status: Single   Tobacco Use   • Smoking status: Never   Substance and Sexual Activity   • Alcohol use: Never   • Drug use: Never     ---------------------------------------------------------------------------------------------------------------------   Allergies:  Lipitor [atorvastatin] and Penicillins  ---------------------------------------------------------------------------------------------------------------------   Medications below are reported home medications pulling from within the system; at this time, these medications have not been reconciled unless otherwise specified and are in the  verification process for further verification as current home medications.      Prior to Admission Medications     Prescriptions Last Dose Informant Patient Reported? Taking?    aspirin 81 MG chewable tablet   No No    Chew and swallow 1 tablet Daily.    cetirizine (zyrTEC) 10 MG tablet   No No    Take 1 tablet by mouth Daily.    cyanocobalamin 1000 MCG/ML injection   Yes No    Inject 1,000 mcg into the appropriate muscle as directed by prescriber Every 30 (Thirty) Days.    Diclofenac Sodium (VOLTAREN) 1 % gel gel   Yes No    Apply 2 g topically to the appropriate area as directed 4 (Four) Times a Day As Needed.    metoprolol succinate XL (TOPROL-XL) 25 MG 24 hr tablet   No No    Take 1 tablet by mouth Daily.    nisoldipine (SULAR) 8.5 MG 24 hr tablet   No No    Take 2 tablets by mouth Daily.    Omega-3 Fatty Acids (fish oil) 1000 MG capsule capsule   Yes No    Take 1,000 mg by mouth 2 (Two) Times a Day With Meals.    ondansetron ODT (ZOFRAN-ODT) 4 MG disintegrating tablet   Yes No    Place 4 mg on the tongue 2 (Two) Times a Day As Needed for Nausea or Vomiting.    polyethylene glycol (MIRALAX) 17 g packet   Yes No    Take 17 g by mouth 2 (Two) Times a Day As Needed.        Objective     Vital Signs:  Temp:  [98.5 °F (36.9 °C)] 98.5 °F (36.9 °C)  Heart Rate:  [40-81] 40  Resp:  [18] 18  BP: (160-179)/(57-92) 168/71    Mean Arterial Pressure (Non-Invasive) for the past 24 hrs (Last 3 readings):   Noninvasive MAP (mmHg)   05/04/23 0140 141   05/04/23 0120 95   05/04/23 0100 142     SpO2:  [95 %-98 %] 98 %  on   ;   Device (Oxygen Therapy): room air  Body mass index is 24.99 kg/m².    Wt Readings from Last 3 Encounters:   05/03/23 68 kg (150 lb)   01/19/23 71.9 kg (158 lb 8.2 oz)      ----------------------------------------------------------------------------------------------------------------------  Physical Exam  Vitals and nursing note reviewed.   Constitutional:       General: She is awake. She is not in acute  distress.     Appearance: She is well-developed and normal weight. She is not ill-appearing, toxic-appearing or diaphoretic.      Comments: The patient appears disheveled.   HENT:      Head: Normocephalic and atraumatic.      Right Ear: External ear normal.      Left Ear: External ear normal.      Nose: Nose normal.   Eyes:      General: No scleral icterus.        Right eye: No discharge.         Left eye: No discharge.      Extraocular Movements: Extraocular movements intact.      Conjunctiva/sclera: Conjunctivae normal.      Pupils: Pupils are equal, round, and reactive to light.   Cardiovascular:      Rate and Rhythm: Regular rhythm. Bradycardia present.      Pulses: Normal pulses.      Heart sounds: Murmur heard.   Pulmonary:      Effort: Pulmonary effort is normal. No respiratory distress.      Breath sounds: No wheezing or rales.   Abdominal:      General: Bowel sounds are normal. There is no distension.      Palpations: Abdomen is soft.   Musculoskeletal:         General: Tenderness present.      Right lower leg: Edema present.   Skin:     Capillary Refill: Capillary refill takes less than 2 seconds.      Coloration: Skin is not jaundiced or pale.      Findings: Erythema and lesion present.      Comments: Bilateral legs appear to have some round, red lesions with a slight scale, most consistent with psoriasis plaques.  The right foot and ankle are edematous, red, and warm to touch.   Neurological:      Mental Status: She is alert. She is disoriented.      Cranial Nerves: No cranial nerve deficit, dysarthria or facial asymmetry.      Motor: No weakness.      Comments: She can follow commands.   Psychiatric:         Attention and Perception: Attention normal.         Mood and Affect: Mood normal.         Speech: Speech normal.         Behavior: Behavior normal. Behavior is cooperative.         Cognition and Memory: Cognition is impaired.      ---------------------------------------------------------------------------------------------------------------------  EKG: Bradycardia with a heart rate of 41 and a QTc of 518 ms.  There is a new third-degree heart block.  There is poor R wave progression.  There are inverted T waves in V1-V2.  When compared to EKGs dated January 16, 2023 and January 18, 2023, the T wave inversions and third-degree heart block are new.  Please note that I have personally looked at the EKG from this admission, the comparison EKGs in the medical records, and the above is my interpretation of this admission's EKG; I await the final cardiology read.      Telemetry:  Sinus bradycardia with heart rates 30-50's.  Please note that I personally looked at the telemetry strips.      Last echocardiogram:  Results for orders placed during the hospital encounter of 01/15/23    Adult Transthoracic Echo Complete W/ Cont if Necessary Per Protocol    Interpretation Summary  •  Normal left ventricular cavity size and wall thickness noted  •  Left ventricular ejection fraction appears to be 56 - 60%.  •  The following left ventricular wall segments are hypokinetic: apical anterior, apical lateral, apical inferior, apical septal and apex hypokinetic.  •  Left ventricular diastolic function is consistent with (grade I) impaired relaxation.  •  There is mild calcification of the aortic valve. There is mild thickening of the non-coronary, left coronary and right coronary cusp(s) of the aortic valve. The aortic valve appears trileaflet.  •  There is mild calcification of the mitral valve posterior leaflet(s). Trace mitral valve regurgitation is present. No significant mitral valve stenosis is present.  •  Trace tricuspid valve regurgitation is present. Estimated right ventricular systolic pressure from tricuspid regurgitation is mildly elevated (35-45 mmHg).  •  There is no evidence of pericardial effusion.    I have personally read the ECHO final  report.  --------------------------------------------------------------------------------------------------------------------  LABS:    CBC and coagulation:  Results from last 7 days   Lab Units 05/03/23 2117   LACTATE mmol/L 1.8   CRP mg/dL 3.26*   WBC 10*3/mm3 10.94*   HEMOGLOBIN g/dL 14.3   HEMATOCRIT % 44.5   MCV fL 95.1   MCHC g/dL 32.1   PLATELETS 10*3/mm3 251     Renal and electrolytes:  Results from last 7 days   Lab Units 05/03/23 2117   SODIUM mmol/L 147*   POTASSIUM mmol/L 4.1   MAGNESIUM mg/dL 2.0   CHLORIDE mmol/L 109*   CO2 mmol/L 24.4   BUN mg/dL 47*   CREATININE mg/dL 1.14*   CALCIUM mg/dL 10.8*   GLUCOSE mg/dL 112*     Estimated Creatinine Clearance: 36.6 mL/min (A) (by C-G formula based on SCr of 1.14 mg/dL (H)).    Liver and pancreatic function:  Results from last 7 days   Lab Units 05/03/23 2117   ALBUMIN g/dL 4.0   BILIRUBIN mg/dL 0.7   ALK PHOS U/L 83   AST (SGOT) U/L 26   ALT (SGPT) U/L 14     Endocrine function:  Lab Results   Component Value Date    HGBA1C 5.50 01/16/2023     Lab Results   Component Value Date    TSH 0.998 05/03/2023    FREET4 1.95 (H) 05/03/2023     Cardiac:  Results from last 7 days   Lab Units 05/03/23 2313 05/03/23 2117   HSTROP T ng/L 44* 50*   PROBNP pg/mL  --  6,565.0*       Cultures:  Lab Results   Component Value Date    COLORU Yellow 05/03/2023    CLARITYU Clear 05/03/2023    PHUR 6.0 05/03/2023    GLUCOSEU Negative 05/03/2023    KETONESU Trace (A) 05/03/2023    BLOODU Negative 05/03/2023    NITRITEU Negative 05/03/2023    LEUKOCYTESUR Negative 05/03/2023    BILIRUBINUR Negative 05/03/2023    UROBILINOGEN 0.2 E.U./dL 05/03/2023    RBCUA 0-2 05/03/2023    WBCUA 3-5 (A) 05/03/2023    BACTERIA None Seen 05/03/2023     Microbiology Results (last 10 days)     Procedure Component Value - Date/Time    COVID PRE-OP / PRE-PROCEDURE SCREENING ORDER (NO ISOLATION) - Swab, Nasopharynx [[id number]]  (Normal) Collected: 05/04/23 0057    Lab Status: Final result Specimen:  Swab from Nasopharynx Updated: 05/04/23 0120    Narrative:      The following orders were created for panel order COVID PRE-OP / PRE-PROCEDURE SCREENING ORDER (NO ISOLATION) - Swab, Nasopharynx.  Procedure                               Abnormality         Status                     ---------                               -----------         ------                     COVID-19 and FLU A/B PCR...[[id number]]  Normal              Final result                 Please view results for these tests on the individual orders.    COVID-19 and FLU A/B PCR - Swab, Nasopharynx [[id number]]  (Normal) Collected: 05/04/23 0057    Lab Status: Final result Specimen: Swab from Nasopharynx Updated: 05/04/23 0120     COVID19 Not Detected     Influenza A PCR Not Detected     Influenza B PCR Not Detected    Narrative:      Fact sheet for providers: https://www.fda.gov/media/460086/download    Fact sheet for patients: https://www.[employer].gov/media/138425/download    Test performed by PCR.        I have personally looked at the labs and they are summarized above.  ----------------------------------------------------------------------------------------------------------------------  Detailed radiology reports for the last 24 hours:    Imaging Results (Last 24 Hours)     Procedure Component Value Units Date/Time    XR Chest 1 View [[id number]] Collected: 05/03/23 2208     Updated: 05/03/23 2212    Narrative:      Procedure: Portable chest x-ray examination performed on 05/03/2023.  Single view. Semiupright position.     HISTORY: Chest pain.     FINDINGS:     Enlarged heart size.  Central pulmonary vascular congestion with mild interstitial edema.  No lobar consolidation, pleural effusion, or pneumothorax.  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  No fracture or foreign body.  No free air in the upper abdomen.       Impression:         1.  Enlarged heart size.  2.  Central pulmonary  vascular congestion with trace interstitial edema.  3.  No pleural effusion or pneumothorax.  4.  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  5.  No free air in the upper abdomen.     This report was finalized on 5/3/2023 10:10 PM by Mumtaz Mg MD.           I have personally looked at the radiology images and I have read the available final report.    Assessment & Plan       -New onset third-degree heart block that was present on admission in a patient with known apical MI and left bundle branch block  -Right foot and ankle swelling with skin changes, present on admission, suspect cellulitis  -Sacral decubitus ulcer present on admission  -History of essential hypertension  -History of chronic kidney disease stage IV, with baseline creatinine 0.9-1.14  -History of polycythemia vera  -History of peripheral vascular disease  -History of heart failure with preserved ejection fraction, suspect mild exacerbation that was present on admission    Admitted to the critical care unit.  We will place ZOLL's pads on her chest and back and should she have worsening bradycardia or blood pressures then we can pace her with these goals monitor.  Cardiology has been consulted.  We will keep the patient n.p.o. for possible intervention.  I have written for the patient to receive aspirin daily and Lipitor daily.  I have consulted the wound care nurse practitioner for the sacral decubitus ulcer.  I will start the patient on cefazolin for nonpurulent cellulitis; the patient has had hives with penicillin before and thus we will try the cefazolin and monitor her for any possible skin reactions.  I will obtain venous Dopplers of both legs to make sure she does not have any DVTs.  We will weigh the patient daily and monitor her input and output closely as she does have a history of heart failure with preserved ejection  fraction.  I will also consult palliative care for goals of care.  I have consulted  for help with disposition.  We will trend the patient's creatinine and electrolytes closely.  Chest x-ray shows some fluid overload but she does not have any crackles on exam and only the leg with the cellulitis is edematous.  The proBNP is elevated.  I will give the patient 40 mg IV Lasix, perform daily weights, and obtain a Reds vest determination.    We will avoid QT prolonging agents and continue to monitor the electrolytes closely. In order to lessen the risk of worsening QTc, the goal potassium level is 4-4.5 and goal magnesium level is 2-2.2.    VTE Prophylaxis:   Mechanical Order History:     None      Pharmalogical Order History:      Ordered     Dose Route Frequency Stop    Signed and Held  heparin (porcine) 5000 UNIT/ML injection 5,000 Units         5,000 Units SC Every 12 Hours Scheduled --              The patient is high risk due to the following diagnoses/reasons: No third degree heart block    Rick Dejesus MD  [facility]  05/04/23  02:44 EDT

## 2023-05-05 ENCOUNTER — APPOINTMENT (OUTPATIENT)
Dept: GENERAL RADIOLOGY | Facility: HOSPITAL | Age: 88
End: 2023-05-05
Payer: MEDICARE

## 2023-05-05 LAB
ALBUMIN SERPL-MCNC: 3 G/DL (ref 3.5–5.2)
ALBUMIN/GLOB SERPL: 0.9 G/DL
ALP SERPL-CCNC: 68 U/L (ref 39–117)
ALT SERPL W P-5'-P-CCNC: 10 U/L (ref 1–33)
ANION GAP SERPL CALCULATED.3IONS-SCNC: 13.9 MMOL/L (ref 5–15)
AST SERPL-CCNC: 21 U/L (ref 1–32)
BH CV ECHO MEAS - ACS: 1.7 CM
BH CV ECHO MEAS - AO MAX PG: 20.8 MMHG
BH CV ECHO MEAS - AO MEAN PG: 10 MMHG
BH CV ECHO MEAS - AO ROOT DIAM: 2.9 CM
BH CV ECHO MEAS - AO V2 MAX: 228 CM/SEC
BH CV ECHO MEAS - AO V2 VTI: 54.6 CM
BH CV ECHO MEAS - EDV(CUBED): 110.6 ML
BH CV ECHO MEAS - EDV(MOD-SP4): 57.4 ML
BH CV ECHO MEAS - EF(MOD-SP4): 63.6 %
BH CV ECHO MEAS - ESV(CUBED): 17.6 ML
BH CV ECHO MEAS - ESV(MOD-SP4): 20.9 ML
BH CV ECHO MEAS - FS: 45.8 %
BH CV ECHO MEAS - IVS/LVPW: 1 CM
BH CV ECHO MEAS - IVSD: 1.1 CM
BH CV ECHO MEAS - LAT PEAK E' VEL: 10.6 CM/SEC
BH CV ECHO MEAS - LV DIASTOLIC VOL/BSA (35-75): 31.1 CM2
BH CV ECHO MEAS - LV MASS(C)D: 194 GRAMS
BH CV ECHO MEAS - LV SYSTOLIC VOL/BSA (12-30): 11.3 CM2
BH CV ECHO MEAS - LVIDD: 4.8 CM
BH CV ECHO MEAS - LVIDS: 2.6 CM
BH CV ECHO MEAS - LVPWD: 1.1 CM
BH CV ECHO MEAS - MED PEAK E' VEL: 6.5 CM/SEC
BH CV ECHO MEAS - MR MAX PG: 85.1 MMHG
BH CV ECHO MEAS - MR MAX VEL: 461 CM/SEC
BH CV ECHO MEAS - MR MEAN PG: 60.5 MMHG
BH CV ECHO MEAS - MR MEAN VEL: 373.5 CM/SEC
BH CV ECHO MEAS - MR VTI: 177 CM
BH CV ECHO MEAS - MV A MAX VEL: 118 CM/SEC
BH CV ECHO MEAS - MV E MAX VEL: 104 CM/SEC
BH CV ECHO MEAS - MV E/A: 0.88
BH CV ECHO MEAS - RAP SYSTOLE: 10 MMHG
BH CV ECHO MEAS - RVSP: 68.4 MMHG
BH CV ECHO MEAS - SI(MOD-SP4): 19.8 ML/M2
BH CV ECHO MEAS - SV(MOD-SP4): 36.5 ML
BH CV ECHO MEAS - TAPSE (>1.6): 1.81 CM
BH CV ECHO MEAS - TR MAX PG: 58.4 MMHG
BH CV ECHO MEAS - TR MAX VEL: 382 CM/SEC
BH CV ECHO MEASUREMENTS AVERAGE E/E' RATIO: 12.16
BILIRUB SERPL-MCNC: 0.6 MG/DL (ref 0–1.2)
BUN SERPL-MCNC: 42 MG/DL (ref 8–23)
BUN/CREAT SERPL: 32.3 (ref 7–25)
CALCIUM SPEC-SCNC: 9.2 MG/DL (ref 8.6–10.5)
CHLORIDE SERPL-SCNC: 112 MMOL/L (ref 98–107)
CO2 SERPL-SCNC: 21.1 MMOL/L (ref 22–29)
CREAT SERPL-MCNC: 1.3 MG/DL (ref 0.57–1)
D-LACTATE SERPL-SCNC: 1 MMOL/L (ref 0.5–2)
DEPRECATED RDW RBC AUTO: 53.3 FL (ref 37–54)
EGFRCR SERPLBLD CKD-EPI 2021: 39.6 ML/MIN/1.73
ERYTHROCYTE [DISTWIDTH] IN BLOOD BY AUTOMATED COUNT: 14.6 % (ref 12.3–15.4)
GLOBULIN UR ELPH-MCNC: 3.3 GM/DL
GLUCOSE SERPL-MCNC: 110 MG/DL (ref 65–99)
HCT VFR BLD AUTO: 39.9 % (ref 34–46.6)
HGB BLD-MCNC: 12.4 G/DL (ref 12–15.9)
LEFT ATRIUM VOLUME INDEX: 30.8 ML/M2
MAGNESIUM SERPL-MCNC: 1.8 MG/DL (ref 1.6–2.4)
MAXIMAL PREDICTED HEART RATE: 132 BPM
MCH RBC QN AUTO: 30.6 PG (ref 26.6–33)
MCHC RBC AUTO-ENTMCNC: 31.1 G/DL (ref 31.5–35.7)
MCV RBC AUTO: 98.5 FL (ref 79–97)
PLATELET # BLD AUTO: 188 10*3/MM3 (ref 140–450)
PMV BLD AUTO: 12.1 FL (ref 6–12)
POTASSIUM SERPL-SCNC: 3.2 MMOL/L (ref 3.5–5.2)
POTASSIUM SERPL-SCNC: 3.3 MMOL/L (ref 3.5–5.2)
PROT SERPL-MCNC: 6.3 G/DL (ref 6–8.5)
RBC # BLD AUTO: 4.05 10*6/MM3 (ref 3.77–5.28)
SODIUM SERPL-SCNC: 147 MMOL/L (ref 136–145)
STRESS TARGET HR: 112 BPM
WBC NRBC COR # BLD: 10.77 10*3/MM3 (ref 3.4–10.8)

## 2023-05-05 PROCEDURE — 93005 ELECTROCARDIOGRAM TRACING: CPT | Performed by: NURSE PRACTITIONER

## 2023-05-05 PROCEDURE — C1894 INTRO/SHEATH, NON-LASER: HCPCS | Performed by: INTERNAL MEDICINE

## 2023-05-05 PROCEDURE — 85027 COMPLETE CBC AUTOMATED: CPT | Performed by: INTERNAL MEDICINE

## 2023-05-05 PROCEDURE — C1769 GUIDE WIRE: HCPCS | Performed by: INTERNAL MEDICINE

## 2023-05-05 PROCEDURE — 25010000002 VANCOMYCIN 5 G RECONSTITUTED SOLUTION 5,000 MG VIAL: Performed by: INTERNAL MEDICINE

## 2023-05-05 PROCEDURE — 25010000002 HEPARIN (PORCINE) PER 1000 UNITS: Performed by: INTERNAL MEDICINE

## 2023-05-05 PROCEDURE — 84132 ASSAY OF SERUM POTASSIUM: CPT | Performed by: INTERNAL MEDICINE

## 2023-05-05 PROCEDURE — 25510000001 IOPAMIDOL PER 1 ML: Performed by: INTERNAL MEDICINE

## 2023-05-05 PROCEDURE — 87040 BLOOD CULTURE FOR BACTERIA: CPT | Performed by: INTERNAL MEDICINE

## 2023-05-05 PROCEDURE — 99153 MOD SED SAME PHYS/QHP EA: CPT | Performed by: INTERNAL MEDICINE

## 2023-05-05 PROCEDURE — 02H63JZ INSERTION OF PACEMAKER LEAD INTO RIGHT ATRIUM, PERCUTANEOUS APPROACH: ICD-10-PCS | Performed by: INTERNAL MEDICINE

## 2023-05-05 PROCEDURE — 25010000002 FENTANYL CITRATE (PF) 50 MCG/ML SOLUTION: Performed by: INTERNAL MEDICINE

## 2023-05-05 PROCEDURE — L3660 SO 8 AB RSTR CAN/WEB PRE OTS: HCPCS | Performed by: INTERNAL MEDICINE

## 2023-05-05 PROCEDURE — 99233 SBSQ HOSP IP/OBS HIGH 50: CPT | Performed by: INTERNAL MEDICINE

## 2023-05-05 PROCEDURE — C1898 LEAD, PMKR, OTHER THAN TRANS: HCPCS | Performed by: INTERNAL MEDICINE

## 2023-05-05 PROCEDURE — 83735 ASSAY OF MAGNESIUM: CPT | Performed by: INTERNAL MEDICINE

## 2023-05-05 PROCEDURE — 0 POTASSIUM CHLORIDE 10 MEQ/100ML SOLUTION: Performed by: INTERNAL MEDICINE

## 2023-05-05 PROCEDURE — 80053 COMPREHEN METABOLIC PANEL: CPT | Performed by: INTERNAL MEDICINE

## 2023-05-05 PROCEDURE — 99221 1ST HOSP IP/OBS SF/LOW 40: CPT | Performed by: PSYCHIATRY & NEUROLOGY

## 2023-05-05 PROCEDURE — 99152 MOD SED SAME PHYS/QHP 5/>YRS: CPT | Performed by: INTERNAL MEDICINE

## 2023-05-05 PROCEDURE — 71045 X-RAY EXAM CHEST 1 VIEW: CPT

## 2023-05-05 PROCEDURE — 02HK3JZ INSERTION OF PACEMAKER LEAD INTO RIGHT VENTRICLE, PERCUTANEOUS APPROACH: ICD-10-PCS | Performed by: INTERNAL MEDICINE

## 2023-05-05 PROCEDURE — C1785 PMKR, DUAL, RATE-RESP: HCPCS | Performed by: INTERNAL MEDICINE

## 2023-05-05 PROCEDURE — 83605 ASSAY OF LACTIC ACID: CPT | Performed by: INTERNAL MEDICINE

## 2023-05-05 PROCEDURE — 33208 INSRT HEART PM ATRIAL & VENT: CPT | Performed by: INTERNAL MEDICINE

## 2023-05-05 PROCEDURE — 25010000002 CEFAZOLIN PER 500 MG: Performed by: INTERNAL MEDICINE

## 2023-05-05 PROCEDURE — 0JH606Z INSERTION OF PACEMAKER, DUAL CHAMBER INTO CHEST SUBCUTANEOUS TISSUE AND FASCIA, OPEN APPROACH: ICD-10-PCS | Performed by: INTERNAL MEDICINE

## 2023-05-05 PROCEDURE — 25010000002 MAGNESIUM SULFATE 2 GM/50ML SOLUTION: Performed by: INTERNAL MEDICINE

## 2023-05-05 PROCEDURE — 93005 ELECTROCARDIOGRAM TRACING: CPT | Performed by: INTERNAL MEDICINE

## 2023-05-05 DEVICE — GEN PM ASSURITY MRI DR RF PM2272: Type: IMPLANTABLE DEVICE | Status: FUNCTIONAL

## 2023-05-05 DEVICE — LD PM TENDRIL STS 6F52CM 2088TC52: Type: IMPLANTABLE DEVICE | Status: FUNCTIONAL

## 2023-05-05 DEVICE — LD PM TENDRIL STS 6F58CM 2088TC58: Type: IMPLANTABLE DEVICE | Status: FUNCTIONAL

## 2023-05-05 RX ORDER — SODIUM CHLORIDE 9 MG/ML
40 INJECTION, SOLUTION INTRAVENOUS AS NEEDED
Status: DISCONTINUED | OUTPATIENT
Start: 2023-05-05 | End: 2023-05-05 | Stop reason: HOSPADM

## 2023-05-05 RX ORDER — DEXTROSE MONOHYDRATE 50 MG/ML
75 INJECTION, SOLUTION INTRAVENOUS CONTINUOUS
Status: DISCONTINUED | OUTPATIENT
Start: 2023-05-05 | End: 2023-05-06

## 2023-05-05 RX ORDER — POTASSIUM CHLORIDE 7.45 MG/ML
10 INJECTION INTRAVENOUS
Status: COMPLETED | OUTPATIENT
Start: 2023-05-05 | End: 2023-05-05

## 2023-05-05 RX ORDER — POTASSIUM CHLORIDE 750 MG/1
40 CAPSULE, EXTENDED RELEASE ORAL AS NEEDED
Status: DISCONTINUED | OUTPATIENT
Start: 2023-05-05 | End: 2023-05-11 | Stop reason: HOSPADM

## 2023-05-05 RX ORDER — POTASSIUM CHLORIDE 1500 MG/1
40 TABLET, FILM COATED, EXTENDED RELEASE ORAL EVERY 4 HOURS
Status: DISCONTINUED | OUTPATIENT
Start: 2023-05-05 | End: 2023-05-05

## 2023-05-05 RX ORDER — SODIUM CHLORIDE 9 MG/ML
40 INJECTION, SOLUTION INTRAVENOUS AS NEEDED
Status: DISCONTINUED | OUTPATIENT
Start: 2023-05-05 | End: 2023-05-11 | Stop reason: HOSPADM

## 2023-05-05 RX ORDER — POTASSIUM CHLORIDE 7.45 MG/ML
10 INJECTION INTRAVENOUS
Status: DISCONTINUED | OUTPATIENT
Start: 2023-05-05 | End: 2023-05-11 | Stop reason: HOSPADM

## 2023-05-05 RX ORDER — SODIUM CHLORIDE 0.9 % (FLUSH) 0.9 %
10 SYRINGE (ML) INJECTION AS NEEDED
Status: DISCONTINUED | OUTPATIENT
Start: 2023-05-05 | End: 2023-05-05 | Stop reason: HOSPADM

## 2023-05-05 RX ORDER — POTASSIUM CHLORIDE 1.5 G/1.77G
40 POWDER, FOR SOLUTION ORAL AS NEEDED
Status: DISCONTINUED | OUTPATIENT
Start: 2023-05-05 | End: 2023-05-11 | Stop reason: HOSPADM

## 2023-05-05 RX ORDER — POTASSIUM CHLORIDE 1.5 G/1.77G
40 POWDER, FOR SOLUTION ORAL EVERY 4 HOURS
Status: COMPLETED | OUTPATIENT
Start: 2023-05-05 | End: 2023-05-06

## 2023-05-05 RX ORDER — SODIUM CHLORIDE 0.9 % (FLUSH) 0.9 %
10 SYRINGE (ML) INJECTION EVERY 12 HOURS SCHEDULED
Status: DISCONTINUED | OUTPATIENT
Start: 2023-05-05 | End: 2023-05-11 | Stop reason: HOSPADM

## 2023-05-05 RX ORDER — SODIUM CHLORIDE 0.9 % (FLUSH) 0.9 %
10 SYRINGE (ML) INJECTION AS NEEDED
Status: DISCONTINUED | OUTPATIENT
Start: 2023-05-05 | End: 2023-05-11 | Stop reason: HOSPADM

## 2023-05-05 RX ORDER — MAGNESIUM SULFATE HEPTAHYDRATE 40 MG/ML
2 INJECTION, SOLUTION INTRAVENOUS ONCE
Status: COMPLETED | OUTPATIENT
Start: 2023-05-05 | End: 2023-05-05

## 2023-05-05 RX ORDER — LIDOCAINE HYDROCHLORIDE 20 MG/ML
INJECTION, SOLUTION INFILTRATION; PERINEURAL
Status: DISCONTINUED | OUTPATIENT
Start: 2023-05-05 | End: 2023-05-05 | Stop reason: HOSPADM

## 2023-05-05 RX ORDER — FENTANYL CITRATE 50 UG/ML
INJECTION, SOLUTION INTRAMUSCULAR; INTRAVENOUS
Status: DISCONTINUED | OUTPATIENT
Start: 2023-05-05 | End: 2023-05-05 | Stop reason: HOSPADM

## 2023-05-05 RX ORDER — HYDROCODONE BITARTRATE AND ACETAMINOPHEN 5; 325 MG/1; MG/1
1 TABLET ORAL EVERY 8 HOURS PRN
Status: DISCONTINUED | OUTPATIENT
Start: 2023-05-05 | End: 2023-05-07

## 2023-05-05 RX ORDER — SODIUM CHLORIDE 0.9 % (FLUSH) 0.9 %
10 SYRINGE (ML) INJECTION EVERY 12 HOURS SCHEDULED
Status: DISCONTINUED | OUTPATIENT
Start: 2023-05-05 | End: 2023-05-05 | Stop reason: HOSPADM

## 2023-05-05 RX ORDER — SODIUM CHLORIDE 9 MG/ML
INJECTION, SOLUTION INTRAVENOUS
Status: COMPLETED | OUTPATIENT
Start: 2023-05-05 | End: 2023-05-05

## 2023-05-05 RX ADMIN — Medication 10 ML: at 21:07

## 2023-05-05 RX ADMIN — CEFAZOLIN 2 G: 2 INJECTION, POWDER, FOR SOLUTION INTRAMUSCULAR; INTRAVENOUS at 16:40

## 2023-05-05 RX ADMIN — ATORVASTATIN CALCIUM 40 MG: 40 TABLET, FILM COATED ORAL at 21:03

## 2023-05-05 RX ADMIN — CEFAZOLIN 2 G: 2 INJECTION, POWDER, FOR SOLUTION INTRAMUSCULAR; INTRAVENOUS at 05:38

## 2023-05-05 RX ADMIN — Medication 10 ML: at 08:49

## 2023-05-05 RX ADMIN — POTASSIUM CHLORIDE 40 MEQ: 1.5 POWDER, FOR SOLUTION ORAL at 21:04

## 2023-05-05 RX ADMIN — POTASSIUM CHLORIDE 10 MEQ: 7.46 INJECTION, SOLUTION INTRAVENOUS at 08:49

## 2023-05-05 RX ADMIN — CEFAZOLIN 2 G: 2 INJECTION, POWDER, FOR SOLUTION INTRAMUSCULAR; INTRAVENOUS at 21:06

## 2023-05-05 RX ADMIN — ASPIRIN 81 MG: 81 TABLET, COATED ORAL at 08:53

## 2023-05-05 RX ADMIN — HEPARIN SODIUM 5000 UNITS: 5000 INJECTION INTRAVENOUS; SUBCUTANEOUS at 08:49

## 2023-05-05 RX ADMIN — MAGNESIUM SULFATE HEPTAHYDRATE 2 G: 40 INJECTION, SOLUTION INTRAVENOUS at 05:37

## 2023-05-05 RX ADMIN — POTASSIUM CHLORIDE 10 MEQ: 7.46 INJECTION, SOLUTION INTRAVENOUS at 05:37

## 2023-05-05 RX ADMIN — HYDROCODONE BITARTRATE AND ACETAMINOPHEN 1 TABLET: 5; 325 TABLET ORAL at 23:25

## 2023-05-05 RX ADMIN — POTASSIUM CHLORIDE 10 MEQ: 7.46 INJECTION, SOLUTION INTRAVENOUS at 07:59

## 2023-05-05 RX ADMIN — Medication 10 ML: at 08:50

## 2023-05-05 RX ADMIN — Medication 10 ML: at 12:37

## 2023-05-05 RX ADMIN — Medication 10 ML: at 21:06

## 2023-05-05 RX ADMIN — POTASSIUM CHLORIDE 10 MEQ: 7.46 INJECTION, SOLUTION INTRAVENOUS at 06:56

## 2023-05-05 RX ADMIN — DEXTROSE MONOHYDRATE 75 ML/HR: 50 INJECTION, SOLUTION INTRAVENOUS at 12:37

## 2023-05-05 NOTE — PROGRESS NOTES
[facility]     Progress Note    Patient Name: Azra Ohara  : 1935  MRN: [id number]  Primary Care Physician:  Minal Salazar APRN  Date of admission: 5/3/2023    Subjective   Subjective     Chief Complaint: 88-year-old female being seen in follow-up for third-degree heart block    History of Present Illness  Patient Reports no complaints during my visit.  Had recently complained of some nausea which she states has since resolved.  Also complained of some left upper arm pain related to her blood pressure cuff.    Patient noted to have some mildly decreased electrolytes early this morning; supplementation was ordered by the [facility] physician.    Present during visit: NATALY Xavier and Gonzalez, the patient's nephew    Review of Systems  Patient denied any chest pains or palpitations.  She does not report any shortness of air.    Objective   Objective     Vitals:   Temp:  [98.2 °F (36.8 °C)-99 °F (37.2 °C)] 99 °F (37.2 °C)  Heart Rate:  [34-47] 38  Resp:  [13-48] 18  BP: (123-177)/() 175/72  Flow (L/min):  [2] 2    Physical Exam  Constitutional:       General: She is not in acute distress.     Appearance: She is well-developed.   HENT:      Head: Normocephalic and atraumatic.      Mouth/Throat:      Mouth: Mucous membranes are dry.   Eyes:      Conjunctiva/sclera: Conjunctivae normal.   Neck:      Trachea: No tracheal deviation.   Cardiovascular:      Rate and Rhythm: Regular rhythm. Bradycardia present.      Pulses:           Dorsalis pedis pulses are 2+ on the right side and 2+ on the left side.      Heart sounds: No murmur heard.    No friction rub. No gallop.      Comments: No significant lower extremity edema  Pulmonary:      Effort: No respiratory distress.      Breath sounds: Examination of the right-lower field reveals decreased breath sounds. Examination of the left-lower field reveals decreased breath sounds. Decreased breath sounds present. No wheezing or rales.   Abdominal:      General:  Bowel sounds are normal. There is no distension.      Palpations: Abdomen is soft.      Tenderness: There is no abdominal tenderness. There is no guarding.   Skin:     General: Skin is warm and dry.      Findings: No erythema or rash.      Comments: Scaling, plaquing lesions noted B/L lower extremities   Neurological:      Mental Status: She is alert.      Cranial Nerves: No cranial nerve deficit.      Comments: Oriented to self, place, month; follows commands.          Result Review    Result Review:  I have personally reviewed the results from the time of this admission to 5/5/2023 11:54 EDT and agree with these findings:  [x]  Laboratory list / accordion  []  Microbiology  []  Radiology  []  EKG/Telemetry   []  Cardiology/Vascular   []  Pathology  []  Old records  []  Other:  Most notable findings include: CMP significant for sodium 147, potassium 3.2, CO2 21.1, chloride 112, BUN 42 and creatinine 1.30. Mg 1.8.    CXR 5/3/23:  FINDINGS:     Enlarged heart size.  Central pulmonary vascular congestion with mild interstitial edema.  No lobar consolidation, pleural effusion, or pneumothorax.  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  No fracture or foreign body.  No free air in the upper abdomen.     IMPRESSION:     1.  Enlarged heart size.  2.  Central pulmonary vascular congestion with trace interstitial edema.  3.  No pleural effusion or pneumothorax.  4.  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  5.  No free air in the upper abdomen.    Echocardiogram:  •  Left ventricular systolic function is normal. Left ventricular ejection fraction appears to be 61 - 65%.  •  Left ventricular diastolic function is consistent with (grade Ia w/high LAP) impaired relaxation.  •  The left atrial cavity is moderately dilated.  •  The right atrial  cavity is moderately  dilated.  •  There is mild calcification of the aortic valve mainly affecting the non-coronary, left coronary and right coronary cusp(s).  •  Severe mitral valve regurgitation is present.  •  Severe tricuspid valve regurgitation is present.  •  Estimated right ventricular systolic pressure from tricuspid regurgitation is markedly elevated (>55 mmHg).    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 while awaiting final cardiology decision regarding pacemaker implantation  -The patient appears euvolemic to hypovolemic at this time as evidenced by her hypernatremia; will plan for a short course of D5  -Continue to supplement the patient's potassium and magnesium as needed per protocol  -Repeat chemistry panel and Mg level in a.m.   -Continue aspirin and atorvastatin  -Patient remains on cefazolin for suspected cellulitis that was appreciate upon admission  -Add PRN Hydralazine for significantly uncontrolled HTN as patient currently NPO  -Repeat CBC in a.m. and monitor temperature curve  -Plan for PT/OT consults once medically stable from cardiac standpoint    Chronic medical conditions:  -Question age-related cognitive changes versus underlying dementia: Psychiatry consult placed for medical decision making capacity  -History of left bundle branch block  -History of colon cancer status postresection 2004  -Reported history of polycythemia vera    DVT prophylaxis:  [facility]    CODE STATUS:    Medical Intervention Limits: NO intubation  (DNI)  Level Of Support Discussed With: Next of Kin (If No Surrogate)  Code Status (Patient has no pulse and is not breathing): No CPR (Do Not Attempt to Resuscitate)  Medical Interventions (Patient has pulse or is breathing): Limited Support  Comments: Sister Doreen and nephew Alexandru  Release to patient: Routine Release    Disposition:  I expect patient to be discharged to be discharged home with home health v SNF when medically stable; likely at least 2-3 days.    Rebecca Rodrigues, DO

## 2023-05-05 NOTE — PLAN OF CARE
Goal Outcome Evaluation:  Plan of Care Reviewed With: patient        Progress: no change  Outcome Evaluation: Pt remains alert but confused. Pt has been NPO since MN planning for pacemaker placement today. VSS. Will continue with plan of care

## 2023-05-05 NOTE — CONSULTS
Referring Provider: Lilia  Reason for Consultation: Capacity       Chief complaint/Focus of Exam: Medical decision making capacity    Subjective .     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  Psychiatry was consulted to evaluate patient for medical decision making capacity.  When she presented to the emergency department, she was a poor historian and seems somewhat confused and at times has been forgetful or gotten off topic.  There was some concern that she may not have a good understanding of what is going on and therefore not be able to make her own medical decisions due to lack of capacity.  I evaluated patient at bedside today.  She was alert and oriented to all orientation questions aside from saying that it is 1923 which is not a major mistake given her age.  She was initially somewhat off topic when asked what she is being treated for in the hospital and she was talking about her thyroid but after I asked if she had any heart issues, she was able to tell me in fair understanding her current situation and the fact that they were considering pacemaker placement.  She was able to tell me the risks and benefits of getting the pacemaker placed versus not getting the pacemaker placed and understood that death could be a result due to irregular heart rhythm without the pacer and that there are risks including death with surgery.  She states that she has spent time praying about it overnight and has been considering it heavily on both sides.  She endorsed some fatigue but denied any other current complaints.  She denied SI/HI/AVH.    Review of Systems  Pertinent items are noted  in HPI    History  Past Medical History:   Diagnosis Date   • Chronic kidney disease, stage 4 (severe) 01/20/2023   • Essential hypertension    • History of colon cancer    • Left bundle branch block 01/20/2023   • Long term (current) use of aspirin 01/20/2023   • Polycythemia vera 01/20/2023   ,   Past Surgical History:   Procedure Laterality Date   • COLON RESECTION  2004   • PARATHYROIDECTOMY Right     Parathyroid adenoma   ,   Family History   Problem Relation Age of Onset   • Heart disease Mother    • Heart disease Father    ,   Social History     Socioeconomic History   • Marital status: Single   Tobacco Use   • Smoking status: Never   Vaping Use   • Vaping Use: Never used   Substance and Sexual Activity   • Alcohol use: Never   • Drug use: Never     E-cigarette/Vaping   • E-cigarette/Vaping Use Never User      E-cigarette/Vaping Substances     E-cigarette/Vaping Devices       ,   Medications Prior to Admission   Medication Sig Dispense Refill Last Dose   • amLODIPine (NORVASC) 2.5 MG tablet Take 1 tablet by mouth Daily.   Not started yet at Not started yet   • diclofenac (VOLTAREN) 75 MG EC tablet Take 1 tablet by mouth 2 (Two) Times a Day As Needed.   Unknown   • ondansetron ODT (ZOFRAN-ODT) 4 MG disintegrating tablet Place 1 tablet on the tongue 2 (Two) Times a Day As Needed for Nausea or Vomiting.   Unknown   • triamterene-hydrochlorothiazide (DYAZIDE) 37.5-25 MG per capsule Take 1 capsule by mouth Every Morning.   Unknown   , Scheduled Meds:  aspirin, 81 mg, Oral, Daily  atorvastatin, 40 mg, Oral, Nightly  ceFAZolin, 2 g, Intravenous, Q8H  heparin (porcine), 5,000 Units, Subcutaneous, Q12H  sodium chloride, 10 mL, Intravenous, Q12H  sodium chloride, 10 mL, Intravenous, Q12H  sodium chloride, 10 mL, Intravenous, Q12H  vancomycin, 1,000 mg, Intravenous, Once    , Continuous Infusions:  dextrose, 75 mL/hr, Last Rate: 75 mL/hr (05/05/23 1237)    , PRN Meds:  •  [COMPLETED] Insert Peripheral IV **AND** sodium  chloride  •  sodium chloride  •  sodium chloride  •  sodium chloride  •  sodium chloride  •  sodium chloride  •  sodium chloride and Allergies:  Lipitor [atorvastatin] and Penicillins    Objective     Vital Signs   Temp:  [98.2 °F (36.8 °C)-99 °F (37.2 °C)] 99 °F (37.2 °C)  Heart Rate:  [34-47] 38  Resp:  [13-48] 18  BP: (123-177)/() 175/72    Mental Status Exam:   Mental Status Exam:    Hygiene:   good  Cooperation:  Cooperative  Eye Contact:  Good  Psychomotor Behavior:  Appropriate  Affect:  Full range  Hopelessness: Denies  Speech:  Normal  Thought Progress:  Goal directed and Linear, minor tangentiality at times   Thought Content:  Normal and Mood congruent  Suicidal:  None  Homicidal:  None  Hallucinations:  None  Delusion:  None  Memory:  Intact  Orientation:  Person, Place, Time and Situation  Reliability:  good  Insight:  Good  Judgement:  Good  Impulse Control:  Good    Results Review:   I reviewed the patient's new clinical results.  Lab Results (last 24 hours)     Procedure Component Value Units Date/Time    Magnesium [[id number]]  (Normal) Collected: 05/05/23 0004    Specimen: Blood Updated: 05/05/23 0112     Magnesium 1.8 mg/dL     Comprehensive Metabolic Panel [[id number]]  (Abnormal) Collected: 05/05/23 0004    Specimen: Blood Updated: 05/05/23 0112     Glucose 110 mg/dL      BUN 42 mg/dL      Creatinine 1.30 mg/dL      Sodium 147 mmol/L      Potassium 3.2 mmol/L      Chloride 112 mmol/L      CO2 21.1 mmol/L      Calcium 9.2 mg/dL      Total Protein 6.3 g/dL      Albumin 3.0 g/dL      ALT (SGPT) 10 U/L      AST (SGOT) 21 U/L      Alkaline Phosphatase 68 U/L      Total Bilirubin 0.6 mg/dL      Globulin 3.3 gm/dL      A/G Ratio 0.9 g/dL      BUN/Creatinine Ratio 32.3     Anion Gap 13.9 mmol/L      eGFR 39.6 mL/min/1.73     Narrative:      GFR Normal >60  Chronic Kidney Disease <60  Kidney Failure <15    The GFR formula is only valid for adults with stable renal function between ages 18 and 70.     CBC (No Diff) [[id number]]  (Abnormal) Collected: 05/05/23 0004    Specimen: Blood Updated: 05/05/23 0050     WBC 10.77 10*3/mm3      RBC 4.05 10*6/mm3      Hemoglobin 12.4 g/dL      Hematocrit 39.9 %      MCV 98.5 fL      MCH 30.6 pg      MCHC 31.1 g/dL      RDW 14.6 %      RDW-SD 53.3 fl      MPV 12.1 fL      Platelets 188 10*3/mm3         Imaging Results (Last 24 Hours)     Procedure Component Value Units Date/Time    US Venous Doppler Lower Extremity Bilateral (duplex) [[id number]] Collected: 05/04/23 1558     Updated: 05/04/23 1601    Narrative:      EXAM:    US Duplex Bilateral Lower Extremities Veins     EXAM DATE:    5/4/2023 3:03 PM     CLINICAL HISTORY:    right leg/ankle swelling     TECHNIQUE:    Real-time duplex ultrasound scan of the bilateral lower extremity  veins integrating B-mode two-dimensional vascular structure, Doppler  spectral analysis, color flow Doppler imaging and compression.     COMPARISON:    No relevant prior studies available.     FINDINGS:    RIGHT DEEP VEINS:  Unremarkable.  No DVT in the right common femoral,  femoral, proximal deep femoral or popliteal veins.  The veins  demonstrate normal color flow, are normally compressible, with normal  phasic flow and/or augmentation response.       LEFT DEEP VEINS:  Unremarkable.  No DVT in the left common femoral,  femoral, proximal deep femoral or popliteal veins.  The veins  demonstrate normal color flow, are normally compressible, with normal  phasic flow and/or augmentation response.       SOFT TISSUES:  No acute findings.  No popliteal cyst.       Impression:        Normal bilateral lower extremity duplex venous ultrasound.     This report was finalized on 5/4/2023 3:59 PM by Dr. Jarad Gonzalez MD.               Assessment & Plan     Evaluation for medical decision making capacity  -At this point in time, patient does have capacity for medical decision making.  She is alert and oriented and able to answer questions about her current  treatment options and medical needs appropriately given her level of education.  She did have some slight memory difficulty at first when asked why she was in the hospital but after I specifically asked about heart issues, she was able to tell me everything mentioned above and had already considered the risks and benefits of treatment versus nontreatment.  Patient may need to be redirected or reminded but after this minor redirection or prompting, she tends to recover back to the appropriate topic and is able to answer questions appropriately in that sense.      I discussed the patients findings and my recommendations with patient and nursing staff    Lai Barrera MD  05/05/23  12:42 EDT

## 2023-05-05 NOTE — PROGRESS NOTES
"Palliative Care Daily Progress Note     S: Medical record reviewed, followed up with Primary RN Morenita and Dr Behbahani regarding patient's condition. When palliative care entered the room Azra was awake and resting quietly in bed. She was alert and oriented to person and place but not time, she began crying about not having to go be hooked up to that machine again. I told her that I was not hooking her up to anything. She calmed and told me thank you honey, and said she could not go through that again.      O:   Palliative Performance Scale Score:     /78 (BP Location: Right arm, Patient Position: Sitting)   Pulse 75   Temp 98.6 °F (37 °C) (Axillary)   Resp 14   Ht 165 cm (64.96\")   Wt 79.2 kg (174 lb 9.7 oz)   SpO2 98%   BMI 29.09 kg/m²     Intake/Output Summary (Last 24 hours) at 5/5/2023 1659  Last data filed at 5/5/2023 1553  Gross per 24 hour   Intake 1116.25 ml   Output 1900 ml   Net -783.75 ml       PE:  General Appearance:    Chronically ill appearing, alert, confused,, NAD   HEENT:    NC/AT, without obvious abnormality, EOMI, anicteric    Neck:   supple, trachea midline, no JVD   Lungs:     Unlabored respirations,no wheezes rhonchi or rales present    Heart:    Bradycardia, normal S1 and S2, no Murmur noted   Abdomen:     Soft, tenderness, ND, NABS    Extremities:   Moves all extremities, 1+ BLE edema, BLE have erythema/psoriasis, warm to touch, BL big toe have nails several inches long curl around and cutting into 2nd toe(giovanna horn in appearance)   Pulses:   Pulses palpable and equal bilaterally   Skin:   Warm, dry   Neurologic:   Alert to person, place, not time, confusing statements   Psych:   Calm, confused           Meds: Reviewed and changes noted.    Labs:   Results from last 7 days   Lab Units 05/05/23  0004   WBC 10*3/mm3 10.77   HEMOGLOBIN g/dL 12.4   HEMATOCRIT % 39.9   PLATELETS 10*3/mm3 188     Results from last 7 days   Lab Units 05/05/23  0004   SODIUM mmol/L 147*   POTASSIUM " mmol/L 3.2*   CHLORIDE mmol/L 112*   CO2 mmol/L 21.1*   BUN mg/dL 42*   CREATININE mg/dL 1.30*   GLUCOSE mg/dL 110*   CALCIUM mg/dL 9.2     Results from last 7 days   Lab Units 05/05/23  0004   SODIUM mmol/L 147*   POTASSIUM mmol/L 3.2*   CHLORIDE mmol/L 112*   CO2 mmol/L 21.1*   BUN mg/dL 42*   CREATININE mg/dL 1.30*   CALCIUM mg/dL 9.2   BILIRUBIN mg/dL 0.6   ALK PHOS U/L 68   ALT (SGPT) U/L 10   AST (SGOT) U/L 21   GLUCOSE mg/dL 110*     Imaging Results (Last 72 Hours)     Procedure Component Value Units Date/Time    XR Chest 1 View [[id number]] Collected: 05/05/23 1645     Updated: 05/05/23 1648    Narrative:      EXAM:    XR Chest, 1 View     EXAM DATE:    5/5/2023 4:38 PM     CLINICAL HISTORY:    post pacemaker; I44.2-Atrioventricular block, complete;  I44.2-Atrioventricular block, complete     TECHNIQUE:    Frontal view of the chest.     COMPARISON:    05/03/2023     FINDINGS:    LUNGS AND PLEURAL SPACES:  Coarsened markings and vague bibasilar  airspace opacities.  Probably tiny right pleural effusion.  No  pneumothorax.    HEART:  Cardiomegaly again noted.    MEDIASTINUM:  Unremarkable.    BONES/JOINTS:  Unremarkable.    TUBES, LINES AND DEVICES:  Automatic implantable cardioverter  defibrillator (AICD).       Impression:      1.  Coarsened markings and vague bibasilar airspace opacities.  2.  Probably tiny right pleural effusion.  3.  Cardiomegaly again noted.     This report was finalized on 5/5/2023 4:45 PM by Dr. Jarad Gonzalez MD.       US Venous Doppler Lower Extremity Bilateral (duplex) [[id number]] Collected: 05/04/23 1558     Updated: 05/04/23 1601    Narrative:      EXAM:    US Duplex Bilateral Lower Extremities Veins     EXAM DATE:    5/4/2023 3:03 PM     CLINICAL HISTORY:    right leg/ankle swelling     TECHNIQUE:    Real-time duplex ultrasound scan of the bilateral lower extremity  veins integrating B-mode two-dimensional vascular structure, Doppler  spectral analysis, color flow Doppler imaging  and compression.     COMPARISON:    No relevant prior studies available.     FINDINGS:    RIGHT DEEP VEINS:  Unremarkable.  No DVT in the right common femoral,  femoral, proximal deep femoral or popliteal veins.  The veins  demonstrate normal color flow, are normally compressible, with normal  phasic flow and/or augmentation response.       LEFT DEEP VEINS:  Unremarkable.  No DVT in the left common femoral,  femoral, proximal deep femoral or popliteal veins.  The veins  demonstrate normal color flow, are normally compressible, with normal  phasic flow and/or augmentation response.       SOFT TISSUES:  No acute findings.  No popliteal cyst.       Impression:        Normal bilateral lower extremity duplex venous ultrasound.     This report was finalized on 5/4/2023 3:59 PM by Dr. Jarad Gonzalez MD.       XR Chest 1 View [[id number]] Collected: 05/03/23 2208     Updated: 05/03/23 2212    Narrative:      Procedure: Portable chest x-ray examination performed on 05/03/2023.  Single view. Semiupright position.     HISTORY: Chest pain.     FINDINGS:     Enlarged heart size.  Central pulmonary vascular congestion with mild interstitial edema.  No lobar consolidation, pleural effusion, or pneumothorax.  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  No fracture or foreign body.  No free air in the upper abdomen.       Impression:         1.  Enlarged heart size.  2.  Central pulmonary vascular congestion with trace interstitial edema.  3.  No pleural effusion or pneumothorax.  4.  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  5.  No free air in the upper abdomen.     This report was finalized on 5/3/2023 10:10 PM by Mumtaz Mg MD.               Diagnostics: Reviewed    A: Azra Ohara is a 88 y.o. female  admitted with a urinary tract infection. She  has a medical history of apical MI, heart failure with preserved EF, essential hypertension, chronic kidney disease stage IV, LBBB, polycythemia vera, and peripheral vascular disease. The patient was confused and was only able to tell the physician that she was fatigued and was having shortness of breath with activity, she also stated that she had chest pain. Per  EMS their was suspicion of a UTI and pt arrived disheveled and smelled like urine. In the ER pt was found to be in Sinus Bradycardia and EKG showed a new third degree heart block.Dr Nobles recommended admission and he would see pt in the am.      P:  Pt to receive her pacemaker today. I was able to speak with nephew Alexandru, who asked if the hospital could help the family come up with a plan for a safe discharge, as they felt, as do I that pt is unable to care for herself at home. Palliative was able to meet with family with [facility] and Dr Rodrigues to discuss options. Pts nephews Gonzalez and Alexandru were upset because psychiatry had stated Azra did have capacity as they disagree on her past behavior and condition upon which she arrived. They stated that they have tried to offer her help and even to have HH in th past and she refused help. Dr Rodrigues suggested that she will talk with pt and to give her options so she feels like she still has a choice and is in charge. Montserrat I let them know that we will be here Monday and will follow up with Dr Rodrigues's notes so we know how to proceed with plan. Family states that if reasonable plan is not agreed to by Azra they would like a second opinion from a different psychiatrist.    We will continue to follow along. Please do not hesitate to contact us regarding further sx mgmt or GOC needs, including after hours or on weekends via our on call provider at [phone].     Lala Bee, APRN    5/5/2023

## 2023-05-05 NOTE — PROGRESS NOTES
THC Physician - Brief Progress Note  PERMANENT  05/05/2023 04:10    [facility] [place] [facility] - [address] - [place], [place] ([facility])    DALIA WENCESLAO    Date of Service 05/05/2023 04:10    HPI/Events of Note [facility] Provider Intervention Note    Comments:  RN reports patient's potassium 3.2 (4.1) and magnesium 1.8 (2). RN requests replacement orders please. Patient only has PIVs and is NPO.    Chart reviewed, orders placed    _____   Video Assessment performed            Contact [facility] for any needs if bedside physician is not present.      Interventions Minor-Electrolyte abnormality - evaluation and management        Electronically Signed by: Norbert Alves) on 05/05/2023 04:12

## 2023-05-05 NOTE — PLAN OF CARE
Goal Outcome Evaluation:              Outcome Evaluation: A&O, NPO for Pacemaker placement today.  purwick in place, no void, bladder scan 36 ml.  VSS  Problem: Fall Injury Risk  Goal: Absence of Fall and Fall-Related Injury  Outcome: Ongoing, Progressing     Problem: Impaired Wound Healing  Goal: Optimal Wound Healing  Outcome: Ongoing, Progressing     Problem: Dysrhythmia  Goal: Normalized Cardiac Rhythm  Outcome: Ongoing, Progressing     Problem: Hypertension Comorbidity  Goal: Blood Pressure in Desired Range  Outcome: Ongoing, Progressing     Problem: Skin Injury Risk Increased  Goal: Skin Health and Integrity  Outcome: Ongoing, Progressing      [FreeTextEntry1] : 2mo ex FT female infant PMH of repaired congenital heart lesions (DORV, TGA, VSD, coarct s/p BAS, PA banding, and coarct repair 2021) here for weight check/fu.\par \par Seen by Neuro, recommended Ophtho evaluation for intrinsic eye causes of nystagmus. Stable for a CV standpoint. From GI standpoint is tolerating feeds of 90cc/q3h and gaining weight well but MOC thinks she is still hungry so advised increase to 95cc/q3h for 3 days and then increase to goal of 100cc/q3h if tolerated. MOC also wondering if prune juice can be thickened with thickener she uses for formula but will reach out to [facility] to determine if this under purview of  or GI. MOC also wondering about whether RTF formula can be thickened with thickener as she was given instructions for recipe mixing by [facility] for powder formula only. Advised that I will contact  and 410 will reach out to mom re: consensus. Anticipatory guidance given and return precautions discussed in detail. Advised to RTC in 5-6 weeks for 4mo WCC. Parent(s) in agreement with plan. All questions answered.\par - Maxwell Brown MD, PGY3

## 2023-05-05 NOTE — CONSULTS
Preprocedure Diagnosis: Toenail deformities onychogryphosis, onychodystrophy, onychomycosis, onychocryptosis bilateral 1-5, pain.PVD, CKD  Chronic kidney disease, stage 4 (severe) 01/20/2023    • Essential hypertension     • History of colon cancer     • Left bundle branch block 01/20/2023   • Long term (current) use of aspirin 01/20/2023   • Polycythemia vera    Post Procedure Diagnosis:   Toenail deformities onychogryphosis, onychodystrophy, onychomycosis, onychocryptosis bilateral 1-5, pain.PVD, CKD  Chronic kidney disease, stage 4 (severe) 01/20/2023    • Essential hypertension     • History of colon cancer     • Left bundle branch block 01/20/2023   • Long term (current) use of aspirin 01/20/2023   • Polycythemia vera          Procedure #1: Debridement of toenails 6-10 using mycotic nail marcial toenails 1-5 bilateral were debrided in length, height and incurvation at edges. Patient tolerated procedure well.

## 2023-05-05 NOTE — CONSULTS
PODIATRIC SURGERY CONSULTATION REPORT      Referring Provider: MD  Reason for Consultation: Severe Toenail pain.      Principal problem: Third degree atrioventricular block    Subjective     History of present illness:     Ms. Azra Ohara is a 88 y.o. female with past medical history significant forapical MI, heart failure with preserved EF, essential hypertension, chronic kidney disease stage IV, LBBB, polycythemia vera, and peripheral vascular disease seen for sever elongated overgrown toenails bilateral.Podiatric consultation was requested for antimicrobial management. History taken from: patient Case was discussed with patient    Review of Systems    Constitutional: No fever, chills or night sweats. No appetite change or unexpected weight change. No fatigue.  Eyes: No eye drainage, itching or redness.  HEENT: No mouth sores, dysphagia or nose bleed.  Respiratory: No shortness of breath, cough or production of sputum.  Cardiovascular: No chest pain, no palpitations, no orthopnea.  Gastrointestinal: No nausea, vomiting or diarrhea. No abdominal pain, hematemesis or rectal bleeding.  Genitourinary: No dysuria or polyuria.  Hematologic/lymphatic: No lymph node abnormalities, no easy bruising or easy bleeding.  Musculoskeletal: No muscle or joint pain.  Skin: toenail pain bilateral  Neurological: No loss of consciousness, no seizure, no headache.  Behavioral/Psych: No depression or suicidal ideation.  Endocrine: No hot flashes.  Immunologic: Negative.    Past Medical History    Past Medical History:   Diagnosis Date   • Chronic kidney disease, stage 4 (severe) 01/20/2023   • Essential hypertension    • History of colon cancer    • Left bundle branch block 01/20/2023   • Long term (current) use of aspirin 01/20/2023   • Polycythemia vera 01/20/2023       Past Surgical History    Past Surgical History:   Procedure Laterality Date   • COLON RESECTION  2004   • PARATHYROIDECTOMY Right     Parathyroid adenoma  "      Family History    Family History   Problem Relation Age of Onset   • Heart disease Mother    • Heart disease Father        Social History    Social History     Tobacco Use   • Smoking status: Never   Vaping Use   • Vaping Use: Never used   Substance Use Topics   • Alcohol use: Never   • Drug use: Never       Allergies    Lipitor [atorvastatin] and Penicillins    Objective     /65   Pulse (!) 37   Temp 98.8 °F (37.1 °C) (Oral)   Resp 16   Ht 165 cm (64.96\")   Wt 79.2 kg (174 lb 9.7 oz)   SpO2 98%   BMI 29.09 kg/m²     Temp:  [98.2 °F (36.8 °C)-99 °F (37.2 °C)] 98.8 °F (37.1 °C)        Intake/Output Summary (Last 24 hours) at 5/5/2023 1431  Last data filed at 5/5/2023 1315  Gross per 24 hour   Intake 687.5 ml   Output 1900 ml   Net -1212.5 ml         Physical Exam:     General Appearance:  Alert, cooperative, in no acute distress   Head:  Normocephalic, without obvious abnormality, atraumatic   Eyes:            Lids and lashes normal, conjunctivae and sclerae normal,     no icterus, no pallor, corneas clear, PERRLA   Ears:  Ears appear intact with no abnormalities noted   Throat: No oral lesions, no thrush, oral mucosa moist   Neck: No adenopathy, supple, trachea midline, no thyromegaly, no   carotid bruit, no JVD   Back:   No tenderness to percussion or palpation, range of motion   normal   Lungs:   Clear to auscultation,respirations regular, even and unlabored. No wheezing, no rhonchi and no crackles.    Heart:  Regular rhythm and normal rate, normal S1 and S2, no           murmur, no gallop, no rub, no click   Chest Wall:  No abnormalities observed   Abdomen:   Normal bowel sounds, no masses, no organomegaly, soft       non tender, non distended, no guarding, no rebound tenderness   Rectal:   Deferred   Extremities: Severe overgrown gryphotic toenails bilateral, elongated, hypertrophic, discolored fungus, sera horn, pain to palpation 1-5 bilateral.    Pulses: Decreased DP and PT Pulses " bilaterally, thin atrphic skin bilateral. Diffuse callus scaling hyperkeraosis.   Skin: No bleeding, bruising or rash   Lymph nodes: No palpable adenopathy   Neurologic: Awake, alert and oriented x 3. Following commands.       Results:    Results from last 7 days   Lab Units 05/05/23  0004 05/03/23 2117   WBC 10*3/mm3 10.77 10.94*     Lab Results   Component Value Date    NEUTROABS 9.07 (H) 05/03/2023       Results from last 7 days   Lab Units 05/05/23  0004   CREATININE mg/dL 1.30*       Results from last 7 days   Lab Units 05/03/23 2117   CRP mg/dL 3.26*       Imaging Results (Last 24 Hours)     Procedure Component Value Units Date/Time    US Venous Doppler Lower Extremity Bilateral (duplex) [[id number]] Collected: 05/04/23 1558     Updated: 05/04/23 1601    Narrative:      EXAM:    US Duplex Bilateral Lower Extremities Veins     EXAM DATE:    5/4/2023 3:03 PM     CLINICAL HISTORY:    right leg/ankle swelling     TECHNIQUE:    Real-time duplex ultrasound scan of the bilateral lower extremity  veins integrating B-mode two-dimensional vascular structure, Doppler  spectral analysis, color flow Doppler imaging and compression.     COMPARISON:    No relevant prior studies available.     FINDINGS:    RIGHT DEEP VEINS:  Unremarkable.  No DVT in the right common femoral,  femoral, proximal deep femoral or popliteal veins.  The veins  demonstrate normal color flow, are normally compressible, with normal  phasic flow and/or augmentation response.       LEFT DEEP VEINS:  Unremarkable.  No DVT in the left common femoral,  femoral, proximal deep femoral or popliteal veins.  The veins  demonstrate normal color flow, are normally compressible, with normal  phasic flow and/or augmentation response.       SOFT TISSUES:  No acute findings.  No popliteal cyst.       Impression:        Normal bilateral lower extremity duplex venous ultrasound.     This report was finalized on 5/4/2023 3:59 PM by Dr. Jarad Gonzalez MD.                Results Review:    I have personally reviewed laboratory data, culture results, radiology studies and antimicrobial therapy.    Hospital Medications (active)       Dose Frequency Start End    aspirin EC tablet 81 mg ([MAR Hold] since 5/5/2023  1:19 PM) 81 mg Daily 5/4/2023     Admin Instructions: Herbal/drug interaction: Avoid use with ginkgo biloba. Do not crush or chew.  Do not exceed 4 grams of aspirin in a 24 hr period.    If given for pain, use the following pain scale:   Mild Pain = Pain Score of 1-3, CPOT 1-2  Moderate Pain = Pain Score of 4-6, CPOT 3-4  Severe Pain = Pain Score of 7-10, CPOT 5-8    Route: Oral    atorvastatin (LIPITOR) tablet 40 mg ([MAR Hold] since 5/5/2023  1:19 PM) 40 mg Nightly 5/4/2023     Admin Instructions: Avoid grapefruit juice.    Route: Oral    ceFAZolin 2 gm IVPB in 100 mL NS (VTB) 2 g Every 8 Hours 5/4/2023 5/9/2023    Admin Instructions: Caution: Look alike/sound alike drug alert    Route: Intravenous    dextrose (D5W) 5 % infusion 75 mL/hr Continuous 5/5/2023     Route: Intravenous    fentaNYL citrate (PF) (SUBLIMAZE) injection  Code / Trauma / Sedation Medication 5/5/2023     heparin (porcine) 5000 UNIT/ML injection 5,000 Units ([MAR Hold] since 5/5/2023  1:19 PM) 5,000 Units Every 12 Hours Scheduled 5/4/2023     Route: Subcutaneous    lidocaine (XYLOCAINE) 2% injection  Code / Trauma / Sedation Medication 5/5/2023     O2 (OXYGEN)  Code / Trauma / Sedation Medication 5/5/2023     sodium chloride 0.9 % flush 10 mL ([MAR Hold] since 5/5/2023  1:19 PM) 10 mL As Needed 5/3/2023     Route: Intravenous    Cosign for Ordering: Required by Abelardo Rowan DO    Linked Group 1: See [facility] for full Linked Orders Report.        sodium chloride 0.9 % flush 10 mL ([MAR Hold] since 5/5/2023  1:19 PM) 10 mL Every 12 Hours Scheduled 5/4/2023     Route: Intravenous    sodium chloride 0.9 % flush 10 mL ([MAR Hold] since 5/5/2023  1:19 PM) 10 mL As Needed 5/4/2023     Route:  Intravenous    sodium chloride 0.9 % flush 10 mL ([MAR Hold] since 5/5/2023  1:19 PM) 10 mL Every 12 Hours Scheduled 5/4/2023     Route: Intravenous    Cosign for Ordering: Accepted by Rick Dejesus MD on 5/4/2023  3:58 AM    sodium chloride 0.9 % flush 10 mL ([MAR Hold] since 5/5/2023  1:19 PM) 10 mL As Needed 5/4/2023     Route: Intravenous    Cosign for Ordering: Accepted by Rick Dejesus MD on 5/4/2023  3:58 AM    sodium chloride 0.9 % flush 10 mL 10 mL Every 12 Hours Scheduled 5/5/2023     Route: Intravenous    sodium chloride 0.9 % flush 10 mL 10 mL As Needed 5/5/2023     Route: Intravenous    sodium chloride 0.9 % infusion 40 mL ([MAR Hold] since 5/5/2023  1:19 PM) 40 mL As Needed 5/4/2023     Admin Instructions: Following administration of an IV intermittent medication, flush line with 40mL NS at 100mL/hr.    Route: Intravenous    sodium chloride 0.9 % infusion 40 mL ([MAR Hold] since 5/5/2023  1:19 PM) 40 mL As Needed 5/4/2023     Admin Instructions: Following administration of an IV intermittent medication, flush line with 40mL NS at 100mL/hr.    Route: Intravenous    Cosign for Ordering: Accepted by Rick Dejesus MD on 5/4/2023  3:58 AM    sodium chloride 0.9 % infusion 40 mL 40 mL As Needed 5/5/2023     Admin Instructions: Following administration of an IV intermittent medication, flush line with 40mL NS at 100mL/hr.    Route: Intravenous    sodium chloride 0.9 % infusion  Code / Trauma / Sedation Continuous Med 5/5/2023     vancomycin (VANCOCIN) 1,000 mg in sodium chloride 0.9 % 250 mL IVPB-VTB 1,000 mg Once 5/5/2023     Route: Intravenous    vancomycin (VANCOCIN) 1,000 mg in sodium chloride 0.9 % 250 mL IVPB  Code / Trauma / Sedation Continuous Med 5/5/2023             PROBLEM LIST:    Patient Active Problem List   Diagnosis   • Chronic kidney disease, stage 4 (severe)   • Essential hypertension   • Left bundle branch block   • Malignant neoplasm of large intestine   • Polycythemia vera    • Long term (current) use of aspirin   • Third degree atrioventricular block       Assessment and Plan:  Toenail deformities onychogryphosis, onychodystrophy, onychomycosis, onychocryptosis bilateral 1-5, pain.PVD, CKD    PLAN: Debridement toenails 1-5 bilateral, Ammonium lactate cream daily pedal skin. Follow up outpatient foot care high risk non professional care.           Patient's findings and recommendations were discussed with patient    Code Status:   Code Status and Medical Interventions:   Ordered at: 05/04/23 1533     Medical Intervention Limits:    NO intubation (DNI)     Level Of Support Discussed With:    Next of Kin (If No Surrogate)     Code Status (Patient has no pulse and is not breathing):    No CPR (Do Not Attempt to Resuscitate)     Medical Interventions (Patient has pulse or is breathing):    Limited Support     Comments:    Sister Doreen and nephew Alexandru     Release to patient:    Routine Release       Jean Wilkerson MD  05/05/23  14:31 EDT

## 2023-05-05 NOTE — CASE MANAGEMENT/SOCIAL WORK
Discharge Planning Assessment   [facility]     Patient Name: Azra Ohara  MRN: [id number]  Today's Date: 5/5/2023    Admit Date: 5/3/2023          Discharge Plan     Row Name 05/05/23 1706       Plan    Plan SS noted, Psychiatry has determined Pt has capacity to make her own decisions. SS attended family meeting with Dr. Rodrigues, Palliative care [place], Pt's nephews and sister on this date. Family expressed concerns that Pt exhibts some confusion and not been willing to provide self care at home. Physician plans to discuss HH vs SNF with Pt over the weekend. Pt just returned from OR. SS to follow up with Pt on Monday, 05/08/23 to discuss discharge needs.                RAFAEL HamiltonW

## 2023-05-05 NOTE — PROGRESS NOTES
[facility]  PROGRESS NOTE      Patient information:  Name: Azra Ohara  Age/Sex: 88 y.o. female  :  1935        PCP: Minal Salazar APRN  Attending: Rick Dejesus MD  MRN:  [id number]  Visit Number:  [id number]    LOS:  LOS: 1 day     PROBLEM LIST:Principal Problem:    Third degree atrioventricular block      Reason for Cardiology follow-up: Third degree heart block     Subjective   ADMISSION INFORMATION:  Chief Complaint   Patient presents with   • Urinary Tract Infection       HPI:  Azra Ohara is a 88 y.o. female with a past medical history significant for HFpEF, CKD stage IV, essential hypertension, LBBB, PAD, history of apical MI, history of malignant neoplasm of large intestine, and polycythemia.  Patient presented to [facility] ([facility]) emergency room (ER) on 5/3/2023 with complaints of dysuria.  While in the ER, proBNP was 6565. HS troponin was 50 with repeat being 44.  EKG revealed third-degree AV block with junctional escape rhythm no evidence of STEMI was noted.  Patient was admitted and  Cardiology was consulted for further evaluation and management.     Interval History:   According to nursing notes, Kassidy Sanz on 2023 1722, patient's CODE STATUS was changed to DNR /DNI.  Patient is in room CCU 10 and was examined by Dr. Garvin.   Patient has been n.p.o. since midnight for possible pacemaker implantation today.  Telemetry reveals SB 40s with a BBB with third-degree block.  According to patient's I&O flowsheet, she diuresed excellent with a -2410 mL noted.  Patient's potassium was 3.2 and magnesium was 1.8.  Both have been replaced per hospital protocol.  Patient's creatinine is slightly bumped up to 1.30 from 1.14.  CO2 is 21.1, sodium is 147, chloride is 112, and BUN is 42.  HGB is 12.4 and platelet count is 188.  Patient is lying in bed resting quietly with her eyes closed but is easily awaken with verbal stimuli.   No acute distress noted at this time.  Patient is oriented to person and place. She currently denies any chest pain, shortness of breath, or palpitations. Patient's nephew is at bedside. He reports he is not her POA and he is hoping her sister, Doreen,  will take on the role of her POA if needed.     Vital Signs  Temp:  [98.2 °F (36.8 °C)-98.9 °F (37.2 °C)] 98.9 °F (37.2 °C)  Heart Rate:  [34-47] 38  Resp:  [11-48] 22  BP: (123-177)/() 132/90  Vital Signs (last 72 hrs)       05/02 0700  05/03 0659 05/03 0700  05/04 0659 05/04 0700  05/05 0659 05/05 0700 05/05 0743   Most Recent      Temp (°F)   98.1 -  98.5    98.2 -  98.9       98.9 (37.2) 05/05 0400    Heart Rate   37 -  81    34 -  47      38     38 05/05 0700    Resp   18 -  20    11 -  48      22     22 05/05 0700    BP   132/94 -  180/158    123/109 -  177/64      132/90     132/90 05/05 0700    SpO2 (%)   94 -  99    86 -  100      100     100 05/05 0700        Body mass index is 29.09 kg/m².    Intake/Output Summary (Last 24 hours) at 5/5/2023 0743  Last data filed at 5/5/2023 0537  Gross per 24 hour   Intake 340 ml   Output 2750 ml   Net -2410 ml     Objective  Objective     I have seen and examined Ms. Ohara today.  Physical Exam:      General Appearance:    Somnolent, cooperative, in no acute distress.   Head:    Normocephalic, without obvious abnormality, atraumatic.   Eyes:                          Conjunctivae and sclerae normal, no icterus, no pallor, corneas clear.   Neck:   No adenopathy, supple, trachea midline, no thyromegaly, no carotid bruit, no JVD.   Lungs:     Clear to auscultation, respirations regular, even and             unlabored.    Heart:    Regular rhythm and bradycardic rate, normal S1 and S2, Grade 2/6 murmur noted heard best aortic area, no gallop, no rub, no click   Chest Wall:    No abnormalities observed.   Abdomen:     Normal bowel sounds, no masses, no organomegaly, soft nontender, nondistended, no guarding, no rebound  tenderness.   Extremities:   Moves all extremities well, no edema, no cyanosis, no            redness.   Pulses:   Pulses palpable and equal bilaterally.   Skin:   No bleeding, bruising or rash.   Neurologic:   Somulent and oriented to person and place, Speech Clear & comprehensive.       Results review   Results Review:     Results from last 7 days   Lab Units 05/05/23  0004 05/03/23 2117   WBC 10*3/mm3 10.77 10.94*   HEMOGLOBIN g/dL 12.4 14.3   PLATELETS 10*3/mm3 188 251     Results from last 7 days   Lab Units 05/05/23  0004 05/03/23 2117   SODIUM mmol/L 147* 147*   POTASSIUM mmol/L 3.2* 4.1   CHLORIDE mmol/L 112* 109*   CO2 mmol/L 21.1* 24.4   BUN mg/dL 42* 47*   CREATININE mg/dL 1.30* 1.14*   CALCIUM mg/dL 9.2 10.8*   GLUCOSE mg/dL 110* 112*   ALT (SGPT) U/L 10 14   AST (SGOT) U/L 21 26     Results from last 7 days   Lab Units 05/03/23  2313 05/03/23 2117   HSTROP T ng/L 44* 50*     Lab Results   Component Value Date    PROBNP 6,565.0 (H) 05/03/2023     No results found.     No results found for: INR  Lab Results   Component Value Date    MG 1.8 05/05/2023    MG 2.0 05/03/2023    MG 2.0 01/17/2023     Lab Results   Component Value Date    TSH 0.998 05/03/2023    TRIG 98 01/17/2023    HDL 35 (L) 01/17/2023    LDL 24 01/17/2023      Pain Management Panel         Latest Ref Rng & Units 1/16/2023   Pain Management Panel   Creatinine, Urine mg/dL 72.7     Amphetamine, Urine Qual Negative Negative     Barbiturates Screen, Urine Negative Negative     Benzodiazepine Screen, Urine Negative Negative     Buprenorphine, Screen, Urine Negative Negative     Cocaine Screen, Urine Negative Negative     Methadone Screen , Urine Negative Negative     Methamphetamine, Ur Negative Negative                Microbiology Results (last 10 days)     Procedure Component Value - Date/Time    COVID PRE-OP / PRE-PROCEDURE SCREENING ORDER (NO ISOLATION) - Swab, Nasopharynx [[id number]]  (Normal) Collected: 05/04/23 0057    Lab Status:  Final result Specimen: Swab from Nasopharynx Updated: 05/04/23 0120    Narrative:      The following orders were created for panel order COVID PRE-OP / PRE-PROCEDURE SCREENING ORDER (NO ISOLATION) - Swab, Nasopharynx.  Procedure                               Abnormality         Status                     ---------                               -----------         ------                     COVID-19 and FLU A/B PCR...[[id number]]  Normal              Final result                 Please view results for these tests on the individual orders.    COVID-19 and FLU A/B PCR - Swab, Nasopharynx [[id number]]  (Normal) Collected: 05/04/23 0057    Lab Status: Final result Specimen: Swab from Nasopharynx Updated: 05/04/23 0120     COVID19 Not Detected     Influenza A PCR Not Detected     Influenza B PCR Not Detected    Narrative:      Fact sheet for providers: https://www.fda.gov/media/784253/download    Fact sheet for patients: https://www.fda.gov/media/695403/download    Test performed by PCR.         Imaging Results (Last 48 Hours)     Procedure Component Value Units Date/Time    US Venous Doppler Lower Extremity Bilateral (duplex) [[id number]] Collected: 05/04/23 1558     Updated: 05/04/23 1601    Narrative:      EXAM:    US Duplex Bilateral Lower Extremities Veins     EXAM DATE:    5/4/2023 3:03 PM     CLINICAL HISTORY:    right leg/ankle swelling     TECHNIQUE:    Real-time duplex ultrasound scan of the bilateral lower extremity  veins integrating B-mode two-dimensional vascular structure, Doppler  spectral analysis, color flow Doppler imaging and compression.     COMPARISON:    No relevant prior studies available.     FINDINGS:    RIGHT DEEP VEINS:  Unremarkable.  No DVT in the right common femoral,  femoral, proximal deep femoral or popliteal veins.  The veins  demonstrate normal color flow, are normally compressible, with normal  phasic flow and/or augmentation response.       LEFT DEEP VEINS:  Unremarkable.  No DVT in the  left common femoral,  femoral, proximal deep femoral or popliteal veins.  The veins  demonstrate normal color flow, are normally compressible, with normal  phasic flow and/or augmentation response.       SOFT TISSUES:  No acute findings.  No popliteal cyst.       Impression:        Normal bilateral lower extremity duplex venous ultrasound.     This report was finalized on 5/4/2023 3:59 PM by Dr. Jarda Gonzalez MD.       XR Chest 1 View [[id number]] Collected: 05/03/23 2208     Updated: 05/03/23 2212    Narrative:      Procedure: Portable chest x-ray examination performed on 05/03/2023.  Single view. Semiupright position.     HISTORY: Chest pain.     FINDINGS:     Enlarged heart size.  Central pulmonary vascular congestion with mild interstitial edema.  No lobar consolidation, pleural effusion, or pneumothorax.  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  No fracture or foreign body.  No free air in the upper abdomen.       Impression:         1.  Enlarged heart size.  2.  Central pulmonary vascular congestion with trace interstitial edema.  3.  No pleural effusion or pneumothorax.  4.  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  5.  No free air in the upper abdomen.     This report was finalized on 5/3/2023 10:10 PM by Mumtaz Mg MD.             ECHO:  Results for orders placed during the hospital encounter of 01/15/23    Adult Transthoracic Echo Complete W/ Cont if Necessary Per Protocol    Interpretation Summary  •  Normal left ventricular cavity size and wall thickness noted  •  Left ventricular ejection fraction appears to be 56 - 60%.  •  The following left ventricular wall segments are hypokinetic: apical anterior, apical lateral, apical inferior, apical septal and apex hypokinetic.  •  Left ventricular diastolic function is  consistent with (grade I) impaired relaxation.  •  There is mild calcification of the aortic valve. There is mild thickening of the non-coronary, left coronary and right coronary cusp(s) of the aortic valve. The aortic valve appears trileaflet.  •  There is mild calcification of the mitral valve posterior leaflet(s). Trace mitral valve regurgitation is present. No significant mitral valve stenosis is present.  •  Trace tricuspid valve regurgitation is present. Estimated right ventricular systolic pressure from tricuspid regurgitation is mildly elevated (35-45 mmHg).  •  There is no evidence of pericardial effusion.      STRESS TEST:  Results for orders placed during the hospital encounter of 01/15/23    Stress Test With Myocardial Perfusion One Day    Interpretation Summary  Images from the original result were not included.    •  A pharmacological stress test was performed using regadenoson without low-level exercise.  •  Findings consistent with an indeterminate ECG stress test.  •  Myocardial perfusion imaging indicates a small-sized infarct located in the apex with no significant ischemia noted.  •  Gated SPECT scanning was of suboptimal quality and hence could not comment upon the LV wall motion and systolic function accurately.  •  Impressions are consistent with an intermediate risk study.       HEART CATH:  No results found for this or any previous visit.      [id number]      TELEMETRY: SB 30/40's with BBB with 3rd Degree Block still present           I reviewed the patient's new clinical results.    Medication Review:   Current list of medications may not reflect those currently placed in orders that are not signed or are being held.     aspirin, 81 mg, Oral, Daily  atorvastatin, 40 mg, Oral, Nightly  ceFAZolin, 2 g, Intravenous, Q8H  heparin (porcine), 5,000 Units, Subcutaneous, Q12H  magnesium sulfate, 2 g, Intravenous, Once  potassium chloride, 10 mEq, Intravenous, Q1H  sodium chloride, 10 mL,  Intravenous, Q12H  sodium chloride, 10 mL, Intravenous, Q12H         Assessment      1. Complete heart block, status post permanent dual-chamber pacemaker implantation earlier today  2. Essential hypertension with fluctuating systolic blood pressure  3. Urinary tract infection, being managed by the hospitalist service  4. Stage IIIb chronic kidney disease.    Plan     1. Patient underwent permanent pacemaker implantation earlier today and tolerated well.  2. She can be transferred to telemetry floor in a.m.  3. Patient if she remains stable with her heart rate and rhythm, could be discharged home this weekend and follow-up in our office on Monday for dressing check and next Friday for staple removal.  4. She will wear the sling on her left arm till next Friday.  5. No shower baths still next Friday, can do sponge baths.    I have discussed the patients findings and my recommendations with patient.      Electronically signed by WASHINGTON Henderson, 05/05/23, 12:27 PM EDT.    Electronically signed by Sampson Garvin MD, 05/05/23, 7:28 PM EDT.              Please note that portions of this note were completed with a voice recognition program.    Please note that portions of this note were copied and has been reviewed and is accurate as of 5/5/2023 .

## 2023-05-06 PROBLEM — I44.2 THIRD DEGREE ATRIOVENTRICULAR BLOCK: Status: RESOLVED | Noted: 2023-05-04 | Resolved: 2023-05-06

## 2023-05-06 LAB
ANION GAP SERPL CALCULATED.3IONS-SCNC: 10.4 MMOL/L (ref 5–15)
BASOPHILS # BLD AUTO: 0.03 10*3/MM3 (ref 0–0.2)
BASOPHILS NFR BLD AUTO: 0.4 % (ref 0–1.5)
BUN SERPL-MCNC: 34 MG/DL (ref 8–23)
BUN/CREAT SERPL: 37.4 (ref 7–25)
CALCIUM SPEC-SCNC: 8.7 MG/DL (ref 8.6–10.5)
CHLORIDE SERPL-SCNC: 113 MMOL/L (ref 98–107)
CO2 SERPL-SCNC: 20.6 MMOL/L (ref 22–29)
CREAT SERPL-MCNC: 0.91 MG/DL (ref 0.57–1)
DEPRECATED RDW RBC AUTO: 50.5 FL (ref 37–54)
EGFRCR SERPLBLD CKD-EPI 2021: 60.8 ML/MIN/1.73
EOSINOPHIL # BLD AUTO: 0.08 10*3/MM3 (ref 0–0.4)
EOSINOPHIL NFR BLD AUTO: 0.9 % (ref 0.3–6.2)
ERYTHROCYTE [DISTWIDTH] IN BLOOD BY AUTOMATED COUNT: 14.4 % (ref 12.3–15.4)
GLUCOSE SERPL-MCNC: 91 MG/DL (ref 65–99)
HCT VFR BLD AUTO: 36.2 % (ref 34–46.6)
HGB BLD-MCNC: 11.5 G/DL (ref 12–15.9)
IMM GRANULOCYTES # BLD AUTO: 0.03 10*3/MM3 (ref 0–0.05)
IMM GRANULOCYTES NFR BLD AUTO: 0.4 % (ref 0–0.5)
LYMPHOCYTES # BLD AUTO: 1.16 10*3/MM3 (ref 0.7–3.1)
LYMPHOCYTES NFR BLD AUTO: 13.6 % (ref 19.6–45.3)
MAGNESIUM SERPL-MCNC: 2 MG/DL (ref 1.6–2.4)
MCH RBC QN AUTO: 30.4 PG (ref 26.6–33)
MCHC RBC AUTO-ENTMCNC: 31.8 G/DL (ref 31.5–35.7)
MCV RBC AUTO: 95.8 FL (ref 79–97)
MONOCYTES # BLD AUTO: 0.58 10*3/MM3 (ref 0.1–0.9)
MONOCYTES NFR BLD AUTO: 6.8 % (ref 5–12)
NEUTROPHILS NFR BLD AUTO: 6.68 10*3/MM3 (ref 1.7–7)
NEUTROPHILS NFR BLD AUTO: 77.9 % (ref 42.7–76)
NRBC BLD AUTO-RTO: 0 /100 WBC (ref 0–0.2)
PLATELET # BLD AUTO: 173 10*3/MM3 (ref 140–450)
PMV BLD AUTO: 12.6 FL (ref 6–12)
POTASSIUM SERPL-SCNC: 3.9 MMOL/L (ref 3.5–5.2)
POTASSIUM SERPL-SCNC: 4.7 MMOL/L (ref 3.5–5.2)
QT INTERVAL: 454 MS
QT INTERVAL: 462 MS
QT INTERVAL: 616 MS
QTC INTERVAL: 476 MS
QTC INTERVAL: 506 MS
QTC INTERVAL: 595 MS
RBC # BLD AUTO: 3.78 10*6/MM3 (ref 3.77–5.28)
SODIUM SERPL-SCNC: 144 MMOL/L (ref 136–145)
WBC NRBC COR # BLD: 8.56 10*3/MM3 (ref 3.4–10.8)

## 2023-05-06 PROCEDURE — 94799 UNLISTED PULMONARY SVC/PX: CPT

## 2023-05-06 PROCEDURE — 25010000002 CEFAZOLIN PER 500 MG: Performed by: INTERNAL MEDICINE

## 2023-05-06 PROCEDURE — 94761 N-INVAS EAR/PLS OXIMETRY MLT: CPT

## 2023-05-06 PROCEDURE — 25010000002 HEPARIN (PORCINE) PER 1000 UNITS: Performed by: INTERNAL MEDICINE

## 2023-05-06 PROCEDURE — 84132 ASSAY OF SERUM POTASSIUM: CPT | Performed by: INTERNAL MEDICINE

## 2023-05-06 PROCEDURE — 83735 ASSAY OF MAGNESIUM: CPT | Performed by: INTERNAL MEDICINE

## 2023-05-06 PROCEDURE — 80048 BASIC METABOLIC PNL TOTAL CA: CPT | Performed by: INTERNAL MEDICINE

## 2023-05-06 PROCEDURE — 99233 SBSQ HOSP IP/OBS HIGH 50: CPT | Performed by: INTERNAL MEDICINE

## 2023-05-06 PROCEDURE — 85025 COMPLETE CBC W/AUTO DIFF WBC: CPT | Performed by: INTERNAL MEDICINE

## 2023-05-06 RX ORDER — DOCUSATE SODIUM 100 MG/1
100 CAPSULE, LIQUID FILLED ORAL 2 TIMES DAILY
Status: DISCONTINUED | OUTPATIENT
Start: 2023-05-06 | End: 2023-05-11 | Stop reason: HOSPADM

## 2023-05-06 RX ORDER — ACETAMINOPHEN 325 MG/1
325 TABLET ORAL EVERY 6 HOURS PRN
Status: DISCONTINUED | OUTPATIENT
Start: 2023-05-06 | End: 2023-05-07

## 2023-05-06 RX ORDER — LOSARTAN POTASSIUM 25 MG/1
25 TABLET ORAL
Status: DISCONTINUED | OUTPATIENT
Start: 2023-05-06 | End: 2023-05-07

## 2023-05-06 RX ORDER — MULTIPLE VITAMINS W/ MINERALS TAB 9MG-400MCG
1 TAB ORAL DAILY
Status: DISCONTINUED | OUTPATIENT
Start: 2023-05-06 | End: 2023-05-11 | Stop reason: HOSPADM

## 2023-05-06 RX ADMIN — Medication 1 TABLET: at 17:37

## 2023-05-06 RX ADMIN — HEPARIN SODIUM 5000 UNITS: 5000 INJECTION INTRAVENOUS; SUBCUTANEOUS at 20:58

## 2023-05-06 RX ADMIN — CEFAZOLIN 2 G: 2 INJECTION, POWDER, FOR SOLUTION INTRAMUSCULAR; INTRAVENOUS at 17:37

## 2023-05-06 RX ADMIN — CEFAZOLIN 2 G: 2 INJECTION, POWDER, FOR SOLUTION INTRAMUSCULAR; INTRAVENOUS at 05:34

## 2023-05-06 RX ADMIN — Medication 10 ML: at 08:22

## 2023-05-06 RX ADMIN — DOCUSATE SODIUM 100 MG: 100 CAPSULE, LIQUID FILLED ORAL at 09:45

## 2023-05-06 RX ADMIN — LOSARTAN POTASSIUM 25 MG: 25 TABLET, FILM COATED ORAL at 17:37

## 2023-05-06 RX ADMIN — CEFAZOLIN 2 G: 2 INJECTION, POWDER, FOR SOLUTION INTRAMUSCULAR; INTRAVENOUS at 21:00

## 2023-05-06 RX ADMIN — POTASSIUM CHLORIDE 40 MEQ: 1.5 POWDER, FOR SOLUTION ORAL at 02:00

## 2023-05-06 RX ADMIN — DOCUSATE SODIUM 100 MG: 100 CAPSULE, LIQUID FILLED ORAL at 20:58

## 2023-05-06 RX ADMIN — Medication 10 ML: at 20:59

## 2023-05-06 RX ADMIN — ASPIRIN 81 MG: 81 TABLET, COATED ORAL at 08:22

## 2023-05-06 RX ADMIN — HYDROCODONE BITARTRATE AND ACETAMINOPHEN 1 TABLET: 5; 325 TABLET ORAL at 23:53

## 2023-05-06 RX ADMIN — ATORVASTATIN CALCIUM 40 MG: 40 TABLET, FILM COATED ORAL at 20:58

## 2023-05-06 RX ADMIN — ACETAMINOPHEN 325 MG: 325 TABLET ORAL at 19:05

## 2023-05-06 NOTE — PLAN OF CARE
Goal Outcome Evaluation:           Progress: improving  Outcome Evaluation: A&O, Paced rhythm, left arm in sling, VSS awaiting PCU bed for transfer.  Problem: Fall Injury Risk  Goal: Absence of Fall and Fall-Related Injury  Outcome: Ongoing, Progressing     Problem: Adult Inpatient Plan of Care  Goal: Patient-Specific Goal (Individualized)  Outcome: Ongoing, Progressing     Problem: Adult Inpatient Plan of Care  Goal: Absence of Hospital-Acquired Illness or Injury  Outcome: Ongoing, Progressing     Problem: Skin Injury Risk Increased  Goal: Skin Health and Integrity  Outcome: Ongoing, Progressing     Problem: Impaired Wound Healing  Goal: Optimal Wound Healing  Outcome: Ongoing, Progressing     Problem: Dysrhythmia  Goal: Normalized Cardiac Rhythm  Outcome: Ongoing, Progressing     Problem: Hypertension Comorbidity  Goal: Blood Pressure in Desired Range  Outcome: Ongoing, Progressing

## 2023-05-06 NOTE — PROGRESS NOTES
Nutrition Services    Patient Name:  Azra Ohara  YOB: 1935  MRN: [id number]  Admit Date:  5/3/2023    Diet order Regular diet.  Intake 25% x 1 meal.  Noted PI.  Supplements ordered Boost plus tid.    Recommend MVI daily to aide with wound healing.     Electronically signed by:  Kelly Mendoza RD  05/06/23 08:39 EDT

## 2023-05-06 NOTE — PROGRESS NOTES
[facility]  PROGRESS NOTE      Patient information:  Name: Azra Ohara  Age/Sex: 88 y.o. female  :  1935        PCP: Minal Salazar APRN  Attending: Rick Dejesus MD  MRN:  [id number]  Visit Number:  [id number]    LOS:  LOS: 2 days     PROBLEM LIST:Principal Problem:    Third degree atrioventricular block      Reason for Cardiology follow-up: Third degree heart block     Subjective   ADMISSION INFORMATION:  Chief Complaint   Patient presents with   • Urinary Tract Infection       HPI:  Azra Ohara is a 88 y.o. female with a past medical history significant for HFpEF, CKD stage IV, essential hypertension, LBBB, PAD, history of apical MI, history of malignant neoplasm of large intestine, and polycythemia.  Patient presented to [facility] ([facility] emergency room (ER) on 5/3/2023 with complaints of dysuria.  While in the ER, proBNP was 6565. HS troponin was 50 with repeat being 44.  EKG revealed third-degree AV block with junctional escape rhythm no evidence of STEMI was noted.  Patient was admitted and  Cardiology was consulted for further evaluation and management.     Interval History:     Room CCU 10 and examined by Dr. Padilla.  Patient is chest pain free and doing well. She reports she feels much better in comparison to the prior day.  She is tolerating PPM well thus far. She is encouraged to limit her movement of her left extremity.       Patient is s/p PPM on 23.     Vital Signs  Temp:  [98.1 °F (36.7 °C)-99 °F (37.2 °C)] 98.1 °F (36.7 °C)  Heart Rate:  [36-77] 71  Resp:  [14-20] 20  BP: (101-176)/() 151/81  Vital Signs (last 72 hrs)        0700  05/ 0659 / 0700  / 0659  0700   0659  0700  05 0743   Most Recent      Temp (°F)   98.1 -  98.5    98.2 -  98.9       98.9 (37.2)  0400    Heart Rate   37 -  81    34 -  47      38     38  0700    Resp   18 -  20    11 -  48      22     22  "05/05 0700    BP   132/94 -  180/158    123/109 -  177/64      132/90     132/90 05/05 0700    SpO2 (%)   94 -  99    86 -  100      100     100 05/05 0700        Body mass index is 29.86 kg/m².    Intake/Output Summary (Last 24 hours) at 5/6/2023 0817  Last data filed at 5/6/2023 0600  Gross per 24 hour   Intake 1722.5 ml   Output 700 ml   Net 1022.5 ml     Objective:  Vital signs: (most recent): Blood pressure 151/81, pulse 71, temperature 98.1 °F (36.7 °C), temperature source Oral, resp. rate 20, height 165 cm (64.96\"), weight 81.3 kg (179 lb 3.7 oz), SpO2 92 %.            Objective     I have seen and examined Ms. Ohara 05/06/23    Physical Exam:      General Appearance:    Alert, cooperative, in no acute distress.   Head:    Normocephalic, without obvious abnormality, atraumatic.   Eyes:                          Conjunctivae and sclerae normal, no icterus, no pallor, corneas clear.   Neck:   No adenopathy, supple, trachea midline, no thyromegaly, no carotid bruit, no JVD.   Lungs:     Clear to auscultation, respirations regular, even and             unlabored.    Heart:    Regular rhythm and rate, normal S1 and S2, Grade 2/6 murmur noted heard best aortic area, no gallop, no rub, no click. PPM dressing intact with scant amount of blood.    Chest Wall:    No abnormalities observed.   Abdomen:     Normal bowel sounds, no masses, no organomegaly, soft nontender, nondistended, no guarding, no rebound tenderness.   Extremities:   jLUE sling in place.  no edema, no cyanosis, no            redness.   Pulses:   Pulses palpable and equal bilaterally.   Skin:   No bleeding, bruising or rash.   Neurologic:   Somulent and oriented to person and place, Speech Clear & comprehensive.       Results review   Results Review:     Results from last 7 days   Lab Units 05/06/23  0029 05/05/23  0004 05/03/23  2117   WBC 10*3/mm3 8.56 10.77 10.94*   HEMOGLOBIN g/dL 11.5* 12.4 14.3   PLATELETS 10*3/mm3 173 188 251     Results from last " 7 days   Lab Units 05/06/23  0528 05/06/23  0029 05/05/23  1751 05/05/23  0004 05/03/23 2117   SODIUM mmol/L  --  144  --  147* 147*   POTASSIUM mmol/L 4.7 3.9 3.3* 3.2* 4.1   CHLORIDE mmol/L  --  113*  --  112* 109*   CO2 mmol/L  --  20.6*  --  21.1* 24.4   BUN mg/dL  --  34*  --  42* 47*   CREATININE mg/dL  --  0.91  --  1.30* 1.14*   CALCIUM mg/dL  --  8.7  --  9.2 10.8*   GLUCOSE mg/dL  --  91  --  110* 112*   ALT (SGPT) U/L  --   --   --  10 14   AST (SGOT) U/L  --   --   --  21 26     Results from last 7 days   Lab Units 05/03/23  2313 05/03/23 2117   HSTROP T ng/L 44* 50*     Lab Results   Component Value Date    PROBNP 6,565.0 (H) 05/03/2023     No results found.     No results found for: INR  Lab Results   Component Value Date    MG 2.0 05/06/2023    MG 1.8 05/05/2023    MG 2.0 05/03/2023     Lab Results   Component Value Date    TSH 0.998 05/03/2023    TRIG 98 01/17/2023    HDL 35 (L) 01/17/2023    LDL 24 01/17/2023      Pain Management Panel         Latest Ref Rng & Units 1/16/2023   Pain Management Panel   Creatinine, Urine mg/dL 72.7     Amphetamine, Urine Qual Negative Negative     Barbiturates Screen, Urine Negative Negative     Benzodiazepine Screen, Urine Negative Negative     Buprenorphine, Screen, Urine Negative Negative     Cocaine Screen, Urine Negative Negative     Methadone Screen , Urine Negative Negative     Methamphetamine, Ur Negative Negative                Microbiology Results (last 10 days)     Procedure Component Value - Date/Time    COVID PRE-OP / PRE-PROCEDURE SCREENING ORDER (NO ISOLATION) - Swab, Nasopharynx [[id number]]  (Normal) Collected: 05/04/23 0057    Lab Status: Final result Specimen: Swab from Nasopharynx Updated: 05/04/23 0120    Narrative:      The following orders were created for panel order COVID PRE-OP / PRE-PROCEDURE SCREENING ORDER (NO ISOLATION) - Swab, Nasopharynx.  Procedure                               Abnormality         Status                     ---------                                -----------         ------                     COVID-19 and FLU A/B PCR...[[id number]]  Normal              Final result                 Please view results for these tests on the individual orders.    COVID-19 and FLU A/B PCR - Swab, Nasopharynx [[id number]]  (Normal) Collected: 05/04/23 0057    Lab Status: Final result Specimen: Swab from Nasopharynx Updated: 05/04/23 0120     COVID19 Not Detected     Influenza A PCR Not Detected     Influenza B PCR Not Detected    Narrative:      Fact sheet for providers: https://www.fda.gov/media/486619/download    Fact sheet for patients: https://www.fda.gov/media/194720/download    Test performed by PCR.         Imaging Results (Last 48 Hours)     Procedure Component Value Units Date/Time    XR Chest 1 View [[id number]] Collected: 05/05/23 1645     Updated: 05/05/23 1648    Narrative:      EXAM:    XR Chest, 1 View     EXAM DATE:    5/5/2023 4:38 PM     CLINICAL HISTORY:    post pacemaker; I44.2-Atrioventricular block, complete;  I44.2-Atrioventricular block, complete     TECHNIQUE:    Frontal view of the chest.     COMPARISON:    05/03/2023     FINDINGS:    LUNGS AND PLEURAL SPACES:  Coarsened markings and vague bibasilar  airspace opacities.  Probably tiny right pleural effusion.  No  pneumothorax.    HEART:  Cardiomegaly again noted.    MEDIASTINUM:  Unremarkable.    BONES/JOINTS:  Unremarkable.    TUBES, LINES AND DEVICES:  Automatic implantable cardioverter  defibrillator (AICD).       Impression:      1.  Coarsened markings and vague bibasilar airspace opacities.  2.  Probably tiny right pleural effusion.  3.  Cardiomegaly again noted.     This report was finalized on 5/5/2023 4:45 PM by Dr. Jarad Gonzalez MD.       US Venous Doppler Lower Extremity Bilateral (duplex) [[id number]] Collected: 05/04/23 1558     Updated: 05/04/23 1601    Narrative:      EXAM:    US Duplex Bilateral Lower Extremities Veins     EXAM DATE:    5/4/2023 3:03 PM      CLINICAL HISTORY:    right leg/ankle swelling     TECHNIQUE:    Real-time duplex ultrasound scan of the bilateral lower extremity  veins integrating B-mode two-dimensional vascular structure, Doppler  spectral analysis, color flow Doppler imaging and compression.     COMPARISON:    No relevant prior studies available.     FINDINGS:    RIGHT DEEP VEINS:  Unremarkable.  No DVT in the right common femoral,  femoral, proximal deep femoral or popliteal veins.  The veins  demonstrate normal color flow, are normally compressible, with normal  phasic flow and/or augmentation response.       LEFT DEEP VEINS:  Unremarkable.  No DVT in the left common femoral,  femoral, proximal deep femoral or popliteal veins.  The veins  demonstrate normal color flow, are normally compressible, with normal  phasic flow and/or augmentation response.       SOFT TISSUES:  No acute findings.  No popliteal cyst.       Impression:        Normal bilateral lower extremity duplex venous ultrasound.     This report was finalized on 5/4/2023 3:59 PM by Dr. Jarad Gonzalez MD.             ECHO:  Results for orders placed during the hospital encounter of 05/03/23    Adult Transthoracic Echo Complete W/ Cont if Necessary Per Protocol    Interpretation Summary  •  Left ventricular systolic function is normal. Left ventricular ejection fraction appears to be 61 - 65%.  •  Left ventricular diastolic function is consistent with (grade Ia w/high LAP) impaired relaxation.  •  The left atrial cavity is moderately dilated.  •  The right atrial cavity is moderately  dilated.  •  There is mild calcification of the aortic valve mainly affecting the non-coronary, left coronary and right coronary cusp(s).  •  Severe mitral valve regurgitation is present.  •  Severe tricuspid valve regurgitation is present.  •  Estimated right ventricular systolic pressure from tricuspid regurgitation is markedly elevated (>55 mmHg).      STRESS TEST:  Results for orders placed during  the hospital encounter of 01/15/23    Stress Test With Myocardial Perfusion One Day    Interpretation Summary  Images from the original result were not included.    •  A pharmacological stress test was performed using regadenoson without low-level exercise.  •  Findings consistent with an indeterminate ECG stress test.  •  Myocardial perfusion imaging indicates a small-sized infarct located in the apex with no significant ischemia noted.  •  Gated SPECT scanning was of suboptimal quality and hence could not comment upon the LV wall motion and systolic function accurately.  •  Impressions are consistent with an intermediate risk study.       HEART CATH:  No results found for this or any previous visit.      [id number]      TELEMETRY:            I reviewed the patient's new clinical results.    Medication Review:   Current list of medications may not reflect those currently placed in orders that are not signed or are being held.     aspirin, 81 mg, Oral, Daily  atorvastatin, 40 mg, Oral, Nightly  ceFAZolin, 2 g, Intravenous, Q8H  docusate sodium, 100 mg, Oral, BID  heparin (porcine), 5,000 Units, Subcutaneous, Q12H  sodium chloride, 10 mL, Intravenous, Q12H  sodium chloride, 10 mL, Intravenous, Q12H  sodium chloride, 10 mL, Intravenous, Q12H      dextrose, 75 mL/hr, Last Rate: 75 mL/hr (23 1643)      Assessment      1. Complete heart block, status post permanent dual-chamber pacemaker implantationon 23 by Dr. Garvin  2. Essential hypertension with fluctuating systolic blood pressure  3. Urinary tract infection, being managed by the hospitalist service  4. Stage IIIb chronic kidney disease.  5. Mitral and tricuspid valve regurgitation    Plan     1.   2. Patient if she remains stable with her heart rate and rhythm, could be discharged home this weekend from cardiac standpointand follow-up in our office on Monday for dressing check and next Friday for staple removal.  3. She will wear the sling on her  left arm till next Friday.  4. No shower baths still next Friday, can do sponge baths.  5. We will follow at a distance please call if assistance is required    I have discussed the patients findings and my recommendations with patient.  Electronically signed by Balbina Padilla MD, 05/06/23, 11:50 AM EDT.                Please note that portions of this note were completed with a voice recognition program.    Please note that portions of this note were copied and has been reviewed and is accurate as of 5/6/2023 .

## 2023-05-06 NOTE — PROGRESS NOTES
" Psychiatric     Progress Note    Patient Name: Azra Ohara  : 1935  MRN: [id number]  Primary Care Physician:  Minal Salazar APRN  Date of admission: 5/3/2023    Subjective   Subjective     Chief Complaint: 88-year-old female being seen in follow-up for third-degree heart block    Procedures:  23: PPM    History of Present Illness  Patient Reports no complaints during my visit. She had just started to eat breakfast. She stated \"I believe I am feeling better.\"    No overnight events reported to me per nursing staff.     I briefly discussed with patient (after reminding her to limit mobility of left arm due to PM placement) that I felt skilled nursing services at home upon discharge would be needed and provide her a great deal of help to which she replied, 'okay!'    Present during visit: JEFFERSON Jaime    Review of Systems  Patient denied any generalized pain.  She does not report any shortness of air.    Objective   Objective     Vitals:   Temp:  [98.1 °F (36.7 °C)-99 °F (37.2 °C)] 98.3 °F (36.8 °C)  Heart Rate:  [37-77] 64  Resp:  [14-20] 18  BP: (101-176)/() 147/76  Flow (L/min):  [2] 2    Physical Exam  Constitutional:       General: She is not in acute distress.     Appearance: She is well-developed.   HENT:      Head: Normocephalic and atraumatic.      Mouth/Throat:      Mouth: Mucous membranes are dry.   Eyes:      Conjunctiva/sclera: Conjunctivae normal.   Neck:      Trachea: No tracheal deviation.   Cardiovascular:      Rate and Rhythm: Normal rate and regular rhythm.      Pulses:           Dorsalis pedis pulses are 2+ on the right side and 2+ on the left side.      Heart sounds: No murmur heard.    No friction rub. No gallop.      Comments: Bedside tele: Paced 70s; No significant lower extremity edema  Pulmonary:      Effort: No respiratory distress.      Breath sounds: Examination of the right-lower field reveals decreased breath sounds. Examination of the left-lower field " reveals decreased breath sounds. Decreased breath sounds present. No wheezing or rales.   Abdominal:      General: Bowel sounds are normal. There is no distension.      Palpations: Abdomen is soft.      Tenderness: There is no abdominal tenderness. There is no guarding.   Musculoskeletal:      Comments: Left arm in sling   Skin:     General: Skin is warm and dry.      Findings: No erythema or rash.      Comments: Scaling, plaquing lesions noted B/L lower extremities   Neurological:      Mental Status: She is alert.      Cranial Nerves: No cranial nerve deficit.      Comments: Oriented to self, place; follows commands.          Result Review    Result Review:  I have personally reviewed the results from the time of this admission to 5/6/2023 11:30 EDT and agree with these findings:  [x]  Laboratory list / accordion  []  Microbiology  []  Radiology  []  EKG/Telemetry   []  Cardiology/Vascular   []  Pathology  []  Old records  []  Other:  Most notable findings include: BMP with most recent K 4.7, BUN/Cr 34/0.91. Mg 2.0. CBC with Hgb 11.5.     CXR 5/3/23:  FINDINGS:     Enlarged heart size.  Central pulmonary vascular congestion with mild interstitial edema.  No lobar consolidation, pleural effusion, or pneumothorax.  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  No fracture or foreign body.  No free air in the upper abdomen.     IMPRESSION:     1.  Enlarged heart size.  2.  Central pulmonary vascular congestion with trace interstitial edema.  3.  No pleural effusion or pneumothorax.  4.  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  5.  No free air in the upper abdomen.    Echocardiogram:  •  Left ventricular systolic function is normal. Left ventricular ejection fraction appears to be 61 - 65%.  •  Left ventricular diastolic function is  consistent with (grade Ia w/high LAP) impaired relaxation.  •  The left atrial cavity is moderately dilated.  •  The right atrial cavity is moderately  dilated.  •  There is mild calcification of the aortic valve mainly affecting the non-coronary, left coronary and right coronary cusp(s).  •  Severe mitral valve regurgitation is present.  •  Severe tricuspid valve regurgitation is present.  •  Estimated right ventricular systolic pressure from tricuspid regurgitation is markedly elevated (>55 mmHg).    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 now status post permanent pacemaker placement  #History of apical infarction  #Chronic HFpEF  #Severe mitral valve regurgitation  #Severe tricuspid valve regurgitation  #Mild acute hypokalemia, resolved  #Hypernatremia, likely hypovolemic and currently resolved  #Borderline hypomagnesemia, resolved with supplementation  #Uncontrolled essential hypertension  #Reported right ankle cellulitis upon admission  #Sacral decubitus ulcer, present on admission  #CKD stage IV  #Mild macrocytosis with mild anemia  #Generalized weakness/physical debility  -Appreciate cardiology assistance; patient currently in a paced rhythm in the 70  -As her hyper natremia has resolved I will discontinue the D5W  -Continue to supplement the patient's potassium and magnesium as needed per protocol  -Repeat chemistry panel and Mg level in a.m.   -Continue aspirin and atorvastatin  -Patient remains on cefazolin for suspected cellulitis that was appreciate upon admission  -Hold home dyazide given electrolyte abnormalities and advanced age; begin low dose Losartan and monitor BP response  -Repeat CBC in a.m. and monitor temperature curve  -Consult PT/OT    Chronic medical conditions:  -Question age-related cognitive changes versus underlying dementia: Psychiatry consult placed for medical decision making capacity who deemed patient with capacity at this time;  will continue to monitor  -History of left bundle branch block  -History of colon cancer status postresection 2004  -Reported history of polycythemia vera    DVT prophylaxis:  [facility]    CODE STATUS:    Medical Intervention Limits: NO intubation (DNI)  Level Of Support Discussed With: Next of Kin (If No Surrogate)  Code Status (Patient has no pulse and is not breathing): No CPR (Do Not Attempt to Resuscitate)  Medical Interventions (Patient has pulse or is breathing): Limited Support  Comments: Sister Doreen and nephew Alexandru  Release to patient: Routine Release    Disposition:  I expect patient to be discharged to be discharged home with home health v SNF when medically stable; likely at least 1-2 days. Transfer to telemetry today if a bed is available.    Rebecca Rodrigues, DO

## 2023-05-06 NOTE — PLAN OF CARE
Goal Outcome Evaluation:  Plan of Care Reviewed With: patient        Progress: no change  Outcome Evaluation: Pt remains alert and oriented. Left arm remains in sling. VSS. Straight cath x1. Will continue with plan of care.

## 2023-05-07 LAB
ANION GAP SERPL CALCULATED.3IONS-SCNC: 8.8 MMOL/L (ref 5–15)
BUN SERPL-MCNC: 32 MG/DL (ref 8–23)
BUN/CREAT SERPL: 37.2 (ref 7–25)
CALCIUM SPEC-SCNC: 9 MG/DL (ref 8.6–10.5)
CHLORIDE SERPL-SCNC: 112 MMOL/L (ref 98–107)
CO2 SERPL-SCNC: 21.2 MMOL/L (ref 22–29)
CREAT SERPL-MCNC: 0.86 MG/DL (ref 0.57–1)
DEPRECATED RDW RBC AUTO: 50 FL (ref 37–54)
EGFRCR SERPLBLD CKD-EPI 2021: 65.1 ML/MIN/1.73
ERYTHROCYTE [DISTWIDTH] IN BLOOD BY AUTOMATED COUNT: 14.2 % (ref 12.3–15.4)
GLUCOSE SERPL-MCNC: 99 MG/DL (ref 65–99)
HCT VFR BLD AUTO: 35.8 % (ref 34–46.6)
HGB BLD-MCNC: 11.1 G/DL (ref 12–15.9)
MAGNESIUM SERPL-MCNC: 2 MG/DL (ref 1.6–2.4)
MCH RBC QN AUTO: 29.8 PG (ref 26.6–33)
MCHC RBC AUTO-ENTMCNC: 31 G/DL (ref 31.5–35.7)
MCV RBC AUTO: 96 FL (ref 79–97)
PLATELET # BLD AUTO: 168 10*3/MM3 (ref 140–450)
PMV BLD AUTO: 12.5 FL (ref 6–12)
POTASSIUM SERPL-SCNC: 4.1 MMOL/L (ref 3.5–5.2)
RBC # BLD AUTO: 3.73 10*6/MM3 (ref 3.77–5.28)
SODIUM SERPL-SCNC: 142 MMOL/L (ref 136–145)
WBC NRBC COR # BLD: 8.32 10*3/MM3 (ref 3.4–10.8)

## 2023-05-07 PROCEDURE — 25010000002 CEFAZOLIN PER 500 MG: Performed by: INTERNAL MEDICINE

## 2023-05-07 PROCEDURE — 99232 SBSQ HOSP IP/OBS MODERATE 35: CPT | Performed by: INTERNAL MEDICINE

## 2023-05-07 PROCEDURE — 85027 COMPLETE CBC AUTOMATED: CPT | Performed by: INTERNAL MEDICINE

## 2023-05-07 PROCEDURE — 25010000002 HEPARIN (PORCINE) PER 1000 UNITS: Performed by: INTERNAL MEDICINE

## 2023-05-07 PROCEDURE — 80048 BASIC METABOLIC PNL TOTAL CA: CPT | Performed by: INTERNAL MEDICINE

## 2023-05-07 PROCEDURE — 94761 N-INVAS EAR/PLS OXIMETRY MLT: CPT

## 2023-05-07 PROCEDURE — 94799 UNLISTED PULMONARY SVC/PX: CPT

## 2023-05-07 PROCEDURE — 83735 ASSAY OF MAGNESIUM: CPT | Performed by: INTERNAL MEDICINE

## 2023-05-07 RX ORDER — LOSARTAN POTASSIUM 50 MG/1
50 TABLET ORAL
Status: DISCONTINUED | OUTPATIENT
Start: 2023-05-08 | End: 2023-05-09

## 2023-05-07 RX ORDER — ACETAMINOPHEN 500 MG
500 TABLET ORAL EVERY 6 HOURS PRN
Status: DISCONTINUED | OUTPATIENT
Start: 2023-05-07 | End: 2023-05-11 | Stop reason: HOSPADM

## 2023-05-07 RX ORDER — HYDROCODONE BITARTRATE AND ACETAMINOPHEN 5; 325 MG/1; MG/1
0.5 TABLET ORAL EVERY 8 HOURS PRN
Status: DISCONTINUED | OUTPATIENT
Start: 2023-05-07 | End: 2023-05-11 | Stop reason: HOSPADM

## 2023-05-07 RX ORDER — LANOLIN ALCOHOL/MO/W.PET/CERES
3 CREAM (GRAM) TOPICAL NIGHTLY PRN
Status: DISCONTINUED | OUTPATIENT
Start: 2023-05-07 | End: 2023-05-11 | Stop reason: HOSPADM

## 2023-05-07 RX ADMIN — ATORVASTATIN CALCIUM 40 MG: 40 TABLET, FILM COATED ORAL at 21:48

## 2023-05-07 RX ADMIN — Medication 10 ML: at 21:51

## 2023-05-07 RX ADMIN — CEFAZOLIN 2 G: 2 INJECTION, POWDER, FOR SOLUTION INTRAMUSCULAR; INTRAVENOUS at 05:25

## 2023-05-07 RX ADMIN — Medication 10 ML: at 08:06

## 2023-05-07 RX ADMIN — LOSARTAN POTASSIUM 25 MG: 25 TABLET, FILM COATED ORAL at 08:04

## 2023-05-07 RX ADMIN — HEPARIN SODIUM 5000 UNITS: 5000 INJECTION INTRAVENOUS; SUBCUTANEOUS at 08:04

## 2023-05-07 RX ADMIN — Medication 10 ML: at 08:04

## 2023-05-07 RX ADMIN — HEPARIN SODIUM 5000 UNITS: 5000 INJECTION INTRAVENOUS; SUBCUTANEOUS at 21:48

## 2023-05-07 RX ADMIN — Medication 1 TABLET: at 08:04

## 2023-05-07 RX ADMIN — DOCUSATE SODIUM 100 MG: 100 CAPSULE, LIQUID FILLED ORAL at 21:48

## 2023-05-07 RX ADMIN — DOCUSATE SODIUM 100 MG: 100 CAPSULE, LIQUID FILLED ORAL at 08:04

## 2023-05-07 RX ADMIN — CEFAZOLIN 2 G: 2 INJECTION, POWDER, FOR SOLUTION INTRAMUSCULAR; INTRAVENOUS at 21:48

## 2023-05-07 RX ADMIN — ASPIRIN 81 MG: 81 TABLET, COATED ORAL at 08:04

## 2023-05-07 RX ADMIN — CEFAZOLIN 2 G: 2 INJECTION, POWDER, FOR SOLUTION INTRAMUSCULAR; INTRAVENOUS at 13:54

## 2023-05-07 NOTE — PROGRESS NOTES
[facility]     Progress Note    Patient Name: Azra Ohara  : 1935  MRN: [id number]  Primary Care Physician:  Minal Salazar APRN  Date of admission: 5/3/2023    Subjective   Subjective     Chief Complaint: 88-year-old female being seen in follow-up for third-degree heart block    Procedures:  23: PPM    Urinary Tract Infection       Patient Reports poor sleep overnight; otherwise no new complaints.     No overnight events reported to me per nursing staff.     When discussing eventual discharge plans with her again she remains verbally agreeable to home health.     I explained to her what a power of  is (reported understanding) and I recommended that she should choose one in the event that she were to be hospitalized again and at that time was unable to make her own decisions; she states that she would like for her nephew Alexandru to be named her POA.    Patient agreeable to attempt up in chair today with staff.     Present during visit: NATALY Duncan    Review of Systems  Patient reports intermittent left chest pain at the site of her pacemaker.  She does not report any shortness of air, nausea, vomiting or abdominal pain.    Objective   Objective     Vitals:   Temp:  [97.9 °F (36.6 °C)-99.5 °F (37.5 °C)] 97.9 °F (36.6 °C)  Heart Rate:  [64-90] 74  Resp:  [15-23] 23  BP: (131-160)/() 140/64  Flow (L/min):  [2] 2    Physical Exam  Constitutional:       General: She is not in acute distress.     Appearance: She is well-developed.   HENT:      Head: Normocephalic and atraumatic.      Mouth/Throat:      Mouth: Mucous membranes are dry.   Eyes:      Conjunctiva/sclera: Conjunctivae normal.   Neck:      Trachea: No tracheal deviation.   Cardiovascular:      Rate and Rhythm: Normal rate and regular rhythm.      Pulses:           Dorsalis pedis pulses are 2+ on the right side and 2+ on the left side.      Heart sounds: No murmur heard.    No friction rub. No gallop.   Pulmonary:      Effort:  No respiratory distress.      Breath sounds: Examination of the right-lower field reveals decreased breath sounds. Examination of the left-lower field reveals decreased breath sounds. Decreased breath sounds present. No wheezing or rales.   Chest:      Comments: Scant amount of dried blood noted on pacemaker dressing  Abdominal:      General: Bowel sounds are normal. There is no distension.      Palpations: Abdomen is soft.      Tenderness: There is no abdominal tenderness. There is no guarding.   Musculoskeletal:      Comments: Left arm in sling   Skin:     General: Skin is warm and dry.      Findings: No erythema or rash.      Comments: Scaling, plaquing lesions noted B/L lower extremities   Neurological:      Mental Status: She is alert.      Cranial Nerves: No cranial nerve deficit.      Comments: Oriented to self, place and time; follows commands.          Result Review    Result Review:  I have personally reviewed the results from the time of this admission to 5/7/2023 12:38 EDT and agree with these findings:  [x]  Laboratory list / accordion  []  Microbiology  []  Radiology  []  EKG/Telemetry   []  Cardiology/Vascular   []  Pathology  []  Old records  []  Other:  Most notable findings include: BMP with most recent K 4.1, BUN/Cr 32/0.86. Mg 2.0. CBC with Hgb 11.1.     CXR 5/3/23:  FINDINGS:     Enlarged heart size.  Central pulmonary vascular congestion with mild interstitial edema.  No lobar consolidation, pleural effusion, or pneumothorax.  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  No fracture or foreign body.  No free air in the upper abdomen.     IMPRESSION:     1.  Enlarged heart size.  2.  Central pulmonary vascular congestion with trace interstitial edema.  3.  No pleural effusion or pneumothorax.  4.  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  5.  No free air in the upper abdomen.    Echocardiogram:  •  Left ventricular systolic function is normal. Left ventricular ejection fraction appears to be 61 - 65%.  •  Left ventricular diastolic function is consistent with (grade Ia w/high LAP) impaired relaxation.  •  The left atrial cavity is moderately dilated.  •  The right atrial cavity is moderately  dilated.  •  There is mild calcification of the aortic valve mainly affecting the non-coronary, left coronary and right coronary cusp(s).  •  Severe mitral valve regurgitation is present.  •  Severe tricuspid valve regurgitation is present.  •  Estimated right ventricular systolic pressure from tricuspid regurgitation is markedly elevated (>55 mmHg).    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  #History of apical infarction  #Chronic HFpEF  #Severe mitral valve regurgitation  #Severe tricuspid valve regurgitation  #Mild acute hypokalemia, resolved  #Hypernatremia, likely hypovolemic and currently resolved  #Borderline hypomagnesemia, resolved with supplementation  #Uncontrolled essential hypertension  #Reported right ankle cellulitis upon admission  #Sacral decubitus ulcer, present on admission  #CKD stage IV  #Mild macrocytosis with mild anemia  #Generalized weakness/physical debility  -Add low dose PRN melatonin  -Appreciate cardiology assistance; continue with sling on the left arm until Friday; sponge baths until that time per their recommendations  -Continue with recently started losartan; consider increasing dose based on ongoing blood pressure readings; overall it is down trending  -Continue to hold home dyazide given electrolyte abnormalities and advanced age  -Hypernatremia has resolved and her D5 was discontinued  -Continue to supplement the patient's potassium and magnesium as needed per protocol  -Repeat chemistry panel  and Mg level in a.m.   -Continue aspirin and atorvastatin  -Patient remains on cefazolin for suspected cellulitis that was appreciated upon admission; currently completing course  -Repeat CBC in a.m. and monitor temperature curve  -PT/OT consults placed and pending  -PRN medications in place for pain  -Hopefully palliative care can assist with arranging a POA when available; as noted patient states she will choose her nephew Alexandru    Chronic medical conditions:  -Question age-related cognitive changes versus underlying dementia: Psychiatry consult placed for medical decision making capacity who deemed patient with capacity at this time; will continue to monitor  -History of left bundle branch block  -History of colon cancer status postresection 2004  -Reported history of polycythemia vera    DVT prophylaxis:  [facility]    CODE STATUS:    Medical Intervention Limits: NO intubation (DNI)  Level Of Support Discussed With: Next of Kin (If No Surrogate)  Code Status (Patient has no pulse and is not breathing): No CPR (Do Not Attempt to Resuscitate)  Medical Interventions (Patient has pulse or is breathing): Limited Support  Comments: Sister Doreen and nephew Alexandru  Release to patient: Routine Release    Disposition:  I expect patient to be discharged to be discharged home with home health v SNF when medically stable; likely at least 1-2 days    Rebecca Rodrigues DO

## 2023-05-07 NOTE — PLAN OF CARE
Goal Outcome Evaluation:  Plan of Care Reviewed With: patient        Progress: no change  Outcome Evaluation: pt alert to self and place, on RA, VSS. Pt has a paced rhythm. PT has not voided all shift, bladder scan complete, MD aware. PT resting in bed at this time, call light in reach, care on going til 7A.

## 2023-05-07 NOTE — PLAN OF CARE
Goal Outcome Evaluation:  Plan of Care Reviewed With: patient           Outcome Evaluation: VSS on room air. Pt straight cathed this shift. Pt resting in bed at this time and voices no complaints. Safety maintained, call light in reach.

## 2023-05-08 LAB
ANION GAP SERPL CALCULATED.3IONS-SCNC: 8 MMOL/L (ref 5–15)
BUN SERPL-MCNC: 25 MG/DL (ref 8–23)
BUN/CREAT SERPL: 30.1 (ref 7–25)
CALCIUM SPEC-SCNC: 8.7 MG/DL (ref 8.6–10.5)
CHLORIDE SERPL-SCNC: 109 MMOL/L (ref 98–107)
CO2 SERPL-SCNC: 22 MMOL/L (ref 22–29)
CREAT SERPL-MCNC: 0.83 MG/DL (ref 0.57–1)
DEPRECATED RDW RBC AUTO: 49.9 FL (ref 37–54)
EGFRCR SERPLBLD CKD-EPI 2021: 67.9 ML/MIN/1.73
ERYTHROCYTE [DISTWIDTH] IN BLOOD BY AUTOMATED COUNT: 14 % (ref 12.3–15.4)
GLUCOSE SERPL-MCNC: 104 MG/DL (ref 65–99)
HCT VFR BLD AUTO: 38.2 % (ref 34–46.6)
HGB BLD-MCNC: 11.9 G/DL (ref 12–15.9)
MCH RBC QN AUTO: 29.8 PG (ref 26.6–33)
MCHC RBC AUTO-ENTMCNC: 31.2 G/DL (ref 31.5–35.7)
MCV RBC AUTO: 95.7 FL (ref 79–97)
PLATELET # BLD AUTO: 174 10*3/MM3 (ref 140–450)
PMV BLD AUTO: 12.2 FL (ref 6–12)
POTASSIUM SERPL-SCNC: 3.8 MMOL/L (ref 3.5–5.2)
RBC # BLD AUTO: 3.99 10*6/MM3 (ref 3.77–5.28)
SODIUM SERPL-SCNC: 139 MMOL/L (ref 136–145)
WBC NRBC COR # BLD: 8.32 10*3/MM3 (ref 3.4–10.8)

## 2023-05-08 PROCEDURE — 97162 PT EVAL MOD COMPLEX 30 MIN: CPT

## 2023-05-08 PROCEDURE — 25010000002 HEPARIN (PORCINE) PER 1000 UNITS: Performed by: INTERNAL MEDICINE

## 2023-05-08 PROCEDURE — 85027 COMPLETE CBC AUTOMATED: CPT | Performed by: INTERNAL MEDICINE

## 2023-05-08 PROCEDURE — 99232 SBSQ HOSP IP/OBS MODERATE 35: CPT | Performed by: STUDENT IN AN ORGANIZED HEALTH CARE EDUCATION/TRAINING PROGRAM

## 2023-05-08 PROCEDURE — 94799 UNLISTED PULMONARY SVC/PX: CPT

## 2023-05-08 PROCEDURE — 97166 OT EVAL MOD COMPLEX 45 MIN: CPT

## 2023-05-08 PROCEDURE — 25010000002 CEFAZOLIN PER 500 MG: Performed by: INTERNAL MEDICINE

## 2023-05-08 PROCEDURE — 80048 BASIC METABOLIC PNL TOTAL CA: CPT | Performed by: INTERNAL MEDICINE

## 2023-05-08 RX ORDER — SODIUM CHLORIDE 9 MG/ML
75 INJECTION, SOLUTION INTRAVENOUS CONTINUOUS
Status: DISCONTINUED | OUTPATIENT
Start: 2023-05-08 | End: 2023-05-11 | Stop reason: HOSPADM

## 2023-05-08 RX ADMIN — HEPARIN SODIUM 5000 UNITS: 5000 INJECTION INTRAVENOUS; SUBCUTANEOUS at 08:28

## 2023-05-08 RX ADMIN — Medication 10 ML: at 20:41

## 2023-05-08 RX ADMIN — HEPARIN SODIUM 5000 UNITS: 5000 INJECTION INTRAVENOUS; SUBCUTANEOUS at 20:40

## 2023-05-08 RX ADMIN — ACETAMINOPHEN 500 MG: 500 TABLET ORAL at 01:49

## 2023-05-08 RX ADMIN — Medication 10 ML: at 20:42

## 2023-05-08 RX ADMIN — CEFAZOLIN 2 G: 2 INJECTION, POWDER, FOR SOLUTION INTRAMUSCULAR; INTRAVENOUS at 22:16

## 2023-05-08 RX ADMIN — DOCUSATE SODIUM 100 MG: 100 CAPSULE, LIQUID FILLED ORAL at 20:38

## 2023-05-08 RX ADMIN — SODIUM CHLORIDE 75 ML/HR: 9 INJECTION, SOLUTION INTRAVENOUS at 10:19

## 2023-05-08 RX ADMIN — LOSARTAN POTASSIUM 50 MG: 50 TABLET, FILM COATED ORAL at 08:29

## 2023-05-08 RX ADMIN — Medication 10 ML: at 08:30

## 2023-05-08 RX ADMIN — Medication 10 ML: at 08:29

## 2023-05-08 RX ADMIN — ASPIRIN 81 MG: 81 TABLET, COATED ORAL at 08:28

## 2023-05-08 RX ADMIN — CEFAZOLIN 2 G: 2 INJECTION, POWDER, FOR SOLUTION INTRAMUSCULAR; INTRAVENOUS at 13:45

## 2023-05-08 RX ADMIN — Medication 1 TABLET: at 08:28

## 2023-05-08 RX ADMIN — CEFAZOLIN 2 G: 2 INJECTION, POWDER, FOR SOLUTION INTRAMUSCULAR; INTRAVENOUS at 06:12

## 2023-05-08 RX ADMIN — HYDROCODONE BITARTRATE AND ACETAMINOPHEN 0.5 TABLET: 5; 325 TABLET ORAL at 22:12

## 2023-05-08 RX ADMIN — HYDROCODONE BITARTRATE AND ACETAMINOPHEN 0.5 TABLET: 5; 325 TABLET ORAL at 12:18

## 2023-05-08 RX ADMIN — ATORVASTATIN CALCIUM 40 MG: 40 TABLET, FILM COATED ORAL at 20:38

## 2023-05-08 RX ADMIN — DOCUSATE SODIUM 100 MG: 100 CAPSULE, LIQUID FILLED ORAL at 08:29

## 2023-05-08 RX ADMIN — SODIUM CHLORIDE 75 ML/HR: 9 INJECTION, SOLUTION INTRAVENOUS at 22:25

## 2023-05-08 NOTE — THERAPY EVALUATION
Acute Care - Physical Therapy Initial Evaluation   [facility]     Patient Name: Azra Ohara  : 1935  MRN: [id number]  Today's Date: 2023   Onset of Illness/Injury or Date of Surgery: 23  Visit Dx:     ICD-10-CM ICD-9-CM   1. Third degree atrioventricular block  I44.2 426.0   2. Complete heart block  I44.2 426.0     Patient Active Problem List   Diagnosis   • Chronic kidney disease, stage 4 (severe)   • Essential hypertension   • Left bundle branch block   • Malignant neoplasm of large intestine   • Polycythemia vera   • Long term (current) use of aspirin     Past Medical History:   Diagnosis Date   • Chronic kidney disease, stage 4 (severe) 2023   • Essential hypertension    • History of colon cancer    • Left bundle branch block 2023   • Long term (current) use of aspirin 2023   • Polycythemia vera 2023     Past Surgical History:   Procedure Laterality Date   • CARDIAC ELECTROPHYSIOLOGY PROCEDURE N/A 2023    Procedure: Pacemaker DC new;  Surgeon: Sampson Garvin MD;  Location: [facility] INVASIVE LOCATION;  Service: Cardiology;  Laterality: N/A;   • COLON RESECTION     • PARATHYROIDECTOMY Right     Parathyroid adenoma     PT Assessment (last 12 hours)     PT Evaluation and Treatment     Row Name 23 1728          Physical Therapy Time and Intention    Subjective Information complains of;pain  pt. c/o vague pain throughout B LE, unable to specify further.  -AG     Document Type evaluation  -AG     Mode of Treatment physical therapy  -AG     Patient Effort adequate  -AG     Symptoms Noted During/After Treatment fatigue  -AG     Row Name 23 1728          General Information    Patient Profile Reviewed yes  -AG     Onset of Illness/Injury or Date of Surgery 23  -AG     Referring Physician Dr. Rodrigues  -AG     Patient Observations agree to therapy;cooperative;alert  -AG     Patient/Family/Caregiver Comments/Observations pt. supine in PCU bed on room air.   "L UE sling and swathe is on, however, pt. has freed her arm and is observed moving it and weightbearing onto L UE.  PT reapplied sling, swathe and educated pt. about limiting movement of that arm.  -AG     Prior Level of Function independent:;all household mobility  pt. reports bathing \"every other day\" and getting herself dressed, however, states she had difficulty dressing.  Reports previously independent in bathroom.  Used RW at all times for household mobility.  States she rarely left home.  Has ramp.  -AG     Existing Precautions/Restrictions fall;non-weight bearing;pacemaker;brace on at all times  recent permanent pacemaker implantation; L UE immobilized.  -AG     Limitations/Impairments safety/cognitive  -AG     Equipment Issued to Patient gait belt  -AG     Risks Reviewed patient:;dizziness;increased discomfort;LOB  -AG     Benefits Reviewed patient:;improve function;increase independence;increase strength;increase balance;decrease risk of DVT;increase knowledge  -AG     Barriers to Rehab medically complex;cognitive status  -AG     Row Name 05/08/23 1728          Previous Level of Function/Home Environm    Toileting, Premorbid Functional Level independent  -AG     BADLs, Premorbid Functional Level partial assistance  -AG     Bed Mobility, Premorbid Functional Level independent  -AG     Transfers, Premorbid Functional Level independent;uses device or equipment  -AG     Household Ambulation, Premorbid Functional Level independent;uses device or equipment  -AG     Row Name 05/08/23 1728          Living Environment    Current Living Arrangements home  -AG     Home Accessibility wheelchair accessible  -AG     People in Home alone  -AG     Primary Care Provided by self  -AG     Row Name 05/08/23 1728          Pain    Pain Intervention(s) --  patient reports B LE pain  -AG     Row Name 05/08/23 1728          Cognition    Affect/Mental Status (Cognition) anxious  -AG     Orientation Status (Cognition) oriented " to;place;person  -     Follows Commands (Cognition) follows one-step commands;repetition of directions required;verbal cues/prompting required;physical/tactile prompts required  -     Cognitive Function safety deficit  -     Personal Safety Interventions fall prevention program maintained;gait belt;nonskid shoes/slippers when out of bed;supervised activity  [facility] Name 05/08/23 1728          Range of Motion Comprehensive    Comment, General Range of Motion B LE AROM deficits identified: B decr knee extension; hips and ankles [facility] Name 05/08/23 1728          Strength Comprehensive (MMT)    General Manual Muscle Testing (MMT) Assessment --  B quads 2+/5; B psoas 3+/5; B ant tib 4-/5  [facility] Name 05/08/23 1728          Sensory    Hearing Status [facility] Name 05/08/23 1728          Vision Assessment/Intervention    Visual Impairment/Limitations [facility] Name 05/08/23 1728          Sensory Assessment (Somatosensory)    Sensory Assessment (Somatosensory) LE sensation intact  [facility] Name 05/08/23 1728          Mobility    Extremity Weight-bearing Status --  no restrictions  [facility] Name 05/08/23 1728          Bed Mobility    Bed Mobility scooting/bridging;rolling right;supine-sit  -     Rolling Right [place] (Bed Mobility) verbal cues;nonverbal cues (demo/gesture);minimum assist (75% patient effort)  -     Scooting/Bridging [place] (Bed Mobility) verbal cues;nonverbal cues (demo/gesture);maximum assist (25% patient effort)  -     Supine-Sit [place] (Bed Mobility) verbal cues;nonverbal cues (demo/gesture);moderate assist (50% patient effort)  -     Bed Mobility, Safety Issues decreased use of arms for pushing/pulling;decreased use of legs for bridging/pushing;cognitive deficits limit understanding  -     Assistive Device (Bed Mobility) bed rails;draw sheet  -     Row Name 05/08/23 1728          Transfers    Transfers sit-stand transfer;stand-sit  transfer;stand pivot/stand step transfer  -AG     Row Name 05/08/23 1728          Sit-Stand Transfer    Sit-Stand [place] (Transfers) verbal cues;nonverbal cues (demo/gesture);maximum assist (25% patient effort)  -     Assistive Device (Sit-Stand Transfers) walker, front-wheeled  -AG     Row Name 05/08/23 1728          Stand-Sit Transfer    Stand-Sit [place] (Transfers) verbal cues;nonverbal cues (demo/gesture);maximum assist (25% patient effort)  -AG     Assistive Device (Stand-Sit Transfers) walker, front-wheeled  -AG     Row Name 05/08/23 1728          Stand Pivot/Stand Step Transfer    Stand Pivot/Stand Step [place] (Transfers) verbal cues;nonverbal cues (demo/gesture);maximum assist (25% patient effort)  -AG     Row Name 05/08/23 1728          Gait/Stairs (Locomotion)    Comment, (Gait/Stairs) deferred d/t safety risk  -     Row Name 05/08/23 1728          Safety Issues, Functional Mobility    Safety Issues Affecting Function (Mobility) positioning of assistive device;insight into deficits/self-awareness;problem-solving;safety precautions follow-through/compliance;safety precaution awareness  -     Impairments Affecting Function (Mobility) balance;cognition;endurance/activity tolerance;pain;range of motion (ROM);shortness of breath;strength  -     Cognitive Impairments, Mobility Safety/Performance judgment;problem-solving/reasoning;safety precaution awareness;safety precaution follow-through;insight into deficits/self-awareness  -     Row Name 05/08/23 1728          Balance    Balance Assessment sitting static balance;sitting dynamic balance;sit to stand dynamic balance;standing static balance;standing dynamic balance  -     Static Sitting Balance contact guard;standby assist  -AG     Position, Sitting Balance unsupported;sitting edge of bed  -     Static Standing Balance verbal cues;non-verbal cues (demo/gesture);maximum assist  -AG     Dynamic Standing Balance verbal  cues;non-verbal cues (demo/gesture);maximum assist  -AG     Position/Device Used, Standing Balance walker, front-wheeled  -AG     Row Name 05/08/23 1728          Motor Skills    Motor Skills functional endurance;coordination;motor control/coordination interventions  -AG     Coordination gross motor deficit;bilateral;moderate impairment  -AG     Functional Endurance F-  -AG     Row Name             Wound 05/04/23 0250 Left gluteal    Wound - Properties Group Placement Date: 05/04/23  -BR Placement Time: 0250  -BR Side: Left  -BR Location: gluteal  -BR    Retired Wound - Properties Group Placement Date: 05/04/23  -BR Placement Time: 0250  -BR Side: Left  -BR Location: gluteal  -BR    Retired Wound - Properties Group Date first assessed: 05/04/23  -BR Time first assessed: 0250  -BR Side: Left  -BR Location: gluteal  -BR    Row Name             Wound 05/04/23 0252 Right gluteal    Wound - Properties Group Placement Date: 05/04/23  -BR Placement Time: 0252  -BR Side: Right  -BR Location: gluteal  -BR    Retired Wound - Properties Group Placement Date: 05/04/23  -BR Placement Time: 0252  -BR Side: Right  -BR Location: gluteal  -BR    Retired Wound - Properties Group Date first assessed: 05/04/23  -BR Time first assessed: 0252  -BR Side: Right  -BR Location: gluteal  -BR    Row Name             Wound 05/05/23 1630 Right posterior elbow Skin Tear    Wound - Properties Group Placement Date: 05/05/23  -DH Placement Time: 1630  -DH Side: Right  -DH Orientation: posterior  -DH Location: elbow  -DH Primary Wound Type: Skin tear  -DH    Retired Wound - Properties Group Placement Date: 05/05/23  -DH Placement Time: 1630  -DH Side: Right  -DH Orientation: posterior  -DH Location: elbow  -DH Primary Wound Type: Skin tear  -DH    Retired Wound - Properties Group Date first assessed: 05/05/23  -DH Time first assessed: 1630  -DH Side: Right  -DH Location: elbow  -DH Primary Wound Type: Skin tear  -DH    Row Name 05/08/23 1728           Coping    Observed Emotional State cooperative;anxious  -AG     Verbalized Emotional State acceptance  -AG     Trust Relationship/Rapport care explained;choices provided;thoughts/feelings acknowledged  -AG     Family/Support Persons family  -AG     Involvement in Care not present at bedside  -AG     Family/Support System Care support provided;self-care encouraged  -AG     Row Name 05/08/23 1728          Plan of Care Review    Plan of Care Reviewed With patient  -AG     Outcome Evaluation PT evaluation complete.  Pt. requires max A for out of bed activity, STS and for bed>chair.  Reports SOA with minimal exertion.  SpO2 to 89% with activity on room air.  Pt. will benefit from continued skilled PT to address functional deficits.  -AG     Row Name 05/08/23 1728          Vital Signs    Pre Systolic BP Rehab 152  -AG     Pre Treatment Diastolic BP 97  -AG     Pretreatment Heart Rate (beats/min) 85  -AG     Pre SpO2 (%) 93  -AG     O2 Delivery Pre Treatment room air  -AG     Intra SpO2 (%) 89  -AG     O2 Delivery Intra Treatment room air  -AG     Post SpO2 (%) 96  -AG     O2 Delivery Post Treatment room air  -AG     Intra Patient Position Sitting  -AG     Row Name 05/08/23 1728          Positioning and Restraints    Pre-Treatment Position in bed  -AG     Post Treatment Position chair  -AG     In Chair notified nsg;sitting;call light within reach;encouraged to call for assist;legs elevated  -AG     Row Name 05/08/23 1728          Therapy Assessment/Plan (PT)    Patient/Family Therapy Goals Statement (PT) patient's goal is to return home alone.  She states her nephew and other family are available to assist if needed.  -AG     Functional Level at Time of Evaluation (PT) max A  -AG     PT Diagnosis (PT) impaired functional mobility and endurance  -AG     Rehab Potential (PT) fair, will monitor progress closely  -AG     Criteria for Skilled Interventions Met (PT) yes;skilled treatment is necessary  -AG     Therapy Frequency  (PT) 3 times/wk  3-5 times/ week per priority  -AG     Predicted Duration of Therapy Intervention (PT) until d/c from facility  -AG     Problem List (PT) problems related to;balance;cognition;mobility;coordination;range of motion (ROM);strength;pain  -AG     Activity Limitations Related to Problem List (PT) unable to ambulate safely;unable to transfer safely;BADLs not performed adequately or safely  -AG     Row Name 05/08/23 1728          Therapy Plan Review/Discharge Plan (PT)    Therapy Plan Review (PT) evaluation/treatment results reviewed;care plan/treatment goals reviewed;risks/benefits reviewed;current/potential barriers reviewed;participants voiced agreement with care plan;participants included;patient  -AG     Row Name 05/08/23 1728          Physical Therapy Goals    Bed Mobility Goal Selection (PT) bed mobility, PT goal 1  -AG     Transfer Goal Selection (PT) transfer, PT goal 1  -AG     Gait Training Goal Selection (PT) gait training, PT goal 1  -AG     Row Name 05/08/23 1728          Bed Mobility Goal 1 (PT)    Activity/Assistive Device (Bed Mobility Goal 1, PT) rolling to left;rolling to right;scooting;sit to supine;supine to sit  -AG     [place] Level/Cues Needed (Bed Mobility Goal 1, PT) contact guard required  -AG     Time Frame (Bed Mobility Goal 1, PT) by discharge  -AG     Row Name 05/08/23 1728          Transfer Goal 1 (PT)    Activity/Assistive Device (Transfer Goal 1, PT) sit-to-stand/stand-to-sit;bed-to-chair/chair-to-bed;toilet;walker, rolling  -AG     [place] Level/Cues Needed (Transfer Goal 1, PT) minimum assist (75% or more patient effort)  -AG     Time Frame (Transfer Goal 1, PT) by discharge  -AG     Row Name 05/08/23 1728          Gait Training Goal 1 (PT)    Activity/Assistive Device (Gait Training Goal 1, PT) gait (walking locomotion);assistive device use;decrease fall risk;diminish gait deviation;improve balance and speed;increase endurance/gait distance;increase energy  conservation  -AG     [place] Level (Gait Training Goal 1, PT) moderate assist (50-74% patient effort)  -AG     Distance (Gait Training Goal 1, PT) 10  -AG     Time Frame (Gait Training Goal 1, PT) by discharge  -AG           User Key  (r) = Recorded By, (t) = Taken By, (c) = Cosigned By    Initials Name Provider Type    Morenita Maciel RN Registered Nurse    Montserrat Nails, PT Physical Therapist    Alyce Perez RN Registered Nurse                Physical Therapy Education     Title: PT OT SLP Therapies (In Progress)     Topic: Physical Therapy (In Progress)     Point: Mobility training (In Progress)     Learning Progress Summary           Patient Acceptance, E,D, NR by  at 5/8/2023 1727    Acceptance, E, VU,NR by  at 5/7/2023 0545    Acceptance, E, VU,NR by  at 5/7/2023 0416    Acceptance, E, NR by  at 5/4/2023 1718                   Point: Home exercise program (In Progress)     Learning Progress Summary           Patient Acceptance, E,D, NR by  at 5/8/2023 1727    Acceptance, E, VU,NR by  at 5/7/2023 0545    Acceptance, E, VU,NR by  at 5/7/2023 0416    Acceptance, E, NR by  at 5/4/2023 1718                   Point: Body mechanics (In Progress)     Learning Progress Summary           Patient Acceptance, E,D, NR by  at 5/8/2023 1727    Acceptance, E, VU,NR by  at 5/7/2023 0545    Acceptance, E, VU,NR by  at 5/7/2023 0416    Acceptance, E, NR by  at 5/4/2023 1718                   Point: Precautions (In Progress)     Learning Progress Summary           Patient Acceptance, E,D, NR by  at 5/8/2023 1727    Acceptance, E, VU,NR by  at 5/7/2023 0545    Acceptance, E, VU,NR by  at 5/7/2023 0416    Acceptance, E, NR by  at 5/4/2023 1718                               User Key     Initials Effective Dates Name Provider Type Discipline     06/16/21 -  Kassidy Sanz, RN Registered Nurse Nurse     06/16/21 -  Montserrat Gale, PT Physical Therapist PT      01/05/23 -  Nupur Riggins, RN Registered Nurse Nurse              PT Recommendation and Plan  Planned Therapy Interventions (PT): balance training, bed mobility training, gait training, home exercise program, transfer training, strengthening, ROM (range of motion), postural re-education, neuromuscular re-education, motor coordination training, patient/family education  Therapy Frequency (PT): 3 times/wk (3-5 times/ week per priority)  Plan of Care Reviewed With: patient  Outcome Evaluation: PT evaluation complete.  Pt. requires max A for out of bed activity, STS and for bed>chair.  Reports SOA with minimal exertion.  SpO2 to 89% with activity on room air.  Pt. will benefit from continued skilled PT to address functional deficits.       Time Calculation:    PT Charges     Row Name 05/08/23 1727             Time Calculation    PT Received On 05/08/23  -      PT Goal Re-Cert Due Date 05/22/23  -            User Key  (r) = Recorded By, (t) = Taken By, (c) = Cosigned By    Initials Name Provider Type     Montserrat Gale, PT Physical Therapist              Therapy Charges for Today     Code Description Service Date Service Provider Modifiers Qty    [id number]  PT EVAL MOD COMPLEXITY 4 5/8/2023 Montserrat Gale, PT GP 1          PT G-Codes  AM-PAC 6 Clicks Score (PT): 12    Montserrat Gale PT  5/8/2023

## 2023-05-08 NOTE — PLAN OF CARE
Goal Outcome Evaluation:  Plan of Care Reviewed With: patient           Outcome Evaluation: VSS on room air. PRN medication administered for pain. Patient still unable to void - straight cathed pt this shift. Safety maintained, call light in reach.

## 2023-05-08 NOTE — PROGRESS NOTES
[facility] HOSPITALIST PROGRESS NOTE     Patient Identification:  Name:  Azra Ohara  Age:  88 y.o.  Sex:  female  :  1935  MRN:  [id number]  Visit Number:  [id number]  ROOM: [facility]/     Primary Care Provider:  Minal Salazar APRN    Length of stay in inpatient status:  4    Subjective     Chief Compliant:    Chief Complaint   Patient presents with   • Urinary Tract Infection       History of Presenting Illness:    Patient was seen and examined this morning with her nephew, Ethan, present at bedside. She says she feels tired but otherwise well. She reports she didn't drink very much yesterday because she was sick to her stomach, but that is better today. Her nephew is very concerned about her ability to care for herself at home including having adequate PO intake and ability to care for her wounds. I discussed this with patient and she is agreeable to go for short term rehab prior to returning home.     Objective     Current Hospital Meds:aspirin, 81 mg, Oral, Daily  atorvastatin, 40 mg, Oral, Nightly  ceFAZolin, 2 g, Intravenous, Q8H  docusate sodium, 100 mg, Oral, BID  heparin (porcine), 5,000 Units, Subcutaneous, Q12H  losartan, 50 mg, Oral, Q24H  multivitamin with minerals, 1 tablet, Oral, Daily  sodium chloride, 10 mL, Intravenous, Q12H  sodium chloride, 10 mL, Intravenous, Q12H  sodium chloride, 10 mL, Intravenous, Q12H    sodium chloride, 75 mL/hr, Last Rate: 75 mL/hr (23 1019)        Current Antimicrobial Therapy:  Anti-Infectives (From admission, onward)    Ordered     Dose/Rate Route Frequency Start Stop    23 0443  ceFAZolin 2 gm IVPB in 100 mL NS (VTB)        Ordering Provider: Rebecca Rodrigues, DO    2 g  over 30 Minutes Intravenous Every 8 Hours 23 0600 23 0559        Current Diuretic Therapy:  Diuretics (From admission, onward)    Ordered     Dose/Rate Route Frequency Start Stop    23 0453  furosemide (LASIX) injection 40 mg         Ordering Provider: Rick Dejesus MD    40 mg Intravenous Once 05/04/23 0545 05/04/23 0552        ----------------------------------------------------------------------------------------------------------------------  Vital Signs:  Temp:  [97.2 °F (36.2 °C)-99.4 °F (37.4 °C)] 97.2 °F (36.2 °C)  Heart Rate:  [62-94] 79  Resp:  [11-23] 20  BP: (120-187)/() 165/88  SpO2:  [86 %-100 %] 95 %  on   ;   Device (Oxygen Therapy): room air  Body mass index is 29.25 kg/m².    Wt Readings from Last 3 Encounters:   05/08/23 82.2 kg (181 lb 3.5 oz)   01/19/23 71.9 kg (158 lb 8.2 oz)     Intake & Output (last 3 days)       05/06 0701  05/07 0700 05/07 0701  05/08 0700 05/08 0701  05/09 0700    P.O. 318 100 220    I.V. (mL/kg) 427.5 (5.4) 211.7 (2.6)     IV Piggyback 100      Total Intake(mL/kg) 845.5 (10.6) 311.7 (3.8) 220 (2.7)    Urine (mL/kg/hr) 500 (0.3) 1185 (0.6) 615 (0.6)    Stool 0 0 0    Total Output 500 1185 615    Net +345.5 -873.4 -395           Stool Unmeasured Occurrence  3 x 1 x        Diet: Regular/House Diet; Texture: Regular Texture (IDDSI 7); Fluid Consistency: Thin (IDDSI 0)  ----------------------------------------------------------------------------------------------------------------------  Physical exam:   Constitutional:  Well-developed and well-nourished.  No acute distress.      HENT:  Head:  Normocephalic and atraumatic.    Cardiovascular:  Normal rate, regular rhythm   Pulmonary/Chest:  Normal rate and effort, breath sounds clear to auscultation in anterior and lateral lung fields  Musculoskeletal:  No deformity.    Neurological: Awake, alert, no focal deficit on gross examination. No slurred speech or facial droop.   Skin:  Skin is warm and dry.   Peripheral vascular:  No cyanosis, no edema.  Psychiatric: Appropriate mood and affect  Edited by: Carmen Stoddard DO at 5/8/2023  1924  ----------------------------------------------------------------------------------------------------------------------  Results from last 7 days   Lab Units 05/08/23 0028 05/07/23 0032 05/06/23 0029 05/05/23 1751 05/05/23 0004 05/03/23 2117   CRP mg/dL  --   --   --   --   --  3.26*   LACTATE mmol/L  --   --   --  1.0  --  1.8   WBC 10*3/mm3 8.32 8.32 8.56  --    < > 10.94*   HEMOGLOBIN g/dL 11.9* 11.1* 11.5*  --    < > 14.3   HEMATOCRIT % 38.2 35.8 36.2  --    < > 44.5   MCV fL 95.7 96.0 95.8  --    < > 95.1   MCHC g/dL 31.2* 31.0* 31.8  --    < > 32.1   PLATELETS 10*3/mm3 174 168 173  --    < > 251    < > = values in this interval not displayed.         Results from last 7 days   Lab Units 05/08/23 0028 05/07/23 0032 05/06/23 0528 05/06/23 0029 05/05/23 1751 05/05/23 0004 05/03/23 2117   SODIUM mmol/L 139 142  --  144  --  147* 147*   POTASSIUM mmol/L 3.8 4.1 4.7 3.9   < > 3.2* 4.1   MAGNESIUM mg/dL  --  2.0  --  2.0  --  1.8 2.0   CHLORIDE mmol/L 109* 112*  --  113*  --  112* 109*   CO2 mmol/L 22.0 21.2*  --  20.6*  --  21.1* 24.4   BUN mg/dL 25* 32*  --  34*  --  42* 47*   CREATININE mg/dL 0.83 0.86  --  0.91  --  1.30* 1.14*   CALCIUM mg/dL 8.7 9.0  --  8.7  --  9.2 10.8*   GLUCOSE mg/dL 104* 99  --  91  --  110* 112*   ALBUMIN g/dL  --   --   --   --   --  3.0* 4.0   BILIRUBIN mg/dL  --   --   --   --   --  0.6 0.7   ALK PHOS U/L  --   --   --   --   --  68 83   AST (SGOT) U/L  --   --   --   --   --  21 26   ALT (SGPT) U/L  --   --   --   --   --  10 14    < > = values in this interval not displayed.   Estimated Creatinine Clearance: 50.7 mL/min (by C-G formula based on SCr of 0.83 mg/dL).  No results found for: AMMONIA  Results from last 7 days   Lab Units 05/03/23 2313 05/03/23 2117   HSTROP T ng/L 44* 50*     Results from last 7 days   Lab Units 05/03/23 2117   PROBNP pg/mL 6,565.0*         No results found for: HGBA1C, POCGLU  Lab Results   Component Value Date    TSH 0.998  05/03/2023    FREET4 1.95 (H) 05/03/2023     No results found for: PREGTESTUR, PREGSERUM, HCG, HCGQUANT  Pain Management Panel         Latest Ref Rng & Units 1/16/2023   Pain Management Panel   Creatinine, Urine mg/dL 72.7     Amphetamine, Urine Qual Negative Negative     Barbiturates Screen, Urine Negative Negative     Benzodiazepine Screen, Urine Negative Negative     Buprenorphine, Screen, Urine Negative Negative     Cocaine Screen, Urine Negative Negative     Methadone Screen , Urine Negative Negative     Methamphetamine, Ur Negative Negative                Brief Urine Lab Results  (Last result in the past 365 days)      Color   Clarity   Blood   Leuk Est   Nitrite   Protein   CREAT   Urine HCG        05/03/23 2138 Yellow   Clear   Negative   Negative   Negative   30 mg/dL (1+)               Blood Culture   Date Value Ref Range Status   05/05/2023 No growth at 3 days  Preliminary   05/05/2023 No growth at 3 days  Preliminary     No results found for: URINECX  No results found for: WOUNDCX  No results found for: STOOLCX  No results found for: RESPCX  No results found for: AFBCX  Results from last 7 days   Lab Units 05/05/23  1751 05/03/23  2117   LACTATE mmol/L 1.0 1.8   CRP mg/dL  --  3.26*       I have personally looked at the labs and they are summarized above.  ----------------------------------------------------------------------------------------------------------------------  Detailed radiology reports for the last 24 hours:  Imaging Results (Last 24 Hours)     ** No results found for the last 24 hours. 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  #History of apical infarction  #Chronic HFpEF  #Severe mitral valve regurgitation  #Severe tricuspid valve regurgitation  #Mild acute hypokalemia, resolved  #Hypernatremia, likely hypovolemic and currently resolved  #Borderline hypomagnesemia,  resolved with supplementation  #Uncontrolled essential hypertension  #Reported right ankle cellulitis upon admission  #Sacral decubitus ulcer, present on admission  #CKD stage IV  #Mild macrocytosis with mild anemia  #Generalized weakness/physical debility  #Oliguria without kidney injury likely due to dehydration    - Appreciate Cardiology assistance. She is s/p pacemaker placement--continue with sling on left arm until Friday. Sponge baths until that time.   - Continue losartan which was increased to 100mg daily today by Cardiology due to persistent hypertension  - Continue holding home thiazide due to electrolyte abnormalities and advanced age  - Continue to supplement potassium and magnesium per protocol as needed  - Repeat chemistry panel and mag in am  - Complete a course of cefazolin which was started on admission for suspected lower extremity cellulitis  - Continue aspirin, statin  - Discussed plan with patient and nephew Ethan today and they are both agreeable for short term rehab. Case management consult requested to help arrange this. Work with PT/OT as able for strengthening. Encourage patient to be out of bed with assistance as tolerated.  - BUN/Cr ratio is still >20 but improved from admission. She only had 100cc of PO fluid intake yesterday and 300cc intake total. Gentle IV fluids started today due to oliguria, monitor volume status closely and encourage increased PO intake.   - Continue wound care and pressure injury prevention strategies.    Chronic medical conditions:  - Age related cognitive changes vs underlying dementia - Family is concerned about patient's decision making capacity. Psych evaluated patient and said at that time she retained decision making capacity, appreciate assistance. Patient has designated Alexandru as who she wants to be her POA.  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    Edited by: Richi  DO Carmen at 5/8/2023 1924    VTE Prophylaxis:   Mechanical Order History:      Ordered        05/05/23 1549  Place Sequential Compression Device  Once            05/05/23 1549  Maintain Sequential Compression Device  Continuous                    Pharmalogical Order History:      Ordered     Dose Route Frequency Stop    05/04/23 0301  heparin (porcine) 5000 UNIT/ML injection 5,000 Units         5,000 Units SC Every 12 Hours Scheduled --                Dispo: transfer to telemetry floor today, likely skilled nursing facility rehab at discharge    Carmen Stoddard DO  [facility] Hospitalist  05/08/23  19:24 EDT

## 2023-05-08 NOTE — PLAN OF CARE
Goal Outcome Evaluation:  Plan of Care Reviewed With: patient           Outcome Evaluation: Patient has been alert and oriented x 3--person, place, and time. Patient has slept between care, patient was tunred Q2 hrs, VSS at this time, call light within reach, bed alarm set.

## 2023-05-08 NOTE — PROGRESS NOTES
20     LOS: 4 days     Name: Azra Ohara  Age/Sex: 88 y.o. female  :  1935        PCP: Minal Salazar APRN  REF: No ref. provider found    Active Problems:    * No active hospital problems. *      Reason for follow-up: Third degree heart block    Subjective       Subjective   Azra Ohara is an 88-year-old female with a past medical history significant for HFpEF, CKD stage IV, essential hypertension, LBBB, PAD, history of apical MI, history of malignant neoplasm of large intestine, and polycythemia.  She presented to [facility] ED on 5/3/2023 with complaints of dysuria.  While in the ER, proBNP was 6565.  HS troponin was 50 with repeat being 44.  EKG revealed third-degree AV block with junctional escape rhythm no evidence of STEMI was noted.  Patient was admitted and cardiology was consulted for further evaluation and management.    Interval History: The patient is sitting up in the chair today.  She reports arthralgias.  Denies any pain at the pacemaker site. Patient is s/p PPM on 23.    ROS    Vital Signs  Temp:  [97.2 °F (36.2 °C)-99.4 °F (37.4 °C)] 97.2 °F (36.2 °C)  Heart Rate:  [62-94] 86  Resp:  [-] 19  BP: (120-187)/() 152/97  Vital Signs (last 72 hrs)       05/05 0700  05/06 0659 05/06 0700  05/07 0659 05/07 0700  05/08 0659 05/08 0700  05/08 1645   Most Recent      Temp (°F) 98.1 -  99    98.1 -  98.4    97.9 -  99.5    97.2 -  98.4     97.2 (36.2) 05/08 1609    Heart Rate 36 -  77    61 -  90    62 -  94    73 -  89     86 05/08 1500    Resp     15 -      13 -  21     19 05/08 1600    /80 -  176/65    131/66 -  162/87    120/67 -  162/98    141/83 -  187/102     152/97 05/08 1500    SpO2 (%) 91 -  100    89 -  99    86 -  97    89 -  100     95 05/08 1500        Documented weights    23 0300 23 0535 23   Weight: 68 kg (150 lb) 79.2 kg (174 lb 9.7 oz) 81.3 kg (179 lb 3.7 oz) 79.7 kg (175 lb 11.3 oz)     05/07/23 0500 05/08/23 0400   Weight: 79.7 kg (175 lb 11.3 oz) 82.2 kg (181 lb 3.5 oz)      Body mass index is 29.25 kg/m².    Intake/Output Summary (Last 24 hours) at 5/8/2023 1645  Last data filed at 5/8/2023 1147  Gross per 24 hour   Intake 431.65 ml   Output 615 ml   Net -183.35 ml     Objective    Objective       Physical Exam:     General Appearance:    Alert, cooperative, in no acute distress   Head:    Normocephalic, without obvious abnormality, atraumatic   Eyes:            Conjunctivae and sclerae normal, no   icterus, no pallor, corneas clear.   Neck:   No adenopathy, supple, trachea midline, no thyromegaly, no   carotid bruit, no JVD   Lungs:     Clear to auscultation,respirations regular, even and                  unlabored    Heart:    Regular rhythm and normal rate, normal S1 and S2, no            murmur, no gallop, no rub, no click   Chest Wall:    PM dressing clean, dry, and intact.    Abdomen:     Normal bowel sounds, no masses, no organomegaly, soft        nontender, nondistended, no guarding, no rebound                tenderness   Extremities:   LUE sling in place, no edema, no cyanosis, no             redness   Pulses:   Pulses palpable and equal bilaterally   Skin:   No bleeding, bruising or rash       Neurologic:   Alert and oriented x 3          Procedures    Results review       Results Review:   Results from last 7 days   Lab Units 05/08/23  0028 05/07/23  0032 05/06/23  0029 05/05/23  0004 05/03/23 2117   WBC 10*3/mm3 8.32 8.32 8.56 10.77 10.94*   HEMOGLOBIN g/dL 11.9* 11.1* 11.5* 12.4 14.3   PLATELETS 10*3/mm3 174 168 173 188 251     Results from last 7 days   Lab Units 05/08/23  0028 05/07/23  0032 05/06/23  0528 05/06/23  0029 05/05/23  1751 05/05/23  0004 05/03/23  2117   SODIUM mmol/L 139 142  --  144  --  147* 147*   POTASSIUM mmol/L 3.8 4.1 4.7 3.9 3.3* 3.2* 4.1   CHLORIDE mmol/L 109* 112*  --  113*  --  112* 109*   CO2 mmol/L 22.0 21.2*  --  20.6*  --  21.1* 24.4   BUN mg/dL  25* 32*  --  34*  --  42* 47*   CREATININE mg/dL 0.83 0.86  --  0.91  --  1.30* 1.14*   CALCIUM mg/dL 8.7 9.0  --  8.7  --  9.2 10.8*   GLUCOSE mg/dL 104* 99  --  91  --  110* 112*   ALT (SGPT) U/L  --   --   --   --   --  10 14   AST (SGOT) U/L  --   --   --   --   --  21 26     Results from last 7 days   Lab Units 05/03/23  2313 05/03/23  2117   HSTROP T ng/L 44* 50*     No results found for: INR  Lab Results   Component Value Date    MG 2.0 05/07/2023    MG 2.0 05/06/2023    MG 1.8 05/05/2023     Lab Results   Component Value Date    TSH 0.998 05/03/2023    TRIG 98 01/17/2023    HDL 35 (L) 01/17/2023    LDL 24 01/17/2023      Imaging Results (Last 48 Hours)     ** No results found for the last 48 hours. **        No results found for: BNP               Echo   Results for orders placed during the hospital encounter of 05/03/23    Adult Transthoracic Echo Complete W/ Cont if Necessary Per Protocol    Interpretation Summary  •  Left ventricular systolic function is normal. Left ventricular ejection fraction appears to be 61 - 65%.  •  Left ventricular diastolic function is consistent with (grade Ia w/high LAP) impaired relaxation.  •  The left atrial cavity is moderately dilated.  •  The right atrial cavity is moderately  dilated.  •  There is mild calcification of the aortic valve mainly affecting the non-coronary, left coronary and right coronary cusp(s).  •  Severe mitral valve regurgitation is present.  •  Severe tricuspid valve regurgitation is present.  •  Estimated right ventricular systolic pressure from tricuspid regurgitation is markedly elevated (>55 mmHg).           I reviewed the patient's new clinical results.    Telemetry: paced in the 70s       Medication Review:   aspirin, 81 mg, Oral, Daily  atorvastatin, 40 mg, Oral, Nightly  ceFAZolin, 2 g, Intravenous, Q8H  docusate sodium, 100 mg, Oral, BID  heparin (porcine), 5,000 Units, Subcutaneous, Q12H  losartan, 50 mg, Oral, Q24H  multivitamin with  minerals, 1 tablet, Oral, Daily  sodium chloride, 10 mL, Intravenous, Q12H  sodium chloride, 10 mL, Intravenous, Q12H  sodium chloride, 10 mL, Intravenous, Q12H        sodium chloride, 75 mL/hr, Last Rate: 75 mL/hr (05/08/23 1019)        Assessment      1. Complete heart block, status post permanent dual-chamber pacemaker implantation on 5/5/2023, clinically stable.  2. Systemic hypertension with elevated blood pressures.    Plan     1. Increase losartan to 100 mg daily.      I discussed the patient's findings and my recommendations with patient.    Electronically signed by WASHINGTON Mccord, 05/08/23, 4:49 PM EDT.    Electronically signed by Sampson Garvin MD, 05/08/23, 7:07 PM EDT.      Please note that portions of this note were completed with a voice recognition program.

## 2023-05-08 NOTE — CASE MANAGEMENT/SOCIAL WORK
Discharge Planning Assessment   [place]     Patient Name: Azra Ohara  MRN: [id number]  Today's Date: 5/8/2023    Admit Date: 5/3/2023    Plan: SS received nursing consult for Patient/family requesting short term rehab. SS spoke with pt at bedside who states she is unsure where she wants to complete short term rehab at. Pt requested for list for NH for short term rehab and plans to speak with family. SS provided pt with NH list. SS to follow up tomorrow 5/9/23. SS to follow.     Discharge Plan     Row Name 05/08/23 1127       Plan    Plan SS received nursing consult for Patient/family requesting short term rehab. SS spoke with pt at bedside who states she is unsure where she wants to complete short term rehab at. Pt requested for list for NH for short term rehab and plans to speak with family. SS provided pt with NH list. SS to follow up tomorrow 5/9/23. SS to follow.                ELIOT Damon

## 2023-05-08 NOTE — THERAPY EVALUATION
Patient Name: Azra Ohara  : 1935    MRN: [id number]                              Today's Date: 2023       Admit Date: 5/3/2023    Visit Dx:     ICD-10-CM ICD-9-CM   1. Third degree atrioventricular block  I44.2 426.0   2. Complete heart block  I44.2 426.0     Patient Active Problem List   Diagnosis   • Chronic kidney disease, stage 4 (severe)   • Essential hypertension   • Left bundle branch block   • Malignant neoplasm of large intestine   • Polycythemia vera   • Long term (current) use of aspirin     Past Medical History:   Diagnosis Date   • Chronic kidney disease, stage 4 (severe) 2023   • Essential hypertension    • History of colon cancer    • Left bundle branch block 2023   • Long term (current) use of aspirin 2023   • Polycythemia vera 2023     Past Surgical History:   Procedure Laterality Date   • CARDIAC ELECTROPHYSIOLOGY PROCEDURE N/A 2023    Procedure: Pacemaker DC new;  Surgeon: Sampson Garvin MD;  Location: [facility] INVASIVE LOCATION;  Service: Cardiology;  Laterality: N/A;   • COLON RESECTION     • PARATHYROIDECTOMY Right     Parathyroid adenoma      General Information     Row Name 23 1436          OT Time and Intention    Document Type evaluation  -LM     Mode of Treatment occupational therapy  -     Row Name 23 1436          General Information    Patient Profile Reviewed yes  -     Prior Level of Function ADL's;transfer;all household mobility  patient reports living alone  -     Existing Precautions/Restrictions fall;pacemaker;non-weight bearing  sling and swathe  -     Barriers to Rehab medically complex;cognitive status  -     Row Name 23 1436          Living Environment    People in Home alone  -     Row Name 23 1436          Cognition    Orientation Status (Cognition) disoriented to;place;situation;verbal cues/prompts needed for orientation  -     Row Name 23 1436          Safety Issues, Functional  Mobility    Safety Issues Affecting Function (Mobility) problem-solving;sequencing abilities;judgment;insight into deficits/self-awareness;positioning of assistive device  -LM     Impairments Affecting Function (Mobility) balance;cognition;endurance/activity tolerance;strength  -LM     Cognitive Impairments, Mobility Safety/Performance insight into deficits/self-awareness;judgment;problem-solving/reasoning  -           User Key  (r) = Recorded By, (t) = Taken By, (c) = Cosigned By    Initials Name Provider Type     Kathy Guillory, OT Occupational Therapist                 Mobility/ADL's     Row Name 05/08/23 1438          Activities of Daily Living    BADL Assessment/Intervention bathing;upper body dressing;lower body dressing;grooming;feeding;toileting  -     Row Name 05/08/23 1438          Bathing Assessment/Intervention    [place] Level (Bathing) moderate assist (50% patient effort);maximum assist (25% patient effort)  -     Row Name 05/08/23 1438          Upper Body Dressing Assessment/Training    [place] Level (Upper Body Dressing) maximum assist (25% patient effort)  -     Row Name 05/08/23 1438          Lower Body Dressing Assessment/Training    [place] Level (Lower Body Dressing) maximum assist (25% patient effort)  -     Row Name 05/08/23 1438          Grooming Assessment/Training    [place] Level (Grooming) moderate assist (50% patient effort);minimum assist (75% patient effort)  -     Row Name 05/08/23 1438          Self-Feeding Assessment/Training    [place] Level (Feeding) set up  -     Row Name 05/08/23 1438          Toileting Assessment/Training    [place] Level (Toileting) maximum assist (25% patient effort)  -           User Key  (r) = Recorded By, (t) = Taken By, (c) = Cosigned By    Initials Name Provider Type     Kathy Guillory, OT Occupational Therapist               Obj/Interventions     Row Name 05/08/23 1441          Sensory Assessment  (Somatosensory)    Sensory Assessment (Somatosensory) sensation intact  -LM     Row Name 05/08/23 1441          Vision Assessment/Intervention    Visual Impairment/Limitations WFL  -LM     Row Name 05/08/23 1441          Range of Motion Comprehensive    General Range of Motion upper extremity range of motion deficits identified  -LM     Comment, General Range of Motion LUE deferred due to pacemaker placement  -LM     Row Name 05/08/23 1441          Strength Comprehensive (MMT)    General Manual Muscle Testing (MMT) Assessment upper extremity strength deficits identified  -LM     Comment, General Manual Muscle Testing (MMT) Assessment deferred LUE  -LM     [facility] Name 05/08/23 1441          Motor Skills    Motor Skills functional endurance  -LM     Functional Endurance f-  -LM           User Key  (r) = Recorded By, (t) = Taken By, (c) = Cosigned By    Initials Name Provider Type    LM Kathy Guillory, OT Occupational Therapist               Goals/Plan     [facility] Name 05/08/23 1450          Transfer Goal 1 (OT)    Activity/Assistive Device (Transfer Goal 1, OT) transfers, all;commode, 3-in-1;walker, rolling  -LM     [place] Level/Cues Needed (Transfer Goal 1, OT) minimum assist (75% or more patient effort);contact guard required  -LM     Time Frame (Transfer Goal 1, OT) by discharge  -[facility] Name 05/08/23 1450          Toileting Goal 1 (OT)    Activity/Device (Toileting Goal 1, OT) toileting skills, all  -LM     [place] Level/Cues Needed (Toileting Goal 1, OT) minimum assist (75% or more patient effort);moderate assist (50-74% patient effort)  -LM     Time Frame (Toileting Goal 1, OT) by discharge  -[facility] Name 05/08/23 1450          Therapy Assessment/Plan (OT)    Planned Therapy Interventions (OT) activity tolerance training;adaptive equipment training;BADL retraining;patient/caregiver education/training;transfer/mobility retraining;strengthening exercise;ROM/therapeutic exercise  -LM           User Key   (r) = Recorded By, (t) = Taken By, (c) = Cosigned By    Initials Name Provider Type    Kathy Kiran, OT Occupational Therapist               Clinical Impression     Row Name 05/08/23 1442          Plan of Care Review    Plan of Care Reviewed With patient  -LM     Row Name 05/08/23 1442          Therapy Assessment/Plan (OT)    Patient/Family Therapy Goal Statement (OT) return home to plof  -LM     Rehab Potential (OT) good, to achieve stated therapy goals  -LM     Criteria for Skilled Therapeutic Interventions Met (OT) yes;meets criteria;skilled treatment is necessary  -LM     Therapy Frequency (OT) other (see comments)  prn and/or to monitor fxl progress  -LM     Row Name 05/08/23 1442          Therapy Plan Review/Discharge Plan (OT)    Anticipated Discharge Disposition (OT) --  TBD  -LM     Row Name 05/08/23 1442          Positioning and Restraints    Post Treatment Position bed  -LM     In Bed call light within reach;encouraged to call for assist;exit alarm on  -LM           User Key  (r) = Recorded By, (t) = Taken By, (c) = Cosigned By    Initials Name Provider Type    Kathy Kiran, OT Occupational Therapist               Outcome Measures     Row Name 05/08/23 0828          How much help from another person do you currently need...    Turning from your back to your side while in flat bed without using bedrails? 3  -LA     Moving from lying on back to sitting on the side of a flat bed without bedrails? 2  -LA     Moving to and from a bed to a chair (including a wheelchair)? 2  -LA     Standing up from a chair using your arms (e.g., wheelchair, bedside chair)? 2  -LA     Climbing 3-5 steps with a railing? 1  -LA     To walk in hospital room? 2  -LA     AM-PAC 6 Clicks Score (PT) 12  -LA     Highest level of mobility 4 --> Transferred to chair/commode  -LA           User Key  (r) = Recorded By, (t) = Taken By, (c) = Cosigned By    Initials Name Provider Type    Perla Sandy, RN Registered Nurse                   OT Recommendation and Plan  Planned Therapy Interventions (OT): activity tolerance training, adaptive equipment training, BADL retraining, patient/caregiver education/training, transfer/mobility retraining, strengthening exercise, ROM/therapeutic exercise  Therapy Frequency (OT): other (see comments) (prn and/or to monitor fxl progress)  Plan of Care Review  Plan of Care Reviewed With: patient     Time Calculation:     Therapy Charges for Today     Code Description Service Date Service Provider Modifiers Qty    [id number]  OT EVAL MOD COMPLEXITY 4 5/8/2023 Kathy Guillory, OT GO 1               Kathy Guillory OT  5/8/2023

## 2023-05-09 LAB
ANION GAP SERPL CALCULATED.3IONS-SCNC: 8.7 MMOL/L (ref 5–15)
BUN SERPL-MCNC: 23 MG/DL (ref 8–23)
BUN/CREAT SERPL: 31.1 (ref 7–25)
CALCIUM SPEC-SCNC: 8.6 MG/DL (ref 8.6–10.5)
CHLORIDE SERPL-SCNC: 112 MMOL/L (ref 98–107)
CO2 SERPL-SCNC: 21.3 MMOL/L (ref 22–29)
CREAT SERPL-MCNC: 0.74 MG/DL (ref 0.57–1)
DEPRECATED RDW RBC AUTO: 49.7 FL (ref 37–54)
EGFRCR SERPLBLD CKD-EPI 2021: 77.9 ML/MIN/1.73
ERYTHROCYTE [DISTWIDTH] IN BLOOD BY AUTOMATED COUNT: 13.9 % (ref 12.3–15.4)
GLUCOSE SERPL-MCNC: 105 MG/DL (ref 65–99)
HCT VFR BLD AUTO: 38.5 % (ref 34–46.6)
HGB BLD-MCNC: 12.1 G/DL (ref 12–15.9)
MAGNESIUM SERPL-MCNC: 1.7 MG/DL (ref 1.6–2.4)
MCH RBC QN AUTO: 30.3 PG (ref 26.6–33)
MCHC RBC AUTO-ENTMCNC: 31.4 G/DL (ref 31.5–35.7)
MCV RBC AUTO: 96.3 FL (ref 79–97)
PLATELET # BLD AUTO: 164 10*3/MM3 (ref 140–450)
PMV BLD AUTO: 12 FL (ref 6–12)
POTASSIUM SERPL-SCNC: 3.8 MMOL/L (ref 3.5–5.2)
RBC # BLD AUTO: 4 10*6/MM3 (ref 3.77–5.28)
SODIUM SERPL-SCNC: 142 MMOL/L (ref 136–145)
WBC NRBC COR # BLD: 8.52 10*3/MM3 (ref 3.4–10.8)

## 2023-05-09 PROCEDURE — 25010000002 HEPARIN (PORCINE) PER 1000 UNITS: Performed by: STUDENT IN AN ORGANIZED HEALTH CARE EDUCATION/TRAINING PROGRAM

## 2023-05-09 PROCEDURE — 80048 BASIC METABOLIC PNL TOTAL CA: CPT | Performed by: STUDENT IN AN ORGANIZED HEALTH CARE EDUCATION/TRAINING PROGRAM

## 2023-05-09 PROCEDURE — 99232 SBSQ HOSP IP/OBS MODERATE 35: CPT | Performed by: STUDENT IN AN ORGANIZED HEALTH CARE EDUCATION/TRAINING PROGRAM

## 2023-05-09 PROCEDURE — 25010000002 MAGNESIUM SULFATE 2 GM/50ML SOLUTION: Performed by: STUDENT IN AN ORGANIZED HEALTH CARE EDUCATION/TRAINING PROGRAM

## 2023-05-09 PROCEDURE — 25010000002 HEPARIN (PORCINE) PER 1000 UNITS: Performed by: INTERNAL MEDICINE

## 2023-05-09 PROCEDURE — 83735 ASSAY OF MAGNESIUM: CPT | Performed by: STUDENT IN AN ORGANIZED HEALTH CARE EDUCATION/TRAINING PROGRAM

## 2023-05-09 PROCEDURE — 85027 COMPLETE CBC AUTOMATED: CPT | Performed by: STUDENT IN AN ORGANIZED HEALTH CARE EDUCATION/TRAINING PROGRAM

## 2023-05-09 RX ORDER — MAGNESIUM SULFATE HEPTAHYDRATE 40 MG/ML
4 INJECTION, SOLUTION INTRAVENOUS AS NEEDED
Status: DISCONTINUED | OUTPATIENT
Start: 2023-05-09 | End: 2023-05-11 | Stop reason: HOSPADM

## 2023-05-09 RX ORDER — MAGNESIUM SULFATE HEPTAHYDRATE 40 MG/ML
2 INJECTION, SOLUTION INTRAVENOUS AS NEEDED
Status: DISCONTINUED | OUTPATIENT
Start: 2023-05-09 | End: 2023-05-11 | Stop reason: HOSPADM

## 2023-05-09 RX ORDER — MAGNESIUM SULFATE HEPTAHYDRATE 40 MG/ML
2 INJECTION, SOLUTION INTRAVENOUS ONCE
Status: COMPLETED | OUTPATIENT
Start: 2023-05-09 | End: 2023-05-09

## 2023-05-09 RX ORDER — LOSARTAN POTASSIUM 50 MG/1
100 TABLET ORAL
Status: DISCONTINUED | OUTPATIENT
Start: 2023-05-10 | End: 2023-05-11 | Stop reason: HOSPADM

## 2023-05-09 RX ORDER — MAGNESIUM SULFATE 1 G/100ML
1 INJECTION INTRAVENOUS AS NEEDED
Status: DISCONTINUED | OUTPATIENT
Start: 2023-05-09 | End: 2023-05-11 | Stop reason: HOSPADM

## 2023-05-09 RX ADMIN — HEPARIN SODIUM 5000 UNITS: 5000 INJECTION INTRAVENOUS; SUBCUTANEOUS at 20:44

## 2023-05-09 RX ADMIN — DOCUSATE SODIUM 100 MG: 100 CAPSULE, LIQUID FILLED ORAL at 08:46

## 2023-05-09 RX ADMIN — ATORVASTATIN CALCIUM 40 MG: 40 TABLET, FILM COATED ORAL at 20:44

## 2023-05-09 RX ADMIN — Medication 10 ML: at 08:47

## 2023-05-09 RX ADMIN — HYDROCODONE BITARTRATE AND ACETAMINOPHEN 0.5 TABLET: 5; 325 TABLET ORAL at 08:47

## 2023-05-09 RX ADMIN — ASPIRIN 81 MG: 81 TABLET, COATED ORAL at 08:46

## 2023-05-09 RX ADMIN — HEPARIN SODIUM 5000 UNITS: 5000 INJECTION INTRAVENOUS; SUBCUTANEOUS at 08:47

## 2023-05-09 RX ADMIN — HYDROCODONE BITARTRATE AND ACETAMINOPHEN 0.5 TABLET: 5; 325 TABLET ORAL at 20:52

## 2023-05-09 RX ADMIN — LOSARTAN POTASSIUM 50 MG: 50 TABLET, FILM COATED ORAL at 08:46

## 2023-05-09 RX ADMIN — Medication 1 TABLET: at 08:46

## 2023-05-09 RX ADMIN — DOCUSATE SODIUM 100 MG: 100 CAPSULE, LIQUID FILLED ORAL at 20:44

## 2023-05-09 RX ADMIN — SODIUM CHLORIDE 75 ML/HR: 9 INJECTION, SOLUTION INTRAVENOUS at 11:49

## 2023-05-09 RX ADMIN — ACETAMINOPHEN 500 MG: 500 TABLET ORAL at 14:39

## 2023-05-09 RX ADMIN — MAGNESIUM SULFATE HEPTAHYDRATE 2 G: 40 INJECTION, SOLUTION INTRAVENOUS at 02:57

## 2023-05-09 NOTE — PLAN OF CARE
Goal Outcome Evaluation:  Plan of Care Reviewed With: patient           Outcome Evaluation: Patient has slept between care, patient is on room air and tolerated well, call light within reach, bed alarm set, VSS at this time. Patient requested PRN pain medication early in the shift, patient has been alert and oriented to person and place this shift.

## 2023-05-09 NOTE — DISCHARGE PLACEMENT REQUEST
"Gómez, Wenceslao (88 y.o. Female)     Date of Birth   1935    Social Security Number       Address   [address] [address]    Home Phone   [phone]    MRN   [id number]       Nondenominational   None    Marital Status   Single                            Admission Date   5/3/23    Admission Type   Emergency    Admitting Provider   Rick Dejesus MD    Attending Provider   Carmen Stoddard DO    Department, Room/Bed   [address], 3302/1S       Discharge Date       Discharge Disposition       Discharge Destination                               Attending Provider: Carmen Stoddard DO    Allergies: Lipitor [Atorvastatin], Penicillins    Isolation: None   Infection: None   Code Status: No CPR    Ht: 167.6 cm (66\")   Wt: 82.2 kg (181 lb 3.2 oz)    Admission Cmt: None   Principal Problem: Third degree atrioventricular block [I44.2]                 Active Insurance as of 5/3/2023     Primary Coverage     Payor Plan Insurance Group Employer/Plan Group    UNITED HE[employer] MEDICARE REPLACEMENT UNITED [employer]ALTHCARE MEDICARE REPLACEMENT [id number]     Payor Plan Address Payor Plan Phone Number Payor Plan Fax Number Effective Dates    [address] [address]   1/1/2023 - None Entered    [facility] [id number]       Subscriber Name Subscriber Birth Date Member ID       WENCESLAO GÓMEZ 1935 [id number]                 Emergency Contacts      (Rel.) Home Phone Work Phone Mobile Phone    MELISSA SCHMIDT (Sister) [phone] -- --    Gonzalez Barrera (nephew) (Other) -- -- [phone]    Alexandru Schmidt (healthcare surrogate/ nephew) (Other) -- -- [phone]            Insurance Information                UNITED HE[employer] MEDICARE REPLACEMENT/UNITED HEALTHC[employer] MEDICARE REPLACEMENT Phone: --    Subscriber: Wenceslao Gómez Subscriber#: [id number]    Group#: [id number] Precert#: [id number]             History & Physical      Rick Dejesus MD at 05/04/23 0244              [facility] HOSPITALIST HISTORY " "AND PHYSICAL    Patient Identification:  Name:  Azra Ohara  Age:  88 y.o.  Sex:  female  :  1935  MRN:  [id number]   Visit Number:  [id number]  Admit Date: 5/3/2023   Room number:  CC10/1C  Primary Care Physician:  Minal Salazar APRN    Date of Admission: 5/3/2023     Subjective     Chief complaint:    Chief Complaint   Patient presents with   • Urinary Tract Infection     History of presenting illness:  88 y.o. female who was admitted on 5/3/2023 from the ED with suspected UTI.  The patient has a past medical history of apical MI, heart failure with preserved EF, essential hypertension, chronic kidney disease stage IV, LBBB, polycythemia vera, and peripheral vascular disease.  The patient is a very poor historian and as a result I am unable to obtain a history of presenting illness from her.  When asked why she came here tonight, her answer was that \"they\" begging her to come into treat her virus.  I directly asked her about symptoms and she could only tell me that she has had fatigue, cough, dyspnea on exertion, chest pain, and generalized weakness.  The patient was unable to tell me anything else.  When asked orientation questions, she stated that the year was .  She then was attempting to get out of bed to go home as the \"heart doctor will not be here till later today so I need to go home\".  Per my review of the emergency department records and my discussion with the emergency department provider, the patient was brought in via [facility] for suspicion of UTI.  The patient arrived disheveled and smelled like urine.  Further evaluation in the emergency department found that she had sinus bradycardia; EKG showed a new third-degree heart block.  Patient did not have any suspicion for sepsis; urinalysis was not very impressive and was not indicative of an acute urinary tract infection.  The emergency department contacted Dr. Nobles with interventional cardiology and he " recommended admission; he told the emergency department that he would see the patient in the morning.  Thus, I have admitted the patient to the critical care unit for further evaluation and treatment.  ---------------------------------------------------------------------------------------------------------------------   Review of Systems   Unable to perform ROS: Other   Constitutional: Positive for fatigue.   Respiratory: Positive for cough and shortness of breath (DODGE).    Cardiovascular: Positive for chest pain.   Neurological: Positive for weakness.     Disoriented to situation/past medical history.  ---------------------------------------------------------------------------------------------------------------------   Past Medical History:   Diagnosis Date   • Chronic kidney disease, stage 4 (severe) 01/20/2023   • Essential hypertension    • History of colon cancer    • Left bundle branch block 01/20/2023   • Long term (current) use of aspirin 01/20/2023   • Polycythemia vera 01/20/2023     Past Surgical History:   Procedure Laterality Date   • COLON RESECTION  2004   • PARATHYROIDECTOMY Right     Parathyroid adenoma     Family History   Problem Relation Age of Onset   • Heart disease Mother    • Heart disease Father      Social History     Socioeconomic History   • Marital status: Single   Tobacco Use   • Smoking status: Never   Substance and Sexual Activity   • Alcohol use: Never   • Drug use: Never     ---------------------------------------------------------------------------------------------------------------------   Allergies:  Lipitor [atorvastatin] and Penicillins  ---------------------------------------------------------------------------------------------------------------------   Medications below are reported home medications pulling from within the system; at this time, these medications have not been reconciled unless otherwise specified and are in the verification process for further verification  as current home medications.      Prior to Admission Medications     Prescriptions Last Dose Informant Patient Reported? Taking?    aspirin 81 MG chewable tablet   No No    Chew and swallow 1 tablet Daily.    cetirizine (zyrTEC) 10 MG tablet   No No    Take 1 tablet by mouth Daily.    cyanocobalamin 1000 MCG/ML injection   Yes No    Inject 1,000 mcg into the appropriate muscle as directed by prescriber Every 30 (Thirty) Days.    Diclofenac Sodium (VOLTAREN) 1 % gel gel   Yes No    Apply 2 g topically to the appropriate area as directed 4 (Four) Times a Day As Needed.    metoprolol succinate XL (TOPROL-XL) 25 MG 24 hr tablet   No No    Take 1 tablet by mouth Daily.    nisoldipine (SULAR) 8.5 MG 24 hr tablet   No No    Take 2 tablets by mouth Daily.    Omega-3 Fatty Acids (fish oil) 1000 MG capsule capsule   Yes No    Take 1,000 mg by mouth 2 (Two) Times a Day With Meals.    ondansetron ODT (ZOFRAN-ODT) 4 MG disintegrating tablet   Yes No    Place 4 mg on the tongue 2 (Two) Times a Day As Needed for Nausea or Vomiting.    polyethylene glycol (MIRALAX) 17 g packet   Yes No    Take 17 g by mouth 2 (Two) Times a Day As Needed.        Objective     Vital Signs:  Temp:  [98.5 °F (36.9 °C)] 98.5 °F (36.9 °C)  Heart Rate:  [40-81] 40  Resp:  [18] 18  BP: (160-179)/(57-92) 168/71    Mean Arterial Pressure (Non-Invasive) for the past 24 hrs (Last 3 readings):   Noninvasive MAP (mmHg)   05/04/23 0140 141   05/04/23 0120 95   05/04/23 0100 142     SpO2:  [95 %-98 %] 98 %  on   ;   Device (Oxygen Therapy): room air  Body mass index is 24.99 kg/m².    Wt Readings from Last 3 Encounters:   05/03/23 68 kg (150 lb)   01/19/23 71.9 kg (158 lb 8.2 oz)      ----------------------------------------------------------------------------------------------------------------------  Physical Exam  Vitals and nursing note reviewed.   Constitutional:       General: She is awake. She is not in acute distress.     Appearance: She is well-developed  and normal weight. She is not ill-appearing, toxic-appearing or diaphoretic.      Comments: The patient appears disheveled.   HENT:      Head: Normocephalic and atraumatic.      Right Ear: External ear normal.      Left Ear: External ear normal.      Nose: Nose normal.   Eyes:      General: No scleral icterus.        Right eye: No discharge.         Left eye: No discharge.      Extraocular Movements: Extraocular movements intact.      Conjunctiva/sclera: Conjunctivae normal.      Pupils: Pupils are equal, round, and reactive to light.   Cardiovascular:      Rate and Rhythm: Regular rhythm. Bradycardia present.      Pulses: Normal pulses.      Heart sounds: Murmur heard.   Pulmonary:      Effort: Pulmonary effort is normal. No respiratory distress.      Breath sounds: No wheezing or rales.   Abdominal:      General: Bowel sounds are normal. There is no distension.      Palpations: Abdomen is soft.   Musculoskeletal:         General: Tenderness present.      Right lower leg: Edema present.   Skin:     Capillary Refill: Capillary refill takes less than 2 seconds.      Coloration: Skin is not jaundiced or pale.      Findings: Erythema and lesion present.      Comments: Bilateral legs appear to have some round, red lesions with a slight scale, most consistent with psoriasis plaques.  The right foot and ankle are edematous, red, and warm to touch.   Neurological:      Mental Status: She is alert. She is disoriented.      Cranial Nerves: No cranial nerve deficit, dysarthria or facial asymmetry.      Motor: No weakness.      Comments: She can follow commands.   Psychiatric:         Attention and Perception: Attention normal.         Mood and Affect: Mood normal.         Speech: Speech normal.         Behavior: Behavior normal. Behavior is cooperative.         Cognition and Memory: Cognition is impaired.      ---------------------------------------------------------------------------------------------------------------------  EKG: Bradycardia with a heart rate of 41 and a QTc of 518 ms.  There is a new third-degree heart block.  There is poor R wave progression.  There are inverted T waves in V1-V2.  When compared to EKGs dated January 16, 2023 and January 18, 2023, the T wave inversions and third-degree heart block are new.  Please note that I have personally looked at the EKG from this admission, the comparison EKGs in the medical records, and the above is my interpretation of this admission's EKG; I await the final cardiology read.      Telemetry:  Sinus bradycardia with heart rates 30-50's.  Please note that I personally looked at the telemetry strips.      Last echocardiogram:  Results for orders placed during the hospital encounter of 01/15/23    Adult Transthoracic Echo Complete W/ Cont if Necessary Per Protocol    Interpretation Summary  •  Normal left ventricular cavity size and wall thickness noted  •  Left ventricular ejection fraction appears to be 56 - 60%.  •  The following left ventricular wall segments are hypokinetic: apical anterior, apical lateral, apical inferior, apical septal and apex hypokinetic.  •  Left ventricular diastolic function is consistent with (grade I) impaired relaxation.  •  There is mild calcification of the aortic valve. There is mild thickening of the non-coronary, left coronary and right coronary cusp(s) of the aortic valve. The aortic valve appears trileaflet.  •  There is mild calcification of the mitral valve posterior leaflet(s). Trace mitral valve regurgitation is present. No significant mitral valve stenosis is present.  •  Trace tricuspid valve regurgitation is present. Estimated right ventricular systolic pressure from tricuspid regurgitation is mildly elevated (35-45 mmHg).  •  There is no evidence of pericardial effusion.    I have personally read the ECHO final  report.  --------------------------------------------------------------------------------------------------------------------  LABS:    CBC and coagulation:  Results from last 7 days   Lab Units 05/03/23 2117   LACTATE mmol/L 1.8   CRP mg/dL 3.26*   WBC 10*3/mm3 10.94*   HEMOGLOBIN g/dL 14.3   HEMATOCRIT % 44.5   MCV fL 95.1   MCHC g/dL 32.1   PLATELETS 10*3/mm3 251     Renal and electrolytes:  Results from last 7 days   Lab Units 05/03/23 2117   SODIUM mmol/L 147*   POTASSIUM mmol/L 4.1   MAGNESIUM mg/dL 2.0   CHLORIDE mmol/L 109*   CO2 mmol/L 24.4   BUN mg/dL 47*   CREATININE mg/dL 1.14*   CALCIUM mg/dL 10.8*   GLUCOSE mg/dL 112*     Estimated Creatinine Clearance: 36.6 mL/min (A) (by C-G formula based on SCr of 1.14 mg/dL (H)).    Liver and pancreatic function:  Results from last 7 days   Lab Units 05/03/23 2117   ALBUMIN g/dL 4.0   BILIRUBIN mg/dL 0.7   ALK PHOS U/L 83   AST (SGOT) U/L 26   ALT (SGPT) U/L 14     Endocrine function:  Lab Results   Component Value Date    HGBA1C 5.50 01/16/2023     Lab Results   Component Value Date    TSH 0.998 05/03/2023    FREET4 1.95 (H) 05/03/2023     Cardiac:  Results from last 7 days   Lab Units 05/03/23 2313 05/03/23 2117   HSTROP T ng/L 44* 50*   PROBNP pg/mL  --  6,565.0*       Cultures:  Lab Results   Component Value Date    COLORU Yellow 05/03/2023    CLARITYU Clear 05/03/2023    PHUR 6.0 05/03/2023    GLUCOSEU Negative 05/03/2023    KETONESU Trace (A) 05/03/2023    BLOODU Negative 05/03/2023    NITRITEU Negative 05/03/2023    LEUKOCYTESUR Negative 05/03/2023    BILIRUBINUR Negative 05/03/2023    UROBILINOGEN 0.2 E.U./dL 05/03/2023    RBCUA 0-2 05/03/2023    WBCUA 3-5 (A) 05/03/2023    BACTERIA None Seen 05/03/2023     Microbiology Results (last 10 days)     Procedure Component Value - Date/Time    COVID PRE-OP / PRE-PROCEDURE SCREENING ORDER (NO ISOLATION) - Swab, Nasopharynx [[id number]]  (Normal) Collected: 05/04/23 0057    Lab Status: Final result Specimen:  Swab from Nasopharynx Updated: 05/04/23 0120    Narrative:      The following orders were created for panel order COVID PRE-OP / PRE-PROCEDURE SCREENING ORDER (NO ISOLATION) - Swab, Nasopharynx.  Procedure                               Abnormality         Status                     ---------                               -----------         ------                     COVID-19 and FLU A/B PCR...[[id number]]  Normal              Final result                 Please view results for these tests on the individual orders.    COVID-19 and FLU A/B PCR - Swab, Nasopharynx [[id number]]  (Normal) Collected: 05/04/23 0057    Lab Status: Final result Specimen: Swab from Nasopharynx Updated: 05/04/23 0120     COVID19 Not Detected     Influenza A PCR Not Detected     Influenza B PCR Not Detected    Narrative:      Fact sheet for providers: https://www.fda.gov/media/911974/download    Fact sheet for patients: https://www.[place].gov/media/644057/download    Test performed by PCR.        I have personally looked at the labs and they are summarized above.  ----------------------------------------------------------------------------------------------------------------------  Detailed radiology reports for the last 24 hours:    Imaging Results (Last 24 Hours)     Procedure Component Value Units Date/Time    XR Chest 1 View [[id number]] Collected: 05/03/23 2208     Updated: 05/03/23 2212    Narrative:      Procedure: Portable chest x-ray examination performed on 05/03/2023.  Single view. Semiupright position.     HISTORY: Chest pain.     FINDINGS:     Enlarged heart size.  Central pulmonary vascular congestion with mild interstitial edema.  No lobar consolidation, pleural effusion, or pneumothorax.  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  No fracture or foreign body.  No free air in the upper abdomen.       Impression:         1.  Enlarged heart size.  2.  Central pulmonary  vascular congestion with trace interstitial edema.  3.  No pleural effusion or pneumothorax.  4.  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  5.  No free air in the upper abdomen.     This report was finalized on 5/3/2023 10:10 PM by Mumtaz Mg MD.           I have personally looked at the radiology images and I have read the available final report.    Assessment & Plan       -New onset third-degree heart block that was present on admission in a patient with known apical MI and left bundle branch block  -Right foot and ankle swelling with skin changes, present on admission, suspect cellulitis  -Sacral decubitus ulcer present on admission  -History of essential hypertension  -History of chronic kidney disease stage IV, with baseline creatinine 0.9-1.14  -History of polycythemia vera  -History of peripheral vascular disease  -History of heart failure with preserved ejection fraction, suspect mild exacerbation that was present on admission    Admitted to the critical care unit.  We will place ZOLL's pads on her chest and back and should she have worsening bradycardia or blood pressures then we can pace her with these goals monitor.  Cardiology has been consulted.  We will keep the patient n.p.o. for possible intervention.  I have written for the patient to receive aspirin daily and Lipitor daily.  I have consulted the wound care nurse practitioner for the sacral decubitus ulcer.  I will start the patient on cefazolin for nonpurulent cellulitis; the patient has had hives with penicillin before and thus we will try the cefazolin and monitor her for any possible skin reactions.  I will obtain venous Dopplers of both legs to make sure she does not have any DVTs.  We will weigh the patient daily and monitor her input and output closely as she does have a history of heart failure with preserved ejection  fraction.  I will also consult palliative care for goals of care.  I have consulted  for help with disposition.  We will trend the patient's creatinine and electrolytes closely.  Chest x-ray shows some fluid overload but she does not have any crackles on exam and only the leg with the cellulitis is edematous.  The proBNP is elevated.  I will give the patient 40 mg IV Lasix, perform daily weights, and obtain a Reds vest determination.    We will avoid QT prolonging agents and continue to monitor the electrolytes closely. In order to lessen the risk of worsening QTc, the goal potassium level is 4-4.5 and goal magnesium level is 2-2.2.    VTE Prophylaxis:   Mechanical Order History:     None      Pharmalogical Order History:      Ordered     Dose Route Frequency Stop    Signed and Held  heparin (porcine) 5000 UNIT/ML injection 5,000 Units         5,000 Units SC Every 12 Hours Scheduled --              The patient is high risk due to the following diagnoses/reasons: No third degree heart block    Rick Dejesus MD  [facility]  05/04/23  02:44 EDT        Electronically signed by Rick Dejesus MD at 05/04/23 0456         Current Facility-Administered Medications   Medication Dose Route Frequency Provider Last Rate Last Admin   • acetaminophen (TYLENOL) tablet 500 mg  500 mg Oral Q6H PRN Carmen Stoddard DO   500 mg at 05/09/23 1439   • aspirin EC tablet 81 mg  81 mg Oral Daily Carmen Stoddrad DO   81 mg at 05/09/23 0846   • atorvastatin (LIPITOR) tablet 40 mg  40 mg Oral Nightly Carmen Stoddard DO   40 mg at 05/08/23 2038   • docusate sodium (COLACE) capsule 100 mg  100 mg Oral BID Carmen Stoddard, DO   100 mg at 05/09/23 0846   • heparin (porcine) 5000 UNIT/ML injection 5,000 Units  5,000 Units Subcutaneous Q12H Carmen Stoddard DO   5,000 Units at 05/09/23 0847   • HYDROcodone-acetaminophen (NORCO) 5-325 MG per tablet 0.5 tablet  0.5 tablet Oral Q8H PRN Carmen Stoddard, DO    0.5 tablet at 05/09/23 0847   • [START ON 5/10/2023] losartan (COZAAR) tablet 100 mg  100 mg Oral Q24H Norah Lambert APRN       • Magnesium Sulfate - Total Dose 4 grams - Magnesium 1 or Less  4 g Intravenous PRN Richi, Carmen, DO        Or   • Magnesium Sulfate - Total Dose 3 grams - Magnesium 1.1 - 1.5  1 g Intravenous PRN Richi, Carmen, DO        Or   • Magnesium Sulfate - Total Dose 2 grams - Magnesium 1.6 - 1.9  2 g Intravenous PRN Richi, Carmen, DO       • melatonin tablet 3 mg  3 mg Oral Nightly PRN Richi, Carmen, DO       • multivitamin with minerals 1 tablet  1 tablet Oral Daily Brent Stoddarda, DO   1 tablet at 05/09/23 0846   • potassium chloride (MICRO-K) CR capsule 40 mEq  40 mEq Oral PRN Richi, Carmen, DO        Or   • potassium chloride (KLOR-CON) packet 40 mEq  40 mEq Oral PRN Lakeisha Stoddardsea, DO        Or   • potassium chloride 10 mEq in 100 mL IVPB  10 mEq Intravenous Q1H PRN Richi, Carmen, DO       • sodium chloride 0.9 % flush 10 mL  10 mL Intravenous PRN Stoddard, Carmen, DO       • sodium chloride 0.9 % flush 10 mL  10 mL Intravenous Q12H Stoddard, Carmen, DO   10 mL at 05/09/23 0847   • sodium chloride 0.9 % flush 10 mL  10 mL Intravenous PRN Stoddard, Carmen, DO       • sodium chloride 0.9 % flush 10 mL  10 mL Intravenous Q12H Richi, Carmen, DO   10 mL at 05/09/23 0847   • sodium chloride 0.9 % flush 10 mL  10 mL Intravenous PRN Stoddard, Carmen, DO       • sodium chloride 0.9 % flush 10 mL  10 mL Intravenous Q12H Stoddard, Carmen, DO   10 mL at 05/09/23 0847   • sodium chloride 0.9 % flush 10 mL  10 mL Intravenous PRN Stoddard, Carmen, DO       • sodium chloride 0.9 % infusion 40 mL  40 mL Intravenous PRN Stoddard, Carmen, DO       • sodium chloride 0.9 % infusion 40 mL  40 mL Intravenous PRN Stoddard, Carmen, DO       • sodium chloride 0.9 % infusion 40 mL  40 mL Intravenous PRN Carmen Stoddard,        • sodium chloride 0.9 % infusion  75 mL/hr Intravenous Continuous Carmen Stoddard DO 75 mL/hr  at 23 1149 75 mL/hr at 23 1149        Physician Progress Notes (most recent note)      Carmen Stoddard DO at 23 1924              [facility] HOSPITALIST PROGRESS NOTE     Patient Identification:  Name:  Azra Ohara  Age:  88 y.o.  Sex:  female  :  1935  MRN:  [id number]  Visit Number:  [id number]  ROOM: [address] [address]     Primary Care Provider:  Minal Salazar APRN    Length of stay in inpatient status:  4    Subjective     Chief Compliant:    Chief Complaint   Patient presents with   • Urinary Tract Infection       History of Presenting Illness:    Patient was seen and examined this morning with her nephew, Alexandru, present at bedside. She says she feels tired but otherwise well. She reports she didn't drink very much yesterday because she was sick to her stomach, but that is better today. Her nephew is very concerned about her ability to care for herself at home including having adequate PO intake and ability to care for her wounds. I discussed this with patient and she is agreeable to go for short term rehab prior to returning home.     Objective     Current Hospital Meds:aspirin, 81 mg, Oral, Daily  atorvastatin, 40 mg, Oral, Nightly  ceFAZolin, 2 g, Intravenous, Q8H  docusate sodium, 100 mg, Oral, BID  heparin (porcine), 5,000 Units, Subcutaneous, Q12H  losartan, 50 mg, Oral, Q24H  multivitamin with minerals, 1 tablet, Oral, Daily  sodium chloride, 10 mL, Intravenous, Q12H  sodium chloride, 10 mL, Intravenous, Q12H  sodium chloride, 10 mL, Intravenous, Q12H    sodium chloride, 75 mL/hr, Last Rate: 75 mL/hr (23 1019)        Current Antimicrobial Therapy:  Anti-Infectives (From admission, onward)    Ordered     Dose/Rate Route Frequency Start Stop    23 0443  ceFAZolin 2 gm IVPB in 100 mL NS (VTB)        Ordering Provider: Rebecca Rodrigues DO    2 g  over 30 Minutes Intravenous Every 8 Hours 23 0600 23 0559        Current Diuretic  Therapy:  Diuretics (From admission, onward)    Ordered     Dose/Rate Route Frequency Start Stop    05/04/23 0453  furosemide (LASIX) injection 40 mg        Ordering Provider: Rick Dejesus MD    40 mg Intravenous Once 05/04/23 0545 05/04/23 0552        ----------------------------------------------------------------------------------------------------------------------  Vital Signs:  Temp:  [97.2 °F (36.2 °C)-99.4 °F (37.4 °C)] 97.2 °F (36.2 °C)  Heart Rate:  [62-94] 79  Resp:  [11-23] 20  BP: (120-187)/() 165/88  SpO2:  [86 %-100 %] 95 %  on   ;   Device (Oxygen Therapy): room air  Body mass index is 29.25 kg/m².    Wt Readings from Last 3 Encounters:   05/08/23 82.2 kg (181 lb 3.5 oz)   01/19/23 71.9 kg (158 lb 8.2 oz)     Intake & Output (last 3 days)       05/06 0701 05/07 0700 05/07 0701 05/08 0700 05/08 0701  05/09 0700    P.O. 318 100 220    I.V. (mL/kg) 427.5 (5.4) 211.7 (2.6)     IV Piggyback 100      Total Intake(mL/kg) 845.5 (10.6) 311.7 (3.8) 220 (2.7)    Urine (mL/kg/hr) 500 (0.3) 1185 (0.6) 615 (0.6)    Stool 0 0 0    Total Output 500 1185 615    Net +345.5 -873.4 -395           Stool Unmeasured Occurrence  3 x 1 x        Diet: Regular/House Diet; Texture: Regular Texture (IDDSI 7); Fluid Consistency: Thin (IDDSI 0)  ----------------------------------------------------------------------------------------------------------------------  Physical exam:   Constitutional:  Well-developed and well-nourished.  No acute distress.      HENT:  Head:  Normocephalic and atraumatic.    Cardiovascular:  Normal rate, regular rhythm   Pulmonary/Chest:  Normal rate and effort, breath sounds clear to auscultation in anterior and lateral lung fields  Musculoskeletal:  No deformity.    Neurological: Awake, alert, no focal deficit on gross examination. No slurred speech or facial droop.   Skin:  Skin is warm and dry.   Peripheral vascular:  No cyanosis, no edema.  Psychiatric: Appropriate mood and  affect  Edited by: Carmen Stoddard DO at 5/8/2023 1924  ----------------------------------------------------------------------------------------------------------------------  Results from last 7 days   Lab Units 05/08/23 0028 05/07/23 0032 05/06/23 0029 05/05/23 1751 05/05/23 0004 05/03/23 2117   CRP mg/dL  --   --   --   --   --  3.26*   LACTATE mmol/L  --   --   --  1.0  --  1.8   WBC 10*3/mm3 8.32 8.32 8.56  --    < > 10.94*   HEMOGLOBIN g/dL 11.9* 11.1* 11.5*  --    < > 14.3   HEMATOCRIT % 38.2 35.8 36.2  --    < > 44.5   MCV fL 95.7 96.0 95.8  --    < > 95.1   MCHC g/dL 31.2* 31.0* 31.8  --    < > 32.1   PLATELETS 10*3/mm3 174 168 173  --    < > 251    < > = values in this interval not displayed.         Results from last 7 days   Lab Units 05/08/23 0028 05/07/23 0032 05/06/23 0528 05/06/23 0029 05/05/23 1751 05/05/23 0004 05/03/23 2117   SODIUM mmol/L 139 142  --  144  --  147* 147*   POTASSIUM mmol/L 3.8 4.1 4.7 3.9   < > 3.2* 4.1   MAGNESIUM mg/dL  --  2.0  --  2.0  --  1.8 2.0   CHLORIDE mmol/L 109* 112*  --  113*  --  112* 109*   CO2 mmol/L 22.0 21.2*  --  20.6*  --  21.1* 24.4   BUN mg/dL 25* 32*  --  34*  --  42* 47*   CREATININE mg/dL 0.83 0.86  --  0.91  --  1.30* 1.14*   CALCIUM mg/dL 8.7 9.0  --  8.7  --  9.2 10.8*   GLUCOSE mg/dL 104* 99  --  91  --  110* 112*   ALBUMIN g/dL  --   --   --   --   --  3.0* 4.0   BILIRUBIN mg/dL  --   --   --   --   --  0.6 0.7   ALK PHOS U/L  --   --   --   --   --  68 83   AST (SGOT) U/L  --   --   --   --   --  21 26   ALT (SGPT) U/L  --   --   --   --   --  10 14    < > = values in this interval not displayed.   Estimated Creatinine Clearance: 50.7 mL/min (by C-G formula based on SCr of 0.83 mg/dL).  No results found for: AMMONIA  Results from last 7 days   Lab Units 05/03/23 2313 05/03/23 2117   HSTROP T ng/L 44* 50*     Results from last 7 days   Lab Units 05/03/23 2117   PROBNP pg/mL 6,565.0*         No results found for: HGBA1C, POCGLU  Lab  Results   Component Value Date    TSH 0.998 05/03/2023    FREET4 1.95 (H) 05/03/2023     No results found for: PREGTESTUR, PREGSERUM, HCG, HCGQUANT  Pain Management Panel         Latest Ref Rng & Units 1/16/2023   Pain Management Panel   Creatinine, Urine mg/dL 72.7     Amphetamine, Urine Qual Negative Negative     Barbiturates Screen, Urine Negative Negative     Benzodiazepine Screen, Urine Negative Negative     Buprenorphine, Screen, Urine Negative Negative     Cocaine Screen, Urine Negative Negative     Methadone Screen , Urine Negative Negative     Methamphetamine, Ur Negative Negative                Brief Urine Lab Results  (Last result in the past 365 days)      Color   Clarity   Blood   Leuk Est   Nitrite   Protein   CREAT   Urine HCG        05/03/23 2138 Yellow   Clear   Negative   Negative   Negative   30 mg/dL (1+)               Blood Culture   Date Value Ref Range Status   05/05/2023 No growth at 3 days  Preliminary   05/05/2023 No growth at 3 days  Preliminary     No results found for: URINECX  No results found for: WOUNDCX  No results found for: STOOLCX  No results found for: RESPCX  No results found for: AFBCX  Results from last 7 days   Lab Units 05/05/23  1751 05/03/23  2117   LACTATE mmol/L 1.0 1.8   CRP mg/dL  --  3.26*       I have personally looked at the labs and they are summarized above.  ----------------------------------------------------------------------------------------------------------------------  Detailed radiology reports for the last 24 hours:  Imaging Results (Last 24 Hours)     ** No results found for the last 24 hours. 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  #History of apical infarction  #Chronic HFpEF  #Severe mitral valve regurgitation  #Severe tricuspid valve regurgitation  #Mild acute hypokalemia, resolved  #Hypernatremia, likely hypovolemic and currently  resolved  #Borderline hypomagnesemia, resolved with supplementation  #Uncontrolled essential hypertension  #Reported right ankle cellulitis upon admission  #Sacral decubitus ulcer, present on admission  #CKD stage IV  #Mild macrocytosis with mild anemia  #Generalized weakness/physical debility  #Oliguria without kidney injury likely due to dehydration    - Appreciate Cardiology assistance. She is s/p pacemaker placement--continue with sling on left arm until Friday. Sponge baths until that time.   - Continue losartan which was increased to 100mg daily today by Cardiology due to persistent hypertension  - Continue holding home thiazide due to electrolyte abnormalities and advanced age  - Continue to supplement potassium and magnesium per protocol as needed  - Repeat chemistry panel and mag in am  - Complete a course of cefazolin which was started on admission for suspected lower extremity cellulitis  - Continue aspirin, statin  - Discussed plan with patient and nephew Alexandru today and they are both agreeable for short term rehab. Case management consult requested to help arrange this. Work with PT/OT as able for strengthening. Encourage patient to be out of bed with assistance as tolerated.  - BUN/Cr ratio is still >20 but improved from admission. She only had 100cc of PO fluid intake yesterday and 300cc intake total. Gentle IV fluids started today due to oliguria, monitor volume status closely and encourage increased PO intake.   - Continue wound care and pressure injury prevention strategies.    Chronic medical conditions:  - Age related cognitive changes vs underlying dementia - Family is concerned about patient's decision making capacity. Psych evaluated patient and said at that time she retained decision making capacity, appreciate assistance. Patient has designated Alexandru as who she wants to be her POA.  - History of left bundle branch block  - History of colon cancer s/p resection 2004  - Reported history of  polycythemia vera    Edited by: Carmen Stoddard DO at 2023 1924    VTE Prophylaxis:   Mechanical Order History:      Ordered        23 1549  Place Sequential Compression Device  Once            23 1549  Maintain Sequential Compression Device  Continuous                    Pharmalogical Order History:      Ordered     Dose Route Frequency Stop    23 0301  heparin (porcine) 5000 UNIT/ML injection 5,000 Units         5,000 Units SC Every 12 Hours Scheduled --                Dispo: transfer to telemetry floor today, likely skilled nursing facility rehab at discharge    Carmen Stoddard DO  [facility] Hospitalist  23  19:24 EDT     Electronically signed by Carmen Stoddard DO at 23 192          Physical Therapy Notes (most recent note)      Montserrat Gale, PT at 23 175  Version 1 of 1         Acute Care - Physical Therapy Initial Evaluation  [facility]     Patient Name: Azra Ohara  : 1935  MRN: [id number]  Today's Date: 2023   Onset of Illness/Injury or Date of Surgery: 23  Visit Dx:     ICD-10-CM ICD-9-CM   1. Third degree atrioventricular block  I44.2 426.0   2. Complete heart block  I44.2 426.0     Patient Active Problem List   Diagnosis   • Chronic kidney disease, stage 4 (severe)   • Essential hypertension   • Left bundle branch block   • Malignant neoplasm of large intestine   • Polycythemia vera   • Long term (current) use of aspirin     Past Medical History:   Diagnosis Date   • Chronic kidney disease, stage 4 (severe) 2023   • Essential hypertension    • History of colon cancer    • Left bundle branch block 2023   • Long term (current) use of aspirin 2023   • Polycythemia vera 2023     Past Surgical History:   Procedure Laterality Date   • CARDIAC ELECTROPHYSIOLOGY PROCEDURE N/A 2023    Procedure: Pacemaker DC new;  Surgeon: Sampson Garvin MD;  Location: [facility] INVASIVE LOCATION;  Service: Cardiology;   "Laterality: N/A;   • COLON RESECTION  2004   • PARATHYROIDECTOMY Right     Parathyroid adenoma     PT Assessment (last 12 hours)     PT Evaluation and Treatment     Row Name 05/08/23 1728          Physical Therapy Time and Intention    Subjective Information complains of;pain  pt. c/o vague pain throughout B LE, unable to specify further.  -AG     Document Type evaluation  -AG     Mode of Treatment physical therapy  -AG     Patient Effort adequate  -AG     Symptoms Noted During/After Treatment fatigue  -AG     Row Name 05/08/23 7047          General Information    Patient Profile Reviewed yes  -AG     Onset of Illness/Injury or Date of Surgery 05/03/23  -AG     Referring Physician Dr. Rodrigues  -     Patient Observations agree to therapy;cooperative;alert  -AG     Patient/Family/Caregiver Comments/Observations pt. supine in PCU bed on room air.  L UE sling and swathe is on, however, pt. has freed her arm and is observed moving it and weightbearing onto L UE.  PT reapplied sling, swathe and educated pt. about limiting movement of that arm.  -AG     Prior Level of Function independent:;all household mobility  pt. reports bathing \"every other day\" and getting herself dressed, however, states she had difficulty dressing.  Reports previously independent in bathroom.  Used RW at all times for household mobility.  States she rarely left home.  Has ramp.  -AG     Existing Precautions/Restrictions fall;non-weight bearing;pacemaker;brace on at all times  recent permanent pacemaker implantation; L UE immobilized.  -AG     Limitations/Impairments safety/cognitive  -AG     Equipment Issued to Patient gait belt  -AG     Risks Reviewed patient:;dizziness;increased discomfort;LOB  -AG     Benefits Reviewed patient:;improve function;increase independence;increase strength;increase balance;decrease risk of DVT;increase knowledge  -AG     Barriers to Rehab medically complex;cognitive status  -AG     Row Name 05/08/23 2017          " Previous Level of Function/Home Environm    Toileting, Premorbid Functional Level independent  -AG     BADLs, Premorbid Functional Level partial assistance  -AG     Bed Mobility, Premorbid Functional Level independent  -AG     Transfers, Premorbid Functional Level independent;uses device or equipment  -AG     Household Ambulation, Premorbid Functional Level independent;uses device or equipment  -     Row Name 05/08/23 1728          Living Environment    Current Living Arrangements home  -AG     Home Accessibility wheelchair accessible  -AG     People in Home alone  -AG     Primary Care Provided by self  -     Row Name 05/08/23 1728          Pain    Pain Intervention(s) --  patient reports B LE pain  -     Row Name 05/08/23 1728          Cognition    Affect/Mental Status (Cognition) anxious  -     Orientation Status (Cognition) oriented to;place;person  -AG     Follows Commands (Cognition) follows one-step commands;repetition of directions required;verbal cues/prompting required;physical/tactile prompts required  -     Cognitive Function safety deficit  -     Personal Safety Interventions fall prevention program maintained;gait belt;nonskid shoes/slippers when out of bed;supervised activity  -     Row Name 05/08/23 1728          Range of Motion Comprehensive    Comment, General Range of Motion B LE AROM deficits identified: B decr knee extension; hips and ankles L  -     Row Name 05/08/23 1728          Strength Comprehensive (MMT)    General Manual Muscle Testing (MMT) Assessment --  B quads 2+/5; B psoas 3+/5; B ant tib 4-/5  -     Row Name 05/08/23 1728          Sensory    Hearing Status L  -     Row Name 05/08/23 1728          Vision Assessment/Intervention    Visual Impairment/Limitations WFL  -     Row Name 05/08/23 1728          Sensory Assessment (Somatosensory)    Sensory Assessment (Somatosensory) LE sensation intact  -     Row Name 05/08/23 1728          Mobility    Extremity  Weight-bearing Status --  no restrictions  -AG     Row Name 05/08/23 1728          Bed Mobility    Bed Mobility scooting/bridging;rolling right;supine-sit  -AG     Rolling Right [place] (Bed Mobility) verbal cues;nonverbal cues (demo/gesture);minimum assist (75% patient effort)  -AG     Scooting/Bridging [place] (Bed Mobility) verbal cues;nonverbal cues (demo/gesture);maximum assist (25% patient effort)  -AG     Supine-Sit [place] (Bed Mobility) verbal cues;nonverbal cues (demo/gesture);moderate assist (50% patient effort)  -AG     Bed Mobility, Safety Issues decreased use of arms for pushing/pulling;decreased use of legs for bridging/pushing;cognitive deficits limit understanding  -AG     Assistive Device (Bed Mobility) bed rails;draw sheet  -     Row Name 05/08/23 1728          Transfers    Transfers sit-stand transfer;stand-sit transfer;stand pivot/stand step transfer  -     Row Name 05/08/23 1728          Sit-Stand Transfer    Sit-Stand [place] (Transfers) verbal cues;nonverbal cues (demo/gesture);maximum assist (25% patient effort)  -AG     Assistive Device (Sit-Stand Transfers) walker, front-wheeled  -AG     Row Name 05/08/23 1728          Stand-Sit Transfer    Stand-Sit [place] (Transfers) verbal cues;nonverbal cues (demo/gesture);maximum assist (25% patient effort)  -AG     Assistive Device (Stand-Sit Transfers) walker, front-wheeled  -AG     Row Name 05/08/23 1728          Stand Pivot/Stand Step Transfer    Stand Pivot/Stand Step [place] (Transfers) verbal cues;nonverbal cues (demo/gesture);maximum assist (25% patient effort)  -     Row Name 05/08/23 1728          Gait/Stairs (Locomotion)    Comment, (Gait/Stairs) deferred d/t safety risk  -     Row Name 05/08/23 1728          Safety Issues, Functional Mobility    Safety Issues Affecting Function (Mobility) positioning of assistive device;insight into deficits/self-awareness;problem-solving;safety precautions  follow-through/compliance;safety precaution awareness  -AG     Impairments Affecting Function (Mobility) balance;cognition;endurance/activity tolerance;pain;range of motion (ROM);shortness of breath;strength  -AG     Cognitive Impairments, Mobility Safety/Performance judgment;problem-solving/reasoning;safety precaution awareness;safety precaution follow-through;insight into deficits/self-awareness  -AG     Row Name 05/08/23 1728          Balance    Balance Assessment sitting static balance;sitting dynamic balance;sit to stand dynamic balance;standing static balance;standing dynamic balance  -AG     Static Sitting Balance contact guard;standby assist  -AG     Position, Sitting Balance unsupported;sitting edge of bed  -AG     Static Standing Balance verbal cues;non-verbal cues (demo/gesture);maximum assist  -AG     Dynamic Standing Balance verbal cues;non-verbal cues (demo/gesture);maximum assist  -AG     Position/Device Used, Standing Balance walker, front-wheeled  -AG     Row Name 05/08/23 1728          Motor Skills    Motor Skills functional endurance;coordination;motor control/coordination interventions  -AG     Coordination gross motor deficit;bilateral;moderate impairment  -AG     Functional Endurance F-  -AG     Row Name             Wound 05/04/23 0250 Left gluteal    Wound - Properties Group Placement Date: 05/04/23  -BR Placement Time: 0250  -BR Side: Left  -BR Location: gluteal  -BR    Retired Wound - Properties Group Placement Date: 05/04/23  -BR Placement Time: 0250  -BR Side: Left  -BR Location: gluteal  -BR    Retired Wound - Properties Group Date first assessed: 05/04/23  -BR Time first assessed: 0250  -BR Side: Left  -BR Location: gluteal  -BR    Row Name             Wound 05/04/23 0252 Right gluteal    Wound - Properties Group Placement Date: 05/04/23  -BR Placement Time: 0252  -BR Side: Right  -BR Location: gluteal  -BR    Retired Wound - Properties Group Placement Date: 05/04/23  -BR Placement Time:  0252  -BR Side: Right  -BR Location: gluteal  -BR    Retired Wound - Properties Group Date first assessed: 05/04/23  -BR Time first assessed: 0252  -BR Side: Right  -BR Location: gluteal  -BR    Row Name             Wound 05/05/23 1630 Right posterior elbow Skin Tear    Wound - Properties Group Placement Date: 05/05/23  -DH Placement Time: 1630  -DH Side: Right  -DH Orientation: posterior  -DH Location: elbow  -DH Primary Wound Type: Skin tear  -DH    Retired Wound - Properties Group Placement Date: 05/05/23  -DH Placement Time: 1630  -DH Side: Right  -DH Orientation: posterior  -DH Location: elbow  -DH Primary Wound Type: Skin tear  -DH    Retired Wound - Properties Group Date first assessed: 05/05/23  -DH Time first assessed: 1630  -DH Side: Right  -DH Location: elbow  -DH Primary Wound Type: Skin tear  -DH    Row Name 05/08/23 1728          Coping    Observed Emotional State cooperative;anxious  -AG     Verbalized Emotional State acceptance  -AG     Trust Relationship/Rapport care explained;choices provided;thoughts/feelings acknowledged  -AG     Family/Support Persons family  -AG     Involvement in Care not present at bedside  -AG     Family/Support System Care support provided;self-care encouraged  -AG     Row Name 05/08/23 1728          Plan of Care Review    Plan of Care Reviewed With patient  -AG     Outcome Evaluation PT evaluation complete.  Pt. requires max A for out of bed activity, STS and for bed>chair.  Reports SOA with minimal exertion.  SpO2 to 89% with activity on room air.  Pt. will benefit from continued skilled PT to address functional deficits.  -AG     Row Name 05/08/23 1728          Vital Signs    Pre Systolic BP Rehab 152  -AG     Pre Treatment Diastolic BP 97  -AG     Pretreatment Heart Rate (beats/min) 85  -AG     Pre SpO2 (%) 93  -AG     O2 Delivery Pre Treatment room air  -AG     Intra SpO2 (%) 89  -AG     O2 Delivery Intra Treatment room air  -AG     Post SpO2 (%) 96  -AG     O2  Delivery Post Treatment room air  -AG     Intra Patient Position Sitting  -AG     Row Name 05/08/23 1728          Positioning and Restraints    Pre-Treatment Position in bed  -AG     Post Treatment Position chair  -AG     In Chair notified nsg;sitting;call light within reach;encouraged to call for assist;legs elevated  -AG     Row Name 05/08/23 1728          Therapy Assessment/Plan (PT)    Patient/Family Therapy Goals Statement (PT) patient's goal is to return home alone.  She states her nephew and other family are available to assist if needed.  -AG     Functional Level at Time of Evaluation (PT) max A  -AG     PT Diagnosis (PT) impaired functional mobility and endurance  -AG     Rehab Potential (PT) fair, will monitor progress closely  -AG     Criteria for Skilled Interventions Met (PT) yes;skilled treatment is necessary  -AG     Therapy Frequency (PT) 3 times/wk  3-5 times/ week per priority  -AG     Predicted Duration of Therapy Intervention (PT) until d/c from facility  -AG     Problem List (PT) problems related to;balance;cognition;mobility;coordination;range of motion (ROM);strength;pain  -AG     Activity Limitations Related to Problem List (PT) unable to ambulate safely;unable to transfer safely;BADLs not performed adequately or safely  -AG     Row Name 05/08/23 1728          Therapy Plan Review/Discharge Plan (PT)    Therapy Plan Review (PT) evaluation/treatment results reviewed;care plan/treatment goals reviewed;risks/benefits reviewed;current/potential barriers reviewed;participants voiced agreement with care plan;participants included;patient  -AG     Row Name 05/08/23 1728          Physical Therapy Goals    Bed Mobility Goal Selection (PT) bed mobility, PT goal 1  -AG     Transfer Goal Selection (PT) transfer, PT goal 1  -AG     Gait Training Goal Selection (PT) gait training, PT goal 1  -AG     Row Name 05/08/23 1728          Bed Mobility Goal 1 (PT)    Activity/Assistive Device (Bed Mobility Goal 1,  PT) rolling to left;rolling to right;scooting;sit to supine;supine to sit  -AG     [place] Level/Cues Needed (Bed Mobility Goal 1, PT) contact guard required  -AG     Time Frame (Bed Mobility Goal 1, PT) by discharge  -AG     Row Name 05/08/23 1728          Transfer Goal 1 (PT)    Activity/Assistive Device (Transfer Goal 1, PT) sit-to-stand/stand-to-sit;bed-to-chair/chair-to-bed;toilet;walker, rolling  -AG     [place] Level/Cues Needed (Transfer Goal 1, PT) minimum assist (75% or more patient effort)  -AG     Time Frame (Transfer Goal 1, PT) by discharge  -AG     Row Name 05/08/23 1728          Gait Training Goal 1 (PT)    Activity/Assistive Device (Gait Training Goal 1, PT) gait (walking locomotion);assistive device use;decrease fall risk;diminish gait deviation;improve balance and speed;increase endurance/gait distance;increase energy conservation  -AG     [place] Level (Gait Training Goal 1, PT) moderate assist (50-74% patient effort)  -AG     Distance (Gait Training Goal 1, PT) 10  -AG     Time Frame (Gait Training Goal 1, PT) by discharge  -AG           User Key  (r) = Recorded By, (t) = Taken By, (c) = Cosigned By    Initials Name Provider Type    Morenita Maciel, RN Registered Nurse    Montserrat Nails, PT Physical Therapist    Alyce Perez, RN Registered Nurse                Physical Therapy Education     Title: PT OT SLP Therapies (In Progress)     Topic: Physical Therapy (In Progress)     Point: Mobility training (In Progress)     Learning Progress Summary           Patient Acceptance, E,D, NR by  at 5/8/2023 1727    Acceptance, E, VU,NR by  at 5/7/2023 0545    Acceptance, E, VU,NR by  at 5/7/2023 0416    Acceptance, E, NR by  at 5/4/2023 1718                   Point: Home exercise program (In Progress)     Learning Progress Summary           Patient Acceptance, E,D, NR by  at 5/8/2023 1727    Acceptance, E, VU,NR by  at 5/7/2023 0545    Acceptance, E, VU,NR by   at 5/7/2023 0416    Acceptance, E, NR by  at 5/4/2023 1718                   Point: Body mechanics (In Progress)     Learning Progress Summary           Patient Acceptance, E,D, NR by  at 5/8/2023 1727    Acceptance, E, VU,NR by  at 5/7/2023 0545    Acceptance, E, VU,NR by  at 5/7/2023 0416    Acceptance, E, NR by  at 5/4/2023 1718                   Point: Precautions (In Progress)     Learning Progress Summary           Patient Acceptance, E,D, NR by  at 5/8/2023 1727    Acceptance, E, VU,NR by  at 5/7/2023 0545    Acceptance, E, VU,NR by  at 5/7/2023 0416    Acceptance, E, NR by  at 5/4/2023 1718                               User Key     Initials Effective Dates Name Provider Type Discipline     06/16/21 -  Kassidy Sanz, RN Registered Nurse Nurse     06/16/21 -  Montserrat Gale, PT Physical Therapist PT     01/05/23 -  Nupur Riggins RN Registered Nurse Nurse              PT Recommendation and Plan  Planned Therapy Interventions (PT): balance training, bed mobility training, gait training, home exercise program, transfer training, strengthening, ROM (range of motion), postural re-education, neuromuscular re-education, motor coordination training, patient/family education  Therapy Frequency (PT): 3 times/wk (3-5 times/ week per priority)  Plan of Care Reviewed With: patient  Outcome Evaluation: PT evaluation complete.  Pt. requires max A for out of bed activity, STS and for bed>chair.  Reports SOA with minimal exertion.  SpO2 to 89% with activity on room air.  Pt. will benefit from continued skilled PT to address functional deficits.       Time Calculation:    PT Charges     Row Name 05/08/23 1727             Time Calculation    PT Received On 05/08/23  -      PT Goal Re-Cert Due Date 05/22/23  -            User Key  (r) = Recorded By, (t) = Taken By, (c) = Cosigned By    Initials Name Provider Type     Montserrat Gale, PT Physical Therapist              Therapy Charges for  Today     Code Description Service Date Service Provider Modifiers Qty    48889964589 HC PT EVAL MOD COMPLEXITY 4 2023 Montserrat Gale, PT GP 1          PT G-Codes  AM-PAC 6 Clicks Score (PT): 12    Montserrat Gale, PT  2023      Electronically signed by Montserrat Gale, PT at 23 2799          Occupational Therapy Notes (most recent note)      Kathy Guillory, OT at 23 1451          Patient Name: Azra Ohara  : 1935    MRN: [id number]                              Today's Date: 2023       Admit Date: 5/3/2023    Visit Dx:     ICD-10-CM ICD-9-CM   1. Third degree atrioventricular block  I44.2 426.0   2. Complete heart block  I44.2 426.0     Patient Active Problem List   Diagnosis   • Chronic kidney disease, stage 4 (severe)   • Essential hypertension   • Left bundle branch block   • Malignant neoplasm of large intestine   • Polycythemia vera   • Long term (current) use of aspirin     Past Medical History:   Diagnosis Date   • Chronic kidney disease, stage 4 (severe) 2023   • Essential hypertension    • History of colon cancer    • Left bundle branch block 2023   • Long term (current) use of aspirin 2023   • Polycythemia vera 2023     Past Surgical History:   Procedure Laterality Date   • CARDIAC ELECTROPHYSIOLOGY PROCEDURE N/A 2023    Procedure: Pacemaker DC new;  Surgeon: Sampson Garvin MD;  Location: [facility] INVASIVE LOCATION;  Service: Cardiology;  Laterality: N/A;   • COLON RESECTION     • PARATHYROIDECTOMY Right     Parathyroid adenoma      General Information     Row Name 23 1436          OT Time and Intention    Document Type evaluation  -LM     Mode of Treatment occupational therapy  -LM     Row Name 23 143          General Information    Patient Profile Reviewed yes  -LM     Prior Level of Function ADL's;transfer;all household mobility  patient reports living alone  -LM     Existing Precautions/Restrictions  fall;pacemaker;non-weight bearing  sling and swathe  -     Barriers to Rehab medically complex;cognitive status  -[facility] Name 05/08/23 1436          Living Environment    People in Home alone  -[facility] Name 05/08/23 1436          Cognition    Orientation Status (Cognition) disoriented to;place;situation;verbal cues/prompts needed for orientation  -[facility] Name 05/08/23 1436          Safety Issues, Functional Mobility    Safety Issues Affecting Function (Mobility) problem-solving;sequencing abilities;judgment;insight into deficits/self-awareness;positioning of assistive device  -     Impairments Affecting Function (Mobility) balance;cognition;endurance/activity tolerance;strength  -     Cognitive Impairments, Mobility Safety/Performance insight into deficits/self-awareness;judgment;problem-solving/reasoning  -           User Key  (r) = Recorded By, (t) = Taken By, (c) = Cosigned By    Initials Name Provider Type     Kathy Guillory, OT Occupational Therapist                 Mobility/ADL's     Row Name 05/08/23 1438          Activities of Daily Living    BADL Assessment/Intervention bathing;upper body dressing;lower body dressing;grooming;feeding;toileting  -[facility] Name 05/08/23 1438          Bathing Assessment/Intervention    [place] Level (Bathing) moderate assist (50% patient effort);maximum assist (25% patient effort)  -[facility] Name 05/08/23 1438          Upper Body Dressing Assessment/Training    [place] Level (Upper Body Dressing) maximum assist (25% patient effort)  -[facility] Name 05/08/23 1438          Lower Body Dressing Assessment/Training    [place] Level (Lower Body Dressing) maximum assist (25% patient effort)  -[facility] Name 05/08/23 1438          Grooming Assessment/Training    [place] Level (Grooming) moderate assist (50% patient effort);minimum assist (75% patient effort)  -[facility] Name 05/08/23 1438          Self-Feeding Assessment/Training     [place] Level (Feeding) set up  -     Row Name 05/08/23 1438          Toileting Assessment/Training    [place] Level (Toileting) maximum assist (25% patient effort)  -LM           User Key  (r) = Recorded By, (t) = Taken By, (c) = Cosigned By    Initials Name Provider Type    LM Kathy Guillory, OT Occupational Therapist               Obj/Interventions     Row Name 05/08/23 1441          Sensory Assessment (Somatosensory)    Sensory Assessment (Somatosensory) sensation intact  -     Row Name 05/08/23 1441          Vision Assessment/Intervention    Visual Impairment/Limitations WFL  -LM     Row Name 05/08/23 1441          Range of Motion Comprehensive    General Range of Motion upper extremity range of motion deficits identified  -LM     Comment, General Range of Motion LUE deferred due to pacemaker placement  -     Row Name 05/08/23 1441          Strength Comprehensive (MMT)    General Manual Muscle Testing (MMT) Assessment upper extremity strength deficits identified  -LM     Comment, General Manual Muscle Testing (MMT) Assessment deferred LUE  -     Row Name 05/08/23 1441          Motor Skills    Motor Skills functional endurance  -LM     Functional Endurance f-  -LM           User Key  (r) = Recorded By, (t) = Taken By, (c) = Cosigned By    Initials Name Provider Type    LM Kathy Guillory, OT Occupational Therapist               Goals/Plan     Row Name 05/08/23 1450          Transfer Goal 1 (OT)    Activity/Assistive Device (Transfer Goal 1, OT) transfers, all;commode, 3-in-1;walker, rolling  -LM     [place] Level/Cues Needed (Transfer Goal 1, OT) minimum assist (75% or more patient effort);contact guard required  -LM     Time Frame (Transfer Goal 1, OT) by discharge  -     Row Name 05/08/23 1450          Toileting Goal 1 (OT)    Activity/Device (Toileting Goal 1, OT) toileting skills, all  -LM     [place] Level/Cues Needed (Toileting Goal 1, OT) minimum assist (75% or more patient  effort);moderate assist (50-74% patient effort)  -LM     Time Frame (Toileting Goal 1, OT) by discharge  -LM     Row Name 05/08/23 1450          Therapy Assessment/Plan (OT)    Planned Therapy Interventions (OT) activity tolerance training;adaptive equipment training;BADL retraining;patient/caregiver education/training;transfer/mobility retraining;strengthening exercise;ROM/therapeutic exercise  -LM           User Key  (r) = Recorded By, (t) = Taken By, (c) = Cosigned By    Initials Name Provider Type    LM Kathy Guillory, OT Occupational Therapist               Clinical Impression     Row Name 05/08/23 1442          Plan of Care Review    Plan of Care Reviewed With patient  -LM     Row Name 05/08/23 1442          Therapy Assessment/Plan (OT)    Patient/Family Therapy Goal Statement (OT) return home to plof  -LM     Rehab Potential (OT) good, to achieve stated therapy goals  -LM     Criteria for Skilled Therapeutic Interventions Met (OT) yes;meets criteria;skilled treatment is necessary  -LM     Therapy Frequency (OT) other (see comments)  prn and/or to monitor fxl progress  -LM     Row Name 05/08/23 1442          Therapy Plan Review/Discharge Plan (OT)    Anticipated Discharge Disposition (OT) --  TBD  -LM     Row Name 05/08/23 1442          Positioning and Restraints    Post Treatment Position bed  -LM     In Bed call light within reach;encouraged to call for assist;exit alarm on  -LM           User Key  (r) = Recorded By, (t) = Taken By, (c) = Cosigned By    Initials Name Provider Type    Kathy Kiran, OT Occupational Therapist               Outcome Measures     Row Name 05/08/23 0828          How much help from another person do you currently need...    Turning from your back to your side while in flat bed without using bedrails? 3  -LA     Moving from lying on back to sitting on the side of a flat bed without bedrails? 2  -LA     Moving to and from a bed to a chair (including a wheelchair)? 2  -LA      Standing up from a chair using your arms (e.g., wheelchair, bedside chair)? 2  -LA     Climbing 3-5 steps with a railing? 1  -LA     To walk in hospital room? 2  -LA     AM-PAC 6 Clicks Score (PT) 12  -LA     Highest level of mobility 4 --> Transferred to chair/commode  -LA           User Key  (r) = Recorded By, (t) = Taken By, (c) = Cosigned By    Initials Name Provider Type    Perla Sandy RN Registered Nurse                  OT Recommendation and Plan  Planned Therapy Interventions (OT): activity tolerance training, adaptive equipment training, BADL retraining, patient/caregiver education/training, transfer/mobility retraining, strengthening exercise, ROM/therapeutic exercise  Therapy Frequency (OT): other (see comments) (prn and/or to monitor fxl progress)  Plan of Care Review  Plan of Care Reviewed With: patient     Time Calculation:     Therapy Charges for Today     Code Description Service Date Service Provider Modifiers Qty    84068614498 HC OT EVAL MOD COMPLEXITY 4 5/8/2023 Kathy Guillory OT GO 1        Kathy Guillory OT  5/8/2023    Electronically signed by Kathy Guillory OT at 05/08/23 1451       Speech Language Pathology Notes (most recent note)    No notes exist for this encounter.         ADL Documentation (most recent)    Flowsheet Row Most Recent Value   Transferring 3 - assistive equipment and person   Toileting 3 - assistive equipment and person   Bathing 2 - assistive person   Dressing 2 - assistive person   Eating 0 - independent   Communication 0 - understands/communicates without difficulty   Swallowing 0 - swallows foods/liquids without difficulty   Equipment Currently Used at Home none          Discharge Summary    No notes of this type exist for this encounter.         Discharge Order (From admission, onward)    None

## 2023-05-09 NOTE — PLAN OF CARE
Goal Outcome Evaluation:      Patient transferred to  this shift. Patient is resting in bed. No s/s of acute distress noted at this time. Will continue to follow plan of care.

## 2023-05-09 NOTE — CASE MANAGEMENT/SOCIAL WORK
Discharge Planning Assessment   [place]     Patient Name: Azra Ohara  MRN: [id number]  Today's Date: 5/9/2023    Admit Date: 5/3/2023    Plan: SS spoke with pt at bedside on this date regarding NH. Pt states she has not made decision yet on NH and states she didn't feel well yesterday to speak with family. Pt informed SS to follow up tomorrow about the rehab decision. SS to follow.     Discharge Plan     Row Name 05/09/23 1112       Plan    Plan SS spoke with pt at bedside on this date regarding NH. Pt states she has not made decision yet on NH and states she didn't feel well yesterday to speak with family. Pt informed SS to follow up tomorrow about the rehab decision. SS to follow.    15:55: SS contacted Pt healthcare surrogate Alexandru who states pt needs rehab before returning home. SS informed pt nephew that pt was agreeable for short term rehab prior to returning home. Pt nephew asked for referral to be sent to [facility]. SS sent referral via Epic and notified Joanna. SS to follow up in a.arjun.                Marcelina Davis, RAFAELW

## 2023-05-09 NOTE — PROGRESS NOTES
"    [facility] HOSPITALIST PROGRESS NOTE     Patient Identification:  Name:  Azra Ohara  Age:  88 y.o.  Sex:  female  :  1935  MRN:  [id number]  Visit Number:  [id number]  ROOM: [address] [place] [address]     Primary Care Provider:  Minal Salazar APRN    Length of stay in inpatient status:  5    Subjective     Chief Compliant:    Chief Complaint   Patient presents with   • Urinary Tract Infection       History of Presenting Illness:    Patient seen and examined this morning in PCU, and then again with nephew, Alexandru, present after she was moved to the telemetry floor. She reported feeling \"weak\" and having pain in her lower extremities that is chronic. Also reported pain in both her arms. Denied significant shortness of breath or other complaint. She said she was taking the day off from getting up and out because it was raining and she would \"take a cold easy.\"     Nursing staff reports she has required straight cath multiple times over the past two days due to urinary retention. Alexandru is very concerned about the need to do bladder training (which is no longer done with grimaldo catheter in our facility due to facility policy) and the likelihood of recurrent UTI if she requires an indwelling grimaldo catheter. He is also very concerned she is unable to care for herself at home and that her memory is worsening, which has been noticed by other family members over the past few months.     Objective     Current Hospital Meds:aspirin, 81 mg, Oral, Daily  atorvastatin, 40 mg, Oral, Nightly  docusate sodium, 100 mg, Oral, BID  heparin (porcine), 5,000 Units, Subcutaneous, Q12H  [START ON 5/10/2023] losartan, 100 mg, Oral, Q24H  multivitamin with minerals, 1 tablet, Oral, Daily  sodium chloride, 10 mL, Intravenous, Q12H  sodium chloride, 10 mL, Intravenous, Q12H  sodium chloride, 10 mL, Intravenous, Q12H    sodium chloride, 75 mL/hr, Last Rate: 75 mL/hr (23 1149)        Current Antimicrobial " Therapy:  Anti-Infectives (From admission, onward)    Ordered     Dose/Rate Route Frequency Start Stop    05/04/23 0443  ceFAZolin 2 gm IVPB in 100 mL NS (VTB)        Ordering Provider: Rebecca Rodrigues DO    2 g  over 30 Minutes Intravenous Every 8 Hours 05/04/23 0600 05/08/23 0547        Current Diuretic Therapy:  Diuretics (From admission, onward)    Ordered     Dose/Rate Route Frequency Start Stop    05/04/23 0453  furosemide (LASIX) injection 40 mg        Ordering Provider: Rick Dejesus MD    40 mg Intravenous Once 05/04/23 0545 05/04/23 0552        ----------------------------------------------------------------------------------------------------------------------  Vital Signs:  Temp:  [98 °F (36.7 °C)-98.5 °F (36.9 °C)] 98.1 °F (36.7 °C)  Heart Rate:  [65-90] 82  Resp:  [12-22] 18  BP: ()/() 162/88  SpO2:  [94 %-99 %] 96 %  on   ;   Device (Oxygen Therapy): room air  Body mass index is 29.25 kg/m².    Wt Readings from Last 3 Encounters:   05/09/23 82.2 kg (181 lb 3.2 oz)   01/19/23 71.9 kg (158 lb 8.2 oz)     Intake & Output (last 3 days)       05/06 0701 05/07 0700 05/07 0701 05/08 0700 05/08 0701 05/09 0700 05/09 0701  05/10 0700    P.O. 318 100 220     I.V. (mL/kg) 427.5 (5.4) 211.7 (2.6)  1912.5 (23.3)    IV Piggyback 100       Total Intake(mL/kg) 845.5 (10.6) 311.7 (3.8) 220 (2.8) 1912.5 (23.3)    Urine (mL/kg/hr) 500 (0.3) 1185 (0.6) 1365 (0.7)     Stool 0 0 0     Total Output 500 1185 1365     Net +345.5 -873.4 -1145 +1912.5            Urine Unmeasured Occurrence   2 x 1 x    Stool Unmeasured Occurrence  3 x 3 x         Diet: Regular/House Diet; Texture: Regular Texture (IDDSI 7); Fluid Consistency: Thin (IDDSI 0)  ----------------------------------------------------------------------------------------------------------------------  Physical exam:   Constitutional:  Well-developed and well-nourished. Elderly and frail appearing. No acute distress.      HENT:  Head:   Normocephalic and atraumatic.    Cardiovascular:  Normal rate, regular rhythm   Pulmonary/Chest:  Normal rate and effort, breath sounds clear to auscultation in anterior and lateral lung fields  Musculoskeletal:  No deformity.    Neurological: Sleepy, but wakes easily. No focal deficit on gross examination. No slurred speech or facial droop.   Skin:  Skin is warm and dry.   Peripheral vascular:  No cyanosis, no edema.  Psychiatric: Appropriate mood and affect  Edited by: Carmen Stoddard DO at 5/9/2023 1757  ----------------------------------------------------------------------------------------------------------------------  Results from last 7 days   Lab Units 05/09/23  0013 05/08/23  0028 05/07/23  0032 05/06/23  0029 05/05/23 1751 05/05/23  0004 05/03/23  2117   CRP mg/dL  --   --   --   --   --   --  3.26*   LACTATE mmol/L  --   --   --   --  1.0  --  1.8   WBC 10*3/mm3 8.52 8.32 8.32   < >  --    < > 10.94*   HEMOGLOBIN g/dL 12.1 11.9* 11.1*   < >  --    < > 14.3   HEMATOCRIT % 38.5 38.2 35.8   < >  --    < > 44.5   MCV fL 96.3 95.7 96.0   < >  --    < > 95.1   MCHC g/dL 31.4* 31.2* 31.0*   < >  --    < > 32.1   PLATELETS 10*3/mm3 164 174 168   < >  --    < > 251    < > = values in this interval not displayed.         Results from last 7 days   Lab Units 05/09/23  0013 05/08/23  0028 05/07/23  0032 05/06/23  0528 05/06/23  0029 05/05/23  1751 05/05/23  0004 05/03/23 2117   SODIUM mmol/L 142 139 142  --  144  --  147* 147*   POTASSIUM mmol/L 3.8 3.8 4.1   < > 3.9   < > 3.2* 4.1   MAGNESIUM mg/dL 1.7  --  2.0  --  2.0  --  1.8 2.0   CHLORIDE mmol/L 112* 109* 112*  --  113*  --  112* 109*   CO2 mmol/L 21.3* 22.0 21.2*  --  20.6*  --  21.1* 24.4   BUN mg/dL 23 25* 32*  --  34*  --  42* 47*   CREATININE mg/dL 0.74 0.83 0.86  --  0.91  --  1.30* 1.14*   CALCIUM mg/dL 8.6 8.7 9.0  --  8.7  --  9.2 10.8*   GLUCOSE mg/dL 105* 104* 99  --  91  --  110* 112*   ALBUMIN g/dL  --   --   --   --   --   --  3.0* 4.0    BILIRUBIN mg/dL  --   --   --   --   --   --  0.6 0.7   ALK PHOS U/L  --   --   --   --   --   --  68 83   AST (SGOT) U/L  --   --   --   --   --   --  21 26   ALT (SGPT) U/L  --   --   --   --   --   --  10 14    < > = values in this interval not displayed.   Estimated Creatinine Clearance: 56.8 mL/min (by C-G formula based on SCr of 0.74 mg/dL).  No results found for: AMMONIA  Results from last 7 days   Lab Units 05/03/23  2313 05/03/23 2117   HSTROP T ng/L 44* 50*     Results from last 7 days   Lab Units 05/03/23 2117   PROBNP pg/mL 6,565.0*         No results found for: HGBA1C, POCGLU  Lab Results   Component Value Date    TSH 0.998 05/03/2023    FREET4 1.95 (H) 05/03/2023     No results found for: PREGTESTUR, PREGSERUM, HCG, HCGQUANT  Pain Management Panel         Latest Ref Rng & Units 1/16/2023   Pain Management Panel   Creatinine, Urine mg/dL 72.7     Amphetamine, Urine Qual Negative Negative     Barbiturates Screen, Urine Negative Negative     Benzodiazepine Screen, Urine Negative Negative     Buprenorphine, Screen, Urine Negative Negative     Cocaine Screen, Urine Negative Negative     Methadone Screen , Urine Negative Negative     Methamphetamine, Ur Negative Negative                Brief Urine Lab Results  (Last result in the past 365 days)      Color   Clarity   Blood   Leuk Est   Nitrite   Protein   CREAT   Urine HCG        05/03/23 2138 Yellow   Clear   Negative   Negative   Negative   30 mg/dL (1+)               Blood Culture   Date Value Ref Range Status   05/05/2023 No growth at 3 days  Preliminary   05/05/2023 No growth at 3 days  Preliminary     No results found for: URINECX  No results found for: WOUNDCX  No results found for: STOOLCX  No results found for: RESPCX  No results found for: AFBCX  Results from last 7 days   Lab Units 05/05/23  1751 05/03/23 2117   LACTATE mmol/L 1.0 1.8   CRP mg/dL  --  3.26*       I have personally looked at the labs and they are summarized  above.  ----------------------------------------------------------------------------------------------------------------------  Detailed radiology reports for the last 24 hours:  Imaging Results (Last 24 Hours)     ** No results found for the last 24 hours. 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 She is s/p pacemaker placement--continue with sling on left arm until Friday. Sponge baths until that time.   - Continue losartan which was increased to 100mg daily by Cardiology due to persistent hypertension  - Continue holding home thiazide due to electrolyte abnormalities and advanced age  - Continue to supplement potassium and magnesium per protocol as needed  - Repeat chemistry panel and mag in am  - Complete a course of cefazolin which was started on admission for suspected lower extremity cellulitis  - Continue aspirin, statin  - Discussed plan with patient and nephlucia Horvath today. Case management consult requested to help arrange this. Work with PT/OT as able for strengthening. Encourage patient to be out of bed with assistance as tolerated.  - Due to recurrent urinary retention I will request a Urology consult for tomorrow morning. Medications reviewed and no obvious causes of the retention noted. If she requires straight cath again then  we will likely place a grimaldo catheter pending Urology recommendations.     Chronic medical conditions:  - Age related cognitive changes vs underlying dementia - Family is concerned about patient's decision making capacity. Psych evaluated patient and said at that time she retained decision making capacity, appreciate assistance. Patient has designated Alexandru as who she wants to be her POA.  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    Copied portions of the note have been reviewed and are correct as of 05/09/23.   Edited by: Carmen Stoddard DO at 5/9/2023 5648    VTE Prophylaxis:   Mechanical Order History:      Ordered        05/05/23 1549  Place Sequential Compression Device  Once            05/05/23 1549  Maintain Sequential Compression Device  Continuous                    Pharmalogical Order History:      Ordered     Dose Route Frequency Stop    05/04/23 0301  heparin (porcine) 5000 UNIT/ML injection 5,000 Units         5,000 Units SC Every 12 Hours Scheduled --                Dispo: hopefully short term rehab at discharge    Carmen Stoddard DO  [facility]  05/09/23  17:58 EDT

## 2023-05-10 LAB
ANION GAP SERPL CALCULATED.3IONS-SCNC: 7.3 MMOL/L (ref 5–15)
BACTERIA SPEC AEROBE CULT: NORMAL
BACTERIA SPEC AEROBE CULT: NORMAL
BUN SERPL-MCNC: 19 MG/DL (ref 8–23)
BUN/CREAT SERPL: 30.6 (ref 7–25)
CALCIUM SPEC-SCNC: 9 MG/DL (ref 8.6–10.5)
CHLORIDE SERPL-SCNC: 111 MMOL/L (ref 98–107)
CO2 SERPL-SCNC: 22.7 MMOL/L (ref 22–29)
CREAT SERPL-MCNC: 0.62 MG/DL (ref 0.57–1)
EGFRCR SERPLBLD CKD-EPI 2021: 85.8 ML/MIN/1.73
GLUCOSE SERPL-MCNC: 98 MG/DL (ref 65–99)
MAGNESIUM SERPL-MCNC: 1.9 MG/DL (ref 1.6–2.4)
POTASSIUM SERPL-SCNC: 3.7 MMOL/L (ref 3.5–5.2)
SODIUM SERPL-SCNC: 141 MMOL/L (ref 136–145)

## 2023-05-10 PROCEDURE — 80048 BASIC METABOLIC PNL TOTAL CA: CPT | Performed by: STUDENT IN AN ORGANIZED HEALTH CARE EDUCATION/TRAINING PROGRAM

## 2023-05-10 PROCEDURE — 99232 SBSQ HOSP IP/OBS MODERATE 35: CPT | Performed by: STUDENT IN AN ORGANIZED HEALTH CARE EDUCATION/TRAINING PROGRAM

## 2023-05-10 PROCEDURE — 25010000002 HEPARIN (PORCINE) PER 1000 UNITS: Performed by: STUDENT IN AN ORGANIZED HEALTH CARE EDUCATION/TRAINING PROGRAM

## 2023-05-10 PROCEDURE — 97530 THERAPEUTIC ACTIVITIES: CPT

## 2023-05-10 PROCEDURE — 25010000002 MAGNESIUM SULFATE 2 GM/50ML SOLUTION: Performed by: STUDENT IN AN ORGANIZED HEALTH CARE EDUCATION/TRAINING PROGRAM

## 2023-05-10 PROCEDURE — 83735 ASSAY OF MAGNESIUM: CPT | Performed by: STUDENT IN AN ORGANIZED HEALTH CARE EDUCATION/TRAINING PROGRAM

## 2023-05-10 RX ORDER — HYDROXYZINE HYDROCHLORIDE 25 MG/1
25 TABLET, FILM COATED ORAL 3 TIMES DAILY PRN
Status: DISCONTINUED | OUTPATIENT
Start: 2023-05-10 | End: 2023-05-11 | Stop reason: HOSPADM

## 2023-05-10 RX ORDER — IBUPROFEN 400 MG/1
400 TABLET ORAL ONCE
Status: COMPLETED | OUTPATIENT
Start: 2023-05-11 | End: 2023-05-10

## 2023-05-10 RX ORDER — MAGNESIUM SULFATE HEPTAHYDRATE 40 MG/ML
2 INJECTION, SOLUTION INTRAVENOUS ONCE
Status: COMPLETED | OUTPATIENT
Start: 2023-05-10 | End: 2023-05-10

## 2023-05-10 RX ADMIN — Medication 10 ML: at 08:52

## 2023-05-10 RX ADMIN — HYDROCODONE BITARTRATE AND ACETAMINOPHEN 0.5 TABLET: 5; 325 TABLET ORAL at 13:49

## 2023-05-10 RX ADMIN — HYDROXYZINE HYDROCHLORIDE 25 MG: 25 TABLET, FILM COATED ORAL at 21:40

## 2023-05-10 RX ADMIN — HEPARIN SODIUM 5000 UNITS: 5000 INJECTION INTRAVENOUS; SUBCUTANEOUS at 08:52

## 2023-05-10 RX ADMIN — HYDROCODONE BITARTRATE AND ACETAMINOPHEN 0.5 TABLET: 5; 325 TABLET ORAL at 21:40

## 2023-05-10 RX ADMIN — Medication 10 ML: at 08:53

## 2023-05-10 RX ADMIN — SODIUM CHLORIDE 75 ML/HR: 9 INJECTION, SOLUTION INTRAVENOUS at 05:21

## 2023-05-10 RX ADMIN — Medication 1 TABLET: at 08:52

## 2023-05-10 RX ADMIN — Medication 10 ML: at 21:41

## 2023-05-10 RX ADMIN — LOSARTAN POTASSIUM 100 MG: 50 TABLET, FILM COATED ORAL at 08:52

## 2023-05-10 RX ADMIN — HEPARIN SODIUM 5000 UNITS: 5000 INJECTION INTRAVENOUS; SUBCUTANEOUS at 21:41

## 2023-05-10 RX ADMIN — DOCUSATE SODIUM 100 MG: 100 CAPSULE, LIQUID FILLED ORAL at 21:39

## 2023-05-10 RX ADMIN — ATORVASTATIN CALCIUM 40 MG: 40 TABLET, FILM COATED ORAL at 21:39

## 2023-05-10 RX ADMIN — MAGNESIUM SULFATE 2 G: 2 INJECTION INTRAVENOUS at 05:21

## 2023-05-10 RX ADMIN — ACETAMINOPHEN 500 MG: 500 TABLET ORAL at 03:34

## 2023-05-10 RX ADMIN — IBUPROFEN 400 MG: 400 TABLET, FILM COATED ORAL at 23:54

## 2023-05-10 RX ADMIN — DOCUSATE SODIUM 100 MG: 100 CAPSULE, LIQUID FILLED ORAL at 08:52

## 2023-05-10 RX ADMIN — ASPIRIN 81 MG: 81 TABLET, COATED ORAL at 08:52

## 2023-05-10 RX ADMIN — HYDROCODONE BITARTRATE AND ACETAMINOPHEN 0.5 TABLET: 5; 325 TABLET ORAL at 05:36

## 2023-05-10 NOTE — CASE MANAGEMENT/SOCIAL WORK
Discharge Planning Assessment   [place]     Patient Name: Azra Ohara  MRN: [id number]  Today's Date: 5/10/2023    Admit Date: 5/3/2023    Plan: SS left Joanna with [facility] a message to return call regarding referral.  SS will follow.     Discharge Plan     Row Name 05/10/23 1054       Plan    Plan SS left Joanna with [facility] a message to return call regarding referral.  SS will follow.              Continued Care and Services - Admitted Since 5/3/2023     Destination     Service Provider Request Status Selected Services Address Phone Fax Patient Preferred    [facility] Pending - Request Sent N/A [address], [place] [address] [phone] --              ELIOT Foster

## 2023-05-10 NOTE — PROGRESS NOTES
"    [facility] HOSPITALIST PROGRESS NOTE     Patient Identification:  Name:  Azra Ohara  Age:  88 y.o.  Sex:  female  :  1935  MRN:  [id number]  Visit Number:  [id number]  ROOM: [address] [place] [address]     Primary Care Provider:  Minal Salazar APRN    Length of stay in inpatient status:  6    Subjective     Chief Compliant:    Chief Complaint   Patient presents with   • Urinary Tract Infection       History of Presenting Illness:    Patient seen and examined this morning after working with physical therapy. She reported to me that she was hurting \"all over\" but seemed slightly confused, saying she had not been out of bed to work with physical therapy today. She denied other complaints except for pain and requested pain medication. Per nursing report patient did better today from a urinary retention standpoint, and was able to void spontaneously while up to bedside commode.    Objective     Current Hospital Meds:aspirin, 81 mg, Oral, Daily  atorvastatin, 40 mg, Oral, Nightly  docusate sodium, 100 mg, Oral, BID  heparin (porcine), 5,000 Units, Subcutaneous, Q12H  losartan, 100 mg, Oral, Q24H  multivitamin with minerals, 1 tablet, Oral, Daily  sodium chloride, 10 mL, Intravenous, Q12H  sodium chloride, 10 mL, Intravenous, Q12H  sodium chloride, 10 mL, Intravenous, Q12H    sodium chloride, 75 mL/hr, Last Rate: 75 mL/hr (05/10/23 0521)        Current Antimicrobial Therapy:  Anti-Infectives (From admission, onward)    Ordered     Dose/Rate Route Frequency Start Stop    23 0443  ceFAZolin 2 gm IVPB in 100 mL NS (VTB)        Ordering Provider: Rebecca Rodrigues DO    2 g  over 30 Minutes Intravenous Every 8 Hours 23 0600 23 2246        Current Diuretic Therapy:  Diuretics (From admission, onward)    Ordered     Dose/Rate Route Frequency Start Stop    23 0453  furosemide (LASIX) injection 40 mg        Ordering Provider: Rick Dejesus MD    40 mg Intravenous Once 23 05 " 05/04/23 0552        ----------------------------------------------------------------------------------------------------------------------  Vital Signs:  Temp:  [97.9 °F (36.6 °C)-98.4 °F (36.9 °C)] 98.4 °F (36.9 °C)  Heart Rate:  [77-91] 86  Resp:  [18-20] 18  BP: (130-173)/(68-96) 162/73  SpO2:  [95 %-98 %] 95 %  on   ;   Device (Oxygen Therapy): room air  Body mass index is 28.34 kg/m².    Wt Readings from Last 3 Encounters:   05/10/23 79.7 kg (175 lb 9.6 oz)   01/19/23 71.9 kg (158 lb 8.2 oz)     Intake & Output (last 3 days)       05/08 0701 05/09 0700 05/09 0701  05/10 0700 05/10 0701  05/11 0700    P.O. 220 240 600    I.V. (mL/kg) 1203.1 (15.3) 1912.5 (24)     Total Intake(mL/kg) 1423.1 (18.1) 2152.5 (27) 600 (7.5)    Urine (mL/kg/hr) 1365 (0.7)  650 (0.6)    Stool 0      Total Output 1365  650    Net +58.1 +2152.5 -50           Urine Unmeasured Occurrence 2 x 451 x     Stool Unmeasured Occurrence 3 x 1 x         Diet: Regular/House Diet; Texture: Regular Texture (IDDSI 7); Fluid Consistency: Thin (IDDSI 0)  ----------------------------------------------------------------------------------------------------------------------  Physical exam:   Constitutional:  Well-developed and well-nourished. Elderly and frail appearing. Mild distress due to pain.  HENT:  Head:  Normocephalic and atraumatic.    Cardiovascular:  Normal rate, regular rhythm   Pulmonary/Chest:  Normal rate and effort, breath sounds clear to auscultation in anterior and lateral lung fields  Musculoskeletal:  No deformity.    Neurological: Sleepy, but wakes easily. No focal deficit on gross examination. No slurred speech or facial droop.   Skin:  Skin is warm and dry.   Peripheral vascular:  No cyanosis, no edema.  Psychiatric: Tearful  Edited by: Carmen Stoddard DO at 5/10/2023 1953  ----------------------------------------------------------------------------------------------------------------------  Results from last 7 days   Lab Units  05/09/23  0013 05/08/23  0028 05/07/23  0032 05/06/23  0029 05/05/23  1751 05/05/23  0004 05/03/23 2117   CRP mg/dL  --   --   --   --   --   --  3.26*   LACTATE mmol/L  --   --   --   --  1.0  --  1.8   WBC 10*3/mm3 8.52 8.32 8.32   < >  --    < > 10.94*   HEMOGLOBIN g/dL 12.1 11.9* 11.1*   < >  --    < > 14.3   HEMATOCRIT % 38.5 38.2 35.8   < >  --    < > 44.5   MCV fL 96.3 95.7 96.0   < >  --    < > 95.1   MCHC g/dL 31.4* 31.2* 31.0*   < >  --    < > 32.1   PLATELETS 10*3/mm3 164 174 168   < >  --    < > 251    < > = values in this interval not displayed.         Results from last 7 days   Lab Units 05/10/23  0223 05/09/23  0013 05/08/23  0028 05/07/23  0032 05/05/23  1751 05/05/23  0004 05/03/23 2117   SODIUM mmol/L 141 142 139 142   < > 147* 147*   POTASSIUM mmol/L 3.7 3.8 3.8 4.1   < > 3.2* 4.1   MAGNESIUM mg/dL 1.9 1.7  --  2.0   < > 1.8 2.0   CHLORIDE mmol/L 111* 112* 109* 112*   < > 112* 109*   CO2 mmol/L 22.7 21.3* 22.0 21.2*   < > 21.1* 24.4   BUN mg/dL 19 23 25* 32*   < > 42* 47*   CREATININE mg/dL 0.62 0.74 0.83 0.86   < > 1.30* 1.14*   CALCIUM mg/dL 9.0 8.6 8.7 9.0   < > 9.2 10.8*   GLUCOSE mg/dL 98 105* 104* 99   < > 110* 112*   ALBUMIN g/dL  --   --   --   --   --  3.0* 4.0   BILIRUBIN mg/dL  --   --   --   --   --  0.6 0.7   ALK PHOS U/L  --   --   --   --   --  68 83   AST (SGOT) U/L  --   --   --   --   --  21 26   ALT (SGPT) U/L  --   --   --   --   --  10 14    < > = values in this interval not displayed.   Estimated Creatinine Clearance: 66.8 mL/min (by C-G formula based on SCr of 0.62 mg/dL).  No results found for: AMMONIA  Results from last 7 days   Lab Units 05/03/23 2313 05/03/23 2117   HSTROP T ng/L 44* 50*     Results from last 7 days   Lab Units 05/03/23 2117   PROBNP pg/mL 6,565.0*         No results found for: HGBA1C, POCGLU  Lab Results   Component Value Date    TSH 0.998 05/03/2023    FREET4 1.95 (H) 05/03/2023     No results found for: PREGTESTUR, PREGSERUM, HCG, HCGQUANT  Pain  Management Panel         Latest Ref Rng & Units 1/16/2023   Pain Management Panel   Creatinine, Urine mg/dL 72.7     Amphetamine, Urine Qual Negative Negative     Barbiturates Screen, Urine Negative Negative     Benzodiazepine Screen, Urine Negative Negative     Buprenorphine, Screen, Urine Negative Negative     Cocaine Screen, Urine Negative Negative     Methadone Screen , Urine Negative Negative     Methamphetamine, Ur Negative Negative                Brief Urine Lab Results  (Last result in the past 365 days)      Color   Clarity   Blood   Leuk Est   Nitrite   Protein   CREAT   Urine HCG        05/03/23 2138 Yellow   Clear   Negative   Negative   Negative   30 mg/dL (1+)               Blood Culture   Date Value Ref Range Status   05/05/2023 No growth at 5 days  Final   05/05/2023 No growth at 5 days  Final     No results found for: URINECX  No results found for: WOUNDCX  No results found for: STOOLCX  No results found for: RESPCX  No results found for: AFBCX  Results from last 7 days   Lab Units 05/05/23  1751 05/03/23  2117   LACTATE mmol/L 1.0 1.8   CRP mg/dL  --  3.26*       I have personally looked at the labs and they are summarized above.  ----------------------------------------------------------------------------------------------------------------------  Detailed radiology reports for the last 24 hours:  Imaging Results (Last 24 Hours)     ** No results found for the last 24 hours. 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 She is s/p pacemaker placement--continue with sling on left arm until Friday. Sponge baths until that time.   - Continue losartan which was increased to 100mg daily by Cardiology due to persistent hypertension. BP has been better this afternoon but was elevated to 160-170 systolic range this morning.  - Continue holding home thiazide due to electrolyte abnormalities and advanced age  - Continue to supplement potassium and magnesium per protocol as needed  - Repeat chemistry panel and mag periodically   - She has completed a course of cefazolin which was started on admission for suspected lower extremity cellulitis  - Continue aspirin, statin  - Discussed plan with patient and nephew Alexandru previously. Case management consult requested to help arrange this. Work with PT/OT as able for strengthening. Encourage patient to be out of bed with assistance as tolerated. She is making progress with physical therapy per review of therapy notes.  - Continue pain medication prn while monitoring for respiratory depression, bradycardia, hypotension, or oversedation.    Chronic medical conditions:  - Age related cognitive changes vs underlying dementia - Family is concerned about patient's decision making capacity. Psych evaluated patient and said at that time she retained decision making capacity, appreciate assistance. Patient has designated Alexandru as who she wants to be her POA.  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  Edited by: Carmen Stoddard DO at 5/10/2023 1953    VTE Prophylaxis:   Mechanical Order History:      Ordered        05/05/23 1549  Place Sequential Compression Device  Once             05/05/23 1549  Maintain Sequential Compression Device  Continuous                    Pharmalogical Order History:      Ordered     Dose Route Frequency Stop    05/04/23 0301  heparin (porcine) 5000 UNIT/ML injection 5,000 Units         5,000 Units SC Every 12 Hours Scheduled --                Dispo: likely short term rehab pending insurance and facility approval    Carmen Stoddard DO  [facility] Hospitalist  05/10/23  19:53 EDT

## 2023-05-10 NOTE — PLAN OF CARE
"Goal Outcome Evaluation:  Plan of Care Reviewed With: patient   Pt resting in bed with complaints of pain stating, \"all over\" PRN pain medications administered. Pt has been up in chair today and tolerated well. Arm in sling. Will continue with plan of care.   "

## 2023-05-10 NOTE — CASE MANAGEMENT/SOCIAL WORK
Discharge Planning Assessment   [facility]     Patient Name: Azra Ohara  MRN: [id number]  Today's Date: 5/10/2023    Admit Date: 5/3/2023    Plan: [facility] per Joanna will accept pt and begin pre authorization with pt's insurance.  Pt's Healthcare surrogate aware and agreeable.  SS will follow.     Discharge Plan     Row Name 05/10/23 1416       Plan    Plan [facility] per Joanna will accept pt and begin pre authorization with pt's insurance.  Pt's Healthcare surrogate aware and agreeable.  SS will follow.    Row Name 05/10/23 1409       Plan    Plan Comments Per ID's most recent note, pt is considered as having clinical osteomyelitis and will require prolonged course of IV Abx over six weeks; currently being tx with IV Vanco and cefepime should continue until deep wound cx become available.  Pt for PICC line placement under US guidance, pending placement at time of review. Referral sent to [facility] with [facility] for home going iv abx.              Continued Care and Services - Admitted Since 5/3/2023     Destination     Service Provider Request Status Selected Services Address Phone Fax Patient Preferred    [facility] Pending - Request Sent N/A [address] [place] [address] [phone] --                  ELIOT Foster

## 2023-05-10 NOTE — PLAN OF CARE
Goal Outcome Evaluation:  Plan of Care Reviewed With: patient      Pt is resting in bed with eyes closed. Chest is rising and falling evenly. No s/s of acute distress noted. Pt had complaints of pain early in shift and requested PRN pain med. See mar. Pt had large BM and did void when up to bedside X2 assist. No other complaints verbalized at this time. Pt is resting comfortably. Replaced mag this shift.

## 2023-05-10 NOTE — THERAPY TREATMENT NOTE
Acute Care - Physical Therapy Treatment Note  [facility]     Patient Name: Azra Ohara  : 1935  MRN: [id number]  Today's Date: 5/10/2023   Onset of Illness/Injury or Date of Surgery: 23  Visit Dx:     ICD-10-CM ICD-9-CM   1. Third degree atrioventricular block  I44.2 426.0   2. Complete heart block  I44.2 426.0   3. Third degree AV block  I44.2 426.0     Patient Active Problem List   Diagnosis   • Chronic kidney disease, stage 4 (severe)   • Essential hypertension   • Left bundle branch block   • Malignant neoplasm of large intestine   • Polycythemia vera   • Long term (current) use of aspirin     Past Medical History:   Diagnosis Date   • Chronic kidney disease, stage 4 (severe) 2023   • Essential hypertension    • History of colon cancer    • Left bundle branch block 2023   • Long term (current) use of aspirin 2023   • Polycythemia vera 2023     Past Surgical History:   Procedure Laterality Date   • CARDIAC ELECTROPHYSIOLOGY PROCEDURE N/A 2023    Procedure: Pacemaker DC new;  Surgeon: Sampson Garvin MD;  Location: [facility] INVASIVE LOCATION;  Service: Cardiology;  Laterality: N/A;   • COLON RESECTION     • PARATHYROIDECTOMY Right     Parathyroid adenoma     PT Assessment (last 12 hours)     PT Evaluation and Treatment     Row Name 05/10/23 1016          Physical Therapy Time and Intention    Document Type therapy note (daily note)  -     Mode of Treatment physical therapy  -     Patient Effort good  -     Symptoms Noted During/After Treatment fatigue;other (see comments)  Pt fatigues quickly but is eager to work and has demonstrated improvement today  -     Row Name 05/10/23 1016          General Information    Patient Profile Reviewed yes  -     Patient Observations alert;cooperative;agree to therapy  -     Patient/Family/Caregiver Comments/Observations Nephew present during PT treatment  -     Existing Precautions/Restrictions fall;weight  bearing;other (see comments);pacemaker  Limited ROM and Limited WB - no more than 10 lb through left UE due to recent pacemaker implant.  Pt is refusing to wear sling per RN.  -     Row Name 05/10/23 1016          Pain    Pretreatment Pain Rating 0/10 - no pain  -     Posttreatment Pain Rating 0/10 - no pain  -KP     Row Name 05/10/23 1016          Cognition    Affect/Mental Status (Cognition) WFL  -     Orientation Status (Cognition) oriented to;person;place;situation  -     Follows Commands (Cognition) follows one-step commands;over 90% accuracy;physical/tactile prompts required;repetition of directions required;verbal cues/prompting required  -     Row Name 05/10/23 1016          Bed Mobility    Comment, (Bed Mobility) Pt was up in bed side chair upon therapist arrival.  CNA had assisted patient.  -     Row Name 05/10/23 1016          Sit-Stand Transfer    Sit-Stand Only (Transfers) moderate assist (50% patient effort);1 person assist  -     Assistive Device (Sit-Stand Transfers) walker, front-wheeled  -     Comment, (Sit-Stand Transfer) Limited WB through left UE  -[facility] Name 05/10/23 1016          Stand-Sit Transfer    Stand-Sit Only (Transfers) minimum assist (75% patient effort);1 person assist;verbal cues  -     Assistive Device (Stand-Sit Transfers) walker, front-wheeled  -     Comment, (Stand-Sit Transfer) Limited WB through left UE  -[facility] Name 05/10/23 1016          Gait/Stairs (Locomotion)    Only Level (Gait) contact guard;1 person assist  -     Assistive Device (Gait) walker, front-wheeled  -     Distance in Feet (Gait) 5ft  -     Pattern (Gait) step-to  -     Deviations/Abnormal Patterns (Gait) daxa decreased;gait speed decreased;stride length decreased  -     Comment, (Gait/Stairs) Limited WB performed through left UE  -[facility] Name 05/10/23 1016          Balance    Static Sitting Balance modified independence;supervision  -      Position, Sitting Balance sitting in chair  -     Static Standing Balance contact guard;1-person assist  -     Dynamic Standing Balance contact guard;minimal assist;1-person assist  -     Position/Device Used, Standing Balance walker, front-wheeled  -     Row Name 05/10/23 1016          Motor Skills    Therapeutic Exercise hip;knee;ankle  -     Row Name 05/10/23 1016          Knee (Therapeutic Exercise)    Knee (Therapeutic Exercise) strengthening exercise  -     Knee Strengthening (Therapeutic Exercise) left;right;LAQ (long arc quad);sitting;10 repetitions;2 sets  -     Row Name 05/10/23 1016          Ankle (Therapeutic Exercise)    Ankle (Therapeutic Exercise) strengthening exercise  -     Ankle Strengthening (Therapeutic Exercise) left;right;dorsiflexion;plantarflexion;10 repetitions;3 sets  -     Row Name             Wound 05/04/23 0250 Left gluteal    Wound - Properties Group Placement Date: 05/04/23  -BR Placement Time: 0250  -BR Side: Left  -BR Location: gluteal  -BR    Retired Wound - Properties Group Placement Date: 05/04/23  -BR Placement Time: 0250  -BR Side: Left  -BR Location: gluteal  -BR    Retired Wound - Properties Group Date first assessed: 05/04/23  -BR Time first assessed: 0250  -BR Side: Left  -BR Location: gluteal  -BR    Row Name             Wound 05/04/23 0252 Right gluteal    Wound - Properties Group Placement Date: 05/04/23  -BR Placement Time: 0252  -BR Side: Right  -BR Location: gluteal  -BR    Retired Wound - Properties Group Placement Date: 05/04/23  -BR Placement Time: 0252  -BR Side: Right  -BR Location: gluteal  -BR    Retired Wound - Properties Group Date first assessed: 05/04/23  -BR Time first assessed: 0252  -BR Side: Right  -BR Location: gluteal  -BR    Row Name             Wound 05/05/23 1630 Right posterior elbow Skin Tear    Wound - Properties Group Placement Date: 05/05/23  -DH Placement Time: 1630  -DH Side: Right  -DH Orientation: posterior  -DH Location:  elbow  -DH Primary Wound Type: Skin tear  -DH    Retired Wound - Properties Group Placement Date: 05/05/23  - Placement Time: 1630  -DH Side: Right  -DH Orientation: posterior  -DH Location: elbow  -DH Primary Wound Type: Skin tear  -DH    Retired Wound - Properties Group Date first assessed: 05/05/23  - Time first assessed: 1630  -DH Side: Right  -DH Location: elbow  -DH Primary Wound Type: Skin tear  -DH    Row Name 05/10/23 1016          Plan of Care Review    Plan of Care Reviewed With patient  -     Outcome Evaluation PT treatment completed.  Patient performed transfer training with limited WB through left UE using FWW.  Ambulated 5ft with FWW and CGA/Pam.  She is demonstrating improvement in strength and functional mobility.  Continue PT POC.  -KP     Row Name 05/10/23 1016          Positioning and Restraints    Pre-Treatment Position sitting in chair/recliner  -KP     Post Treatment Position chair  -KP     In Chair sitting;call light within reach;encouraged to call for assist;with family/caregiver  Lines in tact and needs in reach  -KP           User Key  (r) = Recorded By, (t) = Taken By, (c) = Cosigned By    Initials Name Provider Type     Morenita Floyd RN Registered Nurse    Sonya Funes, PT Physical Therapist    Alyce Perez, RN Registered Nurse                Physical Therapy Education     Title: PT OT SLP Therapies (In Progress)     Topic: Physical Therapy (In Progress)     Point: Mobility training (In Progress)     Learning Progress Summary           Patient Acceptance, E,D, NR by  at 5/8/2023 1727    Acceptance, E, VU,NR by  at 5/7/2023 0545    Acceptance, E, VU,NR by  at 5/7/2023 0416    Acceptance, E, NR by  at 5/4/2023 1718                   Point: Home exercise program (In Progress)     Learning Progress Summary           Patient Acceptance, E,D, NR by  at 5/8/2023 1727    Acceptance, E, VU,NR by  at 5/7/2023 0545    Acceptance, E, VU,NR by  at 5/7/2023  0416    Acceptance, E, NR by  at 5/4/2023 1718                   Point: Body mechanics (In Progress)     Learning Progress Summary           Patient Acceptance, E,D, NR by  at 5/8/2023 1727    Acceptance, E, VU,NR by  at 5/7/2023 0545    Acceptance, E, VU,NR by  at 5/7/2023 0416    Acceptance, E, NR by  at 5/4/2023 1718                   Point: Precautions (In Progress)     Learning Progress Summary           Patient Acceptance, E,D, NR by  at 5/8/2023 1727    Acceptance, E, VU,NR by  at 5/7/2023 0545    Acceptance, E, VU,NR by  at 5/7/2023 0416    Acceptance, E, NR by  at 5/4/2023 1718                               User Key     Initials Effective Dates Name Provider Type Discipline     06/16/21 -  Kassidy Sanz, RN Registered Nurse Nurse     06/16/21 -  Montserrat Gale, PT Physical Therapist PT     01/05/23 -  Nupur Riggins, NATALY Registered Nurse Nurse              PT Recommendation and Plan     Plan of Care Reviewed With: patient  Outcome Evaluation: PT treatment completed.  Patient performed transfer training with limited WB through left UE using FWW.  Ambulated 5ft with FWW and CGA/Pam.  She is demonstrating improvement in strength and functional mobility.  Continue PT POC.       Time Calculation:    PT Charges     Row Name 05/10/23 1024             Time Calculation    PT Received On 05/10/23  -      PT Goal Re-Cert Due Date 05/22/23  -         Timed Charges    57777 - PT Therapeutic Activity Minutes 25  -KP         Total Minutes    Timed Charges Total Minutes 25  -KP       Total Minutes 25  -KP            User Key  (r) = Recorded By, (t) = Taken By, (c) = Cosigned By    Initials Name Provider Type     Sonya Jackson, PT Physical Therapist              Therapy Charges for Today     Code Description Service Date Service Provider Modifiers Qty    [id number]  PT THERAPEUTIC ACT EA 15 MIN 5/10/2023 Sonya Jackson PT GP 2          PT G-Codes  AM-PAC 6 Clicks Score (PT):  12    Sonya Jackson, PT  5/10/2023

## 2023-05-11 VITALS
TEMPERATURE: 97.3 F | HEART RATE: 83 BPM | OXYGEN SATURATION: 91 % | WEIGHT: 178.2 LBS | DIASTOLIC BLOOD PRESSURE: 82 MMHG | SYSTOLIC BLOOD PRESSURE: 141 MMHG | HEIGHT: 66 IN | BODY MASS INDEX: 28.64 KG/M2 | RESPIRATION RATE: 18 BRPM

## 2023-05-11 LAB — MAGNESIUM SERPL-MCNC: 2 MG/DL (ref 1.6–2.4)

## 2023-05-11 PROCEDURE — 83735 ASSAY OF MAGNESIUM: CPT | Performed by: PHYSICIAN ASSISTANT

## 2023-05-11 PROCEDURE — 97530 THERAPEUTIC ACTIVITIES: CPT

## 2023-05-11 PROCEDURE — 25010000002 HEPARIN (PORCINE) PER 1000 UNITS: Performed by: STUDENT IN AN ORGANIZED HEALTH CARE EDUCATION/TRAINING PROGRAM

## 2023-05-11 PROCEDURE — 97166 OT EVAL MOD COMPLEX 45 MIN: CPT

## 2023-05-11 PROCEDURE — 97110 THERAPEUTIC EXERCISES: CPT

## 2023-05-11 RX ORDER — LANOLIN ALCOHOL/MO/W.PET/CERES
3 CREAM (GRAM) TOPICAL NIGHTLY PRN
Qty: 30 TABLET | Refills: 0 | Status: SHIPPED | OUTPATIENT
Start: 2023-05-11

## 2023-05-11 RX ORDER — ACETAMINOPHEN 500 MG
500 TABLET ORAL EVERY 6 HOURS PRN
Qty: 40 TABLET | Refills: 0 | Status: SHIPPED | OUTPATIENT
Start: 2023-05-11 | End: 2023-05-21

## 2023-05-11 RX ORDER — MULTIPLE VITAMINS W/ MINERALS TAB 9MG-400MCG
1 TAB ORAL DAILY
Qty: 30 EACH | Refills: 0 | Status: SHIPPED | OUTPATIENT
Start: 2023-05-12

## 2023-05-11 RX ORDER — HYDROCODONE BITARTRATE AND ACETAMINOPHEN 5; 325 MG/1; MG/1
0.5 TABLET ORAL EVERY 8 HOURS PRN
Qty: 5 TABLET | Refills: 0 | Status: SHIPPED | OUTPATIENT
Start: 2023-05-11 | End: 2023-05-12 | Stop reason: SDUPTHER

## 2023-05-11 RX ORDER — LOSARTAN POTASSIUM 100 MG/1
100 TABLET ORAL
Qty: 30 TABLET | Refills: 0 | Status: SHIPPED | OUTPATIENT
Start: 2023-05-12

## 2023-05-11 RX ORDER — ATORVASTATIN CALCIUM 40 MG/1
40 TABLET, FILM COATED ORAL NIGHTLY
Qty: 90 TABLET | Refills: 0 | Status: SHIPPED | OUTPATIENT
Start: 2023-05-11 | End: 2023-05-11

## 2023-05-11 RX ORDER — PSEUDOEPHEDRINE HCL 30 MG
100 TABLET ORAL 2 TIMES DAILY
Qty: 60 EACH | Refills: 0 | Status: ON HOLD | OUTPATIENT
Start: 2023-05-11 | End: 2023-05-16

## 2023-05-11 RX ADMIN — HYDROCODONE BITARTRATE AND ACETAMINOPHEN 0.5 TABLET: 5; 325 TABLET ORAL at 06:13

## 2023-05-11 RX ADMIN — Medication 10 ML: at 08:41

## 2023-05-11 RX ADMIN — Medication 1 TABLET: at 08:41

## 2023-05-11 RX ADMIN — HEPARIN SODIUM 5000 UNITS: 5000 INJECTION INTRAVENOUS; SUBCUTANEOUS at 08:41

## 2023-05-11 RX ADMIN — Medication 10 ML: at 10:10

## 2023-05-11 RX ADMIN — LOSARTAN POTASSIUM 100 MG: 50 TABLET, FILM COATED ORAL at 08:41

## 2023-05-11 RX ADMIN — ASPIRIN 81 MG: 81 TABLET, COATED ORAL at 08:41

## 2023-05-11 RX ADMIN — DOCUSATE SODIUM 100 MG: 100 CAPSULE, LIQUID FILLED ORAL at 08:41

## 2023-05-11 RX ADMIN — ACETAMINOPHEN 500 MG: 500 TABLET ORAL at 08:41

## 2023-05-11 NOTE — DISCHARGE PLACEMENT REQUEST
"Wenceslao Gómez (88 y.o. Female)     Date of Birth   1935    Social Security Number       Address   [address] [address]    Home Phone   [phone]    MRN   [id number]       Mandaeism   None    Marital Status   Single                            Admission Date   5/3/23    Admission Type   Emergency    Admitting Provider   Rick Dejesus MD    Attending Provider   Carmen Stoddard DO    Department, Room/Bed   [address], 3302/1S       Discharge Date       Discharge Disposition   Rehab Facility or Unit (DC - External)    Discharge Destination                               Attending Provider: Carmen Stoddard DO    Allergies: Lipitor [Atorvastatin], Penicillins    Isolation: None   Infection: None   Code Status: No CPR    Ht: 167.6 cm (66\")   Wt: 80.8 kg (178 lb 3.2 oz)    Admission Cmt: None   Principal Problem: 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 [id number]       Subscriber Name Subscriber Birth Date Member ID       WENCESLAO GÓMEZ 1935 [id number]                 Emergency Contacts      (Rel.) Home Phone Work Phone Mobile Phone    MELISSA SCHMIDT (Sister) [phone] -- --    Gonzalez Barrera (nephew) (Other) -- -- [phone]    Alexandru Schmidt (healthcare surrogate/ nephew) (Other) -- -- [phone]            Vital Signs (last day)     Date/Time Temp Temp src Pulse Resp BP Patient Position SpO2    05/11/23 0943 97.3 (36.3) Tympanic 83 18 141/82 Lying 91    05/11/23 0633 97.7 (36.5) Tympanic 86 18 165/91 Lying 95    05/11/23 0257 98.1 (36.7) Oral 94 18 164/97 Sitting 98    05/10/23 2234 97.8 (36.6) Oral 93 18 " 157/81 Lying 99    05/10/23 2145 -- -- 89 -- 145/69 Lying --    05/10/23 1855 98.4 (36.9) Oral 86 18 162/73 Lying 95    05/10/23 1441 98 (36.7) Oral 78 18 130/68 Lying 96    05/10/23 1005 97.9 (36.6) Oral 91 20 137/92 Lying 97    05/10/23 0606 98.1 (36.7) Oral 77 20 166/94 Lying 98    05/10/23 0536 -- -- 80 -- 173/95 Lying --    05/10/23 0303 98.1 (36.7) Oral 79 18 170/96 Lying 98          Current Facility-Administered Medications   Medication Dose Route Frequency Provider Last Rate Last Admin   • acetaminophen (TYLENOL) tablet 500 mg  500 mg Oral Q6H PRN Carmen Stoddard DO   500 mg at 05/11/23 0841   • aspirin EC tablet 81 mg  81 mg Oral Daily Carmen Stoddard DO   81 mg at 05/11/23 0841   • atorvastatin (LIPITOR) tablet 40 mg  40 mg Oral Nightly Carmen Stoddard DO   40 mg at 05/10/23 2139   • docusate sodium (COLACE) capsule 100 mg  100 mg Oral BID Carmen Stoddard DO   100 mg at 05/11/23 0841   • heparin (porcine) 5000 UNIT/ML injection 5,000 Units  5,000 Units Subcutaneous Q12H Carmen Stoddard DO   5,000 Units at 05/11/23 0841   • HYDROcodone-acetaminophen (NORCO) 5-325 MG per tablet 0.5 tablet  0.5 tablet Oral Q8H PRN Carmen Stoddard DO   0.5 tablet at 05/11/23 0613   • hydrOXYzine (ATARAX) tablet 25 mg  25 mg Oral TID PRN Harika Sanchez PA-C   25 mg at 05/10/23 2140   • losartan (COZAAR) tablet 100 mg  100 mg Oral Q24H Norah Lambert APRN   100 mg at 05/11/23 0841   • Magnesium Sulfate - Total Dose 4 grams - Magnesium 1 or Less  4 g Intravenous PRN Carmen Stoddard DO        Or   • Magnesium Sulfate - Total Dose 3 grams - Magnesium 1.1 - 1.5  1 g Intravenous PRN Carmen Stoddard DO        Or   • Magnesium Sulfate - Total Dose 2 grams - Magnesium 1.6 - 1.9  2 g Intravenous PRN Carmen Stoddard DO       • melatonin tablet 3 mg  3 mg Oral Nightly PRN Carmen Stoddard DO       • multivitamin with minerals 1 tablet  1 tablet Oral Daily Carmen Stoddard DO   1 tablet at 05/11/23 0841   • potassium chloride (MICRO-K)  "CR capsule 40 mEq  40 mEq Oral PRN Stoddard, Carmen, DO        Or   • potassium chloride (KLOR-CON) packet 40 mEq  40 mEq Oral PRN Stoddard, Carmen, DO        Or   • potassium chloride 10 mEq in 100 mL IVPB  10 mEq Intravenous Q1H PRN Stoddard, Carmen, DO       • sodium chloride 0.9 % flush 10 mL  10 mL Intravenous PRN Stoddard, Carmen, DO       • sodium chloride 0.9 % flush 10 mL  10 mL Intravenous Q12H Stoddard, Carmen, DO   10 mL at 05/11/23 1010   • sodium chloride 0.9 % flush 10 mL  10 mL Intravenous PRN Stoddard, Carmen, DO       • sodium chloride 0.9 % flush 10 mL  10 mL Intravenous Q12H Stoddard, Carmen, DO   10 mL at 05/11/23 0841   • sodium chloride 0.9 % flush 10 mL  10 mL Intravenous PRN Stoddard, Carmen, DO       • sodium chloride 0.9 % flush 10 mL  10 mL Intravenous Q12H Stoddard, Carmen, DO   10 mL at 05/11/23 0841   • sodium chloride 0.9 % flush 10 mL  10 mL Intravenous PRN Stoddard, Carmen, DO       • sodium chloride 0.9 % infusion 40 mL  40 mL Intravenous PRN Stoddard, Carmen, DO       • sodium chloride 0.9 % infusion 40 mL  40 mL Intravenous PRN Stoddard, Carmen, DO       • sodium chloride 0.9 % infusion 40 mL  40 mL Intravenous PRN Stoddard, Carmen, DO       • sodium chloride 0.9 % infusion  75 mL/hr Intravenous Continuous Stoddard, Carmen, DO 75 mL/hr at 05/10/23 0521 75 mL/hr at 05/10/23 0521        Physician Progress Notes (most recent note)      Sarah Bryant, APRN at 05/11/23 0930          Palliative Care Daily Progress Note     S: Medical record reviewed, upon entering the room pt lying in bed, she reports that she is just tired and weak today. Pt reports pain in many places- generalized all over. Pt denies any n/v/d or constipation. Pt denies any anxiety. Rates pain as 4-5 currently     O:   Palliative Performance Scale Score:     /82 (BP Location: Right arm, Patient Position: Lying)   Pulse 83   Temp 97.3 °F (36.3 °C) (Tympanic)   Resp 18   Ht 167.6 cm (66\")   Wt 80.8 kg (178 lb 3.2 oz)   SpO2 91%   " BMI 28.76 kg/m²     Intake/Output Summary (Last 24 hours) at 5/11/2023 1055  Last data filed at 5/11/2023 1000  Gross per 24 hour   Intake 600 ml   Output 500 ml   Net 100 ml       PE:    General Appearance:    Chronically ill appearing, alert, frail cooperative, mild distress r/t pain    HEENT:    NC/AT, without obvious abnormality, EOMI, anicteric    Neck:   supple, trachea midline, no JVD   Lungs:     CTAB without w/r/r    Heart:    RRR, normal S1 and S2, no M/R/G   Abdomen:     Soft, NT, ND, NABS    Extremities:   Moves all extremities, no edema, limited to RUE r/t sling/swath    Pulses:   Pulses palpable and equal bilaterally   Skin:   Warm, dry, abrasion to rt elbow    Neurologic:   A/Ox3, cooperative   Psych:   Tearful, depression          Meds: Reviewed and no changes     Labs:   Results from last 7 days   Lab Units 05/09/23  0013   WBC 10*3/mm3 8.52   HEMOGLOBIN g/dL 12.1   HEMATOCRIT % 38.5   PLATELETS 10*3/mm3 164     Results from last 7 days   Lab Units 05/10/23  0223   SODIUM mmol/L 141   POTASSIUM mmol/L 3.7   CHLORIDE mmol/L 111*   CO2 mmol/L 22.7   BUN mg/dL 19   CREATININE mg/dL 0.62   GLUCOSE mg/dL 98   CALCIUM mg/dL 9.0     Results from last 7 days   Lab Units 05/10/23  0223 05/05/23  1751 05/05/23  0004   SODIUM mmol/L 141   < > 147*   POTASSIUM mmol/L 3.7   < > 3.2*   CHLORIDE mmol/L 111*   < > 112*   CO2 mmol/L 22.7   < > 21.1*   BUN mg/dL 19   < > 42*   CREATININE mg/dL 0.62   < > 1.30*   CALCIUM mg/dL 9.0   < > 9.2   BILIRUBIN mg/dL  --   --  0.6   ALK PHOS U/L  --   --  68   ALT (SGPT) U/L  --   --  10   AST (SGOT) U/L  --   --  21   GLUCOSE mg/dL 98   < > 110*    < > = values in this interval not displayed.     Imaging Results (Last 72 Hours)     ** No results found for the last 72 hours. **            Diagnostics: Reviewed    A: Clinically, she is making progress with physical therapy, she does continue to have increased pain with movement. She has been experiencing some htn during the  stay however today much better than previously. 141/82 hr 83 rr 18 sat 91% ra,      P: Continue with current regimen, she has been accepted to the [facility] as of today. Will continue with symptomatic and supportive care for pt and family. Will assist with dispo as needed.       We will continue to follow along. Please do not hesitate to contact us regarding further sx mgmt or GOC needs, including after hours or on weekends via our on call provider at [phone].     [place] Kim    5/11/2023    Electronically signed by Sarah Bryant APRN at 05/11/23 6551          Consult Notes (most recent note)      Jean Wilkerson MD at 05/05/23 0311          Preprocedure Diagnosis: Toenail deformities onychogryphosis, onychodystrophy, onychomycosis, onychocryptosis bilateral 1-5, pain.PVD, CKD  Chronic kidney disease, stage 4 (severe) 01/20/2023    • Essential hypertension     • History of colon cancer     • Left bundle branch block 01/20/2023   • Long term (current) use of aspirin 01/20/2023   • Polycythemia vera    Post Procedure Diagnosis:   Toenail deformities onychogryphosis, onychodystrophy, onychomycosis, onychocryptosis bilateral 1-5, pain.PVD, CKD  Chronic kidney disease, stage 4 (severe) 01/20/2023    • Essential hypertension     • History of colon cancer     • Left bundle branch block 01/20/2023   • Long term (current) use of aspirin 01/20/2023   • Polycythemia vera          Procedure #1: Debridement of toenails 6-10 using mycotic nail marcial toenails 1-5 bilateral were debrided in length, height and incurvation at edges. Patient tolerated procedure well.       Electronically signed by Jean Wilkerson MD at 05/05/23 4452

## 2023-05-11 NOTE — PLAN OF CARE
"Goal Outcome Evaluation:  Plan of Care Reviewed With: patient           Outcome Evaluation: Pt resting in bed or up to BSC w/ x1 assist. AOx4, VSS, complaints of pain \"all over\" sometimes left side, sometimes legs, relieved w/ PRN medications, otherwise no s/s of acute distress noted. Order for d/c noted. IV access and telemetry removed w/ pt tolerating well.       0897 Report called to [facility] at [phone], report given to [facility] Admissions/Discharge Nurse.  "

## 2023-05-11 NOTE — THERAPY TREATMENT NOTE
Acute Care - Physical Therapy Treatment Note  [facility]     Patient Name: Azra Ohara  : 1935  MRN: [id number]  Today's Date: 2023   Onset of Illness/Injury or Date of Surgery: 23  Visit Dx:     ICD-10-CM ICD-9-CM   1. Third degree atrioventricular block  I44.2 426.0   2. Complete heart block  I44.2 426.0   3. Third degree AV block  I44.2 426.0   4. Acute pain  R52 338.19     Patient Active Problem List   Diagnosis   • Chronic kidney disease, stage 4 (severe)   • Essential hypertension   • Left bundle branch block   • Malignant neoplasm of large intestine   • Polycythemia vera   • Long term (current) use of aspirin     Past Medical History:   Diagnosis Date   • Chronic kidney disease, stage 4 (severe) 2023   • Essential hypertension    • History of colon cancer    • Left bundle branch block 2023   • Long term (current) use of aspirin 2023   • Polycythemia vera 2023     Past Surgical History:   Procedure Laterality Date   • CARDIAC ELECTROPHYSIOLOGY PROCEDURE N/A 2023    Procedure: Pacemaker DC new;  Surgeon: Sampson Garvin MD;  Location: [facility] INVASIVE LOCATION;  Service: Cardiology;  Laterality: N/A;   • COLON RESECTION     • PARATHYROIDECTOMY Right     Parathyroid adenoma     PT Assessment (last 12 hours)     PT Evaluation and Treatment     Row Name 23 1127          Physical Therapy Time and Intention    Subjective Information complains of;weakness;pain  reports B LE generalized pain  -AG     Document Type therapy note (daily note)  -AG     Mode of Treatment physical therapy  -AG     Patient Effort good  -AG     Symptoms Noted During/After Treatment fatigue  -AG     Row Name 23 1127          General Information    Patient Profile Reviewed yes  -AG     Patient Observations agree to therapy;cooperative;alert  -AG     General Observations of Patient pt. lethargic initially, but agreeable to participation.  -     Row Name 23 1127           Pain    Additional Documentation Pain Scale: FACES Pre/Post-Treatment (Group)  -AG     [facility] Name 05/11/23 1127          Pain Scale: FACES Pre/Post-Treatment    Pain: FACES Scale, Pretreatment 2-->hurts little bit  B LE generalized pain  -AG     Posttreatment Pain Rating 4-->hurts little more  -AG     [facility] Name 05/11/23 1127          Cognition    Affect/Mental Status (Cognition) WFL  -AG     Follows Commands (Cognition) verbal cues/prompting required  -AG     Row Name 05/11/23 1127          Bed Mobility    Rolling Right [place] (Bed Mobility) standby assist  -AG     Scooting/Bridging [place] (Bed Mobility) verbal cues;nonverbal cues (demo/gesture);minimum assist (75% patient effort)  -AG     Supine-Sit [place] (Bed Mobility) verbal cues;nonverbal cues (demo/gesture);minimum assist (75% patient effort)  -     Bed Mobility, Safety Issues decreased use of legs for bridging/pushing;decreased use of arms for pushing/pulling  -     Assistive Device (Bed Mobility) bed rails;draw sheet  -AG     Row Name 05/11/23 1127          Transfers    Transfers sit-stand transfer;stand-sit transfer;stand pivot/stand step transfer  -AG     Row Name 05/11/23 1127          Sit-Stand Transfer    Sit-Stand [place] (Transfers) verbal cues;nonverbal cues (demo/gesture);minimum assist (75% patient effort);moderate assist (50% patient effort)  -     Assistive Device (Sit-Stand Transfers) --  none  -AG     Row Name 05/11/23 1127          Stand-Sit Transfer    Stand-Sit [place] (Transfers) verbal cues;nonverbal cues (demo/gesture);minimum assist (75% patient effort)  -AG     Row Name 05/11/23 1127          Stand Pivot/Stand Step Transfer    Stand Pivot/Stand Step [place] (Transfers) verbal cues;nonverbal cues (demo/gesture);moderate assist (50% patient effort)  -AG     Row Name 05/11/23 1127          Balance    Balance Assessment sitting static balance;sitting dynamic balance;sit to stand dynamic balance;standing  static balance;standing dynamic balance  -AG     Static Sitting Balance standby assist  -AG     Position, Sitting Balance unsupported;sitting edge of bed  -AG     Row Name 05/11/23 1127          Motor Skills    Therapeutic Exercise knee  -AG     Row Name 05/11/23 1127          Knee (Therapeutic Exercise)    Knee Strengthening (Therapeutic Exercise) bilateral;flexion;extension;LAQ (long arc quad);sitting  -AG     Row Name             Wound 05/04/23 0250 Left gluteal    Wound - Properties Group Placement Date: 05/04/23  -BR Placement Time: 0250  -BR Side: Left  -BR Location: gluteal  -BR    Retired Wound - Properties Group Placement Date: 05/04/23  -BR Placement Time: 0250  -BR Side: Left  -BR Location: gluteal  -BR    Retired Wound - Properties Group Date first assessed: 05/04/23  -BR Time first assessed: 0250  -BR Side: Left  -BR Location: gluteal  -BR    Row Name             Wound 05/04/23 0252 Right gluteal    Wound - Properties Group Placement Date: 05/04/23  -BR Placement Time: 0252  -BR Side: Right  -BR Location: gluteal  -BR    Retired Wound - Properties Group Placement Date: 05/04/23  -BR Placement Time: 0252  -BR Side: Right  -BR Location: gluteal  -BR    Retired Wound - Properties Group Date first assessed: 05/04/23  -BR Time first assessed: 0252  -BR Side: Right  -BR Location: gluteal  -BR    Row Name             Wound 05/05/23 1630 Right posterior elbow Skin Tear    Wound - Properties Group Placement Date: 05/05/23  -DH Placement Time: 1630  -DH Side: Right  -DH Orientation: posterior  -DH Location: elbow  -DH Primary Wound Type: Skin tear  -DH    Retired Wound - Properties Group Placement Date: 05/05/23  -DH Placement Time: 1630  -DH Side: Right  -DH Orientation: posterior  -DH Location: elbow  -DH Primary Wound Type: Skin tear  -DH    Retired Wound - Properties Group Date first assessed: 05/05/23  -DH Time first assessed: 1630  -DH Side: Right  -DH Location: elbow  -DH Primary Wound Type: Skin tear  -DH     Row Name 05/11/23 1127          Coping    Observed Emotional State calm;cooperative  -AG     Verbalized Emotional State acceptance  -AG     Trust Relationship/Rapport choices provided;care explained;thoughts/feelings acknowledged  -AG     Family/Support Persons family;other (see comments)  nephew  -AG     Family/Support System Care support provided;self-care encouraged  -AG     Row Name 05/11/23 1127          Plan of Care Review    Plan of Care Reviewed With patient  -AG     Outcome Evaluation PT treatment complete. Pt. initially lethargic but aroused with mobilization.  Required min A for supine>sit,min mod A for standpivot to w/c.  Unable to utilize walker d/t L UE immobilized, however,when restrictions are lifted can attempt STS w/ RW and attempt gait if appropriate.  Will follow.  -AG     Row Name 05/11/23 1127          Therapy Plan Review/Discharge Plan (PT)    Therapy Plan Review (PT) evaluation/treatment results reviewed;care plan/treatment goals reviewed;risks/benefits reviewed;current/potential barriers reviewed;participants voiced agreement with care plan;participants included;patient  -AG           User Key  (r) = Recorded By, (t) = Taken By, (c) = Cosigned By    Initials Name Provider Type    Morenita Maciel, RN Registered Nurse    Montserrat Nails, PT Physical Therapist    Alyce Perez, RN Registered Nurse                Physical Therapy Education     Title: PT OT SLP Therapies (Done)     Topic: Physical Therapy (Done)     Point: Mobility training (Done)     Learning Progress Summary           Patient Acceptance, E,D, VU,NR by  at 5/11/2023 1126    Acceptance, E,D, NR by  at 5/8/2023 1727    Acceptance, E, VU,NR by  at 5/7/2023 0545    Acceptance, E, VU,NR by  at 5/7/2023 0416    Acceptance, E, NR by  at 5/4/2023 1718                   Point: Home exercise program (Done)     Learning Progress Summary           Patient Acceptance, E,D, VU,NR by  at 5/11/2023 1126     Acceptance, E,D, NR by  at 5/8/2023 1727    Acceptance, E, VU,NR by  at 5/7/2023 0545    Acceptance, E, VU,NR by  at 5/7/2023 0416    Acceptance, E, NR by TW at 5/4/2023 1718                   Point: Body mechanics (Done)     Learning Progress Summary           Patient Acceptance, E,D, VU,NR by  at 5/11/2023 1126    Acceptance, E,D, NR by  at 5/8/2023 1727    Acceptance, E, VU,NR by  at 5/7/2023 0545    Acceptance, E, VU,NR by  at 5/7/2023 0416    Acceptance, E, NR by TW at 5/4/2023 1718                   Point: Precautions (Done)     Learning Progress Summary           Patient Acceptance, E,D, VU,NR by  at 5/11/2023 1126    Acceptance, E,D, NR by  at 5/8/2023 1727    Acceptance, E, VU,NR by  at 5/7/2023 0545    Acceptance, E, VU,NR by  at 5/7/2023 0416    Acceptance, E, NR by TW at 5/4/2023 1718                               User Key     Initials Effective Dates Name Provider Type Discipline     06/16/21 -  Kassidy Sanz, RN Registered Nurse Nurse     06/16/21 -  Montserrat Gale, PT Physical Therapist PT     01/05/23 -  Nupur Riggins RN Registered Nurse Nurse              PT Recommendation and Plan  Planned Therapy Interventions (PT): balance training, bed mobility training, gait training, home exercise program, transfer training, strengthening, ROM (range of motion), postural re-education, neuromuscular re-education, motor coordination training, patient/family education  Therapy Frequency (PT): 3 times/wk (3-5 times/ week per priority)  Plan of Care Reviewed With: patient  Outcome Evaluation: PT treatment complete. Pt. initially lethargic but aroused with mobilization.  Required min A for supine>sit,min mod A for standpivot to w/c.  Unable to utilize walker d/t L UE immobilized, however,when restrictions are lifted can attempt STS w/ RW and attempt gait if appropriate.  Will follow.       Time Calculation:    PT Charges     Row Name 05/11/23 1127             Time Calculation    PT  Received On 05/11/23  -            User Key  (r) = Recorded By, (t) = Taken By, (c) = Cosigned By    Initials Name Provider Type    Montserrat Nails, PT Physical Therapist              Therapy Charges for Today     Code Description Service Date Service Provider Modifiers Qty    03514915896  PT THER PROC EA 15 MIN 5/11/2023 Montserrat Gale, PT GP 1    [id number]  PT THERAPEUTIC ACT EA 15 MIN 5/11/2023 Montserrat Gale, PT GP 1          PT G-Codes  AM-PAC 6 Clicks Score (PT): 12    Montserrat Gale, PT  5/11/2023

## 2023-05-11 NOTE — PROGRESS NOTES
"Palliative Care Daily Progress Note     S: Medical record reviewed, upon entering the room pt lying in bed, she reports that she is just tired and weak today. Pt reports pain in many places- generalized all over. Pt denies any n/v/d or constipation. Pt denies any anxiety. Rates pain as 4-5 currently     O:   Palliative Performance Scale Score:     /82 (BP Location: Right arm, Patient Position: Lying)   Pulse 83   Temp 97.3 °F (36.3 °C) (Tympanic)   Resp 18   Ht 167.6 cm (66\")   Wt 80.8 kg (178 lb 3.2 oz)   SpO2 91%   BMI 28.76 kg/m²     Intake/Output Summary (Last 24 hours) at 5/11/2023 1055  Last data filed at 5/11/2023 1000  Gross per 24 hour   Intake 600 ml   Output 500 ml   Net 100 ml       PE:    General Appearance:    Chronically ill appearing, alert, frail cooperative, mild distress r/t pain    HEENT:    NC/AT, without obvious abnormality, EOMI, anicteric    Neck:   supple, trachea midline, no JVD   Lungs:     CTAB without w/r/r    Heart:    RRR, normal S1 and S2, no M/R/G   Abdomen:     Soft, NT, ND, NABS    Extremities:   Moves all extremities, no edema, limited to RUE r/t sling/swath    Pulses:   Pulses palpable and equal bilaterally   Skin:   Warm, dry, abrasion to rt elbow    Neurologic:   A/Ox3, cooperative   Psych:   Tearful, depression          Meds: Reviewed and no changes     Labs:   Results from last 7 days   Lab Units 05/09/23  0013   WBC 10*3/mm3 8.52   HEMOGLOBIN g/dL 12.1   HEMATOCRIT % 38.5   PLATELETS 10*3/mm3 164     Results from last 7 days   Lab Units 05/10/23  0223   SODIUM mmol/L 141   POTASSIUM mmol/L 3.7   CHLORIDE mmol/L 111*   CO2 mmol/L 22.7   BUN mg/dL 19   CREATININE mg/dL 0.62   GLUCOSE mg/dL 98   CALCIUM mg/dL 9.0     Results from last 7 days   Lab Units 05/10/23  0223 05/05/23  1751 05/05/23  0004   SODIUM mmol/L 141   < > 147*   POTASSIUM mmol/L 3.7   < > 3.2*   CHLORIDE mmol/L 111*   < > 112*   CO2 mmol/L 22.7   < > 21.1*   BUN mg/dL 19   < > 42*   CREATININE " mg/dL 0.62   < > 1.30*   CALCIUM mg/dL 9.0   < > 9.2   BILIRUBIN mg/dL  --   --  0.6   ALK PHOS U/L  --   --  68   ALT (SGPT) U/L  --   --  10   AST (SGOT) U/L  --   --  21   GLUCOSE mg/dL 98   < > 110*    < > = values in this interval not displayed.     Imaging Results (Last 72 Hours)     ** No results found for the last 72 hours. **            Diagnostics: Reviewed    A: Clinically, she is making progress with physical therapy, she does continue to have increased pain with movement. She has been experiencing some htn during the stay however today much better than previously. 141/82 hr 83 rr 18 sat 91% ra,      P: Continue with current regimen, she has been accepted to the [facility] as of today. Will continue with symptomatic and supportive care for pt and family. Will assist with dispo as needed.       We will continue to follow along. Please do not hesitate to contact us regarding further sx mgmt or GOC needs, including after hours or on weekends via our on call provider at [phone].     Sarah Bryant, APRN    5/11/2023

## 2023-05-11 NOTE — CASE MANAGEMENT/SOCIAL WORK
Discharge Planning Assessment   Rodolfo     Patient Name: Azra Ohara  MRN: [id number]  Today's Date: 5/11/2023    Admit Date: 5/3/2023          Discharge Plan     Row Name 05/11/23 1232       Plan    Final Discharge Disposition Code 03 - skilled nursing facility (SNF)    Final Note Pt is being discharged to [facility] on this date. Pt's HC suurogate is agreeable and aware of discharge. HC surrogate requests RN to call him once discharge is prepared and family will provide transportation via private vehicle. SS faxed clinical update and AVS summary to [facility] fax [phone].    13:15pm: SS provided RN report number for [facility] [phone]. Pt's family to provide transportation. RN to contact Pt's HC surrogate once discharge is completed.                               Continued Care and Services - Admitted Since 5/3/2023     Destination Coordination complete.    Service Provider Request Status Selected Services Address Phone Fax Patient Preferred    [facility]  Selected Skilled Nursing [facility] [address], [place] [address] [phone] --              RAFAEL HamiltonW

## 2023-05-11 NOTE — THERAPY EVALUATION
Acute Care - Occupational Therapy Initial Evaluation   Rodolfo     Patient Name: Azra Ohara  : 1935  MRN: [id number]  Today's Date: 2023  Onset of Illness/Injury or Date of Surgery: 23     Referring Physician: Dr. Rodrigues    Admit Date: 5/3/2023       ICD-10-CM ICD-9-CM   1. Third degree atrioventricular block  I44.2 426.0   2. Complete heart block  I44.2 426.0   3. Third degree AV block  I44.2 426.0   4. Acute pain  R52 338.19     Patient Active Problem List   Diagnosis   • Chronic kidney disease, stage 4 (severe)   • Essential hypertension   • Left bundle branch block   • Malignant neoplasm of large intestine   • Polycythemia vera   • Long term (current) use of aspirin     Past Medical History:   Diagnosis Date   • Chronic kidney disease, stage 4 (severe) 2023   • Essential hypertension    • History of colon cancer    • Left bundle branch block 2023   • Long term (current) use of aspirin 2023   • Polycythemia vera 2023     Past Surgical History:   Procedure Laterality Date   • CARDIAC ELECTROPHYSIOLOGY PROCEDURE N/A 2023    Procedure: Pacemaker DC new;  Surgeon: Sampson Garvin MD;  Location: [facility] INVASIVE LOCATION;  Service: Cardiology;  Laterality: N/A;   • COLON RESECTION     • PARATHYROIDECTOMY Right     Parathyroid adenoma         OT ASSESSMENT FLOWSHEET (last 12 hours)     OT Evaluation and Treatment     Row Name 23 1417 23 1104                OT Time and Intention    Document Type evaluation  -KR therapy note (daily note)  -KR       Mode of Treatment occupational therapy  -KR occupational therapy  -KR       Patient Effort adequate  -KR adequate  -KR          General Information    General Observations of Patient alert/cooperative  -KR pleasant/cooperative  -KR       Prior Level of Function independent:  -KR --          Living Environment    Current Living Arrangements home  -KR --       People in Home child(mauro), adult  -KR --           Cognition    Affect/Mental Status (Cognition) WFL  -KR WFL  -KR       Orientation Status (Cognition) oriented x 3  -KR oriented to;person  -KR       Follows Commands (Cognition) WFL  -KR verbal cues/prompting required  -KR          Range of Motion Comprehensive    Comment, General Range of Motion LUE limitations since fall in home, ROM deferred LUE, RUE WFL  -KR --          Strength Comprehensive (MMT)    Comment, General Manual Muscle Testing (MMT) Assessment RUE 3/5, LUE deferred  -KR --          Bathing Assessment/Intervention    [place] Level (Bathing) bathing skills;minimum assist (75% patient effort)  -KR moderate assist (50% patient effort)  -KR          Upper Body Dressing Assessment/Training    [place] Level (Upper Body Dressing) upper body dressing skills;minimum assist (75% patient effort)  -KR maximum assist (25% patient effort)  -KR          Lower Body Dressing Assessment/Training    [place] Level (Lower Body Dressing) lower body dressing skills;minimum assist (75% patient effort)  -KR maximum assist (25% patient effort)  -KR          Grooming Assessment/Training    [place] Level (Grooming) grooming skills;set up  -KR grooming skills;moderate assist (50% patient effort)  -KR          Self-Feeding Assessment/Training    [place] Level (Feeding) feeding skills;set up  -KR --          Toileting Assessment/Training    [place] Level (Toileting) toileting skills;minimum assist (75% patient effort)  -KR toileting skills;dependent (less than 25% patient effort)  -KR          Transfer Assessment/Treatment    Transfers toilet transfer  -KR --          Motor Skills    Therapeutic Exercise -- --  L hand/wrist AROM, L shoulder remains supported by sling RUE AROM from supine/supported seated in bed.  -KR          Wound 05/04/23 0250 Left gluteal    Wound - Properties Group Placement Date: 05/04/23  -BR Placement Time: 0250 -BR Side: Left  -BR Location: gluteal  -BR    Retired Wound -  Properties Group Placement Date: 05/04/23  -BR Placement Time: 0250  -BR Side: Left  -BR Location: gluteal  -BR    Retired Wound - Properties Group Date first assessed: 05/04/23  -BR Time first assessed: 0250  -BR Side: Left  -BR Location: gluteal  -BR       Wound 05/04/23 0252 Right gluteal    Wound - Properties Group Placement Date: 05/04/23  -BR Placement Time: 0252  -BR Side: Right  -BR Location: gluteal  -BR    Retired Wound - Properties Group Placement Date: 05/04/23  -BR Placement Time: 0252  -BR Side: Right  -BR Location: gluteal  -BR    Retired Wound - Properties Group Date first assessed: 05/04/23  -BR Time first assessed: 0252  -BR Side: Right  -BR Location: gluteal  -BR       Wound 05/05/23 1630 Right posterior elbow Skin Tear    Wound - Properties Group Placement Date: 05/05/23  -DH Placement Time: 1630  -DH Side: Right  -DH Orientation: posterior  -DH Location: elbow  -DH Primary Wound Type: Skin tear  -DH    Retired Wound - Properties Group Placement Date: 05/05/23  -DH Placement Time: 1630  -DH Side: Right  -DH Orientation: posterior  -DH Location: elbow  -DH Primary Wound Type: Skin tear  -DH    Retired Wound - Properties Group Date first assessed: 05/05/23  -DH Time first assessed: 1630  -DH Side: Right  -DH Location: elbow  -DH Primary Wound Type: Skin tear  -DH       Transfer Goal 1 (OT)    Activity/Assistive Device (Transfer Goal 1, OT) transfers, all  -KR --  continuing progresss  -KR       [place] Level/Cues Needed (Transfer Goal 1, OT) contact guard required  -KR --          Toileting Goal 1 (OT)    Activity/Device (Toileting Goal 1, OT) toileting skills, all  -KR --  continuing progress  -KR       [place] Level/Cues Needed (Toileting Goal 1, OT) contact guard required  -KR --          Patient Education Goal (OT)    Activity (Patient Education Goal, OT) AE/DME training as needed to enhance safety/independence in home environment  -KR --       [place]/Cues/Accuracy (Memory Goal  2, OT) verbalizes understanding  -KR --       Time Frame (Patient Education Goal, OT) by discharge  -KR --             User Key  (r) = Recorded By, (t) = Taken By, (c) = Cosigned By    Initials Name Effective Dates     Morenita Floyd RN 06/16/21 -     Dalton Woodward OT 06/16/21 -     Alyce Perez RN 11/08/22 -                        OT Recommendation and Plan              Time Calculation:     Therapy Charges for Today     Code Description Service Date Service Provider Modifiers Qty    95962483677  OT THERAPEUTIC ACT EA 15 MIN 5/11/2023 Dalton Calle OT GO 1    [id number]  OT EVAL MOD COMPLEXITY 4 5/11/2023 Dalton Calle OT GO 1               Dalton Calle OT  5/11/2023

## 2023-05-11 NOTE — CASE MANAGEMENT/SOCIAL WORK
Discharge Planning Assessment   Rodolfo     Patient Name: Azra Ohara  MRN: [id number]  Today's Date: 5/11/2023    Admit Date: 5/3/2023       Discharge Plan     Row Name 05/11/23 1025       Plan    Plan SS notified by [facility] per Joanna that pre-auth has been approved and facility can accept today. SS notified Dr. Stoddard. SS to follow.              Continued Care and Services - Admitted Since 5/3/2023     Destination Coordination complete.    Service Provider Request Status Selected Services Address Phone Fax Patient Preferred    [facility]  Selected Skilled Nursing [facility] [address], [place] [address] [phone] --              ELIOT Hamilton

## 2023-05-11 NOTE — THERAPY TREATMENT NOTE
Acute Care - Occupational Therapy Treatment Note   Rodolfo     Patient Name: Azra Ohara  : 1935  MRN: [id number]  Today's Date: 2023  Onset of Illness/Injury or Date of Surgery: 23     Referring Physician: Dr. Rodrigues    Admit Date: 5/3/2023       ICD-10-CM ICD-9-CM   1. Third degree atrioventricular block  I44.2 426.0   2. Complete heart block  I44.2 426.0   3. Third degree AV block  I44.2 426.0     Patient Active Problem List   Diagnosis   • Chronic kidney disease, stage 4 (severe)   • Essential hypertension   • Left bundle branch block   • Malignant neoplasm of large intestine   • Polycythemia vera   • Long term (current) use of aspirin     Past Medical History:   Diagnosis Date   • Chronic kidney disease, stage 4 (severe) 2023   • Essential hypertension    • History of colon cancer    • Left bundle branch block 2023   • Long term (current) use of aspirin 2023   • Polycythemia vera 2023     Past Surgical History:   Procedure Laterality Date   • CARDIAC ELECTROPHYSIOLOGY PROCEDURE N/A 2023    Procedure: Pacemaker DC new;  Surgeon: Sampson Garvin MD;  Location: [facility] INVASIVE LOCATION;  Service: Cardiology;  Laterality: N/A;   • COLON RESECTION     • PARATHYROIDECTOMY Right     Parathyroid adenoma         OT ASSESSMENT FLOWSHEET (last 12 hours)     OT Evaluation and Treatment     Row Name 23 1104                   OT Time and Intention    Document Type therapy note (daily note)  -KR        Mode of Treatment occupational therapy  -KR        Patient Effort adequate  -KR           General Information    General Observations of Patient pleasant/cooperative  -KR           Cognition    Affect/Mental Status (Cognition) WFL  -KR        Orientation Status (Cognition) oriented to;person  -KR        Follows Commands (Cognition) verbal cues/prompting required  -KR           Bathing Assessment/Intervention    [place] Level (Bathing) moderate assist (50%  patient effort)  -KR           Upper Body Dressing Assessment/Training    [place] Level (Upper Body Dressing) maximum assist (25% patient effort)  -KR           Lower Body Dressing Assessment/Training    [place] Level (Lower Body Dressing) maximum assist (25% patient effort)  -KR           Grooming Assessment/Training    [place] Level (Grooming) grooming skills;moderate assist (50% patient effort)  -KR           Toileting Assessment/Training    [place] Level (Toileting) toileting skills;dependent (less than 25% patient effort)  -KR           Motor Skills    Therapeutic Exercise --  L hand/wrist AROM, L shoulder remains supported by sling RUE AROM from supine/supported seated in bed.  -KR           Wound 05/04/23 0250 Left gluteal    Wound - Properties Group Placement Date: 05/04/23  -BR Placement Time: 0250  -BR Side: Left  -BR Location: gluteal  -BR    Retired Wound - Properties Group Placement Date: 05/04/23  -BR Placement Time: 0250  -BR Side: Left  -BR Location: gluteal  -BR    Retired Wound - Properties Group Date first assessed: 05/04/23  -BR Time first assessed: 0250  -BR Side: Left  -BR Location: gluteal  -BR       Wound 05/04/23 0252 Right gluteal    Wound - Properties Group Placement Date: 05/04/23  -BR Placement Time: 0252  -BR Side: Right  -BR Location: gluteal  -BR    Retired Wound - Properties Group Placement Date: 05/04/23  -BR Placement Time: 0252  -BR Side: Right  -BR Location: gluteal  -BR    Retired Wound - Properties Group Date first assessed: 05/04/23  -BR Time first assessed: 0252  -BR Side: Right  -BR Location: gluteal  -BR       Wound 05/05/23 1630 Right posterior elbow Skin Tear    Wound - Properties Group Placement Date: 05/05/23  -DH Placement Time: 1630  -DH Side: Right  -DH Orientation: posterior  -DH Location: elbow  -DH Primary Wound Type: Skin tear  -DH    Retired Wound - Properties Group Placement Date: 05/05/23  -DH Placement Time: 1630  -DH Side: Right  -DH  Orientation: posterior  -DH Location: elbow  -DH Primary Wound Type: Skin tear  -DH    Retired Wound - Properties Group Date first assessed: 05/05/23  -DH Time first assessed: 1630  -DH Side: Right  - Location: elbow  -DH Primary Wound Type: Skin tear  -DH       Transfer Goal 1 (OT)    Activity/Assistive Device (Transfer Goal 1, OT) --  continuing progresss  -KR           Toileting Goal 1 (OT)    Activity/Device (Toileting Goal 1, OT) --  continuing progress  -KR              User Key  (r) = Recorded By, (t) = Taken By, (c) = Cosigned By    Initials Name Effective Dates     Morenita Floyd RN 06/16/21 -     Dalton Woodward OT 06/16/21 -     Alyce Perez RN 11/08/22 -                        OT Recommendation and Plan              Time Calculation:     Therapy Charges for Today     Code Description Service Date Service Provider Modifiers Qty    [id number]  OT THERAPEUTIC ACT EA 15 MIN 5/11/2023 Dalton Calle OT GO 1               Dalton Calle OT  5/11/2023

## 2023-05-11 NOTE — DISCHARGE SUMMARY
[facility] HOSPITALISTS DISCHARGE SUMMARY    Patient Identification:  Name:  Azra Ohara  Age:  88 y.o.  Sex:  female  :  1935  MRN:  [id number]  Visit Number:  [id number]    Date of Admission: 5/3/2023  Date of Discharge:  2023     PCP: Minal Salazar, [place]    DISCHARGE DIAGNOSIS ***      CONSULTS ***      PROCEDURES PERFORMED  ***    HOSPITAL COURSE  Patient is a 88 y.o. female presented to [facility] complaining of ***.  Please see the admitting history and physical for further details.      Constitutional:  Well-developed and well-nourished. Elderly and frail appearing. Mild distress due to pain.  HENT:  Head:  Normocephalic and atraumatic.    Cardiovascular:  Normal rate, regular rhythm   Pulmonary/Chest:  Normal rate and effort, breath sounds clear to auscultation in anterior and lateral lung fields  Musculoskeletal:  No deformity.    Neurological: Sleepy, but wakes easily. No focal deficit on gross examination. No slurred speech or facial droop.   Skin:  Skin is warm and dry.   Peripheral vascular:  No cyanosis, no edema.  Psychiatric: Tearful  Edited by: Carmen Stoddard DO at 5/10/2023 1953    VITAL SIGNS:  Temp:  [97.3 °F (36.3 °C)-98.4 °F (36.9 °C)] 97.3 °F (36.3 °C)  Heart Rate:  [78-94] 83  Resp:  [18] 18  BP: (130-165)/(68-97) 141/82  SpO2:  [91 %-99 %] 91 %  on   ;   Device (Oxygen Therapy): room air    Body mass index is 28.76 kg/m².  Wt Readings from Last 3 Encounters:   23 80.8 kg (178 lb 3.2 oz)   23 71.9 kg (158 lb 8.2 oz)       PHYSICAL EXAM: ***  Constitutional:  Well-developed and well-nourished.  No acute distress.      HENT:  Head:  Normocephalic and atraumatic.  Mouth:  Moist mucous membranes.    Eyes:  Conjunctivae and EOM are normal. No scleral icterus.    Neck:  Neck supple.  No JVD present.    Cardiovascular:  Normal rate, regular rhythm, and normal heart sounds. No murmur.  Pulmonary/Chest:  Normal rate and effort. Breath  sounds clear to auscultation bilaterally with good air movement.  Abdominal:  Soft. No distension and no tenderness. Normal bowel sounds present.  Musculoskeletal:  No tenderness and no deformity.  No red or swollen joints anywhere.    Neurological:  Alert and oriented to person, place, and time.  No focal strength or sensory deficit.    Skin:  Skin is warm and dry. No rash noted. No pallor.   Peripheral vascular:  No clubbing, no cyanosis, no edema.  DISCHARGE DISPOSITION   Stable    DISCHARGE MEDICATIONS:     Discharge Medications      ASK your doctor about these medications      Instructions Start Date   amLODIPine 2.5 MG tablet  Commonly known as: NORVASC   2.5 mg, Oral, Daily      diclofenac 75 MG EC tablet  Commonly known as: VOLTAREN   75 mg, Oral, 2 Times Daily PRN      ondansetron ODT 4 MG disintegrating tablet  Commonly known as: ZOFRAN-ODT   4 mg, Translingual, 2 Times Daily PRN      triamterene-hydrochlorothiazide 37.5-25 MG per capsule  Commonly known as: DYAZIDE   1 capsule, Oral, Every Morning                Contact information for after-discharge care     Destination     [facility] .    Service: Skilled Nursing  Contact information:  [address] [place] [id number]  [phone]                              TEST  RESULTS PENDING AT DISCHARGE       CODE STATUS  Code Status and Medical Interventions:   Ordered at: 05/04/23 1533     Medical Intervention Limits:    NO intubation (DNI)     Level Of Support Discussed With:    Next of Kin (If No Surrogate)     Code Status (Patient has no pulse and is not breathing):    No CPR (Do Not Attempt to Resuscitate)     Medical Interventions (Patient has pulse or is breathing):    Limited Support     Comments:    Sister Doreen and nephew Alexandru     Release to patient:    Routine Release           Carmen Stoddard DO  [facility] Room[facility]  05/11/23  11:18 EDT    Please note that this discharge summary required more than 30 minutes to  complete.

## 2023-05-12 ENCOUNTER — HOSPITAL ENCOUNTER (EMERGENCY)
Facility: HOSPITAL | Age: 88
Discharge: HOME | End: 2023-05-12
Attending: STUDENT IN AN ORGANIZED HEALTH CARE EDUCATION/TRAINING PROGRAM
Payer: MEDICARE

## 2023-05-12 ENCOUNTER — APPOINTMENT (OUTPATIENT)
Dept: GENERAL RADIOLOGY | Facility: HOSPITAL | Age: 88
End: 2023-05-12
Payer: MEDICARE

## 2023-05-12 ENCOUNTER — LAB REQUISITION (OUTPATIENT)
Dept: LAB | Facility: HOSPITAL | Age: 88
End: 2023-05-12
Payer: MEDICARE

## 2023-05-12 VITALS
OXYGEN SATURATION: 98 % | SYSTOLIC BLOOD PRESSURE: 170 MMHG | DIASTOLIC BLOOD PRESSURE: 112 MMHG | WEIGHT: 178.2 LBS | TEMPERATURE: 97.9 F | RESPIRATION RATE: 18 BRPM | HEIGHT: 66 IN | BODY MASS INDEX: 28.64 KG/M2 | HEART RATE: 88 BPM

## 2023-05-12 DIAGNOSIS — I50.33 ACUTE ON CHRONIC DIASTOLIC HEART FAILURE: Primary | ICD-10-CM

## 2023-05-12 DIAGNOSIS — R52 ACUTE PAIN: ICD-10-CM

## 2023-05-12 DIAGNOSIS — M25.562 CHRONIC PAIN OF BOTH KNEES: ICD-10-CM

## 2023-05-12 DIAGNOSIS — M25.561 CHRONIC PAIN OF BOTH KNEES: ICD-10-CM

## 2023-05-12 DIAGNOSIS — I10 ESSENTIAL (PRIMARY) HYPERTENSION: ICD-10-CM

## 2023-05-12 DIAGNOSIS — G89.29 CHRONIC PAIN OF BOTH KNEES: ICD-10-CM

## 2023-05-12 LAB
ALBUMIN SERPL-MCNC: 3.6 G/DL (ref 3.5–5.2)
ALBUMIN/GLOB SERPL: 1.2 G/DL
ALP SERPL-CCNC: 78 U/L (ref 39–117)
ALT SERPL W P-5'-P-CCNC: 6 U/L (ref 1–33)
ANION GAP SERPL CALCULATED.3IONS-SCNC: 8.9 MMOL/L (ref 5–15)
AST SERPL-CCNC: 22 U/L (ref 1–32)
BASOPHILS # BLD AUTO: 0.04 10*3/MM3 (ref 0–0.2)
BASOPHILS # BLD AUTO: 0.05 10*3/MM3 (ref 0–0.2)
BASOPHILS NFR BLD AUTO: 0.4 % (ref 0–1.5)
BASOPHILS NFR BLD AUTO: 0.6 % (ref 0–1.5)
BILIRUB SERPL-MCNC: 0.4 MG/DL (ref 0–1.2)
BUN SERPL-MCNC: 21 MG/DL (ref 8–23)
BUN/CREAT SERPL: 25.3 (ref 7–25)
CALCIUM SPEC-SCNC: 10 MG/DL (ref 8.6–10.5)
CHLORIDE SERPL-SCNC: 108 MMOL/L (ref 98–107)
CO2 SERPL-SCNC: 26.1 MMOL/L (ref 22–29)
CREAT SERPL-MCNC: 0.83 MG/DL (ref 0.57–1)
DEPRECATED RDW RBC AUTO: 50.4 FL (ref 37–54)
DEPRECATED RDW RBC AUTO: 52.3 FL (ref 37–54)
EGFRCR SERPLBLD CKD-EPI 2021: 67.9 ML/MIN/1.73
EOSINOPHIL # BLD AUTO: 0.16 10*3/MM3 (ref 0–0.4)
EOSINOPHIL # BLD AUTO: 0.22 10*3/MM3 (ref 0–0.4)
EOSINOPHIL NFR BLD AUTO: 1.9 % (ref 0.3–6.2)
EOSINOPHIL NFR BLD AUTO: 2.3 % (ref 0.3–6.2)
ERYTHROCYTE [DISTWIDTH] IN BLOOD BY AUTOMATED COUNT: 14.6 % (ref 12.3–15.4)
ERYTHROCYTE [DISTWIDTH] IN BLOOD BY AUTOMATED COUNT: 14.6 % (ref 12.3–15.4)
GLOBULIN UR ELPH-MCNC: 3.1 GM/DL
GLUCOSE SERPL-MCNC: 124 MG/DL (ref 65–99)
HCT VFR BLD AUTO: 39.1 % (ref 34–46.6)
HCT VFR BLD AUTO: 42.2 % (ref 34–46.6)
HGB BLD-MCNC: 12.2 G/DL (ref 12–15.9)
HGB BLD-MCNC: 13.2 G/DL (ref 12–15.9)
HOLD SPECIMEN: NORMAL
HOLD SPECIMEN: NORMAL
IMM GRANULOCYTES # BLD AUTO: 0.03 10*3/MM3 (ref 0–0.05)
IMM GRANULOCYTES # BLD AUTO: 0.07 10*3/MM3 (ref 0–0.05)
IMM GRANULOCYTES NFR BLD AUTO: 0.4 % (ref 0–0.5)
IMM GRANULOCYTES NFR BLD AUTO: 0.7 % (ref 0–0.5)
INR PPP: 0.99 (ref 0.9–1.1)
LYMPHOCYTES # BLD AUTO: 1.36 10*3/MM3 (ref 0.7–3.1)
LYMPHOCYTES # BLD AUTO: 1.84 10*3/MM3 (ref 0.7–3.1)
LYMPHOCYTES NFR BLD AUTO: 16.5 % (ref 19.6–45.3)
LYMPHOCYTES NFR BLD AUTO: 18.9 % (ref 19.6–45.3)
MCH RBC QN AUTO: 29.5 PG (ref 26.6–33)
MCH RBC QN AUTO: 30.4 PG (ref 26.6–33)
MCHC RBC AUTO-ENTMCNC: 31.2 G/DL (ref 31.5–35.7)
MCHC RBC AUTO-ENTMCNC: 31.3 G/DL (ref 31.5–35.7)
MCV RBC AUTO: 94.7 FL (ref 79–97)
MCV RBC AUTO: 97.2 FL (ref 79–97)
MONOCYTES # BLD AUTO: 0.5 10*3/MM3 (ref 0.1–0.9)
MONOCYTES # BLD AUTO: 0.64 10*3/MM3 (ref 0.1–0.9)
MONOCYTES NFR BLD AUTO: 6.1 % (ref 5–12)
MONOCYTES NFR BLD AUTO: 6.6 % (ref 5–12)
NEUTROPHILS NFR BLD AUTO: 6.12 10*3/MM3 (ref 1.7–7)
NEUTROPHILS NFR BLD AUTO: 6.94 10*3/MM3 (ref 1.7–7)
NEUTROPHILS NFR BLD AUTO: 71.1 % (ref 42.7–76)
NEUTROPHILS NFR BLD AUTO: 74.5 % (ref 42.7–76)
NRBC BLD AUTO-RTO: 0 /100 WBC (ref 0–0.2)
NRBC BLD AUTO-RTO: 0 /100 WBC (ref 0–0.2)
NT-PROBNP SERPL-MCNC: 4916 PG/ML (ref 0–1800)
PLATELET # BLD AUTO: 211 10*3/MM3 (ref 140–450)
PLATELET # BLD AUTO: 213 10*3/MM3 (ref 140–450)
PMV BLD AUTO: 11.7 FL (ref 6–12)
PMV BLD AUTO: 12.5 FL (ref 6–12)
POTASSIUM SERPL-SCNC: 4.2 MMOL/L (ref 3.5–5.2)
PROT SERPL-MCNC: 6.7 G/DL (ref 6–8.5)
PROTHROMBIN TIME: 13.6 SECONDS (ref 12.1–14.7)
QT INTERVAL: 392 MS
QTC INTERVAL: 490 MS
RBC # BLD AUTO: 4.13 10*6/MM3 (ref 3.77–5.28)
RBC # BLD AUTO: 4.34 10*6/MM3 (ref 3.77–5.28)
SODIUM SERPL-SCNC: 143 MMOL/L (ref 136–145)
TROPONIN T SERPL HS-MCNC: 38 NG/L
WBC NRBC COR # BLD: 8.22 10*3/MM3 (ref 3.4–10.8)
WBC NRBC COR # BLD: 9.75 10*3/MM3 (ref 3.4–10.8)
WHOLE BLOOD HOLD COAG: NORMAL
WHOLE BLOOD HOLD SPECIMEN: NORMAL

## 2023-05-12 PROCEDURE — 83880 ASSAY OF NATRIURETIC PEPTIDE: CPT | Performed by: STUDENT IN AN ORGANIZED HEALTH CARE EDUCATION/TRAINING PROGRAM

## 2023-05-12 PROCEDURE — 36415 COLL VENOUS BLD VENIPUNCTURE: CPT

## 2023-05-12 PROCEDURE — 71045 X-RAY EXAM CHEST 1 VIEW: CPT

## 2023-05-12 PROCEDURE — 93005 ELECTROCARDIOGRAM TRACING: CPT | Performed by: STUDENT IN AN ORGANIZED HEALTH CARE EDUCATION/TRAINING PROGRAM

## 2023-05-12 PROCEDURE — 99284 EMERGENCY DEPT VISIT MOD MDM: CPT

## 2023-05-12 PROCEDURE — 85025 COMPLETE CBC W/AUTO DIFF WBC: CPT | Performed by: STUDENT IN AN ORGANIZED HEALTH CARE EDUCATION/TRAINING PROGRAM

## 2023-05-12 PROCEDURE — 80053 COMPREHEN METABOLIC PANEL: CPT | Performed by: STUDENT IN AN ORGANIZED HEALTH CARE EDUCATION/TRAINING PROGRAM

## 2023-05-12 PROCEDURE — 84484 ASSAY OF TROPONIN QUANT: CPT | Performed by: STUDENT IN AN ORGANIZED HEALTH CARE EDUCATION/TRAINING PROGRAM

## 2023-05-12 PROCEDURE — 25010000002 FUROSEMIDE PER 20 MG: Performed by: STUDENT IN AN ORGANIZED HEALTH CARE EDUCATION/TRAINING PROGRAM

## 2023-05-12 PROCEDURE — 85025 COMPLETE CBC W/AUTO DIFF WBC: CPT | Performed by: INTERNAL MEDICINE

## 2023-05-12 PROCEDURE — 96374 THER/PROPH/DIAG INJ IV PUSH: CPT

## 2023-05-12 PROCEDURE — 85610 PROTHROMBIN TIME: CPT | Performed by: STUDENT IN AN ORGANIZED HEALTH CARE EDUCATION/TRAINING PROGRAM

## 2023-05-12 RX ORDER — ASPIRIN 81 MG/1
324 TABLET, CHEWABLE ORAL ONCE
Status: DISCONTINUED | OUTPATIENT
Start: 2023-05-12 | End: 2023-05-12

## 2023-05-12 RX ORDER — TRAMADOL HYDROCHLORIDE 50 MG/1
25 TABLET ORAL EVERY 6 HOURS PRN
Qty: 12 TABLET | Refills: 0 | Status: SHIPPED | OUTPATIENT
Start: 2023-05-12

## 2023-05-12 RX ORDER — TRAMADOL HYDROCHLORIDE 50 MG/1
25 TABLET ORAL ONCE
Status: COMPLETED | OUTPATIENT
Start: 2023-05-12 | End: 2023-05-12

## 2023-05-12 RX ORDER — FUROSEMIDE 20 MG/1
20 TABLET ORAL DAILY
Qty: 30 TABLET | Refills: 0 | Status: SHIPPED | OUTPATIENT
Start: 2023-05-12 | End: 2023-06-11

## 2023-05-12 RX ORDER — HYDROCODONE BITARTRATE AND ACETAMINOPHEN 5; 325 MG/1; MG/1
1 TABLET ORAL EVERY 6 HOURS PRN
Qty: 12 TABLET | Refills: 0 | Status: ON HOLD | OUTPATIENT
Start: 2023-05-12 | End: 2023-05-16

## 2023-05-12 RX ORDER — FUROSEMIDE 10 MG/ML
40 INJECTION INTRAMUSCULAR; INTRAVENOUS ONCE
Status: COMPLETED | OUTPATIENT
Start: 2023-05-12 | End: 2023-05-12

## 2023-05-12 RX ORDER — SODIUM CHLORIDE 0.9 % (FLUSH) 0.9 %
10 SYRINGE (ML) INJECTION AS NEEDED
Status: DISCONTINUED | OUTPATIENT
Start: 2023-05-12 | End: 2023-05-12 | Stop reason: HOSPADM

## 2023-05-12 RX ADMIN — FUROSEMIDE 40 MG: 10 INJECTION, SOLUTION INTRAVENOUS at 15:50

## 2023-05-12 RX ADMIN — METOPROLOL TARTRATE 25 MG: 25 TABLET, FILM COATED ORAL at 15:47

## 2023-05-12 RX ADMIN — TRAMADOL HYDROCHLORIDE 25 MG: 50 TABLET, COATED ORAL at 15:01

## 2023-05-12 RX ADMIN — DICLOFENAC 2 G: 10 GEL TOPICAL at 16:10

## 2023-05-12 NOTE — ED PROVIDER NOTES
"Subjective   History of Present Illness  Patient is an 88-year-old female with history significant for CKD stage IV and recent pacemaker placement who comes to the ER from nursing facility with \"fast heart rate \".  Nephews at bedside state her heart rate at the nursing facility was between  which is why they sent her over.  Patient has no complaints from a cardiac standpoint but states she is hurting in both knees.  She describes as a dull ache which has been chronic.  She denies any falls.  She denies any numbness/tingling sensation in the legs.        Review of Systems   Constitutional: Negative for chills, fatigue and fever.   HENT: Negative for ear pain, sinus pain and sore throat.    Respiratory: Negative for cough, chest tightness, shortness of breath and wheezing.    Cardiovascular: Negative for chest pain, palpitations and leg swelling.   Gastrointestinal: Negative for abdominal pain, constipation, diarrhea, nausea and vomiting.   Genitourinary: Negative for dysuria, hematuria and urgency.   Musculoskeletal: Negative for arthralgias and myalgias.        Knee pain   Neurological: Negative for dizziness, syncope and light-headedness.   Psychiatric/Behavioral: Negative for confusion.       Past Medical History:   Diagnosis Date   • Chronic kidney disease, stage 4 (severe) 01/20/2023   • Essential hypertension    • History of colon cancer    • Left bundle branch block 01/20/2023   • Long term (current) use of aspirin 01/20/2023   • Polycythemia vera 01/20/2023       Allergies   Allergen Reactions   • Lipitor [Atorvastatin] Myalgia     Patient stated she had tried several of the statins and couldn't tolerate any of them and the doctor told her to continue her fish oil   • Penicillins Hives       Past Surgical History:   Procedure Laterality Date   • CARDIAC ELECTROPHYSIOLOGY PROCEDURE N/A 5/5/2023    Procedure: Pacemaker DC new;  Surgeon: Sampson Garvin MD;  Location: [facility] INVASIVE LOCATION;  " Service: Cardiology;  Laterality: N/A;   • COLON RESECTION  2004   • PARATHYROIDECTOMY Right     Parathyroid adenoma       Family History   Problem Relation Age of Onset   • Heart disease Mother    • Heart disease Father        Social History     Socioeconomic History   • Marital status: Single   Tobacco Use   • Smoking status: Never   Vaping Use   • Vaping Use: Never used   Substance and Sexual Activity   • Alcohol use: Never   • Drug use: Never           Objective   Physical Exam  Vitals and nursing note reviewed. Exam conducted with a chaperone present.   Constitutional:       Appearance: She is normal weight.   HENT:      Head: Normocephalic and atraumatic.      Nose: Nose normal.      Mouth/Throat:      Mouth: Mucous membranes are moist.      Pharynx: Oropharynx is clear.   Eyes:      Extraocular Movements: Extraocular movements intact.      Pupils: Pupils are equal, round, and reactive to light.   Cardiovascular:      Rate and Rhythm: Normal rate and regular rhythm.      Pulses: Normal pulses.      Heart sounds: Normal heart sounds.   Pulmonary:      Effort: Pulmonary effort is normal.      Breath sounds: Normal breath sounds.   Abdominal:      General: Abdomen is flat. Bowel sounds are normal.   Musculoskeletal:         General: Normal range of motion.      Cervical back: Normal range of motion and neck supple.   Skin:     General: Skin is warm and dry.      Capillary Refill: Capillary refill takes less than 2 seconds.   Neurological:      General: No focal deficit present.      Mental Status: She is alert. Mental status is at baseline.   Psychiatric:         Mood and Affect: Mood normal.         Behavior: Behavior normal.         Procedures           ED Course  ED Course as of 05/12/23 1600   Fri May 12, 2023   1402 ECG 12 Lead Rhythm Change [CW]   1402 ECG 12 Lead Rhythm Change  Paced rhythm, rate 94, QTc 490, no acute ST or T wave changes [CW]      ED Course User Index  [CW] Ángel Hunt DO     "                                       Medical Decision Making  --EKG nonischemic, paced rhythm-has remained paced in the 80s to 90s. spO2 remained in main  --labs note proBNP 4200, decreased from prior  --X-ray consistent with volume overload likely due to heart failure exacerbation  --Med list reviewed-previous echo noted diastolic dysfunction-give IV Lasix x1  --With placement of pacemaker, will DC with beta-blockade, add diuretics  --will give Voltaren gel topical for osteoarthritic knee pain, add tramadol, switch Norco to every 6 as needed  --Clinically patient has mild exacerbation of heart failure (no clinical evidence of pneumonia and has been on several antibiotic regimens lately due to \"cellulitis \"), will add low-dose Lasix for and follow-up outpatient with cardiology as scheduled and PCP    Amount and/or Complexity of Data Reviewed  Labs: ordered.  Radiology: ordered.  ECG/medicine tests: ordered. Decision-making details documented in ED Course.      Risk  Prescription drug management.          Final diagnoses:   Acute on chronic diastolic heart failure   Chronic pain of both knees       ED Disposition  ED Disposition     ED Disposition   Discharge    Condition   Stable    Comment   --             Minal Salazar, APRN  [address] [address]  [phone]    In 1 week           Medication List      New Prescriptions    Diclofenac Sodium 1 % gel gel  Commonly known as: VOLTAREN  Apply 4 g topically to the appropriate area as directed 4 (Four) Times a Day As Needed (joint pain).     furosemide 20 MG tablet  Commonly known as: LASIX  Take 1 tablet by mouth Daily for 30 days.     metoprolol tartrate 25 MG tablet  Commonly known as: LOPRESSOR  Take 1 tablet by mouth 2 (Two) Times a Day for 30 days.     traMADol 50 MG tablet  Commonly known as: ULTRAM  Take 0.5 tablets by mouth Every 6 (Six) Hours As Needed for Moderate Pain.        Changed    HYDROcodone-acetaminophen 5-325 MG per " tablet  Commonly known as: NORCO  Take 1 tablet by mouth Every 6 (Six) Hours As Needed for Moderate Pain or Severe Pain.  What changed:   · how much to take  · when to take this           Where to Get Your Medications      These medications were sent to [id number] - [place], [place] - [address] [facility] [phone] [facility] [phone]   [address] [facility] [address]    Phone: [phone]   · Diclofenac Sodium 1 % gel gel  · furosemide 20 MG tablet  · HYDROcodone-acetaminophen 5-325 MG per tablet  · metoprolol tartrate 25 MG tablet  · traMADol 50 MG tablet          Ángel Hunt DO  05/12/23 3506

## 2023-05-14 ENCOUNTER — LAB REQUISITION (OUTPATIENT)
Dept: LAB | Facility: HOSPITAL | Age: 88
End: 2023-05-14
Payer: MEDICARE

## 2023-05-14 DIAGNOSIS — Z95.0 PRESENCE OF CARDIAC PACEMAKER: ICD-10-CM

## 2023-05-14 DIAGNOSIS — I50.9 HEART FAILURE, UNSPECIFIED: ICD-10-CM

## 2023-05-14 LAB
ALBUMIN SERPL-MCNC: 3.1 G/DL (ref 3.5–5.2)
ALBUMIN/GLOB SERPL: 0.9 G/DL
ALP SERPL-CCNC: 74 U/L (ref 39–117)
ALT SERPL W P-5'-P-CCNC: 5 U/L (ref 1–33)
ANION GAP SERPL CALCULATED.3IONS-SCNC: 9.1 MMOL/L (ref 5–15)
AST SERPL-CCNC: 16 U/L (ref 1–32)
BASOPHILS # BLD AUTO: 0.06 10*3/MM3 (ref 0–0.2)
BASOPHILS NFR BLD AUTO: 0.5 % (ref 0–1.5)
BILIRUB SERPL-MCNC: 0.4 MG/DL (ref 0–1.2)
BUN SERPL-MCNC: 27 MG/DL (ref 8–23)
BUN/CREAT SERPL: 30.7 (ref 7–25)
CALCIUM SPEC-SCNC: 9.7 MG/DL (ref 8.6–10.5)
CHLORIDE SERPL-SCNC: 106 MMOL/L (ref 98–107)
CHOLEST SERPL-MCNC: 110 MG/DL (ref 0–200)
CK SERPL-CCNC: 40 U/L (ref 20–180)
CO2 SERPL-SCNC: 23.9 MMOL/L (ref 22–29)
CREAT SERPL-MCNC: 0.88 MG/DL (ref 0.57–1)
CRP SERPL-MCNC: 3.58 MG/DL (ref 0–0.5)
D DIMER PPP FEU-MCNC: 2.07 MCGFEU/ML (ref 0–0.88)
DEPRECATED RDW RBC AUTO: 51.3 FL (ref 37–54)
EGFRCR SERPLBLD CKD-EPI 2021: 63.3 ML/MIN/1.73
EOSINOPHIL # BLD AUTO: 0.14 10*3/MM3 (ref 0–0.4)
EOSINOPHIL NFR BLD AUTO: 1.1 % (ref 0.3–6.2)
ERYTHROCYTE [DISTWIDTH] IN BLOOD BY AUTOMATED COUNT: 14.7 % (ref 12.3–15.4)
FOLATE SERPL-MCNC: >20 NG/ML (ref 4.78–24.2)
GLOBULIN UR ELPH-MCNC: 3.3 GM/DL
GLUCOSE SERPL-MCNC: 128 MG/DL (ref 65–99)
HCT VFR BLD AUTO: 38.4 % (ref 34–46.6)
HDLC SERPL-MCNC: 53 MG/DL (ref 40–60)
HGB BLD-MCNC: 12.3 G/DL (ref 12–15.9)
IMM GRANULOCYTES # BLD AUTO: 0.06 10*3/MM3 (ref 0–0.05)
IMM GRANULOCYTES NFR BLD AUTO: 0.5 % (ref 0–0.5)
LDLC SERPL CALC-MCNC: 42 MG/DL (ref 0–100)
LDLC/HDLC SERPL: 0.8 {RATIO}
LYMPHOCYTES # BLD AUTO: 1 10*3/MM3 (ref 0.7–3.1)
LYMPHOCYTES NFR BLD AUTO: 8 % (ref 19.6–45.3)
MAGNESIUM SERPL-MCNC: 1.7 MG/DL (ref 1.6–2.4)
MCH RBC QN AUTO: 30.7 PG (ref 26.6–33)
MCHC RBC AUTO-ENTMCNC: 32 G/DL (ref 31.5–35.7)
MCV RBC AUTO: 95.8 FL (ref 79–97)
MONOCYTES # BLD AUTO: 0.73 10*3/MM3 (ref 0.1–0.9)
MONOCYTES NFR BLD AUTO: 5.8 % (ref 5–12)
NEUTROPHILS NFR BLD AUTO: 10.52 10*3/MM3 (ref 1.7–7)
NEUTROPHILS NFR BLD AUTO: 84.1 % (ref 42.7–76)
NRBC BLD AUTO-RTO: 0 /100 WBC (ref 0–0.2)
NT-PROBNP SERPL-MCNC: 5901 PG/ML (ref 0–1800)
PLATELET # BLD AUTO: 232 10*3/MM3 (ref 140–450)
PMV BLD AUTO: 12.1 FL (ref 6–12)
POTASSIUM SERPL-SCNC: 4 MMOL/L (ref 3.5–5.2)
PROT SERPL-MCNC: 6.4 G/DL (ref 6–8.5)
RBC # BLD AUTO: 4.01 10*6/MM3 (ref 3.77–5.28)
RPR SER QL: NORMAL
SODIUM SERPL-SCNC: 139 MMOL/L (ref 136–145)
TRIGL SERPL-MCNC: 72 MG/DL (ref 0–150)
TROPONIN T SERPL HS-MCNC: 39 NG/L
VIT B12 BLD-MCNC: 647 PG/ML (ref 211–946)
VLDLC SERPL-MCNC: 15 MG/DL (ref 5–40)
WBC NRBC COR # BLD: 12.51 10*3/MM3 (ref 3.4–10.8)

## 2023-05-14 PROCEDURE — 83735 ASSAY OF MAGNESIUM: CPT | Performed by: INTERNAL MEDICINE

## 2023-05-14 PROCEDURE — 83880 ASSAY OF NATRIURETIC PEPTIDE: CPT | Performed by: INTERNAL MEDICINE

## 2023-05-14 PROCEDURE — 86592 SYPHILIS TEST NON-TREP QUAL: CPT | Performed by: INTERNAL MEDICINE

## 2023-05-14 PROCEDURE — 80053 COMPREHEN METABOLIC PANEL: CPT | Performed by: INTERNAL MEDICINE

## 2023-05-14 PROCEDURE — 82746 ASSAY OF FOLIC ACID SERUM: CPT | Performed by: INTERNAL MEDICINE

## 2023-05-14 PROCEDURE — 80061 LIPID PANEL: CPT | Performed by: INTERNAL MEDICINE

## 2023-05-14 PROCEDURE — 85025 COMPLETE CBC W/AUTO DIFF WBC: CPT | Performed by: INTERNAL MEDICINE

## 2023-05-14 PROCEDURE — 86140 C-REACTIVE PROTEIN: CPT | Performed by: INTERNAL MEDICINE

## 2023-05-14 PROCEDURE — 82607 VITAMIN B-12: CPT | Performed by: INTERNAL MEDICINE

## 2023-05-14 PROCEDURE — 82550 ASSAY OF CK (CPK): CPT | Performed by: INTERNAL MEDICINE

## 2023-05-14 PROCEDURE — 84484 ASSAY OF TROPONIN QUANT: CPT | Performed by: INTERNAL MEDICINE

## 2023-05-14 PROCEDURE — 85379 FIBRIN DEGRADATION QUANT: CPT | Performed by: INTERNAL MEDICINE

## 2023-05-15 ENCOUNTER — APPOINTMENT (OUTPATIENT)
Dept: CT IMAGING | Facility: HOSPITAL | Age: 88
DRG: 291 | End: 2023-05-15
Payer: MEDICARE

## 2023-05-15 ENCOUNTER — APPOINTMENT (OUTPATIENT)
Dept: GENERAL RADIOLOGY | Facility: HOSPITAL | Age: 88
DRG: 291 | End: 2023-05-15
Payer: MEDICARE

## 2023-05-15 ENCOUNTER — HOSPITAL ENCOUNTER (INPATIENT)
Facility: HOSPITAL | Age: 88
LOS: 1 days | Discharge: NURSING, REHAB OR LONG-TERM CARE | DRG: 291 | End: 2023-05-19
Attending: INTERNAL MEDICINE | Admitting: INTERNAL MEDICINE
Payer: MEDICARE

## 2023-05-15 DIAGNOSIS — I50.9 ACUTE CONGESTIVE HEART FAILURE, UNSPECIFIED HEART FAILURE TYPE: Primary | ICD-10-CM

## 2023-05-15 LAB
A-A DO2: 77.2 MMHG (ref 0–300)
ALBUMIN SERPL-MCNC: 3.2 G/DL (ref 3.5–5.2)
ALBUMIN/GLOB SERPL: 0.9 G/DL
ALP SERPL-CCNC: 75 U/L (ref 39–117)
ALT SERPL W P-5'-P-CCNC: 5 U/L (ref 1–33)
ANION GAP SERPL CALCULATED.3IONS-SCNC: 12.1 MMOL/L (ref 5–15)
ARTERIAL PATENCY WRIST A: POSITIVE
AST SERPL-CCNC: 14 U/L (ref 1–32)
ATMOSPHERIC PRESS: 731 MMHG
BASE EXCESS BLDA CALC-SCNC: 1.6 MMOL/L (ref 0–2)
BASOPHILS # BLD AUTO: 0.06 10*3/MM3 (ref 0–0.2)
BASOPHILS NFR BLD AUTO: 0.5 % (ref 0–1.5)
BDY SITE: ABNORMAL
BILIRUB SERPL-MCNC: 0.5 MG/DL (ref 0–1.2)
BUN SERPL-MCNC: 27 MG/DL (ref 8–23)
BUN/CREAT SERPL: 31.4 (ref 7–25)
CALCIUM SPEC-SCNC: 9.8 MG/DL (ref 8.6–10.5)
CHLORIDE SERPL-SCNC: 106 MMOL/L (ref 98–107)
CO2 BLDA-SCNC: 25.8 MMOL/L (ref 22–33)
CO2 SERPL-SCNC: 23.9 MMOL/L (ref 22–29)
COHGB MFR BLD: 1.3 % (ref 0–5)
CREAT SERPL-MCNC: 0.86 MG/DL (ref 0.57–1)
CRP SERPL-MCNC: 9.43 MG/DL (ref 0–0.5)
D-LACTATE SERPL-SCNC: 1.2 MMOL/L (ref 0.5–2)
DEPRECATED RDW RBC AUTO: 53.4 FL (ref 37–54)
EGFRCR SERPLBLD CKD-EPI 2021: 65.1 ML/MIN/1.73
EOSINOPHIL # BLD AUTO: 0.11 10*3/MM3 (ref 0–0.4)
EOSINOPHIL NFR BLD AUTO: 0.9 % (ref 0.3–6.2)
ERYTHROCYTE [DISTWIDTH] IN BLOOD BY AUTOMATED COUNT: 14.9 % (ref 12.3–15.4)
FLUAV RNA RESP QL NAA+PROBE: NOT DETECTED
FLUBV RNA RESP QL NAA+PROBE: NOT DETECTED
GAS FLOW AIRWAY: 2 LPM
GEN 5 2HR TROPONIN T REFLEX: 32 NG/L
GLOBULIN UR ELPH-MCNC: 3.6 GM/DL
GLUCOSE SERPL-MCNC: 106 MG/DL (ref 65–99)
HCO3 BLDA-SCNC: 24.7 MMOL/L (ref 20–26)
HCT VFR BLD AUTO: 42.8 % (ref 34–46.6)
HCT VFR BLD CALC: 39.6 % (ref 38–51)
HGB BLD-MCNC: 13.3 G/DL (ref 12–15.9)
HGB BLDA-MCNC: 12.9 G/DL (ref 13.5–17.5)
IMM GRANULOCYTES # BLD AUTO: 0.06 10*3/MM3 (ref 0–0.05)
IMM GRANULOCYTES NFR BLD AUTO: 0.5 % (ref 0–0.5)
INHALED O2 CONCENTRATION: 28 %
LYMPHOCYTES # BLD AUTO: 1.29 10*3/MM3 (ref 0.7–3.1)
LYMPHOCYTES NFR BLD AUTO: 11 % (ref 19.6–45.3)
Lab: ABNORMAL
MCH RBC QN AUTO: 30.3 PG (ref 26.6–33)
MCHC RBC AUTO-ENTMCNC: 31.1 G/DL (ref 31.5–35.7)
MCV RBC AUTO: 97.5 FL (ref 79–97)
METHGB BLD QL: 0 % (ref 0–3)
MODALITY: ABNORMAL
MONOCYTES # BLD AUTO: 0.74 10*3/MM3 (ref 0.1–0.9)
MONOCYTES NFR BLD AUTO: 6.3 % (ref 5–12)
NEUTROPHILS NFR BLD AUTO: 80.8 % (ref 42.7–76)
NEUTROPHILS NFR BLD AUTO: 9.44 10*3/MM3 (ref 1.7–7)
NOTE: ABNORMAL
NOTIFIED BY: ABNORMAL
NOTIFIED WHO: ABNORMAL
NRBC BLD AUTO-RTO: 0 /100 WBC (ref 0–0.2)
NT-PROBNP SERPL-MCNC: 6216 PG/ML (ref 0–1800)
OXYHGB MFR BLDV: 95.3 % (ref 94–99)
PCO2 BLDA: 33.6 MM HG (ref 35–45)
PCO2 TEMP ADJ BLD: ABNORMAL MM[HG]
PH BLDA: 7.47 PH UNITS (ref 7.35–7.45)
PH, TEMP CORRECTED: ABNORMAL
PLATELET # BLD AUTO: 273 10*3/MM3 (ref 140–450)
PMV BLD AUTO: 11.6 FL (ref 6–12)
PO2 BLDA: 76.8 MM HG (ref 83–108)
PO2 TEMP ADJ BLD: ABNORMAL MM[HG]
POTASSIUM SERPL-SCNC: 4.1 MMOL/L (ref 3.5–5.2)
PROT SERPL-MCNC: 6.8 G/DL (ref 6–8.5)
RBC # BLD AUTO: 4.39 10*6/MM3 (ref 3.77–5.28)
SAO2 % BLDCOA: 96.5 % (ref 94–99)
SARS-COV-2 RNA RESP QL NAA+PROBE: NOT DETECTED
SODIUM SERPL-SCNC: 142 MMOL/L (ref 136–145)
TROPONIN T DELTA: 1 NG/L
TROPONIN T SERPL HS-MCNC: 31 NG/L
VENTILATOR MODE: ABNORMAL
WBC NRBC COR # BLD: 11.7 10*3/MM3 (ref 3.4–10.8)

## 2023-05-15 PROCEDURE — G0378 HOSPITAL OBSERVATION PER HR: HCPCS

## 2023-05-15 PROCEDURE — 93010 ELECTROCARDIOGRAM REPORT: CPT | Performed by: INTERNAL MEDICINE

## 2023-05-15 PROCEDURE — 99285 EMERGENCY DEPT VISIT HI MDM: CPT

## 2023-05-15 PROCEDURE — 80053 COMPREHEN METABOLIC PANEL: CPT | Performed by: PHYSICIAN ASSISTANT

## 2023-05-15 PROCEDURE — 71250 CT THORAX DX C-: CPT | Performed by: RADIOLOGY

## 2023-05-15 PROCEDURE — 71045 X-RAY EXAM CHEST 1 VIEW: CPT | Performed by: RADIOLOGY

## 2023-05-15 PROCEDURE — 83050 HGB METHEMOGLOBIN QUAN: CPT

## 2023-05-15 PROCEDURE — 87636 SARSCOV2 & INF A&B AMP PRB: CPT | Performed by: PHYSICIAN ASSISTANT

## 2023-05-15 PROCEDURE — 36600 WITHDRAWAL OF ARTERIAL BLOOD: CPT

## 2023-05-15 PROCEDURE — 36415 COLL VENOUS BLD VENIPUNCTURE: CPT

## 2023-05-15 PROCEDURE — 71250 CT THORAX DX C-: CPT

## 2023-05-15 PROCEDURE — 99223 1ST HOSP IP/OBS HIGH 75: CPT | Performed by: INTERNAL MEDICINE

## 2023-05-15 PROCEDURE — 25010000002 HEPARIN (PORCINE) PER 1000 UNITS: Performed by: INTERNAL MEDICINE

## 2023-05-15 PROCEDURE — 87040 BLOOD CULTURE FOR BACTERIA: CPT | Performed by: PHYSICIAN ASSISTANT

## 2023-05-15 PROCEDURE — 82805 BLOOD GASES W/O2 SATURATION: CPT

## 2023-05-15 PROCEDURE — 25010000002 FUROSEMIDE PER 20 MG: Performed by: PHYSICIAN ASSISTANT

## 2023-05-15 PROCEDURE — 85025 COMPLETE CBC W/AUTO DIFF WBC: CPT | Performed by: PHYSICIAN ASSISTANT

## 2023-05-15 PROCEDURE — 93005 ELECTROCARDIOGRAM TRACING: CPT | Performed by: EMERGENCY MEDICINE

## 2023-05-15 PROCEDURE — 83880 ASSAY OF NATRIURETIC PEPTIDE: CPT | Performed by: PHYSICIAN ASSISTANT

## 2023-05-15 PROCEDURE — 83605 ASSAY OF LACTIC ACID: CPT | Performed by: PHYSICIAN ASSISTANT

## 2023-05-15 PROCEDURE — 71045 X-RAY EXAM CHEST 1 VIEW: CPT

## 2023-05-15 PROCEDURE — 82375 ASSAY CARBOXYHB QUANT: CPT

## 2023-05-15 PROCEDURE — 84484 ASSAY OF TROPONIN QUANT: CPT | Performed by: PHYSICIAN ASSISTANT

## 2023-05-15 PROCEDURE — 86140 C-REACTIVE PROTEIN: CPT | Performed by: PHYSICIAN ASSISTANT

## 2023-05-15 RX ORDER — FUROSEMIDE 10 MG/ML
40 INJECTION INTRAMUSCULAR; INTRAVENOUS ONCE
Status: COMPLETED | OUTPATIENT
Start: 2023-05-15 | End: 2023-05-15

## 2023-05-15 RX ORDER — HEPARIN SODIUM 5000 [USP'U]/ML
5000 INJECTION, SOLUTION INTRAVENOUS; SUBCUTANEOUS EVERY 12 HOURS SCHEDULED
Status: DISCONTINUED | OUTPATIENT
Start: 2023-05-15 | End: 2023-05-19 | Stop reason: HOSPADM

## 2023-05-15 RX ORDER — HYDROCODONE BITARTRATE AND ACETAMINOPHEN 5; 325 MG/1; MG/1
1 TABLET ORAL ONCE
Status: COMPLETED | OUTPATIENT
Start: 2023-05-15 | End: 2023-05-15

## 2023-05-15 RX ORDER — SODIUM CHLORIDE 0.9 % (FLUSH) 0.9 %
10 SYRINGE (ML) INJECTION AS NEEDED
Status: DISCONTINUED | OUTPATIENT
Start: 2023-05-15 | End: 2023-05-19 | Stop reason: HOSPADM

## 2023-05-15 RX ORDER — FUROSEMIDE 10 MG/ML
80 INJECTION INTRAMUSCULAR; INTRAVENOUS 2 TIMES DAILY
Status: DISCONTINUED | OUTPATIENT
Start: 2023-05-16 | End: 2023-05-18

## 2023-05-15 RX ADMIN — DICLOFENAC SODIUM 4 G: 10 GEL TOPICAL at 23:49

## 2023-05-15 RX ADMIN — FUROSEMIDE 40 MG: 10 INJECTION, SOLUTION INTRAVENOUS at 21:08

## 2023-05-15 RX ADMIN — FUROSEMIDE 40 MG: 10 INJECTION, SOLUTION INTRAVENOUS at 18:10

## 2023-05-15 RX ADMIN — HYDROCODONE BITARTRATE AND ACETAMINOPHEN 1 TABLET: 5; 325 TABLET ORAL at 18:42

## 2023-05-15 RX ADMIN — HEPARIN SODIUM 5000 UNITS: 5000 INJECTION INTRAVENOUS; SUBCUTANEOUS at 23:13

## 2023-05-15 NOTE — TELEPHONE ENCOUNTER
DELETE AFTER REVIEWING: Telephone encounter to be sent to the clinical pool     Caller: [facility]    Relationship to patient: CAREG[facility]    Best call back number: [phone]    Chief complaint: SHE HAS TACHYCARDIA, SOB BEEN GOING ON SINCE 05.12.2023.    Type of visit: FOLLOW UP    Requested date: WITHIN 1 WEEK    If rescheduling, when is the original appointment: NONE     Additional notes: FIRST AVAILABLE IS September REQUESTING ASAP SHE IS IN NURSING HOME

## 2023-05-15 NOTE — ED PROVIDER NOTES
Subjective   History of Present Illness  This is an 88 year old female patient who presents to the ER with chief complaint of SOB. PMH significant for HTN, history of colon cancer, CKD, CHF, Heart block requiring recent pacemaker placement. Patient presents today via EMS from the NH. She is having midsternal, non-radiating, constant, pressure like chest pain and SOB.         Review of Systems   Constitutional: Negative.  Negative for fever.   HENT: Negative.    Respiratory: Positive for shortness of breath. Negative for apnea, cough, choking, chest tightness, wheezing and stridor.    Cardiovascular: Positive for chest pain. Negative for palpitations and leg swelling.   Gastrointestinal: Negative.  Negative for abdominal pain.   Endocrine: Negative.    Genitourinary: Negative.  Negative for dysuria.   Skin: Negative.    Neurological: Negative.    Psychiatric/Behavioral: Negative.    All other systems reviewed and are negative.      Past Medical History:   Diagnosis Date   • Chronic kidney disease, stage 4 (severe) 01/20/2023   • Essential hypertension    • History of colon cancer    • Left bundle branch block 01/20/2023   • Long term (current) use of aspirin 01/20/2023   • Polycythemia vera 01/20/2023       Allergies   Allergen Reactions   • Lipitor [Atorvastatin] Myalgia     Patient stated she had tried several of the statins and couldn't tolerate any of them and the doctor told her to continue her fish oil   • Penicillins Hives       Past Surgical History:   Procedure Laterality Date   • CARDIAC ELECTROPHYSIOLOGY PROCEDURE N/A 5/5/2023    Procedure: Pacemaker DC new;  Surgeon: Sampson Garvin MD;  Location: [facility] INVASIVE LOCATION;  Service: Cardiology;  Laterality: N/A;   • COLON RESECTION  2004   • PARATHYROIDECTOMY Right     Parathyroid adenoma       Family History   Problem Relation Age of Onset   • Heart disease Mother    • Heart disease Father        Social History     Socioeconomic History   • Marital  status: Single   Tobacco Use   • Smoking status: Never   Vaping Use   • Vaping Use: Never used   Substance and Sexual Activity   • Alcohol use: Never   • Drug use: Never           Objective   Physical Exam  Vitals and nursing note reviewed.   Constitutional:       General: She is not in acute distress.     Appearance: She is well-developed. She is not diaphoretic.   HENT:      Head: Normocephalic and atraumatic.      Right Ear: External ear normal.      Left Ear: External ear normal.      Nose: Nose normal.   Eyes:      Conjunctiva/sclera: Conjunctivae normal.      Pupils: Pupils are equal, round, and reactive to light.   Neck:      Vascular: No JVD.      Trachea: No tracheal deviation.   Cardiovascular:      Rate and Rhythm: Normal rate and regular rhythm.      Heart sounds: Normal heart sounds. No murmur heard.  Pulmonary:      Effort: Pulmonary effort is normal. No respiratory distress.      Breath sounds: Normal breath sounds. No decreased breath sounds, wheezing, rhonchi or rales.   Abdominal:      General: Bowel sounds are normal.      Palpations: Abdomen is soft.      Tenderness: There is no abdominal tenderness.   Musculoskeletal:         General: No deformity. Normal range of motion.      Cervical back: Normal range of motion and neck supple.   Skin:     General: Skin is warm and dry.      Coloration: Skin is not pale.      Findings: No erythema or rash.   Neurological:      Mental Status: She is alert and oriented to person, place, and time.      Cranial Nerves: No cranial nerve deficit.   Psychiatric:         Behavior: Behavior normal.         Thought Content: Thought content normal.         Procedures        Results for orders placed or performed during the hospital encounter of 05/15/23   COVID-19 and FLU A/B PCR - Swab, Nasopharynx    Specimen: Nasopharynx; Swab   Result Value Ref Range    COVID19 Not Detected Not Detected - Ref. Range    Influenza A PCR Not Detected Not Detected    Influenza B PCR Not  Detected Not Detected   Comprehensive Metabolic Panel    Specimen: Blood   Result Value Ref Range    Glucose 106 (H) 65 - 99 mg/dL    BUN 27 (H) 8 - 23 mg/dL    Creatinine 0.86 0.57 - 1.00 mg/dL    Sodium 142 136 - 145 mmol/L    Potassium 4.1 3.5 - 5.2 mmol/L    Chloride 106 98 - 107 mmol/L    CO2 23.9 22.0 - 29.0 mmol/L    Calcium 9.8 8.6 - 10.5 mg/dL    Total Protein 6.8 6.0 - 8.5 g/dL    Albumin 3.2 (L) 3.5 - 5.2 g/dL    ALT (SGPT) 5 1 - 33 U/L    AST (SGOT) 14 1 - 32 U/L    Alkaline Phosphatase 75 39 - 117 U/L    Total Bilirubin 0.5 0.0 - 1.2 mg/dL    Globulin 3.6 gm/dL    A/G Ratio 0.9 g/dL    BUN/Creatinine Ratio 31.4 (H) 7.0 - 25.0    Anion Gap 12.1 5.0 - 15.0 mmol/L    eGFR 65.1 >60.0 mL/min/1.73   High Sensitivity Troponin T    Specimen: Blood   Result Value Ref Range    HS Troponin T 31 (H) <10 ng/L   BNP    Specimen: Blood   Result Value Ref Range    proBNP 6,216.0 (H) 0.0 - 1,800.0 pg/mL   C-reactive Protein    Specimen: Blood   Result Value Ref Range    C-Reactive Protein 9.43 (H) 0.00 - 0.50 mg/dL   Lactic Acid, Plasma    Specimen: Blood   Result Value Ref Range    Lactate 1.2 0.5 - 2.0 mmol/L   CBC Auto Differential    Specimen: Blood   Result Value Ref Range    WBC 11.70 (H) 3.40 - 10.80 10*3/mm3    RBC 4.39 3.77 - 5.28 10*6/mm3    Hemoglobin 13.3 12.0 - 15.9 g/dL    Hematocrit 42.8 34.0 - 46.6 %    MCV 97.5 (H) 79.0 - 97.0 fL    MCH 30.3 26.6 - 33.0 pg    MCHC 31.1 (L) 31.5 - 35.7 g/dL    RDW 14.9 12.3 - 15.4 %    RDW-SD 53.4 37.0 - 54.0 fl    MPV 11.6 6.0 - 12.0 fL    Platelets 273 140 - 450 10*3/mm3    Neutrophil % 80.8 (H) 42.7 - 76.0 %    Lymphocyte % 11.0 (L) 19.6 - 45.3 %    Monocyte % 6.3 5.0 - 12.0 %    Eosinophil % 0.9 0.3 - 6.2 %    Basophil % 0.5 0.0 - 1.5 %    Immature Grans % 0.5 0.0 - 0.5 %    Neutrophils, Absolute 9.44 (H) 1.70 - 7.00 10*3/mm3    Lymphocytes, Absolute 1.29 0.70 - 3.10 10*3/mm3    Monocytes, Absolute 0.74 0.10 - 0.90 10*3/mm3    Eosinophils, Absolute 0.11 0.00 - 0.40  10*3/mm3    Basophils, Absolute 0.06 0.00 - 0.20 10*3/mm3    Immature Grans, Absolute 0.06 (H) 0.00 - 0.05 10*3/mm3    nRBC 0.0 0.0 - 0.2 /100 WBC   Blood Gas, Arterial With Co-Ox    Specimen: Arterial Blood   Result Value Ref Range    Site Right Radial     Chai's Test Positive     pH, Arterial 7.475 (H) 7.350 - 7.450 pH units    pCO2, Arterial 33.6 (L) 35.0 - 45.0 mm Hg    pO2, Arterial 76.8 (L) 83.0 - 108.0 mm Hg    HCO3, Arterial 24.7 20.0 - 26.0 mmol/L    Base Excess, Arterial 1.6 0.0 - 2.0 mmol/L    O2 Saturation, Arterial 96.5 94.0 - 99.0 %    Hemoglobin, Blood Gas 12.9 (L) 13.5 - 17.5 g/dL    Hematocrit, Blood Gas 39.6 38.0 - 51.0 %    Oxyhemoglobin 95.3 94 - 99 %    Methemoglobin 0.00 0.00 - 3.00 %    Carboxyhemoglobin 1.3 0 - 5 %    A-a DO2 77.2 0.0 - 300.0 mmHg    CO2 Content 25.8 22 - 33 mmol/L    Barometric Pressure for Blood Gas 731 mmHg    Modality Nasal Cannula     FIO2 28 %    Flow Rate 2.0 lpm    Ventilator Mode NA     Note Read back and acknowledge     Notified Who MAYTE MUSE // RN     Notified By MCIHELLE     Collected by [id number]     pH, Temp Corrected      pCO2, Temperature Corrected      pO2, Temperature Corrected     High Sensitivity Troponin T 2Hr    Specimen: Blood   Result Value Ref Range    HS Troponin T 32 (H) <10 ng/L    Troponin T Delta 1 >=-4 - <+4 ng/L   ECG 12 Lead Dyspnea   Result Value Ref Range    QT Interval 454 ms    QTC Interval 490 ms         ED Course  ED Course as of 05/15/23 2055   Mon May 15, 2023   1533 EKG interpretation:    Test Reason : Dyspnea  Blood Pressure :   */*   mmHG  Vent. Rate :  70 BPM     Atrial Rate :  70 BPM     P-R Int : 148 ms          QRS Dur : 156 ms      QT Int : 454 ms       P-R-T Axes : -25 -79  71 degrees     QTc Int : 490 ms     Atrial-sensed ventricular-paced rhythm  Abnormal ECG  When compared with ECG of 12-MAY-2023 13:55,  Vent. rate has decreased BY  24 BPM [DS]   1700 XR Chest 1 View  IMPRESSION:    Slight increase in changes of CHF/edema.      This report was finalized on 5/15/2023 4:43 PM by Dr. Dalton Lawrence MD [MM]   1900 CT Chest Without Contrast Diagnostic  IMPRESSION:  1.  Central predominant interstitial opacities could represent fluid  overload, less likely atypical pneumonia.  2.  Bilateral pleural effusions, right larger than left.  3.  Anterior subluxation of the right shoulder which could be acute or  chronic with large right joint effusion.     This report was finalized on 5/15/2023 6:47 PM by Alex Pallas, DO [MM]   2054 Spoke with Dr. Rodrigues who has accepted her in admission.  [MM]      ED Course User Index  [DS] Jose Lopez MD  [MM] Judit Roger PA                                           Medical Decision Making    This is an 88 year old female patient who presents to the ER with chief complaint of SOB. PMH significant for HTN, history of colon cancer, CKD, CHF, Heart block requiring recent pacemaker placement. Patient presents today via EMS from the NH. She is having midsternal, non-radiating, constant, pressure like chest pain and SOB.         Acute congestive heart failure, unspecified heart failure type: acute illness or injury  Amount and/or Complexity of Data Reviewed  Labs: ordered. Decision-making details documented in ED Course.  Radiology: ordered. Decision-making details documented in ED Course.      Risk  Prescription drug management.  Decision regarding hospitalization.          Final diagnoses:   Acute congestive heart failure, unspecified heart failure type       ED Disposition  ED Disposition     ED Disposition   Decision to Admit    Condition   --    Comment   --             No follow-up provider specified.       Medication List      No changes were made to your prescriptions during this visit.          Judit Roger PA  05/15/23 2055

## 2023-05-16 LAB
ABSOLUTE LUNG FLUID CONTENT: 42 % (ref 20–35)
ANION GAP SERPL CALCULATED.3IONS-SCNC: 13.1 MMOL/L (ref 5–15)
BUN SERPL-MCNC: 25 MG/DL (ref 8–23)
BUN/CREAT SERPL: 30.9 (ref 7–25)
CALCIUM SPEC-SCNC: 9.4 MG/DL (ref 8.6–10.5)
CHLORIDE SERPL-SCNC: 104 MMOL/L (ref 98–107)
CO2 SERPL-SCNC: 24.9 MMOL/L (ref 22–29)
CREAT SERPL-MCNC: 0.81 MG/DL (ref 0.57–1)
DEPRECATED RDW RBC AUTO: 52.7 FL (ref 37–54)
EGFRCR SERPLBLD CKD-EPI 2021: 69.9 ML/MIN/1.73
ERYTHROCYTE [DISTWIDTH] IN BLOOD BY AUTOMATED COUNT: 14.7 % (ref 12.3–15.4)
GLUCOSE SERPL-MCNC: 94 MG/DL (ref 65–99)
HCT VFR BLD AUTO: 38.4 % (ref 34–46.6)
HGB BLD-MCNC: 11.9 G/DL (ref 12–15.9)
MCH RBC QN AUTO: 30.1 PG (ref 26.6–33)
MCHC RBC AUTO-ENTMCNC: 31 G/DL (ref 31.5–35.7)
MCV RBC AUTO: 97.2 FL (ref 79–97)
PLATELET # BLD AUTO: 223 10*3/MM3 (ref 140–450)
PMV BLD AUTO: 11.4 FL (ref 6–12)
POTASSIUM SERPL-SCNC: 3.1 MMOL/L (ref 3.5–5.2)
POTASSIUM SERPL-SCNC: 3.6 MMOL/L (ref 3.5–5.2)
QT INTERVAL: 454 MS
QTC INTERVAL: 490 MS
RBC # BLD AUTO: 3.95 10*6/MM3 (ref 3.77–5.28)
SODIUM SERPL-SCNC: 142 MMOL/L (ref 136–145)
WBC NRBC COR # BLD: 9.68 10*3/MM3 (ref 3.4–10.8)

## 2023-05-16 PROCEDURE — 94726 PLETHYSMOGRAPHY LUNG VOLUMES: CPT

## 2023-05-16 PROCEDURE — 93005 ELECTROCARDIOGRAM TRACING: CPT | Performed by: INTERNAL MEDICINE

## 2023-05-16 PROCEDURE — 94799 UNLISTED PULMONARY SVC/PX: CPT

## 2023-05-16 PROCEDURE — 93010 ELECTROCARDIOGRAM REPORT: CPT | Performed by: INTERNAL MEDICINE

## 2023-05-16 PROCEDURE — 80048 BASIC METABOLIC PNL TOTAL CA: CPT | Performed by: INTERNAL MEDICINE

## 2023-05-16 PROCEDURE — 97166 OT EVAL MOD COMPLEX 45 MIN: CPT

## 2023-05-16 PROCEDURE — 25010000002 FUROSEMIDE PER 20 MG: Performed by: INTERNAL MEDICINE

## 2023-05-16 PROCEDURE — 84132 ASSAY OF SERUM POTASSIUM: CPT | Performed by: INTERNAL MEDICINE

## 2023-05-16 PROCEDURE — 85027 COMPLETE CBC AUTOMATED: CPT | Performed by: INTERNAL MEDICINE

## 2023-05-16 PROCEDURE — 25010000002 ACETAZOLAMIDE PER 500 MG: Performed by: INTERNAL MEDICINE

## 2023-05-16 PROCEDURE — G0378 HOSPITAL OBSERVATION PER HR: HCPCS

## 2023-05-16 PROCEDURE — 99232 SBSQ HOSP IP/OBS MODERATE 35: CPT | Performed by: INTERNAL MEDICINE

## 2023-05-16 PROCEDURE — 97162 PT EVAL MOD COMPLEX 30 MIN: CPT

## 2023-05-16 PROCEDURE — 25010000002 HEPARIN (PORCINE) PER 1000 UNITS: Performed by: INTERNAL MEDICINE

## 2023-05-16 RX ORDER — BISACODYL 10 MG
10 SUPPOSITORY, RECTAL RECTAL DAILY PRN
Status: DISCONTINUED | OUTPATIENT
Start: 2023-05-16 | End: 2023-05-19 | Stop reason: HOSPADM

## 2023-05-16 RX ORDER — DOCUSATE SODIUM 100 MG/1
100 CAPSULE, LIQUID FILLED ORAL 2 TIMES DAILY
Status: DISCONTINUED | OUTPATIENT
Start: 2023-05-16 | End: 2023-05-19 | Stop reason: HOSPADM

## 2023-05-16 RX ORDER — BISACODYL 10 MG
10 SUPPOSITORY, RECTAL RECTAL DAILY PRN
COMMUNITY

## 2023-05-16 RX ORDER — HYDROCODONE BITARTRATE AND ACETAMINOPHEN 5; 325 MG/1; MG/1
1 TABLET ORAL EVERY 8 HOURS PRN
COMMUNITY

## 2023-05-16 RX ORDER — FUROSEMIDE 20 MG/1
20 TABLET ORAL DAILY
Status: CANCELLED | OUTPATIENT
Start: 2023-05-16

## 2023-05-16 RX ORDER — TRAMADOL HYDROCHLORIDE 50 MG/1
25 TABLET ORAL EVERY 6 HOURS PRN
Status: DISCONTINUED | OUTPATIENT
Start: 2023-05-16 | End: 2023-05-19 | Stop reason: HOSPADM

## 2023-05-16 RX ORDER — MEMANTINE HYDROCHLORIDE 5 MG/1
5 TABLET ORAL DAILY
COMMUNITY

## 2023-05-16 RX ORDER — LACTULOSE 10 G/15ML
20 SOLUTION ORAL DAILY PRN
COMMUNITY

## 2023-05-16 RX ORDER — DOCUSATE SODIUM 100 MG/1
100 CAPSULE, LIQUID FILLED ORAL 2 TIMES DAILY
COMMUNITY

## 2023-05-16 RX ORDER — MEMANTINE HYDROCHLORIDE 5 MG/1
5 TABLET ORAL DAILY
Status: DISCONTINUED | OUTPATIENT
Start: 2023-05-16 | End: 2023-05-19 | Stop reason: HOSPADM

## 2023-05-16 RX ORDER — ASPIRIN 81 MG/1
81 TABLET, CHEWABLE ORAL DAILY
COMMUNITY

## 2023-05-16 RX ORDER — DONEPEZIL HYDROCHLORIDE 5 MG/1
5 TABLET, FILM COATED ORAL NIGHTLY
Status: DISCONTINUED | OUTPATIENT
Start: 2023-05-16 | End: 2023-05-19 | Stop reason: HOSPADM

## 2023-05-16 RX ORDER — HYDROCODONE BITARTRATE AND ACETAMINOPHEN 5; 325 MG/1; MG/1
1 TABLET ORAL EVERY 8 HOURS PRN
Status: DISCONTINUED | OUTPATIENT
Start: 2023-05-16 | End: 2023-05-19 | Stop reason: HOSPADM

## 2023-05-16 RX ORDER — MULTIPLE VITAMINS W/ MINERALS TAB 9MG-400MCG
1 TAB ORAL DAILY
Status: DISCONTINUED | OUTPATIENT
Start: 2023-05-16 | End: 2023-05-19 | Stop reason: HOSPADM

## 2023-05-16 RX ORDER — LANOLIN ALCOHOL/MO/W.PET/CERES
3 CREAM (GRAM) TOPICAL NIGHTLY PRN
Status: DISCONTINUED | OUTPATIENT
Start: 2023-05-16 | End: 2023-05-19 | Stop reason: HOSPADM

## 2023-05-16 RX ORDER — ASPIRIN 81 MG/1
81 TABLET, CHEWABLE ORAL DAILY
Status: DISCONTINUED | OUTPATIENT
Start: 2023-05-16 | End: 2023-05-19 | Stop reason: HOSPADM

## 2023-05-16 RX ORDER — LACTULOSE 10 G/15ML
20 SOLUTION ORAL DAILY PRN
Status: DISCONTINUED | OUTPATIENT
Start: 2023-05-16 | End: 2023-05-19 | Stop reason: HOSPADM

## 2023-05-16 RX ORDER — DONEPEZIL HYDROCHLORIDE 5 MG/1
5 TABLET, FILM COATED ORAL NIGHTLY
COMMUNITY

## 2023-05-16 RX ORDER — DOCUSATE SODIUM 100 MG/1
100 CAPSULE, LIQUID FILLED ORAL 2 TIMES DAILY
Status: CANCELLED | OUTPATIENT
Start: 2023-05-16

## 2023-05-16 RX ORDER — LOSARTAN POTASSIUM 50 MG/1
100 TABLET ORAL
Status: DISCONTINUED | OUTPATIENT
Start: 2023-05-16 | End: 2023-05-19 | Stop reason: HOSPADM

## 2023-05-16 RX ORDER — ACETAMINOPHEN 500 MG
500 TABLET ORAL EVERY 6 HOURS PRN
Status: DISCONTINUED | OUTPATIENT
Start: 2023-05-16 | End: 2023-05-19 | Stop reason: HOSPADM

## 2023-05-16 RX ORDER — POTASSIUM CHLORIDE 20 MEQ/1
40 TABLET, EXTENDED RELEASE ORAL ONCE
Status: COMPLETED | OUTPATIENT
Start: 2023-05-16 | End: 2023-05-16

## 2023-05-16 RX ORDER — POTASSIUM CHLORIDE 20 MEQ/1
40 TABLET, EXTENDED RELEASE ORAL DAILY
Status: DISCONTINUED | OUTPATIENT
Start: 2023-05-16 | End: 2023-05-16

## 2023-05-16 RX ADMIN — MEMANTINE HYDROCHLORIDE 5 MG: 5 TABLET, FILM COATED ORAL at 09:25

## 2023-05-16 RX ADMIN — HEPARIN SODIUM 5000 UNITS: 5000 INJECTION INTRAVENOUS; SUBCUTANEOUS at 20:18

## 2023-05-16 RX ADMIN — Medication 1 TABLET: at 09:25

## 2023-05-16 RX ADMIN — DICLOFENAC SODIUM 4 G: 10 GEL TOPICAL at 20:19

## 2023-05-16 RX ADMIN — FUROSEMIDE 80 MG: 10 INJECTION, SOLUTION INTRAMUSCULAR; INTRAVENOUS at 20:18

## 2023-05-16 RX ADMIN — DICLOFENAC SODIUM 4 G: 10 GEL TOPICAL at 07:20

## 2023-05-16 RX ADMIN — DICLOFENAC SODIUM 4 G: 10 GEL TOPICAL at 13:58

## 2023-05-16 RX ADMIN — HEPARIN SODIUM 5000 UNITS: 5000 INJECTION INTRAVENOUS; SUBCUTANEOUS at 09:25

## 2023-05-16 RX ADMIN — TRAMADOL HYDROCHLORIDE 25 MG: 50 TABLET, COATED ORAL at 20:18

## 2023-05-16 RX ADMIN — Medication 10 ML: at 20:18

## 2023-05-16 RX ADMIN — DONEPEZIL HYDROCHLORIDE 5 MG: 5 TABLET, FILM COATED ORAL at 20:18

## 2023-05-16 RX ADMIN — POTASSIUM CHLORIDE 40 MEQ: 20 TABLET, EXTENDED RELEASE ORAL at 03:33

## 2023-05-16 RX ADMIN — EMPAGLIFLOZIN 10 MG: 10 TABLET, FILM COATED ORAL at 09:25

## 2023-05-16 RX ADMIN — TRAMADOL HYDROCHLORIDE 25 MG: 50 TABLET, COATED ORAL at 07:19

## 2023-05-16 RX ADMIN — FUROSEMIDE 80 MG: 10 INJECTION, SOLUTION INTRAMUSCULAR; INTRAVENOUS at 09:25

## 2023-05-16 RX ADMIN — LOSARTAN POTASSIUM 100 MG: 50 TABLET, FILM COATED ORAL at 09:25

## 2023-05-16 RX ADMIN — DOCUSATE SODIUM 100 MG: 100 CAPSULE, LIQUID FILLED ORAL at 09:25

## 2023-05-16 RX ADMIN — DOCUSATE SODIUM 100 MG: 100 CAPSULE, LIQUID FILLED ORAL at 20:18

## 2023-05-16 RX ADMIN — METOPROLOL TARTRATE 25 MG: 25 TABLET, FILM COATED ORAL at 20:18

## 2023-05-16 RX ADMIN — Medication 3 MG: at 20:18

## 2023-05-16 RX ADMIN — METOPROLOL TARTRATE 25 MG: 25 TABLET, FILM COATED ORAL at 09:25

## 2023-05-16 RX ADMIN — ASPIRIN 81 MG: 81 TABLET, CHEWABLE ORAL at 09:25

## 2023-05-16 RX ADMIN — ACETAZOLAMIDE SODIUM 500 MG: 500 INJECTION, POWDER, LYOPHILIZED, FOR SOLUTION INTRAVENOUS at 01:11

## 2023-05-16 RX ADMIN — DICLOFENAC SODIUM 4 G: 10 GEL TOPICAL at 17:22

## 2023-05-16 RX ADMIN — HYDROCODONE BITARTRATE AND ACETAMINOPHEN 1 TABLET: 5; 325 TABLET ORAL at 14:00

## 2023-05-16 NOTE — CASE MANAGEMENT/SOCIAL WORK
Discharge Planning Assessment   [facility]     Patient Name: Azra Oahra  MRN: [id number]  Today's Date: 5/16/2023    Admit Date: 5/15/2023    Plan: Pt discussed in interdisciplinary rounds.  Per Physician pre authorization can be started for pt to return to [facility].  [facility] agree[facility].  SS faxed pt information in epic and notified Joanna.  SS will follow.     Discharge Plan     Row Name 05/16/23 1436       Plan    Plan Pt discussed in interdisciplinary rounds.  Per Physician pre authorization can be started for pt to return to [facility].  Pt agreeable to return to [facility] for rehab.  SS faxed pt information in epic and notified Joanna.  SS will follow.              Continued Care and Services - Admitted Since 5/15/2023     Destination     Service Provider Request Status Selected Services Address Phone Fax Patient Preferred    [facility] Pending - Request Sent N/A [address] [place] [address] [phone] --           ELIOT Foster

## 2023-05-16 NOTE — H&P
[facility]   HISTORY AND PHYSICAL    Patient Name: Azra Ohara  : 1935  MRN: [id number]  Primary Care Physician:  Minal Salazar APRN  Date of admission: 5/15/2023    Subjective   Subjective     Chief Complaint: Shortness of air    History of Present Illness the patient is an 88-year-old female, current nursing home resident with past medical history significant for chronic HFpEF, left bundle branch block with recent history of complete heart block status post permanent pacemaker placement earlier this month, hypertension, CKD 4 and previous history of left bundle branch block who presents from a local nursing facility complaining of shortness of air.    Patient was discharged from our service on or about 2023.  The patient presented to the emergency department on 2023 with reports of tachycardia at the nursing facility.  Patient had no respiratory complaints at that time.  Work-up in the ED revealed a chest x-ray consistent with volume overload thought to be due to heart failure exacerbation; patient was given IV Lasix x1 dose in the ED and was given a prescription for 20 mg p.o. Lasix daily x30 days.      According to the ED provider, nursing facility documentation reveals that patient has received her daily Lasix for the past 2 days since her discharge from the nursing home.  Patient now reportedly with complaints of shortness of air.    Patient was seen and examined in 303-1.  Her nephew Gonzalez is present at bedside.    The patient reports a 4-day history of progressive shortness of air.  She denies fever and/or chills.  She denies significant cough.  She denies any chest pains or pressure.  Patient further denies any palpitations.  She denied any nausea or and/or vomiting.    Review of Systems   Constitutional: Negative for chills and fever.   HENT: Negative for hearing loss and trouble swallowing.    Eyes: Negative for visual disturbance.   Respiratory: Positive for shortness of  breath. Negative for cough and chest tightness.    Cardiovascular: Negative for chest pain and palpitations.   Gastrointestinal: Negative for abdominal pain, diarrhea, nausea and vomiting.   Genitourinary: Negative for decreased urine volume and dysuria.   Musculoskeletal: Positive for arthralgias (chronic, mostly knees. Denied any R shoulder pain.).   Skin: Negative for rash.   Neurological: Positive for weakness. Negative for syncope.   Psychiatric/Behavioral: Negative for agitation and confusion.      Personal History     Past Medical History:   Diagnosis Date   • Chronic kidney disease, stage 4 (severe) 01/20/2023   • Essential hypertension    • History of colon cancer    • Left bundle branch block 01/20/2023   • Long term (current) use of aspirin 01/20/2023   • Polycythemia vera 01/20/2023       Past Surgical History:   Procedure Laterality Date   • CARDIAC ELECTROPHYSIOLOGY PROCEDURE N/A 5/5/2023    Procedure: Pacemaker DC new;  Surgeon: Sampson Garvin MD;  Location: [facility] INVASIVE LOCATION;  Service: Cardiology;  Laterality: N/A;   • COLON RESECTION  2004   • PARATHYROIDECTOMY Right     Parathyroid adenoma       Family History: family history includes Heart disease in her father and mother. Otherwise pertinent FHx was reviewed and not pertinent to current issue.    Social History:  reports that she has never smoked. She does not have any smokeless tobacco history on file. She reports that she does not drink alcohol and does not use drugs.    Home Medications:  Diclofenac Sodium, HYDROcodone-acetaminophen, acetaminophen, aspirin, docusate sodium, furosemide, losartan, melatonin, metoprolol tartrate, multivitamin with minerals, and traMADol    Allergies:  Allergies   Allergen Reactions   • Lipitor [Atorvastatin] Myalgia     Patient stated she had tried several of the statins and couldn't tolerate any of them and the doctor told her to continue her fish oil   • Penicillins Hives       Objective     Objective     Vitals:   Temp:  [98 °F (36.7 °C)] 98 °F (36.7 °C)  Heart Rate:  [68-97] 76  Resp:  [22] 22  BP: (148-180)/() 148/75  Flow (L/min):  [2] 2    Physical Exam  Constitutional:       General: She is not in acute distress.     Appearance: She is well-developed.      Interventions: Nasal cannula in place.   HENT:      Head: Normocephalic and atraumatic.   Eyes:      Conjunctiva/sclera: Conjunctivae normal.   Neck:      Trachea: No tracheal deviation.   Cardiovascular:      Rate and Rhythm: Normal rate and regular rhythm.      Pulses:           Dorsalis pedis pulses are 2+ on the right side and 2+ on the left side.      Heart sounds: No murmur heard.    No friction rub. No gallop.      Comments: 1-2+ pitting edema B/L lower extremities  Pulmonary:      Effort: No respiratory distress.      Breath sounds: Examination of the right-lower field reveals decreased breath sounds. Examination of the left-lower field reveals decreased breath sounds. Decreased breath sounds present. No wheezing or rales.   Chest:      Comments: L chest with PPM dressing; dressing with scant amount of dried blood  Abdominal:      General: Bowel sounds are normal. There is no distension.      Palpations: Abdomen is soft.      Tenderness: There is no abdominal tenderness. There is no guarding.   Skin:     General: Skin is warm and dry.      Findings: No erythema or rash.   Neurological:      Mental Status: She is alert.      Cranial Nerves: No cranial nerve deficit.      Comments: Oriented to self, place and situation; follows commands         Result Review    Result Review:  I have personally reviewed the results from the time of this admission to 5/15/2023 22:02 EDT and agree with these findings:  [x]  Laboratory list / accordion  []  Microbiology  [x]  Radiology  [x]  EKG/Telemetry   [x]  Cardiology/Vascular   []  Pathology  []  Old records  []  Other:  Most notable findings include: Troponin T improved at 31 with a repeat of  32.  proBNP slightly more elevated at 6,126.    CMP largely unremarkable; BUN/creatinine ratio 31.4, glucose 106, albumin 3.2.    CBC with WBC 11.70, MCV 97.5.0    EKG per my view reveals an A-sensed, V-paced rhythm    Echocardiogram 5/4/23:  Interpretation Summary       •  Left ventricular systolic function is normal. Left ventricular ejection fraction appears to be 61 - 65%.  •  Left ventricular diastolic function is consistent with (grade Ia w/high LAP) impaired relaxation.  •  The left atrial cavity is moderately dilated.  •  The right atrial cavity is moderately  dilated.  •  There is mild calcification of the aortic valve mainly affecting the non-coronary, left coronary and right coronary cusp(s).  •  Severe mitral valve regurgitation is present.  •  Severe tricuspid valve regurgitation is present.  •  Estimated right ventricular systolic pressure from tricuspid regurgitation is markedly elevated (>55 mmHg).    CXR 5/15/23 (images reviewed and compared to CXR from 5/12/23):  FINDINGS:    Lungs and pleural spaces:  Interstitial edema is noted.  Pulmonary  vascular congestion.  Small effusions.  Bibasilar airspace disease.  No  pneumothorax.    Heart:  Cardiomegaly stable.    Mediastinum:  Unremarkable.    Bones/joints:  Stable bony structures.    Tubes, lines and devices:  Left cardiac pacer device.     IMPRESSION:    Slight increase in changes of CHF/edema.    CT chest without contrast:  FINDINGS:  Moderate cardiomegaly. Negative for thoracic aortic aneurysm. Small to  moderate volume right and small left pleural effusions. No pericardial  effusion. No pneumothorax.     Central predominant interstitial opacities.     Degenerative changes in the spine. Anterior subluxation of the right  shoulder which could be acute or chronic with large right joint  effusion.     IMPRESSION:  1.  Central predominant interstitial opacities could represent fluid  overload, less likely atypical pneumonia.  2.  Bilateral pleural  effusions, right larger than left.  3.  Anterior subluxation of the right shoulder which could be acute or  chronic with large right joint effusion.      Assessment / Plan     Brief Patient Summary:  Azra Ohara is a 88 y.o. female who presents from a local nursing facility with a 4-day history of progressive shortness of air despite outpatient therapy with oral Lasix.  This is in the setting of known HFpEF with known left and right atrial cavity dilatation, severe MVR, severe TVR with an estimated RVSP markedly elevated at >55 mmHg.  Patient also status post recent pacemaker placement for complete heart block.    #Acute on chronic HFpEF, failed outpatient therapy  #Bilateral pleural effusions due to above  #Hypertensive urgency, present on admission and improved  #Mild troponin T elevation that is overall down-trending with flat trend  #Chronic microcytosis without anemia  #Incidentally noted anterior subluxation of the right shoulder, acute or chronic in nature with large right-sided joint effusion; patient asymptomatic  #Complete heart block status post recent permanent pacemaker placement  #Stage II decubitus ulcer, present on admission  #Generalized weakness/physical debility due to advanced age and prolonged hospitalization  -Patient has been placed on telemetry for observation  -HF pathway initiated; she received 40 mg IV lasix x 2 in ED; started patient on 80 mg IV BID to begin in a.m.  -Will give a dose of IV acetazolamide, 500 mg x 1 (ADVOR trial revealed decrease in LOS and successful de-congestion in the acute phase though no change in mortality rate)  -Currently awaiting pharmacy completion of home medication list; consider changing metoprolol tartrate to Toprol XL  -I have started the patient on Jardiance in hopes to decrease rate of mortality and frequency of HF hospitalizations  -Monitor intake/output and daily weights; patient currently with external catheter  -Will hold on repeat echocardiogram  for now as recently completed earlier this month  -Consider Cardiology consult pending results and clinical course  -Repeat chemistry panel in a.m. to follow electrolytes and renal function  -ReDs Vest ordered   -CHF Navigator consult placed  -Will hold on orthopedic surgery consult for now given incidentally noted right shoulder anterior subluxation with joint effusion as patient is asymptomatic  -Images of decubitus ulcer reviewed under media tab; turn per protocol; magic barrier cream ordered  -Plan to consult PT in a.m.    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    CODE STATUS:    Medical Intervention Limits: NO intubation (DNI)  Code Status (Patient has no pulse and is not breathing): No CPR (Do Not Attempt to Resuscitate)  Medical Interventions (Patient has pulse or is breathing): Limited Support    Admission Status:  I believe this patient meets Observation status.    Patient's medical record from her recent ER visit here in addition to her recent hospitalization here at [facility] has been reviewed and summarized in the HPI.    Case discussed with: Patient, nephew at bedside and NATALY Sherwood    Patient is considered to be high risk due to: Advanced age, recent prolonged hospitalization, CKD    Rebecca Rodrigues DO

## 2023-05-16 NOTE — DISCHARGE PLACEMENT REQUEST
"Wenceslao Gómez (88 y.o. Female)     Date of Birth   1935    Social Security Number       Address   [address] [address]    Home Phone   [phone]    MRN   [id number]       Amish   None    Marital Status   Single                            Admission Date   5/15/23    Admission Type   Emergency    Admitting Provider   Rebecca Rodrigues DO    Attending Provider   Bradley Jacobo DO    Department, Room/Bed   [address], 3302/1S       Discharge Date       Discharge Disposition       Discharge Destination                               Attending Provider: Bradley Jacobo DO    Allergies: Lipitor [Atorvastatin], Penicillins    Isolation: None   Infection: None   Code Status: No CPR    Ht: 165.1 cm (65\")   Wt: 81.2 kg (179 lb)    Admission Cmt: None   Principal Problem: Acute congestive heart failure, unspecified heart failure type [I50.9]                 Active Insurance as of 5/15/2023     Primary Coverage     Payor Plan Insurance Group Employer/Plan Group    [facility] MEDICARE REPLACEMENT [facility] MEDICARE REPLACEMENT [id number]     Payor Plan Address Payor Plan Phone Number Payor Plan Fax Number Effective Dates    [address] [address]   1/1/2023 - None Entered    [facility] [id number]       Subscriber Name Subscriber Birth Date Member ID       WENCESLAO GÓMEZ 1935 [id number]                 Emergency Contacts      (Rel.) Home Phone Work Phone Mobile Phone    Alexandru Schmidt (healthcare surrogate/ nephew) (Other) -- -- [phone]    MELISSA SCHMIDT (Sister) [phone] -- --    Gonzalez Barrera (nephew) (Other) -- -- [phone]            Emergency Contact Information     Name Relation Home Work Mobile    Alexandru Schmidt (healthcare surrogate/ nephew) Other   [phone]    SHANIQUAMELISSA Sister [phone]      Gonzalez Barrera (nephew) Other   [phone]          Insurance Information                [employer] MEDICARE " REPLACEMENT/[facility] MEDICARE REPLACEMENT Phone: --    Subscriber: Azra Ohara Subscriber#: [id number]    Group#: [id number] Precert#: --             History & Physical      Rebecca Rodrigues DO at 05/15/23 2202          [facility]   HISTORY AND PHYSICAL    Patient Name: Azra Ohara  : 1935  MRN: [id number]  Primary Care Physician:  Minal Salazar APRN  Date of admission: 5/15/2023    Subjective   Subjective     Chief Complaint: Shortness of air    History of Present Illness the patient is an 88-year-old female, current nursing home resident with past medical history significant for chronic HFpEF, left bundle branch block with recent history of complete heart block status post permanent pacemaker placement earlier this month, hypertension, CKD 4 and previous history of left bundle branch block who presents from a local nursing facility complaining of shortness of air.    Patient was discharged from our service on or about 2023.  The patient presented to the emergency department on 2023 with reports of tachycardia at the nursing facility.  Patient had no respiratory complaints at that time.  Work-up in the ED revealed a chest x-ray consistent with volume overload thought to be due to heart failure exacerbation; patient was given IV Lasix x1 dose in the ED and was given a prescription for 20 mg p.o. Lasix daily x30 days.      According to the ED provider, nursing facility documentation reveals that patient has received her daily Lasix for the past 2 days since her discharge from the nursing home.  Patient now reportedly with complaints of shortness of air.    Patient was seen and examined in 303-1.  Her nephew Gonzalez is present at bedside.    The patient reports a 4-day history of progressive shortness of air.  She denies fever and/or chills.  She denies significant cough.  She denies any chest pains or pressure.  Patient further denies any palpitations.  She denied any nausea or  and/or vomiting.    Review of Systems   Constitutional: Negative for chills and fever.   HENT: Negative for hearing loss and trouble swallowing.    Eyes: Negative for visual disturbance.   Respiratory: Positive for shortness of breath. Negative for cough and chest tightness.    Cardiovascular: Negative for chest pain and palpitations.   Gastrointestinal: Negative for abdominal pain, diarrhea, nausea and vomiting.   Genitourinary: Negative for decreased urine volume and dysuria.   Musculoskeletal: Positive for arthralgias (chronic, mostly knees. Denied any R shoulder pain.).   Skin: Negative for rash.   Neurological: Positive for weakness. Negative for syncope.   Psychiatric/Behavioral: Negative for agitation and confusion.      Personal History     Past Medical History:   Diagnosis Date   • Chronic kidney disease, stage 4 (severe) 01/20/2023   • Essential hypertension    • History of colon cancer    • Left bundle branch block 01/20/2023   • Long term (current) use of aspirin 01/20/2023   • Polycythemia vera 01/20/2023       Past Surgical History:   Procedure Laterality Date   • CARDIAC ELECTROPHYSIOLOGY PROCEDURE N/A 5/5/2023    Procedure: Pacemaker DC new;  Surgeon: Sampson Garvin MD;  Location: [facility] INVASIVE LOCATION;  Service: Cardiology;  Laterality: N/A;   • COLON RESECTION  2004   • PARATHYROIDECTOMY Right     Parathyroid adenoma       Family History: family history includes Heart disease in her father and mother. Otherwise pertinent FHx was reviewed and not pertinent to current issue.    Social History:  reports that she has never smoked. She does not have any smokeless tobacco history on file. She reports that she does not drink alcohol and does not use drugs.    Home Medications:  Diclofenac Sodium, HYDROcodone-acetaminophen, acetaminophen, aspirin, docusate sodium, furosemide, losartan, melatonin, metoprolol tartrate, multivitamin with minerals, and traMADol    Allergies:  Allergies   Allergen  Reactions   • Lipitor [Atorvastatin] Myalgia     Patient stated she had tried several of the statins and couldn't tolerate any of them and the doctor told her to continue her fish oil   • Penicillins Hives       Objective    Objective     Vitals:   Temp:  [98 °F (36.7 °C)] 98 °F (36.7 °C)  Heart Rate:  [68-97] 76  Resp:  [22] 22  BP: (148-180)/() 148/75  Flow (L/min):  [2] 2    Physical Exam  Constitutional:       General: She is not in acute distress.     Appearance: She is well-developed.      Interventions: Nasal cannula in place.   HENT:      Head: Normocephalic and atraumatic.   Eyes:      Conjunctiva/sclera: Conjunctivae normal.   Neck:      Trachea: No tracheal deviation.   Cardiovascular:      Rate and Rhythm: Normal rate and regular rhythm.      Pulses:           Dorsalis pedis pulses are 2+ on the right side and 2+ on the left side.      Heart sounds: No murmur heard.    No friction rub. No gallop.      Comments: 1-2+ pitting edema B/L lower extremities  Pulmonary:      Effort: No respiratory distress.      Breath sounds: Examination of the right-lower field reveals decreased breath sounds. Examination of the left-lower field reveals decreased breath sounds. Decreased breath sounds present. No wheezing or rales.   Chest:      Comments: L chest with PPM dressing; dressing with scant amount of dried blood  Abdominal:      General: Bowel sounds are normal. There is no distension.      Palpations: Abdomen is soft.      Tenderness: There is no abdominal tenderness. There is no guarding.   Skin:     General: Skin is warm and dry.      Findings: No erythema or rash.   Neurological:      Mental Status: She is alert.      Cranial Nerves: No cranial nerve deficit.      Comments: Oriented to self, place and situation; follows commands         Result Review    Result Review:  I have personally reviewed the results from the time of this admission to 5/15/2023 22:02 EDT and agree with these findings:  [x]   Laboratory list / accordion  []  Microbiology  [x]  Radiology  [x]  EKG/Telemetry   [x]  Cardiology/Vascular   []  Pathology  []  Old records  []  Other:  Most notable findings include: Troponin T improved at 31 with a repeat of 32.  proBNP slightly more elevated at 6,126.    CMP largely unremarkable; BUN/creatinine ratio 31.4, glucose 106, albumin 3.2.    CBC with WBC 11.70, MCV 97.5.0    EKG per my view reveals an A-sensed, V-paced rhythm    Echocardiogram 5/4/23:  Interpretation Summary       •  Left ventricular systolic function is normal. Left ventricular ejection fraction appears to be 61 - 65%.  •  Left ventricular diastolic function is consistent with (grade Ia w/high LAP) impaired relaxation.  •  The left atrial cavity is moderately dilated.  •  The right atrial cavity is moderately  dilated.  •  There is mild calcification of the aortic valve mainly affecting the non-coronary, left coronary and right coronary cusp(s).  •  Severe mitral valve regurgitation is present.  •  Severe tricuspid valve regurgitation is present.  •  Estimated right ventricular systolic pressure from tricuspid regurgitation is markedly elevated (>55 mmHg).    CXR 5/15/23 (images reviewed and compared to CXR from 5/12/23):  FINDINGS:    Lungs and pleural spaces:  Interstitial edema is noted.  Pulmonary  vascular congestion.  Small effusions.  Bibasilar airspace disease.  No  pneumothorax.    Heart:  Cardiomegaly stable.    Mediastinum:  Unremarkable.    Bones/joints:  Stable bony structures.    Tubes, lines and devices:  Left cardiac pacer device.     IMPRESSION:    Slight increase in changes of CHF/edema.    CT chest without contrast:  FINDINGS:  Moderate cardiomegaly. Negative for thoracic aortic aneurysm. Small to  moderate volume right and small left pleural effusions. No pericardial  effusion. No pneumothorax.     Central predominant interstitial opacities.     Degenerative changes in the spine. Anterior subluxation of the  right  shoulder which could be acute or chronic with large right joint  effusion.     IMPRESSION:  1.  Central predominant interstitial opacities could represent fluid  overload, less likely atypical pneumonia.  2.  Bilateral pleural effusions, right larger than left.  3.  Anterior subluxation of the right shoulder which could be acute or  chronic with large right joint effusion.      Assessment / Plan     Brief Patient Summary:  Azra Ohara is a 88 y.o. female who presents from a local nursing facility with a 4-day history of progressive shortness of air despite outpatient therapy with oral Lasix.  This is in the setting of known HFpEF with known left and right atrial cavity dilatation, severe MVR, severe TVR with an estimated RVSP markedly elevated at >55 mmHg.  Patient also status post recent pacemaker placement for complete heart block.    #Acute on chronic HFpEF, failed outpatient therapy  #Bilateral pleural effusions due to above  #Hypertensive urgency, present on admission and improved  #Mild troponin T elevation that is overall down-trending with flat trend  #Chronic microcytosis without anemia  #Incidentally noted anterior subluxation of the right shoulder, acute or chronic in nature with large right-sided joint effusion; patient asymptomatic  #Complete heart block status post recent permanent pacemaker placement  #Stage II decubitus ulcer, present on admission  #Generalized weakness/physical debility due to advanced age and prolonged hospitalization  -Patient has been placed on telemetry for observation  -HF pathway initiated; she received 40 mg IV lasix x 2 in ED; started patient on 80 mg IV BID to begin in a.m.  -Will give a dose of IV acetazolamide, 500 mg x 1 (ADVOR trial revealed decrease in LOS and successful de-congestion in the acute phase though no change in mortality rate)  -Currently awaiting pharmacy completion of home medication list; consider changing metoprolol tartrate to Toprol XL  -I  have started the patient on Jardiance in hopes to decrease rate of mortality and frequency of HF hospitalizations  -Monitor intake/output and daily weights; patient currently with external catheter  -Will hold on repeat echocardiogram for now as recently completed earlier this month  -Consider Cardiology consult pending results and clinical course  -Repeat chemistry panel in a.m. to follow electrolytes and renal function  -ReDs Vest ordered   -CHF Navigator consult placed  -Will hold on orthopedic surgery consult for now given incidentally noted right shoulder anterior subluxation with joint effusion as patient is asymptomatic  -Images of decubitus ulcer reviewed under media tab; turn per protocol; magic barrier cream ordered  -Plan to consult PT in a.m.    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    CODE STATUS:    Medical Intervention Limits: NO intubation (DNI)  Code Status (Patient has no pulse and is not breathing): No CPR (Do Not Attempt to Resuscitate)  Medical Interventions (Patient has pulse or is breathing): Limited Support    Admission Status:  I believe this patient meets Observation status.    Patient's medical record from her recent ER visit here in addition to her recent hospitalization here at [facility] has been reviewed and summarized in the HPI.    Case discussed with: Patient, nephew at bedside and NATALY Sherwood    Patient is considered to be high risk due to: Advanced age, recent prolonged hospitalization, CKD    Rebecca Rodrigues DO    Electronically signed  by Rebecca Rodrigues,  at 05/15/23 2352       Vital Signs (last day)     Date/Time Temp Temp src Pulse Resp BP Patient Position SpO2    05/16/23 1050 97.5 (36.4) Tympanic 76 18 143/81 Lying 98    05/16/23 0809 -- -- -- -- -- -- 96    05/16/23 0632 98.6 (37) Oral 72 22 147/88 Lying 96    05/16/23 0228 98.2 (36.8) Oral 80 22 168/88 Lying 91    05/15/23 2202 98.3 (36.8) Oral 72 23 157/80 Lying 96    05/15/23 2131 -- -- 76 -- -- -- 96    05/15/23 2130 -- -- 80 -- -- -- 98    05/15/23 2108 -- -- 72 -- 148/75 -- 97    05/15/23 2030 -- -- 68 -- 171/105 -- 99    05/15/23 2015 -- -- 72 -- 166/98 -- 98    05/15/23 2000 -- -- -- -- 161/103 -- 100    05/15/23 1945 -- -- 72 -- 156/108 -- 99    05/15/23 1900 -- -- 76 -- 167/95 -- 98    05/15/23 1845 -- -- 74 -- 153/92 -- 98    05/15/23 1830 -- -- 73 -- 170/107 -- 98    05/15/23 1810 -- -- 75 -- 172/108 -- --    05/15/23 1800 -- -- 76 -- 172/108 -- 98    05/15/23 1746 -- -- 80 -- -- -- 95    05/15/23 1715 -- -- 76 -- 167/101 -- 97    05/15/23 1711 -- -- 76 -- -- -- 99    05/15/23 1701 -- -- 75 -- -- -- 99    05/15/23 1700 -- -- -- -- 173/93 -- 97    05/15/23 1646 -- -- 74 -- -- -- 99    05/15/23 1645 -- -- 97 -- 168/102 -- 97    05/15/23 1630 -- -- 73 -- 180/116 -- 98    05/15/23 1545 -- -- 77 -- 174/107 -- 98    05/15/23 1530 -- -- 75 -- 180/114 -- 98    05/15/23 1521 98 (36.7) Oral 78 22 176/116 Sitting 98          Lines, Drains & Airways     Active LDAs     Name Placement date Placement time Site Days    Peripheral IV 05/15/23 1535 Left Antecubital 05/15/23  1535  Antecubital  less than 1                  Current Facility-Administered Medications   Medication Dose Route Frequency Provider Last Rate Last Admin   • acetaminophen (TYLENOL) tablet 500 mg  500 mg Oral Q6H PRN Rebecca Rodrigues DO       • aspirin chewable tablet 81 mg  81 mg Oral Daily Rebecca Rodrigues DO   81 mg at 05/16/23 0925   • bisacodyl (DULCOLAX) suppository 10 mg  10 mg Rectal Daily PRN Lilia  Rebecca Gage, DO       • Diclofenac Sodium (VOLTAREN) 1 % gel 4 g  4 g Topical 4x Daily Rebecca Rodrigues, DO   4 g at 05/16/23 1358   • docusate sodium (COLACE) capsule 100 mg  100 mg Oral BID Rebecca Rodrigues, DO   100 mg at 05/16/23 0925   • donepezil (ARICEPT) tablet 5 mg  5 mg Oral Nightly Rebecca Rodrigues, DO       • empagliflozin (JARDIANCE) tablet 10 mg  10 mg Oral Daily Rebecca Rodrigues, DO   10 mg at 05/16/23 0925   • furosemide (LASIX) injection 80 mg  80 mg Intravenous BID Rebecca Rodrigues, DO   80 mg at 05/16/23 0925   • heparin (porcine) 5000 UNIT/ML injection 5,000 Units  5,000 Units Subcutaneous Q12H Rebecca Rodrigues, DO   5,000 Units at 05/16/23 0925   • HYDROcodone-acetaminophen (NORCO) 5-325 MG per tablet 1 tablet  1 tablet Oral Q8H PRN Rebecca Rodrigues, DO   1 tablet at 05/16/23 1400   • lactulose (CHRONULAC) 10 GM/15ML solution 20 g  20 g Oral Daily PRN Rebecca Rodrigues, DO       • losartan (COZAAR) tablet 100 mg  100 mg Oral Q24H Rebecca Rodrigues, DO   100 mg at 05/16/23 0925   • magic barrier cream 1 application  1 application Topical 4x Daily PRN Rebecca Rodrigues, DO       • melatonin tablet 3 mg  3 mg Oral Nightly PRN Rebecca Rodrigues, DO       • memantine (NAMENDA) tablet 5 mg  5 mg Oral Daily Rebecca Rodrigues, DO   5 mg at 05/16/23 0925   • metoprolol tartrate (LOPRESSOR) tablet 25 mg  25 mg Oral BID Rebecca Rodrigues, DO   25 mg at 05/16/23 0925   • multivitamin with minerals 1 tablet  1 tablet Oral Daily Rebecca Rodrigues, DO   1 tablet at 05/16/23 0925   • sodium chloride 0.9 % flush 10 mL  10 mL Intravenous PRN Rebecca Rodrigues, DO       • traMADol (ULTRAM) tablet 25 mg  25 mg Oral Q6H PRN Rebecca Rodrigues, DO   25 mg at 05/16/23 0719       Lab Results (last 24 hours)     Procedure Component Value Units Date/Time    Basic Metabolic Panel [[id number]]  (Abnormal) Collected: 05/16/23 0204    Specimen: Blood Updated: 05/16/23  0238     Glucose 94 mg/dL      BUN 25 mg/dL      Creatinine 0.81 mg/dL      Sodium 142 mmol/L      Potassium 3.1 mmol/L      Chloride 104 mmol/L      CO2 24.9 mmol/L      Calcium 9.4 mg/dL      BUN/Creatinine Ratio 30.9     Anion Gap 13.1 mmol/L      eGFR 69.9 mL/min/1.73     Narrative:      GFR Normal >60  Chronic Kidney Disease <60  Kidney Failure <15    The GFR formula is only valid for adults with stable renal function between ages 18 and 70.    CBC (No Diff) [[id number]]  (Abnormal) Collected: 05/16/23 0204    Specimen: Blood Updated: 05/16/23 0209     WBC 9.68 10*3/mm3      RBC 3.95 10*6/mm3      Hemoglobin 11.9 g/dL      Hematocrit 38.4 %      MCV 97.2 fL      MCH 30.1 pg      MCHC 31.0 g/dL      RDW 14.7 %      RDW-SD 52.7 fl      MPV 11.4 fL      Platelets 223 10*3/mm3     High Sensitivity Troponin T 2Hr [[id number]]  (Abnormal) Collected: 05/15/23 1828    Specimen: Blood Updated: 05/15/23 1930     HS Troponin T 32 ng/L      Troponin T Delta 1 ng/L     Narrative:      High Sensitive Troponin T Reference Range:  <10.0 ng/L- Negative Female for AMI  <15.0 ng/L- Negative Male for AMI  >=10 - Abnormal Female indicating possible myocardial injury.  >=15 - Abnormal Male indicating possible myocardial injury.   Clinicians would have to utilize clinical acumen, EKG, Troponin, and serial changes to determine if it is an Acute Myocardial Infarction or myocardial injury due to an underlying chronic condition.         Comprehensive Metabolic Panel [[id number]]  (Abnormal) Collected: 05/15/23 1633    Specimen: Blood Updated: 05/15/23 1714     Glucose 106 mg/dL      BUN 27 mg/dL      Creatinine 0.86 mg/dL      Sodium 142 mmol/L      Potassium 4.1 mmol/L      Chloride 106 mmol/L      CO2 23.9 mmol/L      Calcium 9.8 mg/dL      Total Protein 6.8 g/dL      Albumin 3.2 g/dL      ALT (SGPT) 5 U/L      AST (SGOT) 14 U/L      Alkaline Phosphatase 75 U/L      Total Bilirubin 0.5 mg/dL      Globulin 3.6 gm/dL      A/G Ratio 0.9 g/dL       BUN/Creatinine Ratio 31.4     Anion Gap 12.1 mmol/L      eGFR 65.1 mL/min/1.73     Narrative:      GFR Normal >60  Chronic Kidney Disease <60  Kidney Failure <15    The GFR formula is only valid for adults with stable renal function between ages 18 and 70.    High Sensitivity Troponin T [[id number]]  (Abnormal) Collected: 05/15/23 1633    Specimen: Blood Updated: 05/15/23 1714     HS Troponin T 31 ng/L     Narrative:      High Sensitive Troponin T Reference Range:  <10.0 ng/L- Negative Female for AMI  <15.0 ng/L- Negative Male for AMI  >=10 - Abnormal Female indicating possible myocardial injury.  >=15 - Abnormal Male indicating possible myocardial injury.   Clinicians would have to utilize clinical acumen, EKG, Troponin, and serial changes to determine if it is an Acute Myocardial Infarction or myocardial injury due to an underlying chronic condition.         C-reactive Protein [[id number]]  (Abnormal) Collected: 05/15/23 1633    Specimen: Blood Updated: 05/15/23 1714     C-Reactive Protein 9.43 mg/dL     BNP [[id number]]  (Abnormal) Collected: 05/15/23 1633    Specimen: Blood Updated: 05/15/23 1711     proBNP 6,216.0 pg/mL     Narrative:      Among patients with dyspnea, NT-proBNP is highly sensitive for the detection of acute congestive heart failure. In addition NT-proBNP of <300 pg/ml effectively rules out acute congestive heart failure with 99% negative predictive value.      Blood Culture - Blood, Arm, Right [[id number]] Collected: 05/15/23 1620    Specimen: Blood from Arm, Right Updated: 05/15/23 1624    Blood Culture - Blood, Arm, Left [[id number]] Collected: 05/15/23 1620    Specimen: Blood from Arm, Left Updated: 05/15/23 1624    Lactic Acid, Plasma [[id number]]  (Normal) Collected: 05/15/23 1547    Specimen: Blood Updated: 05/15/23 1617     Lactate 1.2 mmol/L     COVID-19 and FLU A/B PCR - Swab, Nasopharynx [[id number]]  (Normal) Collected: 05/15/23 1547    Specimen: Swab from Nasopharynx Updated: 05/15/23  1616     COVID19 Not Detected     Influenza A PCR Not Detected     Influenza B PCR Not Detected    Narrative:      Fact sheet for providers: https://www.fda.gov/media/201926/download    Fact sheet for patients: https://www.fda.gov/media/659492/download    Test performed by PCR.    Blood Gas, Arterial With Co-Ox [[id number]]  (Abnormal) Collected: 05/15/23 1614    Specimen: Arterial Blood Updated: 05/15/23 1615     Site Right Radial     Chai's Test Positive     pH, Arterial 7.475 pH units      Comment: 83 Value above reference range        pCO2, Arterial 33.6 mm Hg      pO2, Arterial 76.8 mm Hg      Comment: 84 Value below reference range        HCO3, Arterial 24.7 mmol/L      Base Excess, Arterial 1.6 mmol/L      O2 Saturation, Arterial 96.5 %      Hemoglobin, Blood Gas 12.9 g/dL      Comment: 84 Value below reference range        Hematocrit, Blood Gas 39.6 %      Oxyhemoglobin 95.3 %      Methemoglobin 0.00 %      Comment: 84 Value below reference range        Carboxyhemoglobin 1.3 %      A-a DO2 77.2 mmHg      CO2 Content 25.8 mmol/L      Barometric Pressure for Blood Gas 731 mmHg      Modality Nasal Cannula     FIO2 28 %      Flow Rate 2.0 lpm      Ventilator Mode NA     Note Read back and acknowledge     Notified Who MAYTE MUSE // RN     Notified By MICHELLE     Collected by [id number]     Comment: Meter: [id number]     :  [id number]        pH, Temp Corrected --     pCO2, Temperature Corrected --     pO2, Temperature Corrected --    CBC & Differential [[id number]]  (Abnormal) Collected: 05/15/23 1547    Specimen: Blood Updated: 05/15/23 1556    Narrative:      The following orders were created for panel order CBC & Differential.  Procedure                               Abnormality         Status                     ---------                               -----------         ------                     CBC Auto Differential[[id number]]        Abnormal            Final result                 Please view results  for these tests on the individual orders.    CBC Auto Differential [[id number]]  (Abnormal) Collected: 05/15/23 1547    Specimen: Blood Updated: 05/15/23 1556     WBC 11.70 10*3/mm3      RBC 4.39 10*6/mm3      Hemoglobin 13.3 g/dL      Hematocrit 42.8 %      MCV 97.5 fL      MCH 30.3 pg      MCHC 31.1 g/dL      RDW 14.9 %      RDW-SD 53.4 fl      MPV 11.6 fL      Platelets 273 10*3/mm3      Neutrophil % 80.8 %      Lymphocyte % 11.0 %      Monocyte % 6.3 %      Eosinophil % 0.9 %      Basophil % 0.5 %      Immature Grans % 0.5 %      Neutrophils, Absolute 9.44 10*3/mm3      Lymphocytes, Absolute 1.29 10*3/mm3      Monocytes, Absolute 0.74 10*3/mm3      Eosinophils, Absolute 0.11 10*3/mm3      Basophils, Absolute 0.06 10*3/mm3      Immature Grans, Absolute 0.06 10*3/mm3      nRBC 0.0 /100 WBC         Orders (last 24 hrs)      Start     Ordered    05/17/23 0600  CBC (No Diff)  Morning Draw         05/16/23 1048    05/17/23 0600  Basic Metabolic Panel  Morning Draw         05/16/23 1048    05/16/23 2100  donepezil (ARICEPT) tablet 5 mg  Nightly         05/16/23 0231 05/16/23 1414  OT Consult: Eval & Treat  Once         05/16/23 1413    05/16/23 0900  furosemide (LASIX) injection 80 mg  2 Times Daily         05/15/23 2149    05/16/23 0900  empagliflozin (JARDIANCE) tablet 10 mg  Daily         05/15/23 2149 05/16/23 0900  losartan (COZAAR) tablet 100 mg  Every 24 Hours Scheduled         05/16/23 0231 05/16/23 0900  multivitamin with minerals 1 tablet  Daily         05/16/23 0231 05/16/23 0900  metoprolol tartrate (LOPRESSOR) tablet 25 mg  2 Times Daily         05/16/23 0231 05/16/23 0900  memantine (NAMENDA) tablet 5 mg  Daily         05/16/23 0231    05/16/23 0900  aspirin chewable tablet 81 mg  Daily         05/16/23 0231 05/16/23 0900  docusate sodium (COLACE) capsule 100 mg  2 Times Daily         05/16/23 0231    05/16/23 0900  potassium chloride (K-DUR,KLOR-CON) CR tablet 40 mEq  Daily,   Status:   Discontinued         05/16/23 0302    05/16/23 0818  Turn Patient  Now Then Every 2 Hours         05/16/23 0818    05/16/23 0818  Use Repositioning Wedge to Position Patient  Continuous         05/16/23 0818    05/16/23 0818  Use Seat Cushion When Up In Chair  Continuous         05/16/23 0818    05/16/23 0818  Elevate Heels Off of Bed  Until Discontinued         05/16/23 0818    05/16/23 0627  Case Management  Consult  Once        Provider:  (Not yet assigned)    05/16/23 0626    05/16/23 0600  Basic Metabolic Panel  Morning Draw         05/15/23 2149    05/16/23 0600  CBC (No Diff)  Morning Draw         05/15/23 2149    05/16/23 0400  potassium chloride (K-DUR,KLOR-CON) CR tablet 40 mEq  Once         05/16/23 0303    05/16/23 0249  magic barrier cream 1 application  4 Times Daily PRN         05/16/23 0249    05/16/23 0231  acetaminophen (TYLENOL) tablet 500 mg  Every 6 Hours PRN         05/16/23 0231    05/16/23 0231  melatonin tablet 3 mg  Nightly PRN         05/16/23 0231    05/16/23 0231  traMADol (ULTRAM) tablet 25 mg  Every 6 Hours PRN         05/16/23 0231    05/16/23 0231  bisacodyl (DULCOLAX) suppository 10 mg  Daily PRN         05/16/23 0231    05/16/23 0231  lactulose (CHRONULAC) 10 GM/15ML solution 20 g  Daily PRN         05/16/23 0231    05/16/23 0231  HYDROcodone-acetaminophen (NORCO) 5-325 MG per tablet 1 tablet  Every 8 Hours PRN         05/16/23 0231    05/16/23 0045  acetaZOLAMIDE (DIAMOX) 500 mg in sodium chloride 0.9 % 50 mL IVPB  Once         05/15/23 2348    05/16/23 0000  Strict Intake & Output  Every 4 Hours       05/15/23 2149    05/15/23 2353  PT Consult: Eval & Treat As Tolerated; Functional Mobility Below Baseline  Once         05/15/23 2352    05/15/23 2330  Diclofenac Sodium (VOLTAREN) 1 % gel 4 g  4 Times Daily         05/15/23 2241    05/15/23 2300  heparin (porcine) 5000 UNIT/ML injection 5,000 Units  Every 12 Hours Scheduled         05/15/23 2145    05/15/23 5250   Wound Ostomy Eval & Treat  Once         05/15/23 2241    05/15/23 2150  Daily Weights  Daily       05/15/23 2149    05/15/23 2150  Cardiac Monitoring  Continuous        Comments: Follow Standing Orders As Outlined in Process Instructions (Open Order Report to View Full Instructions)    05/15/23 2149    05/15/23 2150  Vital Signs  Per Hospital Policy         05/15/23 2149    05/15/23 2150  ReDs Vest  Once         05/15/23 2149    05/15/23 2150  Inpatient Heart Failure Navigator Consult  Once        Provider:  (Not yet assigned)    05/15/23 2149    05/15/23 2150  Code Status and Medical Interventions:  Continuous         05/15/23 2149    05/15/23 2150  Diet: Cardiac Diets; Healthy Heart (2-3 Na+); Texture: Regular Texture (IDDSI 7); Fluid Consistency: Thin (IDDSI 0)  Diet Effective Now         05/15/23 2149    05/15/23 2150  Apply External Urinary Catheter  Once         05/15/23 2149    05/15/23 2105  Initiate Observation Status  Once         05/15/23 2105    05/15/23 2056  furosemide (LASIX) injection 40 mg  Once         05/15/23 2054    05/15/23 2055  Hospitalist (on-call MD unless specified)  Once        Specialty:  Hospitalist  Provider:  Rebecca Rodrigues DO    05/15/23 2054    05/15/23 1840  HYDROcodone-acetaminophen (NORCO) 5-325 MG per tablet 1 tablet  Once         05/15/23 1838    05/15/23 1833  High Sensitivity Troponin T 2Hr  PROCEDURE ONCE         05/15/23 1714    05/15/23 1729  furosemide (LASIX) injection 40 mg  Once         05/15/23 1727    05/15/23 1727  CT Chest Without Contrast Diagnostic  1 Time Imaging         05/15/23 1726    05/15/23 1616  Blood Gas, Arterial With Co-Ox  PROCEDURE ONCE         05/15/23 1614    05/15/23 1608  Comprehensive Metabolic Panel  Once         05/15/23 1544    05/15/23 1608  High Sensitivity Troponin T  Once         05/15/23 1544    05/15/23 1608  BNP  Once         05/15/23 1544    05/15/23 1608  C-reactive Protein  Once         05/15/23 1544    05/15/23 1544   COVID-19 and FLU A/B PCR - Swab, Nasopharynx  Once         05/15/23 1544    05/15/23 1544  Insert Peripheral IV  Once        See Hyperspace for full Linked Orders Report.    05/15/23 1544    05/15/23 1544  Monitor Blood Pressure  Per Hospital Policy         05/15/23 1544    05/15/23 1544  Cardiac Monitoring  Continuous,   Status:  Canceled        Comments: Follow Standing Orders As Outlined in Process Instructions (Open Order Report to View Full Instructions)    05/15/23 1544    05/15/23 1544  Pulse Oximetry, Continuous  Continuous         05/15/23 1544    05/15/23 1544  XR Chest 1 View  1 Time Imaging         05/15/23 1544    05/15/23 1544  Urinalysis With Microscopic If Indicated (No Culture) - Urine, Clean Catch  Once         05/15/23 1544    05/15/23 1544  Blood Gas, Arterial -With Co-Ox Panel: Yes  Once         05/15/23 1544    05/15/23 1544  ECG 12 Lead Dyspnea  Once,   Status:  Canceled         05/15/23 1544    05/15/23 1544  Lactic Acid, Plasma  Once         05/15/23 1544    05/15/23 1544  Blood Culture - Blood, Arm, Right  Once         05/15/23 1544    05/15/23 1544  Blood Culture - Blood, Arm, Left  Once         05/15/23 1544    05/15/23 1543  sodium chloride 0.9 % flush 10 mL  As Needed        See Hyperspace for full Linked Orders Report.    05/15/23 1544    05/15/23 1543  CBC & Differential  Once         05/15/23 1544    05/15/23 1543  CBC Auto Differential  PROCEDURE ONCE         05/15/23 1544    05/15/23 1525  ECG 12 Lead Dyspnea  Once         05/15/23 1524    Unscheduled  Wound Care  As Needed       05/16/23 0818    --  donepezil (ARICEPT) 5 MG tablet  Nightly         05/16/23 0021    --  memantine (NAMENDA) 5 MG tablet  Daily         05/16/23 0021    --  bisacodyl (DULCOLAX) 10 MG suppository  Daily PRN         05/16/23 0021    --  lactulose (CHRONULAC) 10 GM/15ML solution  Daily PRN         05/16/23 0021    --  HYDROcodone-acetaminophen (NORCO) 5-325 MG per tablet  Every 8 Hours PRN         05/16/23  0022    --  aspirin 81 MG chewable tablet  Daily         23 0215    --  docusate sodium (COLACE) 100 MG capsule  2 Times Daily         23 0219    --  SCANNED - TELEMETRY           05/15/23 0000                Operative/Procedure Notes (last 24 hours)  Notes from 05/15/23 1434 through 23 1434   No notes of this type exist for this encounter.            Physician Progress Notes (last 24 hours)      Bradley Jacobo DO at 23 1041              [facility]     Patient Identification:  Name:  Azra Ohara  Age:  88 y.o.  Sex:  female  :  1935  MRN:  [id number]  Visit Number:  [id number]  Primary Care Provider:  Minal Salazar APRN    Length of stay:  0    Chief complaint: Shortness of breath    Subjective:    Patient reports her shortness of breath is slightly improved in comparison to prior to admission.  Patient denies any fevers, chills, sweats, rigors, nausea or vomiting.  No new events since admission.  ----------------------------------------------------------------------------------------------------------------------  Current Hospital Meds:  aspirin, 81 mg, Oral, Daily  Diclofenac Sodium, 4 g, Topical, 4x Daily  docusate sodium, 100 mg, Oral, BID  donepezil, 5 mg, Oral, Nightly  empagliflozin, 10 mg, Oral, Daily  furosemide, 80 mg, Intravenous, BID  heparin (porcine), 5,000 Units, Subcutaneous, Q12H  losartan, 100 mg, Oral, Q24H  memantine, 5 mg, Oral, Daily  metoprolol tartrate, 25 mg, Oral, BID  multivitamin with minerals, 1 tablet, Oral, Daily         ----------------------------------------------------------------------------------------------------------------------  Vital Signs:  Temp:  [98 °F (36.7 °C)-98.6 °F (37 °C)] 98.6 °F (37 °C)  Heart Rate:  [68-97] 72  Resp:  [22-23] 22  BP: (147-180)/() 147/88      05/15/23  1521 23  0500   Weight: 81.2 kg (179 lb) 81.2 kg (179 lb) (new admit)     Body mass index is  29.79 kg/m².    Intake/Output Summary (Last 24 hours) at 5/16/2023 1041  Last data filed at 5/16/2023 0321  Gross per 24 hour   Intake --   Output 1000 ml   Net -1000 ml     Diet: Cardiac Diets; Healthy Heart (2-3 Na+); Texture: Regular Texture (IDDSI 7); Fluid Consistency: Thin (IDDSI 0)  ----------------------------------------------------------------------------------------------------------------------  Physical exam:  Constitutional: Elderly appearing  female in no apparent distress.     HENT:  Head:  Normocephalic and atraumatic.  Mouth:  Moist mucous membranes.    Eyes:  Conjunctivae and EOM are normal.  Pupils are equal, round, and reactive to light.  No scleral icterus.    Neck:  Neck supple. No thyromegaly.  No JVD present.    Cardiovascular:  Regular rate and rhythm with no murmurs, rubs, clicks or gallops appreciated.  Pulmonary/Chest: Faint bibasilar crackles on deep inspiration with no wheezes or rhonchi appreciated.  Abdominal:  Soft. Nontender. Nondistended  Bowel sounds are normal in all four quadrants. No organomegally appreciated.   Musculoskeletal:  No edema, no tenderness, and no deformity.  No red or swollen joints anywhere.    Neurological:  Alert, Cranial nerves II-XII intact with no focal deficits.  No facial droop.  No slurred speech.   Skin:  Warm and dry to palpation with no rashes or lesions appreciated.  Peripheral vascular:  2+ radial and pedal pulses in bilateral upper and lower extremities.  Psychiatric:  Alert and oriented x3, demonstrates appropriate judgment and insight.  ---------------------------------------------------------------------------------   ----------------------------------------------------------------------------------------------------------------------  Results from last 7 days   Lab Units 05/15/23  1828 05/15/23  1633 05/14/23  0038   CK TOTAL U/L  --   --  40   HSTROP T ng/L 32* 31* 39*     Results from last 7 days   Lab Units 05/16/23  0204  05/15/23  1633 05/15/23  1547 05/14/23  0038 05/12/23  1524 05/12/23  1414   CRP mg/dL  --  9.43*  --  3.58*  --   --    LACTATE mmol/L  --   --  1.2  --   --   --    WBC 10*3/mm3 9.68  --  11.70* 12.51*   < > 8.22   HEMOGLOBIN g/dL 11.9*  --  13.3 12.3   < > 13.2   HEMATOCRIT % 38.4  --  42.8 38.4   < > 42.2   MCV fL 97.2*  --  97.5* 95.8   < > 97.2*   MCHC g/dL 31.0*  --  31.1* 32.0   < > 31.3*   PLATELETS 10*3/mm3 223  --  273 232   < > 211   INR   --   --   --   --   --  0.99    < > = values in this interval not displayed.     Results from last 7 days   Lab Units 05/15/23  1614   PH, ARTERIAL pH units 7.475*   PO2 ART mm Hg 76.8*   PCO2, ARTERIAL mm Hg 33.6*   HCO3 ART mmol/L 24.7     Results from last 7 days   Lab Units 05/16/23  0204 05/15/23  1633 05/14/23  0038 05/12/23  1414 05/11/23  0145 05/10/23  0223   SODIUM mmol/L 142 142 139 143  --  141   POTASSIUM mmol/L 3.1* 4.1 4.0 4.2  --  3.7   MAGNESIUM mg/dL  --   --  1.7  --  2.0 1.9   CHLORIDE mmol/L 104 106 106 108*  --  111*   CO2 mmol/L 24.9 23.9 23.9 26.1  --  22.7   BUN mg/dL 25* 27* 27* 21  --  19   CREATININE mg/dL 0.81 0.86 0.88 0.83  --  0.62   CALCIUM mg/dL 9.4 9.8 9.7 10.0  --  9.0   GLUCOSE mg/dL 94 106* 128* 124*  --  98   ALBUMIN g/dL  --  3.2* 3.1* 3.6  --   --    BILIRUBIN mg/dL  --  0.5 0.4 0.4  --   --    ALK PHOS U/L  --  75 74 78  --   --    AST (SGOT) U/L  --  14 16 22  --   --    ALT (SGPT) U/L  --  5 5 6  --   --    Estimated Creatinine Clearance: 50.6 mL/min (by C-G formula based on SCr of 0.81 mg/dL).    No results found for: AMMONIA  Results from last 7 days   Lab Units 05/14/23  0038   CHOLESTEROL mg/dL 110   TRIGLYCERIDES mg/dL 72   HDL CHOL mg/dL 53   LDL CHOL mg/dL 42     No results found for: BLOODCX  No results found for: URINECX  No results found for: WOUNDCX  No results found for: STOOLCX    I have personally looked at the labs and they are summarized  above.  ----------------------------------------------------------------------------------------------------------------------  Imaging Results (Last 24 Hours)     Procedure Component Value Units Date/Time    CT Chest Without Contrast Diagnostic [[id number]] Collected: 05/15/23 1844     Updated: 05/15/23 1849    Narrative:      INDICATION: Shortness of breath.     COMPARISON: No relevant priors available     TECHNIQUE: Axial CT images of the chest were obtained without IV  contrast administration. Coronal and sagittal reformations were  reviewed.     FINDINGS:  Moderate cardiomegaly. Negative for thoracic aortic aneurysm. Small to  moderate volume right and small left pleural effusions. No pericardial  effusion. No pneumothorax.     Central predominant interstitial opacities.     Degenerative changes in the spine. Anterior subluxation of the right  shoulder which could be acute or chronic with large right joint  effusion.       Impression:      1.  Central predominant interstitial opacities could represent fluid  overload, less likely atypical pneumonia.  2.  Bilateral pleural effusions, right larger than left.  3.  Anterior subluxation of the right shoulder which could be acute or  chronic with large right joint effusion.     This report was finalized on 5/15/2023 6:47 PM by Alex Pallas, DO.       XR Chest 1 View [[id number]] Collected: 05/15/23 1636     Updated: 05/15/23 1645    Narrative:      EXAM:    XR Chest, 1 View     EXAM DATE:    5/15/2023 3:58 PM     CLINICAL HISTORY:    SOB     TECHNIQUE:    Frontal view of the chest.     COMPARISON:    05/12/2023     FINDINGS:    Lungs and pleural spaces:  Interstitial edema is noted.  Pulmonary  vascular congestion.  Small effusions.  Bibasilar airspace disease.  No  pneumothorax.    Heart:  Cardiomegaly stable.    Mediastinum:  Unremarkable.    Bones/joints:  Stable bony structures.    Tubes, lines and devices:  Left cardiac pacer device.       Impression:        Slight  increase in changes of CHF/edema.     This report was finalized on 5/15/2023 4:43 PM by Dr. Dalton Lawrence MD.           ----------------------------------------------------------------------------------------------------------------------  Assessment and Plan:    1.  Acute on chronic HFpEF -continue Lasix 80 mg IV twice daily and monitor renal function closely.  Patient was already given dose of acetazolamide in the emergency department.  Have also started Jardiance.    2.  Bilateral pleural effusions -likely secondary to #1, continue IV diuretics.    3.  Hypertensive urgency -now much improved, continue to monitor closely and make antihypertensive medication adjustments as necessary.    4.  Anterior subluxation of right shoulder -currently asymptomatic, will refer to orthopedic surgery in an outpatient setting    5.  CKD stage II -stable    6.  General debility -we will consult PT/OT while hospitalized    Disposition will likely need an additional 48 hours of hospitalization then plan to return back to nursing home.    Bradley Jacobo DO   23   10:41 EDT       Electronically signed by Bradley Jacobo DO at 23 1048       Consult Notes (last 24 hours)  Notes from 05/15/23 1434 through 23 1434   No notes of this type exist for this encounter.            Physical Therapy Notes (most recent note)      Sonya Jackson, PT at 23 1119  Version 1 of 1         Acute Care - Physical Therapy Initial Evaluation  [facility]     Patient Name: Azra Ohara  : 1935  MRN: [id number]  Today's Date: 2023   Onset of Illness/Injury or Date of Surgery: 05/15/23  Visit Dx:     ICD-10-CM ICD-9-CM   1. Acute congestive heart failure, unspecified heart failure type  I50.9 428.0     Patient Active Problem List   Diagnosis   • Chronic kidney disease, stage 4 (severe)   • Essential hypertension   • Left bundle branch block   • Malignant neoplasm of large intestine   • Polycythemia vera   •  Long term (current) use of aspirin   • Acute congestive heart failure, unspecified heart failure type     Past Medical History:   Diagnosis Date   • Chronic kidney disease, stage 4 (severe) 01/20/2023   • Essential hypertension    • History of colon cancer    • Left bundle branch block 01/20/2023   • Long term (current) use of aspirin 01/20/2023   • Polycythemia vera 01/20/2023     Past Surgical History:   Procedure Laterality Date   • CARDIAC ELECTROPHYSIOLOGY PROCEDURE N/A 5/5/2023    Procedure: Pacemaker DC new;  Surgeon: Sampson Garvin MD;  [facility]: [facility] CATH INVASIVE [facility];  Service: Cardiology;  Laterality: N/A;   • COLON RESECTION  2004   • PARATHYROIDECTOMY Right     Parathyroid adenoma     PT Assessment (last 12 hours)     PT Evaluation and Treatment     Row Name 05/16/23 1101          Physical Therapy Time and Intention    Subjective Information complains of;pain  -     Document Type evaluation  -     Mode of Treatment physical therapy  -     Patient Effort adequate  -     Symptoms Noted During/After Treatment other (see comments);increased pain  Pt reported not feeling well and moaned with mobility.  Reported pain all over arms, legs and back.  Deferred standing today.  -     Comment Pt willing to attempt therapy and some mobility today.  Declined standing this date.  -     Row Name 05/16/23 1101          General Information    Patient Profile Reviewed yes  -     Onset of Illness/Injury or Date of Surgery 05/15/23  -     Referring Physician Lilia  -     Patient Observations alert;cooperative;agree to therapy  -     Prior Level of Function min assist:;gait;transfer  -     Equipment Currently Used at Home walker, rolling;wheelchair  -     Existing Precautions/Restrictions fall  -     Row Name 05/16/23 1103          Previous Level of Function/Home Environm    Previous Level of Function, Premorbid Pt reports she was ambulating with walker and some assistance at NH and would  "also sit on w/c most of the day.  -     Row Name 05/16/23 1105          Living Environment    Current Living Arrangements residential facility  -     Home Accessibility wheelchair accessible  -     People in Home facility resident  -[facility] Name 05/16/23 1105          Home Use of Assistive/Adaptive Equipment    Equipment Currently Used at Home oxygen;walker, standard;cane, straight  -[facility] Name 05/16/23 1105          Pain    Additional Documentation Pain Scale: FACES Pre/Post-Treatment (Group)  -[facility] Name 05/16/23 1105          Pain Scale: FACES Pre/Post-Treatment    Pain: FACES Scale, Pretreatment 2-->hurts little bit  -     Posttreatment Pain Rating 4-->hurts little more  -     Pre/Posttreatment Pain Comment Pt did not rate pain but reports pain at rest and with movement, \"all over,\" arms, legs and back  -[facility] Name 05/16/23 1105          Cognition    Affect/Mental Status (Cognition) [facility]     Orientation Status (Cognition) oriented to;person;place;situation  -     Follows Commands (Cognition) follows one-step commands;over 90% accuracy  -     Personal Safety Interventions fall prevention program maintained;muscle strengthening facilitated;nonskid shoes/slippers when out of bed;supervised activity  -[facility] Name 05/16/23 1105          Range of Motion (ROM)    Range of Motion ROM is [facility] Name 05/16/23 1105          Strength Comprehensive (MMT)    Comment, General Manual Muscle Testing (MMT) Assessment Strength 3-/5 throughout LE  -[facility] Name 05/16/23 1105          Sensory Assessment (Somatosensory)    Sensory Assessment Pt reports some numbness and tingling in legs  -[facility] Name 05/16/23 1105          Bed Mobility    Bed Mobility supine-sit;sit-supine  -     Supine-Sit [place] (Bed Mobility) contact guard;1 person assist  hospitals     Sit-Supine [place] (Bed Mobility) contact guard;1 person assist  hospitals     Bed Mobility, Safety Issues decreased use " of arms for pushing/pulling;decreased use of legs for bridging/pushing  -     Assistive Device (Bed Mobility) bed rails;draw sheet;head of bed elevated  -     Row Name 05/16/23 1105          Transfers    Transfers --  Pt declined attempting transfer today due to pain and not feeling well  -     Row Name 05/16/23 1105          Balance    Balance Assessment sitting static balance;sitting dynamic balance  -     Static Sitting Balance standby assist;1-person assist  -     Dynamic Sitting Balance standby assist;1-person assist;contact guard  -     Position, Sitting Balance sitting edge of bed  -     Row Name             Wound 05/15/23 2159 Right lower gluteal Pressure Injury    Wound - Properties Group Placement Date: 05/15/23  -NT Placement Time: 2159 -NT Present on Hospital Admission: Y  -NT Side: Right  -NT Orientation: lower  -NT Location: gluteal  -NT Primary Wound Type: Pressure inj  -NT    Retired Wound - Properties Group Placement Date: 05/15/23  -NT Placement Time: 2159 -NT Present on Hospital Admission: Y  -NT Side: Right  -NT Orientation: lower  -NT Location: gluteal  -NT Primary Wound Type: Pressure inj  -NT    Retired Wound - Properties Group Date first assessed: 05/15/23  -NT Time first assessed: 2159 -NT Present on Hospital Admission: Y  -NT Side: Right  -NT Location: gluteal  -NT Primary Wound Type: Pressure inj  -NT    Row Name             Wound 05/15/23 2201 Left lower gluteal Pressure Injury    Wound - Properties Group Placement Date: 05/15/23  -NT Placement Time: 2201 -NT Side: Left  -NT Orientation: lower  -NT Location: gluteal  -NT Primary Wound Type: Pressure inj  -NT    Retired Wound - Properties Group Placement Date: 05/15/23  -NT Placement Time: 2201 -NT Side: Left  -NT Orientation: lower  -NT Location: gluteal  -NT Primary Wound Type: Pressure inj  -NT    Retired Wound - Properties Group Date first assessed: 05/15/23  -NT Time first assessed: 2201  -NT Side: Left  -NT  Location: gluteal  -NT Primary Wound Type: Pressure inj  -NT    Row Name             Wound 05/15/23 2202 Bilateral medial coccyx MASD (Moisture associated skin damage)    Wound - Properties Group Placement Date: 05/15/23  -NT Placement Time: 2202 -NT Present on Hospital Admission: Y  -NT Side: Bilateral  -NT Orientation: medial  -NT Location: coccyx  -NT Primary Wound Type: MASD  -NT    Retired Wound - Properties Group Placement Date: 05/15/23  -NT Placement Time: 2202 -NT Present on Hospital Admission: Y  -NT Side: Bilateral  -NT Orientation: medial  -NT Location: coccyx  -NT Primary Wound Type: MASD  -NT    Retired Wound - Properties Group Date first assessed: 05/15/23  -NT Time first assessed: 2202 -NT Present on Hospital Admission: Y  -NT Side: Bilateral  -NT Location: coccyx  -NT Primary Wound Type: MASD  -NT    Row Name             Wound 05/04/23 0250 Left gluteal    Wound - Properties Group Placement Date: 05/04/23  -BR Placement Time: 0250  -BR Side: Left  -BR Location: gluteal  -BR    Retired Wound - Properties Group Placement Date: 05/04/23  -BR Placement Time: 0250  -BR Side: Left  -BR Location: gluteal  -BR    Retired Wound - Properties Group Date first assessed: 05/04/23  -BR Time first assessed: 0250  -BR Side: Left  -BR Location: gluteal  -BR    Row Name             Wound 05/04/23 0252 Right gluteal    Wound - Properties Group Placement Date: 05/04/23  -BR Placement Time: 0252  -BR Side: Right  -BR Location: gluteal  -BR    Retired Wound - Properties Group Placement Date: 05/04/23  -BR Placement Time: 0252  -BR Side: Right  -BR Location: gluteal  -BR    Retired Wound - Properties Group Date first assessed: 05/04/23  -BR Time first assessed: 0252  -BR Side: Right  -BR Location: gluteal  -BR    Row Name 05/16/23 1105          Plan of Care Review    Plan of Care Reviewed With patient  -KP     Outcome Evaluation PT evaluation complete.  Patient reports not feeling well and being in pain.  She declined  standing attempt today but performed bed mobility with CGA.  She would continue to benefit from skilled PT this admission to improve strength, endurance, balance and progress with transfer training and gait training.  -     Row Name 05/16/23 1105          Positioning and Restraints    Pre-Treatment Position in bed  -     Post Treatment Position bed  -KP     In Bed side lying left;call light within reach;encouraged to call for assist;exit alarm on;side rails up x3  Lines in tact and needs in reach  -     Row Name 05/16/23 1102          Therapy Assessment/Plan (PT)    Functional Level at Time of Evaluation (PT) CGA - unable to attempt standing  -     PT Diagnosis (PT) Decreased strength, endurance and balance  -     Rehab Potential (PT) good, to achieve stated therapy goals  -     Criteria for Skilled Interventions Met (PT) yes;meets criteria;skilled treatment is necessary  -     Therapy Frequency (PT) 2 times/wk  -     Predicted Duration of Therapy Intervention (PT) 2 wks  -     Problem List (PT) problems related to;balance;mobility;strength  -     Activity Limitations Related to Problem List (PT) unable to ambulate safely;unable to transfer safely  -     Row Name 05/16/23 1101          PT Evaluation Complexity    History, PT Evaluation Complexity 3 or more personal factors and/or comorbidities  -     Examination of Body Systems (PT Eval Complexity) total of 3 or more elements  -     Clinical Presentation (PT Evaluation Complexity) evolving  -     Clinical Decision Making (PT Evaluation Complexity) moderate complexity  -     Overall Complexity (PT Evaluation Complexity) moderate complexity  -     Row Name 05/16/23 1106          Physical Therapy Goals    Bed Mobility Goal Selection (PT) bed mobility, PT goal 1  -     Transfer Goal Selection (PT) transfer, PT goal 1  -     Gait Training Goal Selection (PT) gait training, PT goal 1  -     Row Name 05/16/23 1100          Bed Mobility  Goal 1 (PT)    Activity/Assistive Device (Bed Mobility Goal 1, PT) sit to supine/supine to sit  -KP     [place] Level/Cues Needed (Bed Mobility Goal 1, PT) modified independence  -KP     Time Frame (Bed Mobility Goal 1, PT) long term goal (LTG);by discharge  -     Row Name 05/16/23 1101          Transfer Goal 1 (PT)    Activity/Assistive Device (Transfer Goal 1, PT) sit-to-stand/stand-to-sit;walker, rolling  -KP     [place] Level/Cues Needed (Transfer Goal 1, PT) contact guard required  -KP     Time Frame (Transfer Goal 1, PT) long term goal (LTG);by discharge  -     Row Name 05/16/23 1105          Gait Training Goal 1 (PT)    Activity/Assistive Device (Gait Training Goal 1, PT) gait (walking locomotion);assistive device use;walker, rolling  -KP     [place] Level (Gait Training Goal 1, PT) contact guard required  -KP     Distance (Gait Training Goal 1, PT) 25ft  -KP     Time Frame (Gait Training Goal 1, PT) long term goal (LTG);by discharge  -           User Key  (r) = Recorded By, (t) = Taken By, (c) = Cosigned By    Initials Name Provider Type    NT Kaela Omalley, RN Registered Nurse    Sonya Funes, PT Physical Therapist    Alyce Perez, RN Registered Nurse                  PT Recommendation and Plan  Planned Therapy Interventions (PT): balance training, bed mobility training, gait training, home exercise program, motor coordination training, neuromuscular re-education, patient/family education, postural re-education, ROM (range of motion), strengthening, stretching, transfer training, wheelchair management/propulsion training  Therapy Frequency (PT): 2 times/wk  Plan of Care Reviewed With: patient  Outcome Evaluation: PT evaluation complete.  Patient reports not feeling well and being in pain.  She declined standing attempt today but performed bed mobility with CGA.  She would continue to benefit from skilled PT this admission to improve strength, endurance, balance and  progress with transfer training and gait training.       Time Calculation:    PT Charges     Row Name 05/16/23 1118             Time Calculation    PT Received On 05/16/23  -      PT Goal Re-Cert Due Date 05/30/23  -            User Key  (r) = Recorded By, (t) = Taken By, (c) = Cosigned By    Initials Name Provider Type    Sonya Funes, PT Physical Therapist              Therapy Charges for Today     Code Description Service Date Service Provider Modifiers Qty    19450320278 HC PT EVAL MOD COMPLEXITY 4 5/16/2023 Sonya Jackson, PT GP 1          PT G-Codes  AM-PAC 6 Clicks Score (PT): 15    Sonya Jackson PT  5/16/2023      Electronically signed by Sonya Jackson PT at 05/16/23 1120       Occupational Therapy Notes (most recent note)    No notes exist for this encounter.         Speech Language Pathology Notes (most recent note)    No notes exist for this encounter.         ADL Documentation (last day)     Date/Time Transferring Toileting Bathing Dressing Eating Communication Swallowing Change in Functional Status Since Onset of Current Illness/Injury Equipment Currently Used at Home    05/16/23 1105 -- -- -- -- -- -- -- -- oxygen;walker, standard;cane, straight    05/15/23 2238 -- -- -- -- -- -- -- no oxygen;walker, standard;cane, straight    05/15/23 2200 2 - assistive person 2 - assistive person 2 - assistive person 2 - assistive person 0 - independent 0 - understands/communicates without difficulty 0 - swallows foods/liquids without difficulty -- --    05/15/23 2151 -- -- -- -- -- -- -- no oxygen;cane, straight;walker, standard

## 2023-05-16 NOTE — PLAN OF CARE
Goal Outcome Evaluation:   Pain medication given as appropriate. Remains on 2LNC. No issues noted this shift.

## 2023-05-16 NOTE — THERAPY EVALUATION
Acute Care - Physical Therapy Initial Evaluation   [facility]     Patient Name: Azra Ohara  : 1935  MRN: [id number]  Today's Date: 2023   Onset of Illness/Injury or Date of Surgery: 05/15/23  Visit Dx:     ICD-10-CM ICD-9-CM   1. Acute congestive heart failure, unspecified heart failure type  I50.9 428.0     Patient Active Problem List   Diagnosis   • Chronic kidney disease, stage 4 (severe)   • Essential hypertension   • Left bundle branch block   • Malignant neoplasm of large intestine   • Polycythemia vera   • Long term (current) use of aspirin   • Acute congestive heart failure, unspecified heart failure type     Past Medical History:   Diagnosis Date   • Chronic kidney disease, stage 4 (severe) 2023   • Essential hypertension    • History of colon cancer    • Left bundle branch block 2023   • Long term (current) use of aspirin 2023   • Polycythemia vera 2023     Past Surgical History:   Procedure Laterality Date   • CARDIAC ELECTROPHYSIOLOGY PROCEDURE N/A 2023    Procedure: Pacemaker DC new;  Surgeon: Sampson Garvin MD;  Location: [facility] INVASIVE LOC[facility];  Service: Cardiology;  Laterality: N/A;   • COLON RESECTION     • PARATHYROIDECTOMY Right     Parathyroid adenoma     PT Assessment (last 12 hours)     PT Evaluation and Treatment     Row Name 23 110          Physical Therapy Time and Intention    Subjective Information complains of;pain  -     Document Type evaluation  -     Mode of Treatment physical therapy  -     Patient Effort adequate  -     Symptoms Noted During/After Treatment other (see comments);increased pain  Pt reported not feeling well and moaned with mobility.  Reported pain all over arms, legs and back.  Deferred standing today.  -     Comment Pt willing to attempt therapy and some mobility today.  Declined standing this date.  -     Row Name 23 7970          General Information    Patient Profile Reviewed yes  -   "   Onset of Illness/Injury or Date of Surgery 05/15/23  -     Referring Physician Lilia  -     Patient Observations alert;cooperative;agree to therapy  -     Prior Level of Function min assist:;gait;transfer  -     Equipment Currently Used at Home walker, rolling;wheelchair  -     Existing Precautions/Restrictions fall  -     Row Name 05/16/23 1105          Previous Level of Function/Home Environm    Previous Level of Function, Premorbid Pt reports she was ambulating with walker and some assistance at NH and would also sit on w/c most of the day.  -     Row Name 05/16/23 1105          Living Environment    Current Living Arrangements residential facility  -     Home Accessibility wheelchair accessible  -     People in Home facility resident  -     Row Name 05/16/23 1105          Home Use of Assistive/Adaptive Equipment    Equipment Currently Used at Home oxygen;walker, standard;cane, straight  -[facility] Name 05/16/23 1105          Pain    Additional Documentation Pain Scale: FACES Pre/Post-Treatment (Group)  -[facility] Name 05/16/23 1105          Pain Scale: FACES Pre/Post-Treatment    Pain: FACES Scale, Pretreatment 2-->hurts little bit  -     Posttreatment Pain Rating 4-->hurts little more  -     Pre/Posttreatment Pain Comment Pt did not rate pain but reports pain at rest and with movement, \"all over,\" arms, legs and back  -[facility] Name 05/16/23 1105          Cognition    Affect/Mental Status (Cognition) [facility]     Orientation Status (Cognition) oriented to;person;place;situation  -     Follows Commands (Cognition) follows one-step commands;over 90% accuracy  -     Personal Safety Interventions fall prevention program maintained;muscle strengthening facilitated;nonskid shoes/slippers when out of bed;supervised activity  -     Row Name 05/16/23 1105          Range of Motion (ROM)    Range of Motion ROM is [facility] Name 05/16/23 1105          Strength Comprehensive (MMT) "    Comment, General Manual Muscle Testing (MMT) Assessment Strength 3-/5 throughout LE  -     Row Name 05/16/23 1105          Sensory Assessment (Somatosensory)    Sensory Assessment Pt reports some numbness and tingling in legs  -     Row Name 05/16/23 1105          Bed Mobility    Bed Mobility supine-sit;sit-supine  -     Supine-Sit [place] (Bed Mobility) contact guard;1 person assist  -     Sit-Supine [place] (Bed Mobility) contact guard;1 person assist  -     Bed Mobility, Safety Issues decreased use of arms for pushing/pulling;decreased use of legs for bridging/pushing  -     Assistive Device (Bed Mobility) bed rails;draw sheet;head of bed elevated  -     Row Name 05/16/23 1105          Transfers    Transfers --  Pt declined attempting transfer today due to pain and not feeling well  -     Row Name 05/16/23 1105          Balance    Balance Assessment sitting static balance;sitting dynamic balance  -     Static Sitting Balance standby assist;1-person assist  -     Dynamic Sitting Balance standby assist;1-person assist;contact guard  -     Position, Sitting Balance sitting edge of bed  -     Row Name             Wound 05/15/23 2159 Right lower gluteal Pressure Injury    Wound - Properties Group Placement Date: 05/15/23  -NT Placement Time: 2159  -NT Present on Hospital Admission: Y  -NT Side: Right  -NT Orientation: lower  -NT Location: gluteal  -NT Primary Wound Type: Pressure inj  -NT    Retired Wound - Properties Group Placement Date: 05/15/23  -NT Placement Time: 2159 -NT Present on Hospital Admission: Y  -NT Side: Right  -NT Orientation: lower  -NT Location: gluteal  -NT Primary Wound Type: Pressure inj  -NT    Retired Wound - Properties Group Date first assessed: 05/15/23  -NT Time first assessed: 2159 -NT Present on Hospital Admission: Y  -NT Side: Right  -NT Location: gluteal  -NT Primary Wound Type: Pressure inj  -NT    Row Name             Wound 05/15/23 2201 Left  lower gluteal Pressure Injury    Wound - Properties Group Placement Date: 05/15/23  -NT Placement Time: 2201 -NT Side: Left  -NT Orientation: lower  -NT Location: gluteal  -NT Primary Wound Type: Pressure inj  -NT    Retired Wound - Properties Group Placement Date: 05/15/23  -NT Placement Time: 2201 -NT Side: Left  -NT Orientation: lower  -NT Location: gluteal  -NT Primary Wound Type: Pressure inj  -NT    Retired Wound - Properties Group Date first assessed: 05/15/23  -NT Time first assessed: 2201 -NT Side: Left  -NT Location: gluteal  -NT Primary Wound Type: Pressure inj  -NT    Row Name             Wound 05/15/23 2202 Bilateral medial coccyx MASD (Moisture associated skin damage)    Wound - Properties Group Placement Date: 05/15/23  -NT Placement Time: 2202 -NT Present on Hospital Admission: Y  -NT Side: Bilateral  -NT Orientation: medial  -NT Location: coccyx  -NT Primary Wound Type: MASD  -NT    Retired Wound - Properties Group Placement Date: 05/15/23  -NT Placement Time: 2202 -NT Present on Hospital Admission: Y  -NT Side: Bilateral  -NT Orientation: medial  -NT Location: coccyx  -NT Primary Wound Type: MASD  -NT    Retired Wound - Properties Group Date first assessed: 05/15/23  -NT Time first assessed: 2202 -NT Present on Hospital Admission: Y  -NT Side: Bilateral  -NT Location: coccyx  -NT Primary Wound Type: MASD  -NT    Row Name             Wound 05/04/23 0250 Left gluteal    Wound - Properties Group Placement Date: 05/04/23  -BR Placement Time: 0250  -BR Side: Left  -BR Location: gluteal  -BR    Retired Wound - Properties Group Placement Date: 05/04/23  -BR Placement Time: 0250  -BR Side: Left  -BR Location: gluteal  -BR    Retired Wound - Properties Group Date first assessed: 05/04/23  -BR Time first assessed: 0250  -BR Side: Left  -BR Location: gluteal  -BR    Row Name             Wound 05/04/23 0252 Right gluteal    Wound - Properties Group Placement Date: 05/04/23  -BR Placement Time: 0252  -BR  Side: Right  -BR Location: gluteal  -BR    Retired Wound - Properties Group Placement Date: 05/04/23  -BR Placement Time: 0252 -BR Side: Right  -BR Location: gluteal  -BR    Retired Wound - Properties Group Date first assessed: 05/04/23  -BR Time first assessed: 0252 -BR Side: Right  -BR Location: gluteal  -BR    Row Name 05/16/23 1105          Plan of Care Review    Plan of Care Reviewed With patient  -     Outcome Evaluation PT evaluation complete.  Patient reports not feeling well and being in pain.  She declined standing attempt today but performed bed mobility with CGA.  She would continue to benefit from skilled PT this admission to improve strength, endurance, balance and progress with transfer training and gait training.  -     Row Name 05/16/23 1105          Positioning and Restraints    Pre-Treatment Position in bed  -     Post Treatment Position bed  -     In Bed side lying left;call light within reach;encouraged to call for assist;exit alarm on;side rails up x3  Lines in tact and needs in reach  -     Row Name 05/16/23 1105          Therapy Assessment/Plan (PT)    Functional Level at Time of Evaluation (PT) CGA - unable to attempt standing  -     PT Diagnosis (PT) Decreased strength, endurance and balance  -     Rehab Potential (PT) good, to achieve stated therapy goals  -     Criteria for Skilled Interventions Met (PT) yes;meets criteria;skilled treatment is necessary  -     Therapy Frequency (PT) 2 times/wk  -     Predicted Duration of Therapy Intervention (PT) 2 wks  -     Problem List (PT) problems related to;balance;mobility;strength  -     Activity Limitations Related to Problem List (PT) unable to ambulate safely;unable to transfer safely  -     Row Name 05/16/23 1105          PT Evaluation Complexity    History, PT Evaluation Complexity 3 or more personal factors and/or comorbidities  -     Examination of Body Systems (PT Eval Complexity) total of 3 or more elements   -     Clinical Presentation (PT Evaluation Complexity) evolving  -     Clinical Decision Making (PT Evaluation Complexity) moderate complexity  -KP     Overall Complexity (PT Evaluation Complexity) moderate complexity  -     Row Name 05/16/23 1105          Physical Therapy Goals    Bed Mobility Goal Selection (PT) bed mobility, PT goal 1  -KP     Transfer Goal Selection (PT) transfer, PT goal 1  -KP     Gait Training Goal Selection (PT) gait training, PT goal 1  -     Row Name 05/16/23 1105          Bed Mobility Goal 1 (PT)    Activity/Assistive Device (Bed Mobility Goal 1, PT) sit to supine/supine to sit  -KP     [place] Level/Cues Needed (Bed Mobility Goal 1, PT) modified independence  -KP     Time Frame (Bed Mobility Goal 1, PT) long term goal (LTG);by discharge  -     Row Name 05/16/23 1105          Transfer Goal 1 (PT)    Activity/Assistive Device (Transfer Goal 1, PT) sit-to-stand/stand-to-sit;walker, rolling  -KP     [place] Level/Cues Needed (Transfer Goal 1, PT) contact guard required  -KP     Time Frame (Transfer Goal 1, PT) long term goal (LTG);by discharge  -     Row Name 05/16/23 1105          Gait Training Goal 1 (PT)    Activity/Assistive Device (Gait Training Goal 1, PT) gait (walking locomotion);assistive device use;walker, rolling  -KP     [place] Level (Gait Training Goal 1, PT) contact guard required  -KP     Distance (Gait Training Goal 1, PT) 25ft  -KP     Time Frame (Gait Training Goal 1, PT) long term goal (LTG);by discharge  -           User Key  (r) = Recorded By, (t) = Taken By, (c) = Cosigned By    Initials Name Provider Type    NT Kaela Omalley, RN Registered Nurse    KP Sonya Jackson, PT Physical Therapist    Alyce Perez, RN Registered Nurse                  PT Recommendation and Plan  Planned Therapy Interventions (PT): balance training, bed mobility training, gait training, home exercise program, motor coordination training, neuromuscular  re-education, patient/family education, postural re-education, ROM (range of motion), strengthening, stretching, transfer training, wheelchair management/propulsion training  Therapy Frequency (PT): 2 times/wk  Plan of Care Reviewed With: patient  Outcome Evaluation: PT evaluation complete.  Patient reports not feeling well and being in pain.  She declined standing attempt today but performed bed mobility with CGA.  She would continue to benefit from skilled PT this admission to improve strength, endurance, balance and progress with transfer training and gait training.       Time Calculation:    PT Charges     Row Name 05/16/23 1118             Time Calculation    PT Received On 05/16/23  -      PT Goal Re-Cert Due Date 05/30/23  -            User Key  (r) = Recorded By, (t) = Taken By, (c) = Cosigned By    Initials Name Provider Type    Sonya Funes PT Physical Therapist              Therapy Charges for Today     Code Description Service Date Service Provider Modifiers Qty    00512914815 HC PT EVAL MOD COMPLEXITY 4 5/16/2023 Sonya Jackson, PT GP 1          PT G-Codes  AM-PAC 6 Clicks Score (PT): 15    Sonya Jackson PT  5/16/2023

## 2023-05-16 NOTE — PROGRESS NOTES
[facility]     Patient Identification:  Name:  Azra Ohara  Age:  88 y.o.  Sex:  female  :  1935  MRN:  [id number]  Visit Number:  [id number]  Primary Care Provider:  Minal Salazar APRN    Length of stay:  0    Chief complaint: Shortness of breath    Subjective:    Patient reports her shortness of breath is slightly improved in comparison to prior to admission.  Patient denies any fevers, chills, sweats, rigors, nausea or vomiting.  No new events since admission.  ----------------------------------------------------------------------------------------------------------------------  Current Hospital Meds:  aspirin, 81 mg, Oral, Daily  Diclofenac Sodium, 4 g, Topical, 4x Daily  docusate sodium, 100 mg, Oral, BID  donepezil, 5 mg, Oral, Nightly  empagliflozin, 10 mg, Oral, Daily  furosemide, 80 mg, Intravenous, BID  heparin (porcine), 5,000 Units, Subcutaneous, Q12H  losartan, 100 mg, Oral, Q24H  memantine, 5 mg, Oral, Daily  metoprolol tartrate, 25 mg, Oral, BID  multivitamin with minerals, 1 tablet, Oral, Daily         ----------------------------------------------------------------------------------------------------------------------  Vital Signs:  Temp:  [98 °F (36.7 °C)-98.6 °F (37 °C)] 98.6 °F (37 °C)  Heart Rate:  [68-97] 72  Resp:  [-] 22  BP: (147-180)/() 147/88      05/15/23  1521 23  0500   Weight: 81.2 kg (179 lb) 81.2 kg (179 lb) (new admit)     Body mass index is 29.79 kg/m².    Intake/Output Summary (Last 24 hours) at 2023 1041  Last data filed at 2023 0321  Gross per 24 hour   Intake --   Output 1000 ml   Net -1000 ml     Diet: Cardiac Diets; Healthy Heart (2-3 Na+); Texture: Regular Texture (IDDSI 7); Fluid Consistency: Thin (IDDSI 0)  ----------------------------------------------------------------------------------------------------------------------  Physical exam:  Constitutional: Elderly appearing   female in no apparent distress.     HENT:  Head:  Normocephalic and atraumatic.  Mouth:  Moist mucous membranes.    Eyes:  Conjunctivae and EOM are normal.  Pupils are equal, round, and reactive to light.  No scleral icterus.    Neck:  Neck supple. No thyromegaly.  No JVD present.    Cardiovascular:  Regular rate and rhythm with no murmurs, rubs, clicks or gallops appreciated.  Pulmonary/Chest: Faint bibasilar crackles on deep inspiration with no wheezes or rhonchi appreciated.  Abdominal:  Soft. Nontender. Nondistended  Bowel sounds are normal in all four quadrants. No organomegally appreciated.   Musculoskeletal:  No edema, no tenderness, and no deformity.  No red or swollen joints anywhere.    Neurological:  Alert, Cranial nerves II-XII intact with no focal deficits.  No facial droop.  No slurred speech.   Skin:  Warm and dry to palpation with no rashes or lesions appreciated.  Peripheral vascular:  2+ radial and pedal pulses in bilateral upper and lower extremities.  Psychiatric:  Alert and oriented x3, demonstrates appropriate judgment and insight.  ---------------------------------------------------------------------------------   ----------------------------------------------------------------------------------------------------------------------  Results from last 7 days   Lab Units 05/15/23  1828 05/15/23  1633 05/14/23  0038   CK TOTAL U/L  --   --  40   HSTROP T ng/L 32* 31* 39*     Results from last 7 days   Lab Units 05/16/23  0204 05/15/23  1633 05/15/23  1547 05/14/23  0038 05/12/23  1524 05/12/23  1414   CRP mg/dL  --  9.43*  --  3.58*  --   --    LACTATE mmol/L  --   --  1.2  --   --   --    WBC 10*3/mm3 9.68  --  11.70* 12.51*   < > 8.22   HEMOGLOBIN g/dL 11.9*  --  13.3 12.3   < > 13.2   HEMATOCRIT % 38.4  --  42.8 38.4   < > 42.2   MCV fL 97.2*  --  97.5* 95.8   < > 97.2*   MCHC g/dL 31.0*  --  31.1* 32.0   < > 31.3*   PLATELETS 10*3/mm3 223  --  273 232   < > 211   INR   --   --   --   --   --   0.99    < > = values in this interval not displayed.     Results from last 7 days   Lab Units 05/15/23  1614   PH, ARTERIAL pH units 7.475*   PO2 ART mm Hg 76.8*   PCO2, ARTERIAL mm Hg 33.6*   HCO3 ART mmol/L 24.7     Results from last 7 days   Lab Units 05/16/23  0204 05/15/23  1633 05/14/23  0038 05/12/23  1414 05/11/23  0145 05/10/23  0223   SODIUM mmol/L 142 142 139 143  --  141   POTASSIUM mmol/L 3.1* 4.1 4.0 4.2  --  3.7   MAGNESIUM mg/dL  --   --  1.7  --  2.0 1.9   CHLORIDE mmol/L 104 106 106 108*  --  111*   CO2 mmol/L 24.9 23.9 23.9 26.1  --  22.7   BUN mg/dL 25* 27* 27* 21  --  19   CREATININE mg/dL 0.81 0.86 0.88 0.83  --  0.62   CALCIUM mg/dL 9.4 9.8 9.7 10.0  --  9.0   GLUCOSE mg/dL 94 106* 128* 124*  --  98   ALBUMIN g/dL  --  3.2* 3.1* 3.6  --   --    BILIRUBIN mg/dL  --  0.5 0.4 0.4  --   --    ALK PHOS U/L  --  75 74 78  --   --    AST (SGOT) U/L  --  14 16 22  --   --    ALT (SGPT) U/L  --  5 5 6  --   --    Estimated Creatinine Clearance: 50.6 mL/min (by C-G formula based on SCr of 0.81 mg/dL).    No results found for: AMMONIA  Results from last 7 days   Lab Units 05/14/23  0038   CHOLESTEROL mg/dL 110   TRIGLYCERIDES mg/dL 72   HDL CHOL mg/dL 53   LDL CHOL mg/dL 42     No results found for: BLOODCX  No results found for: URINECX  No results found for: WOUNDCX  No results found for: STOOLCX    I have personally looked at the labs and they are summarized above.  ----------------------------------------------------------------------------------------------------------------------  Imaging Results (Last 24 Hours)     Procedure Component Value Units Date/Time    CT Chest Without Contrast Diagnostic [[id number]] Collected: 05/15/23 1844     Updated: 05/15/23 1849    Narrative:      INDICATION: Shortness of breath.     COMPARISON: No relevant priors available     TECHNIQUE: Axial CT images of the chest were obtained without IV  contrast administration. Coronal and sagittal reformations were  reviewed.      FINDINGS:  Moderate cardiomegaly. Negative for thoracic aortic aneurysm. Small to  moderate volume right and small left pleural effusions. No pericardial  effusion. No pneumothorax.     Central predominant interstitial opacities.     Degenerative changes in the spine. Anterior subluxation of the right  shoulder which could be acute or chronic with large right joint  effusion.       Impression:      1.  Central predominant interstitial opacities could represent fluid  overload, less likely atypical pneumonia.  2.  Bilateral pleural effusions, right larger than left.  3.  Anterior subluxation of the right shoulder which could be acute or  chronic with large right joint effusion.     This report was finalized on 5/15/2023 6:47 PM by Alex Pallas, DO.       XR Chest 1 View [[id number]] Collected: 05/15/23 1636     Updated: 05/15/23 1645    Narrative:      EXAM:    XR Chest, 1 View     EXAM DATE:    5/15/2023 3:58 PM     CLINICAL HISTORY:    SOB     TECHNIQUE:    Frontal view of the chest.     COMPARISON:    05/12/2023     FINDINGS:    Lungs and pleural spaces:  Interstitial edema is noted.  Pulmonary  vascular congestion.  Small effusions.  Bibasilar airspace disease.  No  pneumothorax.    Heart:  Cardiomegaly stable.    Mediastinum:  Unremarkable.    Bones/joints:  Stable bony structures.    Tubes, lines and devices:  Left cardiac pacer device.       Impression:        Slight increase in changes of CHF/edema.     This report was finalized on 5/15/2023 4:43 PM by Dr. Dalton Lawrence MD.           ----------------------------------------------------------------------------------------------------------------------  Assessment and Plan:    1.  Acute on chronic HFpEF -continue Lasix 80 mg IV twice daily and monitor renal function closely.  Patient was already given dose of acetazolamide in the emergency department.  Have also started Jardiance.    2.  Bilateral pleural effusions -likely secondary to #1, continue IV  diuretics.    3.  Hypertensive urgency -now much improved, continue to monitor closely and make antihypertensive medication adjustments as necessary.    4.  Anterior subluxation of right shoulder -currently asymptomatic, will refer to orthopedic surgery in an outpatient setting    5.  CKD stage II -stable    6.  General debility -we will consult PT/OT while hospitalized    Disposition will likely need an additional 48 hours of hospitalization then plan to return back to nursing home.    Bradley Jacobo DO   05/16/23   10:41 EDT

## 2023-05-16 NOTE — PLAN OF CARE
Goal Outcome Evaluation:   Pt resting in bed with eyes closed. No signs or symptoms of distress noted. No complaints at this time. Will continue with plan of care.

## 2023-05-16 NOTE — NURSING NOTE
Wound consult for denuded area to the bilateral buttocks and the coccyx. Areas present as partial thickness wounds with moist and macerated rupali wound skin, shallow pink ulcers. Sheering present to surrounding tissues. New order for magic barrier cream BID and use Z-guard in between. Pressure injury prevention protocols in place.

## 2023-05-16 NOTE — PLAN OF CARE
Goal Outcome Evaluation:   Daily Care Plan Summary: Heart Failure    Diuretic in use (IV or PO): IV        Daily weight (up or down):  Same      Output > Intake (yes/no):No      O2 Requirements (current, any change?): Room Air    Symptoms noted with Activity (Respiratory Tolerance, functional state): SOA with activity       Anticipated Discharge Plans: Back to Nursing Home Facility

## 2023-05-16 NOTE — THERAPY EVALUATION
Acute Care - Occupational Therapy Initial Evaluation   [facility]     Patient Name: Azra Ohara  : 1935  MRN: [id number]  Today's Date: 2023  Onset of Illness/Injury or Date of Surgery: 05/15/23     Referring Physician: Lilia    Admit Date: 5/15/2023       ICD-10-CM ICD-9-CM   1. Acute congestive heart failure, unspecified heart failure type  I50.9 428.0     Patient Active Problem List   Diagnosis   • Chronic kidney disease, stage 4 (severe)   • Essential hypertension   • Left bundle branch block   • Malignant neoplasm of large intestine   • Polycythemia vera   • Long term (current) use of aspirin   • Acute congestive heart failure, unspecified heart failure type     Past Medical History:   Diagnosis Date   • Chronic kidney disease, stage 4 (severe) 2023   • Essential hypertension    • History of colon cancer    • Left bundle branch block 2023   • Long term (current) use of aspirin 2023   • Polycythemia vera 2023     Past Surgical History:   Procedure Laterality Date   • CARDIAC ELECTROPHYSIOLOGY PROCEDURE N/A 2023    Procedure: Pacemaker DC new;  Surgeon: Sampson Garvin MD;  Location: [facility] INVASIVE LOCATION;  Service: Cardiology;  Laterality: N/A;   • COLON RESECTION     • PARATHYROIDECTOMY Right     Parathyroid adenoma         OT ASSESSMENT FLOWSHEET (last 12 hours)     OT Evaluation and Treatment     Row Name 23 1506                   OT Time and Intention    Document Type evaluation  -KR        Mode of Treatment occupational therapy  -KR        Patient Effort adequate  -KR           General Information    General Observations of Patient pleasant/cooperative  -KR           Living Environment    Current Living Arrangements residential facility  -KR        People in Home facility resident  -KR           Cognition    Affect/Mental Status (Cognition) WFL  -KR        Orientation Status (Cognition) oriented to;person;situation  -KR        Follows Commands  (Cognition) WFL  -KR           Range of Motion Comprehensive    Comment, General Range of Motion BUE WFL  -KR           Strength Comprehensive (MMT)    Comment, General Manual Muscle Testing (MMT) Assessment BUE 3/5  -KR           Activities of Daily Living    BADL Assessment/Intervention bathing;upper body dressing;lower body dressing;grooming;toileting  -KR           Bathing Assessment/Intervention    [place] Level (Bathing) bathing skills;moderate assist (50% patient effort)  -KR           Upper Body Dressing Assessment/Training    [place] Level (Upper Body Dressing) upper body dressing skills;moderate assist (50% patient effort)  -KR           Lower Body Dressing Assessment/Training    [place] Level (Lower Body Dressing) lower body dressing skills;moderate assist (50% patient effort)  -KR           Grooming Assessment/Training    [place] Level (Grooming) grooming skills;minimum assist (75% patient effort)  -KR           Toileting Assessment/Training    [place] Level (Toileting) toileting skills;maximum assist (25% patient effort)  -KR           Wound 05/15/23 2159 Right lower gluteal Pressure Injury    Wound - Properties Group Placement Date: 05/15/23  -NT Placement Time: 2159  -NT Present on Hospital Admission: Y  -NT Side: Right  -NT Orientation: lower  -NT Location: gluteal  -NT Primary Wound Type: Pressure inj  -NT    Retired Wound - Properties Group Placement Date: 05/15/23  -NT Placement Time: 2159  -NT Present on Hospital Admission: Y  -NT Side: Right  -NT Orientation: lower  -NT Location: gluteal  -NT Primary Wound Type: Pressure inj  -NT    Retired Wound - Properties Group Date first assessed: 05/15/23  -NT Time first assessed: 2159 -NT Present on Hospital Admission: Y  -NT Side: Right  -NT Location: gluteal  -NT Primary Wound Type: Pressure inj  -NT       Wound 05/15/23 2201 Left lower gluteal Pressure Injury    Wound - Properties Group Placement Date: 05/15/23  -NT Placement  Time: 2201 -NT Side: Left  -NT Orientation: lower  -NT Location: gluteal  -NT Primary Wound Type: Pressure inj  -NT    Retired Wound - Properties Group Placement Date: 05/15/23  -NT Placement Time: 2201 -NT Side: Left  -NT Orientation: lower  -NT Location: gluteal  -NT Primary Wound Type: Pressure inj  -NT    Retired Wound - Properties Group Date first assessed: 05/15/23  -NT Time first assessed: 2201  -NT Side: Left  -NT Location: gluteal  -NT Primary Wound Type: Pressure inj  -NT       Wound 05/15/23 2202 Bilateral medial coccyx MASD (Moisture associated skin damage)    Wound - Properties Group Placement Date: 05/15/23  -NT Placement Time: 2202 -NT Present on Hospital Admission: Y  -NT Side: Bilateral  -NT Orientation: medial  -NT Location: coccyx  -NT Primary Wound Type: MASD  -NT    Retired Wound - Properties Group Placement Date: 05/15/23  -NT Placement Time: 2202 -NT Present on Hospital Admission: Y  -NT Side: Bilateral  -NT Orientation: medial  -NT Location: coccyx  -NT Primary Wound Type: MASD  -NT    Retired Wound - Properties Group Date first assessed: 05/15/23  -NT Time first assessed: 2202  -NT Present on Hospital Admission: Y  -NT Side: Bilateral  -NT Location: coccyx  -NT Primary Wound Type: MASD  -NT       Wound 05/04/23 0250 Left gluteal    Wound - Properties Group Placement Date: 05/04/23  -BR Placement Time: 0250  -BR Side: Left  -BR Location: gluteal  -BR    Retired Wound - Properties Group Placement Date: 05/04/23  -BR Placement Time: 0250  -BR Side: Left  -BR Location: gluteal  -BR    Retired Wound - Properties Group Date first assessed: 05/04/23  -BR Time first assessed: 0250  -BR Side: Left  -BR Location: gluteal  -BR       Wound 05/04/23 0252 Right gluteal    Wound - Properties Group Placement Date: 05/04/23  -BR Placement Time: 0252  -BR Side: Right  -BR Location: gluteal  -BR    Retired Wound - Properties Group Placement Date: 05/04/23  -BR Placement Time: 0252  -BR Side: Right  -BR  Location: gluteal  -BR    Retired Wound - Properties Group Date first assessed: 05/04/23  -BR Time first assessed: 0252  -BR Side: Right  -BR Location: gluteal  -BR       Plan of Care Review    Plan of Care Reviewed With patient  -KR           Therapy Assessment/Plan (OT)    Planned Therapy Interventions (OT) activity tolerance training  -KR           OT Goals    Dressing Goal Selection (OT) dressing, OT goal 1  -KR        Activity Tolerance Goal Selection (OT) activity tolerance, OT goal 1  -KR           Dressing Goal 1 (OT)    Activity/Device (Dressing Goal 1, OT) dressing skills, all  -KR        [place]/Cues Needed (Dressing Goal 1, OT) minimum assist (75% or more patient effort)  -KR        Time Frame (Dressing Goal 1, OT) by discharge  -KR            Activity Tolerance Goal 1 (OT)    Activity Tolerance Goal 1 (OT) Increase to enhance performance of self care/mobility  -KR        Activity Level (Endurance Goal 1, OT) 15 min activity  -KR        Time Frame (Activity Tolerance Goal 1, OT) by discharge  -KR              User Key  (r) = Recorded By, (t) = Taken By, (c) = Cosigned By    Initials Name Effective Dates    Dalton Woodward OT 06/16/21 -     NT Kaela Omalley RN 05/10/21 -     BR Alyce Suarez, RN 11/08/22 -                        OT Recommendation and Plan  Planned Therapy Interventions (OT): activity tolerance training  Plan of Care Review  Plan of Care Reviewed With: patient  Plan of Care Reviewed With: patient        Time Calculation:     Therapy Charges for Today     Code Description Service Date Service Provider Modifiers Qty    [id number] HC OT EVAL MOD COMPLEXITY 4 5/16/2023 Dalton Calle OT GO 1               Dalton Calle OT  5/16/2023

## 2023-05-16 NOTE — CASE MANAGEMENT/SOCIAL WORK
Discharge Planning Assessment  [facility]     Patient Name: Azra Ohara  MRN: [id number]  Today's Date: 5/16/2023    Admit Date: 5/15/2023    Plan: SS received consult per Case Management as pt is a resident at [facility].  Pt does not have a skilled bed reserved at [facility] per [facility].  Pt a resident a [facility] for short term rehab.  Pt's insurance will require a new pre authorization prior to returning to [facility].  SS will follow.     Discharge Plan     Row Name 05/16/23 0927       Plan    Plan SS received consult per Case Management as pt is a resident at [facility].  Pt does not have a skilled bed reserved at [facility] per [facility].  Pt a resident a [facility] for short term rehab.  Pt's insurance will require a new pre authorization prior to returning to [facility].  SS will follow.    Row Name 05/16/23 0638                   ELIOT Foster

## 2023-05-17 PROBLEM — I50.9 ACUTE CONGESTIVE HEART FAILURE, UNSPECIFIED HEART FAILURE TYPE: Status: RESOLVED | Noted: 2023-05-15 | Resolved: 2023-05-17

## 2023-05-17 PROBLEM — I50.33 ACUTE ON CHRONIC HEART FAILURE WITH PRESERVED EJECTION FRACTION (HFPEF): Chronic | Status: ACTIVE | Noted: 2023-05-17

## 2023-05-17 PROBLEM — I50.33 ACUTE ON CHRONIC HEART FAILURE WITH PRESERVED EJECTION FRACTION (HFPEF): Chronic | Status: ACTIVE | Noted: 2023-05-15

## 2023-05-17 LAB
ANION GAP SERPL CALCULATED.3IONS-SCNC: 13.4 MMOL/L (ref 5–15)
BUN SERPL-MCNC: 32 MG/DL (ref 8–23)
BUN/CREAT SERPL: 32.3 (ref 7–25)
CALCIUM SPEC-SCNC: 9.5 MG/DL (ref 8.6–10.5)
CHLORIDE SERPL-SCNC: 100 MMOL/L (ref 98–107)
CO2 SERPL-SCNC: 25.6 MMOL/L (ref 22–29)
CREAT SERPL-MCNC: 0.99 MG/DL (ref 0.57–1)
DEPRECATED RDW RBC AUTO: 53.3 FL (ref 37–54)
EGFRCR SERPLBLD CKD-EPI 2021: 55 ML/MIN/1.73
ERYTHROCYTE [DISTWIDTH] IN BLOOD BY AUTOMATED COUNT: 14.9 % (ref 12.3–15.4)
GLUCOSE SERPL-MCNC: 97 MG/DL (ref 65–99)
HCT VFR BLD AUTO: 38.4 % (ref 34–46.6)
HGB BLD-MCNC: 11.9 G/DL (ref 12–15.9)
MCH RBC QN AUTO: 30.4 PG (ref 26.6–33)
MCHC RBC AUTO-ENTMCNC: 31 G/DL (ref 31.5–35.7)
MCV RBC AUTO: 98.2 FL (ref 79–97)
PLATELET # BLD AUTO: 247 10*3/MM3 (ref 140–450)
PMV BLD AUTO: 11.3 FL (ref 6–12)
POTASSIUM SERPL-SCNC: 3.3 MMOL/L (ref 3.5–5.2)
QT INTERVAL: 476 MS
QTC INTERVAL: 476 MS
RBC # BLD AUTO: 3.91 10*6/MM3 (ref 3.77–5.28)
SODIUM SERPL-SCNC: 139 MMOL/L (ref 136–145)
WBC NRBC COR # BLD: 8.08 10*3/MM3 (ref 3.4–10.8)

## 2023-05-17 PROCEDURE — 97530 THERAPEUTIC ACTIVITIES: CPT

## 2023-05-17 PROCEDURE — 97110 THERAPEUTIC EXERCISES: CPT

## 2023-05-17 PROCEDURE — 80048 BASIC METABOLIC PNL TOTAL CA: CPT | Performed by: INTERNAL MEDICINE

## 2023-05-17 PROCEDURE — 99232 SBSQ HOSP IP/OBS MODERATE 35: CPT | Performed by: INTERNAL MEDICINE

## 2023-05-17 PROCEDURE — 25010000002 FUROSEMIDE PER 20 MG: Performed by: INTERNAL MEDICINE

## 2023-05-17 PROCEDURE — G0378 HOSPITAL OBSERVATION PER HR: HCPCS

## 2023-05-17 PROCEDURE — 25010000002 HEPARIN (PORCINE) PER 1000 UNITS: Performed by: INTERNAL MEDICINE

## 2023-05-17 PROCEDURE — 97116 GAIT TRAINING THERAPY: CPT

## 2023-05-17 PROCEDURE — 85027 COMPLETE CBC AUTOMATED: CPT | Performed by: INTERNAL MEDICINE

## 2023-05-17 RX ORDER — POTASSIUM CHLORIDE 20 MEQ/1
40 TABLET, EXTENDED RELEASE ORAL ONCE
Status: COMPLETED | OUTPATIENT
Start: 2023-05-17 | End: 2023-05-17

## 2023-05-17 RX ADMIN — Medication 1 TABLET: at 08:44

## 2023-05-17 RX ADMIN — HYDROCODONE BITARTRATE AND ACETAMINOPHEN 1 TABLET: 5; 325 TABLET ORAL at 20:14

## 2023-05-17 RX ADMIN — METOPROLOL TARTRATE 25 MG: 25 TABLET, FILM COATED ORAL at 20:16

## 2023-05-17 RX ADMIN — FUROSEMIDE 80 MG: 10 INJECTION, SOLUTION INTRAMUSCULAR; INTRAVENOUS at 20:17

## 2023-05-17 RX ADMIN — HYDROCODONE BITARTRATE AND ACETAMINOPHEN 1 TABLET: 5; 325 TABLET ORAL at 08:45

## 2023-05-17 RX ADMIN — DICLOFENAC SODIUM 4 G: 10 GEL TOPICAL at 17:41

## 2023-05-17 RX ADMIN — ASPIRIN 81 MG: 81 TABLET, CHEWABLE ORAL at 08:44

## 2023-05-17 RX ADMIN — DOCUSATE SODIUM 100 MG: 100 CAPSULE, LIQUID FILLED ORAL at 08:44

## 2023-05-17 RX ADMIN — DONEPEZIL HYDROCHLORIDE 5 MG: 5 TABLET, FILM COATED ORAL at 20:15

## 2023-05-17 RX ADMIN — POTASSIUM CHLORIDE 40 MEQ: 20 TABLET, EXTENDED RELEASE ORAL at 17:41

## 2023-05-17 RX ADMIN — HEPARIN SODIUM 5000 UNITS: 5000 INJECTION INTRAVENOUS; SUBCUTANEOUS at 08:44

## 2023-05-17 RX ADMIN — VITAMINS A AND D OINTMENT 1 APPLICATION: 15.5; 53.4 OINTMENT TOPICAL at 11:23

## 2023-05-17 RX ADMIN — TRAMADOL HYDROCHLORIDE 25 MG: 50 TABLET, COATED ORAL at 14:41

## 2023-05-17 RX ADMIN — HEPARIN SODIUM 5000 UNITS: 5000 INJECTION INTRAVENOUS; SUBCUTANEOUS at 20:16

## 2023-05-17 RX ADMIN — DICLOFENAC SODIUM 4 G: 10 GEL TOPICAL at 08:45

## 2023-05-17 RX ADMIN — METOPROLOL TARTRATE 25 MG: 25 TABLET, FILM COATED ORAL at 08:44

## 2023-05-17 RX ADMIN — DOCUSATE SODIUM 100 MG: 100 CAPSULE, LIQUID FILLED ORAL at 20:16

## 2023-05-17 RX ADMIN — MEMANTINE HYDROCHLORIDE 5 MG: 5 TABLET, FILM COATED ORAL at 08:44

## 2023-05-17 RX ADMIN — FUROSEMIDE 80 MG: 10 INJECTION, SOLUTION INTRAMUSCULAR; INTRAVENOUS at 08:44

## 2023-05-17 RX ADMIN — Medication 10 ML: at 20:17

## 2023-05-17 RX ADMIN — EMPAGLIFLOZIN 10 MG: 10 TABLET, FILM COATED ORAL at 08:44

## 2023-05-17 RX ADMIN — LOSARTAN POTASSIUM 100 MG: 50 TABLET, FILM COATED ORAL at 08:44

## 2023-05-17 RX ADMIN — Medication 10 ML: at 08:44

## 2023-05-17 RX ADMIN — DICLOFENAC SODIUM 4 G: 10 GEL TOPICAL at 20:15

## 2023-05-17 RX ADMIN — DICLOFENAC SODIUM 4 G: 10 GEL TOPICAL at 11:23

## 2023-05-17 NOTE — PLAN OF CARE
Goal Outcome Evaluation:   Daily Care Plan Summary: Heart Failure    Diuretic in use (IV or PO):  IV        Daily weight (up or down):  same      Output > Intake (yes/no): Yes      O2 Requirements (current, any change?): No changes 2L NC which is baseline       Symptoms noted with Activity (Respiratory Tolerance, functional state):    SOA on activity       Anticipated Discharge Plans: Back to Nursing Facility

## 2023-05-17 NOTE — PLAN OF CARE
Goal Outcome Evaluation: Taking over patient's care at this time.     Daily Care Plan Summary: Heart Failure    Diuretic in use (IV or PO):   IV        Daily weight (up or down):    loss: 0.1 lbs      Output > Intake (yes/no):Yes      O2 Requirements (current, any change?): 2 liters/min via nasal cannula      Symptoms noted with Activity (Respiratory Tolerance, functional state):    SOA      Anticipated Discharge Plans:    Return to NH

## 2023-05-17 NOTE — CASE MANAGEMENT/SOCIAL WORK
Discharge Planning Assessment  [facility]     Patient Name: Azra Ohara  MRN: [id number]  Today's Date: 5/17/2023    Admit Date: 5/15/2023       Discharge Plan     Row Name 05/17/23 1040       Plan    Plan SS switched pt's pharmacy back to [facility] in preparation for pt's return to [facility].  SS will follow.    Row Name 05/17/23 1037       Plan    Plan SS spoke with Joanna at [facility] on this date.  [facility] continues to await pre authorization from pt's insurance to accept pt back.  SS will continue to follow and assist with discharge needs.              Continued Care and Services - Admitted Since 5/15/2023     Destination     Service Provider Request Status Selected Services Address Phone Fax Patient Preferred    [facility] Pending - Request Sent N/A [address] [place] [address] [phone] --           ELIOT Foster

## 2023-05-17 NOTE — CASE MANAGEMENT/SOCIAL WORK
Discharge Planning Assessment   [facility]     Patient Name: Azra Ohara  MRN: [id number]  Today's Date: 5/17/2023    Admit Date: 5/15/2023    Plan: SS spoke with Joanna at [facility] on this date.  [facility] continues to await pre authorization from pt's insurance to accept pt back.  SS will continue to follow and assist with discharge needs.     Discharge Plan     Row Name 05/17/23 1037       Plan    Plan SS spoke with Joanna at [facility] on this date.  [facility] continues to await pre authorization from pt's insurance to accept pt back.  SS will continue to follow and assist with discharge needs.              Continued Care and Services - Admitted Since 5/15/2023     Destination     Service Provider Request Status Selected Services Address Phone Fax Patient Preferred    [facility] Pending - Request Sent N/A [address] [place] [address] [phone] --              RAFAEL FosterW

## 2023-05-17 NOTE — THERAPY TREATMENT NOTE
Acute Care - Occupational Therapy Treatment Note   [place]     Patient Name: Azra Ohara  : 1935  MRN: [id number]  Today's Date: 2023  Onset of Illness/Injury or Date of Surgery: 05/15/23     Referring Physician: Lilia    Admit Date: 5/15/2023       ICD-10-CM ICD-9-CM   1. Acute congestive heart failure, unspecified heart failure type  I50.9 428.0     Patient Active Problem List   Diagnosis   • Chronic kidney disease, stage 4 (severe)   • Essential hypertension   • Left bundle branch block   • Malignant neoplasm of large intestine   • Polycythemia vera   • Long term (current) use of aspirin   • Acute congestive heart failure, unspecified heart failure type     Past Medical History:   Diagnosis Date   • Chronic kidney disease, stage 4 (severe) 2023   • Essential hypertension    • History of colon cancer    • Left bundle branch block 2023   • Long term (current) use of aspirin 2023   • Polycythemia vera 2023     Past Surgical History:   Procedure Laterality Date   • CARDIAC ELECTROPHYSIOLOGY PROCEDURE N/A 2023    Procedure: Pacemaker DC new;  Surgeon: Sampson Garvin MD;  Location: [facility] INVASIVE LOC[facility];  Service: Cardiology;  Laterality: N/A;   • COLON RESECTION     • PARATHYROIDECTOMY Right     Parathyroid adenoma         OT ASSESSMENT FLOWSHEET (last 12 hours)     OT Evaluation and Treatment     Row Name 23 1131                   OT Time and Intention    Subjective Information complains of;fatigue  -LA        Document Type therapy note (daily note)  -LA        Mode of Treatment occupational therapy  -LA        Patient Effort good  -LA        Symptoms Noted During/After Treatment fatigue  -LA           General Information    Patient Profile Reviewed yes  -LA        General Observations of Patient Patient agreeable to therapy. Patient pleasant and coopeartive.  -LA        Existing Precautions/Restrictions fall  -LA           Cognition    Affect/Mental  Status (Cognition) WFL  -LA        Orientation Status (Cognition) oriented x 3  -LA        Follows Commands (Cognition) WFL  -LA           Bed Mobility    Supine-Sit [place] (Bed Mobility) contact guard;verbal cues  -LA        Sit-Supine [place] (Bed Mobility) contact guard;verbal cues  -LA           Transfer Assessment/Treatment    Transfers sit-stand transfer  -LA           Sit-Stand Transfer    Sit-Stand [place] (Transfers) contact guard;minimum assist (75% patient effort)  -LA           Motor Skills    Motor Skills coordination;functional endurance  -LA        Therapeutic Exercise shoulder;elbow/forearm;wrist;hand  -LA        Additional Documentation --  Exercises completed from EOB this date. UE exercises completed 10 x2 sets in all planes.  -LA           Balance    Static Sitting Balance standby assist  -LA        Dynamic Sitting Balance standby assist  -LA           Wound 05/15/23 2159 Right lower gluteal Pressure Injury    Wound - Properties Group Placement Date: 05/15/23  -NT Placement Time: 2159 -NT Present on Hospital Admission: Y  -NT Side: Right  -NT Orientation: lower  -NT Location: gluteal  -NT Primary Wound Type: Pressure inj  -NT    Retired Wound - Properties Group Placement Date: 05/15/23  -NT Placement Time: 2159 -NT Present on Hospital Admission: Y  -NT Side: Right  -NT Orientation: lower  -NT Location: gluteal  -NT Primary Wound Type: Pressure inj  -NT    Retired Wound - Properties Group Date first assessed: 05/15/23  -NT Time first assessed: 2159 -NT Present on Hospital Admission: Y  -NT Side: Right  -NT Location: gluteal  -NT Primary Wound Type: Pressure inj  -NT       Wound 05/15/23 2201 Left lower gluteal Pressure Injury    Wound - Properties Group Placement Date: 05/15/23  -NT Placement Time: 2201  -NT Side: Left  -NT Orientation: lower  -NT Location: gluteal  -NT Primary Wound Type: Pressure inj  -NT    Retired Wound - Properties Group Placement Date: 05/15/23  -NT  Placement Time: 2201 -NT Side: Left  -NT Orientation: lower  -NT Location: gluteal  -NT Primary Wound Type: Pressure inj  -NT    Retired Wound - Properties Group Date first assessed: 05/15/23  -NT Time first assessed: 2201 -NT Side: Left  -NT Location: gluteal  -NT Primary Wound Type: Pressure inj  -NT       Wound 05/15/23 2202 Bilateral medial coccyx MASD (Moisture associated skin damage)    Wound - Properties Group Placement Date: 05/15/23  -NT Placement Time: 2202 -NT Present on Hospital Admission: Y  -NT Side: Bilateral  -NT Orientation: medial  -NT Location: coccyx  -NT Primary Wound Type: MASD  -NT    Retired Wound - Properties Group Placement Date: 05/15/23  -NT Placement Time: 2202 -NT Present on Hospital Admission: Y  -NT Side: Bilateral  -NT Orientation: medial  -NT Location: coccyx  -NT Primary Wound Type: MASD  -NT    Retired Wound - Properties Group Date first assessed: 05/15/23  -NT Time first assessed: 2202 -NT Present on Hospital Admission: Y  -NT Side: Bilateral  -NT Location: coccyx  -NT Primary Wound Type: MASD  -NT       Wound 05/04/23 0250 Left gluteal    Wound - Properties Group Placement Date: 05/04/23  -BR Placement Time: 0250  -BR Side: Left  -BR Location: gluteal  -BR    Retired Wound - Properties Group Placement Date: 05/04/23  -BR Placement Time: 0250  -BR Side: Left  -BR Location: gluteal  -BR    Retired Wound - Properties Group Date first assessed: 05/04/23  -BR Time first assessed: 0250  -BR Side: Left  -BR Location: gluteal  -BR       Wound 05/04/23 0252 Right gluteal    Wound - Properties Group Placement Date: 05/04/23  -BR Placement Time: 0252  -BR Side: Right  -BR Location: gluteal  -BR    Retired Wound - Properties Group Placement Date: 05/04/23  -BR Placement Time: 0252  -BR Side: Right  -BR Location: gluteal  -BR    Retired Wound - Properties Group Date first assessed: 05/04/23  -BR Time first assessed: 0252  -BR Side: Right  -BR Location: gluteal  -BR       Plan of Care  Review    Plan of Care Reviewed With patient  -LA        Progress improving  -LA           Positioning and Restraints    Pre-Treatment Position in bed  -LA        Post Treatment Position chair  -LA        In Chair sitting;call light within reach;encouraged to call for assist  -LA              User Key  (r) = Recorded By, (t) = Taken By, (c) = Cosigned By    Initials Name Effective Dates    NT Kaela Omalley RN 05/10/21 -     Alyce Perez RN 11/08/22 -     Keyana Arechiga OT 02/14/22 -                        OT Recommendation and Plan     Plan of Care Review  Plan of Care Reviewed With: patient  Progress: improving  Plan of Care Reviewed With: patient        Time Calculation:     Therapy Charges for Today     Code Description Service Date Service Provider Modifiers Qty    [id number]  OT THERAPEUTIC ACT EA 15 MIN 5/17/2023 Keyana Paula OT GO 1    [id number]  OT THER PROC EA 15 MIN 5/17/2023 Keyana Paula OT GO 1               Keyana Paula OT  5/17/2023

## 2023-05-17 NOTE — PROGRESS NOTES
[facility] HOSPITALIST PROGRESS NOTE     Patient Identification:  Name:  Azra Ohara  Age:  88 y.o.  Sex:  female  :  1935  MRN:  [id number]  Visit Number:  [id number]  Primary Care Provider:  Minal Salazar APRN    Length of stay:  0    Chief complaint: Shortness of breath    Subjective:    Patient states she is doing well this morning and reports her shortness of breath is improved in comparison to yesterday.  She denies any fevers, chills, sweats or rigors.  Patient does report a mild headache.  Otherwise, no new events overnight.  ----------------------------------------------------------------------------------------------------------------------  Current [facility] Meds:  aspirin, 81 mg, Oral, Daily  Diclofenac Sodium, 4 g, Topical, 4x Daily  docusate sodium, 100 mg, Oral, BID  donepezil, 5 mg, Oral, Nightly  empagliflozin, 10 mg, Oral, Daily  furosemide, 80 mg, Intravenous, BID  heparin (porcine), 5,000 Units, Subcutaneous, Q12H  losartan, 100 mg, Oral, Q24H  memantine, 5 mg, Oral, Daily  metoprolol tartrate, 25 mg, Oral, BID  multivitamin with minerals, 1 tablet, Oral, Daily         ----------------------------------------------------------------------------------------------------------------------  Vital Signs:  Temp:  [97.2 °F (36.2 °C)-98.1 °F (36.7 °C)] 97.8 °F (36.6 °C)  Heart Rate:  [60-76] 61  Resp:  [18-19] 18  BP: (111-154)/(52-89) 154/89      05/15/23  1521 23  0500 23  0500   Weight: 81.2 kg (179 lb) 81.2 kg (179 lb) (new admit) 82.1 kg (180 lb 14.4 oz)     Body mass index is 30.1 kg/m².    Intake/Output Summary (Last 24 hours) at 2023 1015  Last data filed at 2023 0830  Gross per 24 hour   Intake 480 ml   Output 1550 ml   Net -1070 ml     Diet: Cardiac Diets; Healthy Heart (2-3 Na+); Texture: Regular Texture (IDDSI 7); Fluid Consistency: Thin (IDDSI  0)  ----------------------------------------------------------------------------------------------------------------------  Physical exam:  Constitutional: Elderly appearing  female in no apparent distress.     HENT:  Head:  Normocephalic and atraumatic.  Mouth:  Moist mucous membranes.    Eyes:  Conjunctivae and EOM are normal.  Pupils are equal, round, and reactive to light.  No scleral icterus.    Neck:  Neck supple. No thyromegaly.  No JVD present.    Cardiovascular:  Regular rate and rhythm with no murmurs, rubs, clicks or gallops appreciated.  Pulmonary/Chest: Faint bibasilar crackles on deep inspiration with no wheezes or rhonchi appreciated.  Abdominal:  Soft. Nontender. Nondistended  Bowel sounds are normal in all four quadrants. No organomegally appreciated.   Musculoskeletal:  No edema, no tenderness, and no deformity.  No red or swollen joints anywhere.    Neurological:  Alert, Cranial nerves II-XII intact with no focal deficits.  No facial droop.  No slurred speech.   Skin:  Warm and dry to palpation with no rashes or lesions appreciated.  Peripheral vascular:  2+ radial and pedal pulses in bilateral upper and lower extremities.  Psychiatric:  Alert and oriented x3, demonstrates appropriate judgment and insight.  ---------------------------------------------------------------------------------   ----------------------------------------------------------------------------------------------------------------------  Results from last 7 days   Lab Units 05/15/23  1828 05/15/23  1633 05/14/23  0038   CK TOTAL U/L  --   --  40   HSTROP T ng/L 32* 31* 39*     Results from last 7 days   Lab Units 05/17/23  0222 05/16/23  0204 05/15/23  1633 05/15/23  1547 05/14/23  0038 05/12/23  1524 05/12/23  1414   CRP mg/dL  --   --  9.43*  --  3.58*  --   --    LACTATE mmol/L  --   --   --  1.2  --   --   --    WBC 10*3/mm3 8.08 9.68  --  11.70* 12.51*   < > 8.22   HEMOGLOBIN g/dL 11.9* 11.9*  --  13.3 12.3   < >  13.2   HEMATOCRIT % 38.4 38.4  --  42.8 38.4   < > 42.2   MCV fL 98.2* 97.2*  --  97.5* 95.8   < > 97.2*   MCHC g/dL 31.0* 31.0*  --  31.1* 32.0   < > 31.3*   PLATELETS 10*3/mm3 247 223  --  273 232   < > 211   INR   --   --   --   --   --   --  0.99    < > = values in this interval not displayed.     Results from last 7 days   Lab Units 05/15/23  1614   PH, ARTERIAL pH units 7.475*   PO2 ART mm Hg 76.8*   PCO2, ARTERIAL mm Hg 33.6*   HCO3 ART mmol/L 24.7     Results from last 7 days   Lab Units 05/17/23  0222 05/16/23  1605 05/16/23  0204 05/15/23  1633 05/14/23  0038 05/12/23  1414 05/12/23  1414 05/11/23  0145   SODIUM mmol/L 139  --  142 142 139   < > 143  --    POTASSIUM mmol/L 3.3* 3.6 3.1* 4.1 4.0   < > 4.2  --    MAGNESIUM mg/dL  --   --   --   --  1.7  --   --  2.0   CHLORIDE mmol/L 100  --  104 106 106   < > 108*  --    CO2 mmol/L 25.6  --  24.9 23.9 23.9   < > 26.1  --    BUN mg/dL 32*  --  25* 27* 27*   < > 21  --    CREATININE mg/dL 0.99  --  0.81 0.86 0.88   < > 0.83  --    CALCIUM mg/dL 9.5  --  9.4 9.8 9.7   < > 10.0  --    GLUCOSE mg/dL 97  --  94 106* 128*   < > 124*  --    ALBUMIN g/dL  --   --   --  3.2* 3.1*  --  3.6  --    BILIRUBIN mg/dL  --   --   --  0.5 0.4  --  0.4  --    ALK PHOS U/L  --   --   --  75 74  --  78  --    AST (SGOT) U/L  --   --   --  14 16  --  22  --    ALT (SGPT) U/L  --   --   --  5 5  --  6  --     < > = values in this interval not displayed.   Estimated Creatinine Clearance: 41.5 mL/min (by C-G formula based on SCr of 0.99 mg/dL).    No results found for: AMMONIA  Results from last 7 days   Lab Units 05/14/23  0038   CHOLESTEROL mg/dL 110   TRIGLYCERIDES mg/dL 72   HDL CHOL mg/dL 53   LDL CHOL mg/dL 42     No results found for: BLOODCX  No results found for: URINECX  No results found for: WOUNDCX  No results found for: STOOLCX    I have personally looked at the labs and they are summarized  above.  ----------------------------------------------------------------------------------------------------------------------  Imaging Results (Last 24 Hours)     ** No results found for the last 24 hours. **        ----------------------------------------------------------------------------------------------------------------------  Assessment and Plan:    1.  Acute on chronic HFpEF -continue Lasix 80 mg IV twice daily and monitor renal function closely.  Continue Jardiance as well.    2.  Bilateral pleural effusions -likely secondary to #1, continue IV diuretics.    3.  Hypertensive urgency -now much improved, continue to monitor closely and make antihypertensive medication adjustments as necessary.    4.  Anterior subluxation of right shoulder -currently asymptomatic, will refer to orthopedic surgery in an outpatient setting    5.  CKD stage II -stable    6.  General debility -we will consult PT/OT while hospitalized    Disposition possible discharge in the next 24 to 48 hours    Bradley Jacobo DO   05/17/23   10:15 EDT

## 2023-05-17 NOTE — THERAPY TREATMENT NOTE
Acute Care - Physical Therapy Treatment Note   [place]     Patient Name: Azra Ohara  : 1935  MRN: [id number]  Today's Date: 2023   Onset of Illness/Injury or Date of Surgery: 05/15/23  Visit Dx:     ICD-10-CM ICD-9-CM   1. Acute congestive heart failure, unspecified heart failure type  I50.9 428.0     Patient Active Problem List   Diagnosis   • Chronic kidney disease, stage 4 (severe)   • Essential hypertension   • Left bundle branch block   • Malignant neoplasm of large intestine   • Polycythemia vera   • Long term (current) use of aspirin   • Acute congestive heart failure, unspecified heart failure type     Past Medical History:   Diagnosis Date   • Chronic kidney disease, stage 4 (severe) 2023   • Essential hypertension    • History of colon cancer    • Left bundle branch block 2023   • Long term (current) use of aspirin 2023   • Polycythemia vera 2023     Past Surgical History:   Procedure Laterality Date   • CARDIAC ELECTROPHYSIOLOGY PROCEDURE N/A 2023    Procedure: Pacemaker DC new;  Surgeon: Sampson Garvin MD;  Location: [facility] INVASIVE LOCATION;  Service: Cardiology;  Laterality: N/A;   • COLON RESECTION     • PARATHYROIDECTOMY Right     Parathyroid adenoma     PT Assessment (last 12 hours)     PT Evaluation and Treatment     Row Name 23 1141          Physical Therapy Time and Intention    Subjective Information complains of;fatigue;weakness  -AG     Document Type therapy note (daily note)  -AG     Mode of Treatment occupational therapy  -AG     Patient Effort good  -AG     Symptoms Noted During/After Treatment fatigue  -AG     Row Name 23 1141          General Information    Patient Profile Reviewed yes  -AG     Patient Observations agree to therapy;cooperative;alert  -AG     Patient/Family/Caregiver Comments/Observations pt. supine in bed on room air; agreeable to participation  -AG     Existing Precautions/Restrictions fall  -AG      Limitations/Impairments hearing  -AG     Risks Reviewed patient:;dizziness;LOB  -AG     Benefits Reviewed patient:;improve function;increase independence;increase strength;increase balance;increase knowledge;decrease risk of DVT  -AG     Barriers to Rehab hearing deficit  -AG     Row Name 05/17/23 1141          Living Environment    Current Living Arrangements residential facility  -AG     People in Home facility resident  -AG     Row Name 05/17/23 1141          Pain    Pretreatment Pain Rating 0/10 - no pain  -AG     Posttreatment Pain Rating 0/10 - no pain  -AG     Row Name 05/17/23 1141          Cognition    Affect/Mental Status (Cognition) WFL  -AG     Orientation Status (Cognition) oriented x 3  -AG     Follows Commands (Cognition) WFL  -AG     Row Name 05/17/23 1141          Bed Mobility    Supine-Sit [place] (Bed Mobility) verbal cues;contact guard  -AG     Sit-Supine [place] (Bed Mobility) verbal cues;contact guard  -AG     Bed Mobility, Safety Issues decreased use of arms for pushing/pulling;decreased use of legs for bridging/pushing  -AG     Row Name 05/17/23 1141          Transfers    Transfers stand pivot/stand step transfer;stand-sit transfer;sit-stand transfer  -AG     [facility] Name 05/17/23 1141          Sit-Stand Transfer    Sit-Stand [place] (Transfers) contact guard;nonverbal cues (demo/gesture);verbal cues;minimum assist (75% patient effort)  -     Assistive Device (Sit-Stand Transfers) walker, front-wheeled  -AG     [facility] Name 05/17/23 1141          Stand-Sit Transfer    Stand-Sit [place] (Transfers) verbal cues;nonverbal cues (demo/gesture);contact guard;minimum assist (75% patient effort)  -     Assistive Device (Stand-Sit Transfers) walker, front-wheeled  -AG     [facility] Name 05/17/23 1141          Stand Pivot/Stand Step Transfer    Stand Pivot/Stand Step [place] (Transfers) verbal cues;nonverbal cues (demo/gesture);contact guard;minimum assist (75% patient effort)  -AG      Assistive Device (Stand Pivot Stand Step Transfer) walker, front-wheeled  -AG     Row Name 05/17/23 1141          Gait/Stairs (Locomotion)    Gait/Stairs Locomotion gait/ambulation independence;gait/ambulation assistive device;distance ambulated;gait pattern;gait deviations  -AG     [place] Level (Gait) contact guard;nonverbal cues (demo/gesture);verbal cues;minimum assist (75% patient effort)  -AG     Assistive Device (Gait) walker, front-wheeled  -AG     Distance in Feet (Gait) 4  -AG     Pattern (Gait) step-to  -AG     Deviations/Abnormal Patterns (Gait) antalgic  -AG     Bilateral Gait Deviations forward flexed posture  -     Row Name 05/17/23 1141          Safety Issues, Functional Mobility    Impairments Affecting Function (Mobility) balance;endurance/activity tolerance;strength;postural/trunk control;coordination  -[facility] Name 05/17/23 1141          Balance    Static Sitting Balance standby assist  -     Dynamic Sitting Balance standby assist  -AG     Position, Sitting Balance unsupported;sitting in chair;sitting edge of bed;supported  -[facility] Name 05/17/23 1141          Motor Skills    Motor Skills coordination;functional endurance;posture;motor control/coordination interventions  -     Therapeutic Exercise knee;hip;ankle  -[facility] Name 05/17/23 1141          Hip (Therapeutic Exercise)    Hip (Therapeutic Exercise) strengthening exercise  -     Hip Strengthening (Therapeutic Exercise) bilateral;flexion;extension;aBduction;aDduction;sitting  -[facility] Name 05/17/23 1141          Knee (Therapeutic Exercise)    Knee (Therapeutic Exercise) strengthening exercise  -     Knee Strengthening (Therapeutic Exercise) bilateral;flexion;extension;marching while seated;LAQ (long arc quad);sitting  -[facility] Name 05/17/23 1141          Ankle (Therapeutic Exercise)    Ankle (Therapeutic Exercise) strengthening exercise  -     Ankle Strengthening (Therapeutic Exercise)  bilateral;dorsiflexion;sitting  -AG     Row Name             Wound 05/15/23 2159 Right lower gluteal Pressure Injury    Wound - Properties Group Placement Date: 05/15/23  -NT Placement Time: 2159 -NT Present on Hospital Admission: Y  -NT Side: Right  -NT Orientation: lower  -NT Location: gluteal  -NT Primary Wound Type: Pressure inj  -NT    Retired Wound - Properties Group Placement Date: 05/15/23  -NT Placement Time: 2159 -NT Present on Hospital Admission: Y  -NT Side: Right  -NT Orientation: lower  -NT Location: gluteal  -NT Primary Wound Type: Pressure inj  -NT    Retired Wound - Properties Group Date first assessed: 05/15/23  -NT Time first assessed: 2159 -NT Present on Hospital Admission: Y  -NT Side: Right  -NT Location: gluteal  -NT Primary Wound Type: Pressure inj  -NT    Row Name             Wound 05/15/23 2201 Left lower gluteal Pressure Injury    Wound - Properties Group Placement Date: 05/15/23  -NT Placement Time: 2201 -NT Side: Left  -NT Orientation: lower  -NT Location: gluteal  -NT Primary Wound Type: Pressure inj  -NT    Retired Wound - Properties Group Placement Date: 05/15/23  -NT Placement Time: 2201 -NT Side: Left  -NT Orientation: lower  -NT Location: gluteal  -NT Primary Wound Type: Pressure inj  -NT    Retired Wound - Properties Group Date first assessed: 05/15/23  -NT Time first assessed: 2201 -NT Side: Left  -NT Location: gluteal  -NT Primary Wound Type: Pressure inj  -NT    Row Name             Wound 05/15/23 2202 Bilateral medial coccyx MASD (Moisture associated skin damage)    Wound - Properties Group Placement Date: 05/15/23  -NT Placement Time: 2202 -NT Present on Hospital Admission: Y  -NT Side: Bilateral  -NT Orientation: medial  -NT Location: coccyx  -NT Primary Wound Type: MASD  -NT    Retired Wound - Properties Group Placement Date: 05/15/23  -NT Placement Time: 2202 -NT Present on Hospital Admission: Y  -NT Side: Bilateral  -NT Orientation: medial  -NT Location: coccyx   -NT Primary Wound Type: MASD  -NT    Retired Wound - Properties Group Date first assessed: 05/15/23  -NT Time first assessed: 2202  -NT Present on Hospital Admission: Y  -NT Side: Bilateral  -NT Location: coccyx  -NT Primary Wound Type: MASD  -NT    Row Name             Wound 05/04/23 0250 Left gluteal    Wound - Properties Group Placement Date: 05/04/23  -BR Placement Time: 0250  -BR Side: Left  -BR Location: gluteal  -BR    Retired Wound - Properties Group Placement Date: 05/04/23  -BR Placement Time: 0250  -BR Side: Left  -BR Location: gluteal  -BR    Retired Wound - Properties Group Date first assessed: 05/04/23  -BR Time first assessed: 0250  -BR Side: Left  -BR Location: gluteal  -BR    Row Name             Wound 05/04/23 0252 Right gluteal    Wound - Properties Group Placement Date: 05/04/23  -BR Placement Time: 0252  -BR Side: Right  -BR Location: gluteal  -BR    Retired Wound - Properties Group Placement Date: 05/04/23  -BR Placement Time: 0252  -BR Side: Right  -BR Location: gluteal  -BR    Retired Wound - Properties Group Date first assessed: 05/04/23  -BR Time first assessed: 0252  -BR Side: Right  -BR Location: gluteal  -BR    Row Name 05/17/23 1141          Coping    Observed Emotional State calm;cooperative  -AG     Verbalized Emotional State acceptance  -AG     Trust Relationship/Rapport care explained;choices provided;thoughts/feelings acknowledged  -AG     Family/Support Persons family  -AG     Involvement in Care not present at bedside  -AG     Row Name 05/17/23 1141          Plan of Care Review    Plan of Care Reviewed With patient  -AG     Progress improving  -AG     Outcome Evaluation PT treatment complete.  Pt. CGA / min A for out of bed mobility, able to take ~4 steps w/ RW and CGA; remained up in bedside chair to perform B LE ther ex for strengthening.  Will continue.  -AG     Row Name 05/17/23 1141          Vital Signs    Intra SpO2 (%) 94  -AG     O2 Delivery Intra Treatment room air  -AG      Intra Patient Position Sitting  -AG     Row Name 05/17/23 1141          Positioning and Restraints    Pre-Treatment Position in bed  -     Post Treatment Position chair  -AG     In Chair sitting;call light within reach;encouraged to call for assist;with nsg  -AG           User Key  (r) = Recorded By, (t) = Taken By, (c) = Cosigned By    Initials Name Provider Type    AG Montserrat Gale, PT Physical Therapist    Kaela Hobbs, RN Registered Nurse    Alyce Perez RN Registered Nurse                Physical Therapy Education     Title: PT OT SLP Therapies (Done)     Topic: Physical Therapy (Done)     Point: Mobility training (Done)     Learning Progress Summary           Patient Acceptance, E,D, VU,NR by  at 5/17/2023 1139                   Point: Home exercise program (Done)     Learning Progress Summary           Patient Acceptance, E,D, VU,NR by  at 5/17/2023 1139                   Point: Body mechanics (Done)     Learning Progress Summary           Patient Acceptance, E,D, VU,NR by  at 5/17/2023 1139                   Point: Precautions (Done)     Learning Progress Summary           Patient Acceptance, E,D, VU,NR by  at 5/17/2023 1139                               User Key     Initials Effective Dates Name Provider Type [facility] 06/16/21 -  Montserrat Gale, PT Physical Therapist PT              PT Recommendation and Plan     Plan of Care Reviewed With: patient  Progress: improving  Outcome Evaluation: PT treatment complete.  Pt. CGA / min A for out of bed mobility, able to take ~4 steps w/ RW and CGA; remained up in bedside chair to perform B LE ther ex for strengthening.  Will continue.       Time Calculation:    PT Charges     Row Name 05/17/23 1139             Time Calculation    PT Received On 05/17/23  -            User Key  (r) = Recorded By, (t) = Taken By, (c) = Cosigned By    Initials Name Provider Type    AG Montserrat Gale, PT Physical Therapist              Therapy  Charges for Today     Code Description Service Date Service Provider Modifiers Qty    95733108033 HC GAIT TRAINING EA 15 MIN 5/17/2023 Montserrat Gale, PT GP 1    [id number] HC PT THER PROC EA 15 MIN 5/17/2023 Montserrat Gale, PT GP 1          PT G-Codes  AM-PAC 6 Clicks Score (PT): 17    Montserrat Gale, PT  5/17/2023

## 2023-05-18 PROBLEM — I50.9 ACUTE CONGESTIVE HEART FAILURE, UNSPECIFIED HEART FAILURE TYPE: Status: ACTIVE | Noted: 2023-05-18

## 2023-05-18 LAB
ANION GAP SERPL CALCULATED.3IONS-SCNC: 10.2 MMOL/L (ref 5–15)
BUN SERPL-MCNC: 35 MG/DL (ref 8–23)
BUN/CREAT SERPL: 33.3 (ref 7–25)
CALCIUM SPEC-SCNC: 9.8 MG/DL (ref 8.6–10.5)
CHLORIDE SERPL-SCNC: 101 MMOL/L (ref 98–107)
CO2 SERPL-SCNC: 27.8 MMOL/L (ref 22–29)
CREAT SERPL-MCNC: 1.05 MG/DL (ref 0.57–1)
EGFRCR SERPLBLD CKD-EPI 2021: 51.2 ML/MIN/1.73
GLUCOSE SERPL-MCNC: 103 MG/DL (ref 65–99)
POTASSIUM SERPL-SCNC: 3.5 MMOL/L (ref 3.5–5.2)
SODIUM SERPL-SCNC: 139 MMOL/L (ref 136–145)

## 2023-05-18 PROCEDURE — 25010000002 FUROSEMIDE PER 20 MG: Performed by: INTERNAL MEDICINE

## 2023-05-18 PROCEDURE — 97110 THERAPEUTIC EXERCISES: CPT

## 2023-05-18 PROCEDURE — 97530 THERAPEUTIC ACTIVITIES: CPT

## 2023-05-18 PROCEDURE — 97116 GAIT TRAINING THERAPY: CPT

## 2023-05-18 PROCEDURE — 99232 SBSQ HOSP IP/OBS MODERATE 35: CPT | Performed by: INTERNAL MEDICINE

## 2023-05-18 PROCEDURE — 80048 BASIC METABOLIC PNL TOTAL CA: CPT | Performed by: INTERNAL MEDICINE

## 2023-05-18 PROCEDURE — 25010000002 HEPARIN (PORCINE) PER 1000 UNITS: Performed by: INTERNAL MEDICINE

## 2023-05-18 PROCEDURE — 97535 SELF CARE MNGMENT TRAINING: CPT

## 2023-05-18 RX ORDER — FUROSEMIDE 10 MG/ML
40 INJECTION INTRAMUSCULAR; INTRAVENOUS 2 TIMES DAILY
Status: DISCONTINUED | OUTPATIENT
Start: 2023-05-18 | End: 2023-05-19 | Stop reason: HOSPADM

## 2023-05-18 RX ADMIN — DICLOFENAC SODIUM 4 G: 10 GEL TOPICAL at 20:15

## 2023-05-18 RX ADMIN — DICLOFENAC SODIUM 4 G: 10 GEL TOPICAL at 11:26

## 2023-05-18 RX ADMIN — HYDROCODONE BITARTRATE AND ACETAMINOPHEN 1 TABLET: 5; 325 TABLET ORAL at 15:03

## 2023-05-18 RX ADMIN — FUROSEMIDE 40 MG: 10 INJECTION, SOLUTION INTRAMUSCULAR; INTRAVENOUS at 20:15

## 2023-05-18 RX ADMIN — DOCUSATE SODIUM 100 MG: 100 CAPSULE, LIQUID FILLED ORAL at 08:53

## 2023-05-18 RX ADMIN — DICLOFENAC SODIUM 4 G: 10 GEL TOPICAL at 17:51

## 2023-05-18 RX ADMIN — METOPROLOL TARTRATE 25 MG: 25 TABLET, FILM COATED ORAL at 08:55

## 2023-05-18 RX ADMIN — Medication 10 ML: at 08:53

## 2023-05-18 RX ADMIN — FUROSEMIDE 80 MG: 10 INJECTION, SOLUTION INTRAMUSCULAR; INTRAVENOUS at 08:53

## 2023-05-18 RX ADMIN — DOCUSATE SODIUM 100 MG: 100 CAPSULE, LIQUID FILLED ORAL at 20:15

## 2023-05-18 RX ADMIN — Medication 1 TABLET: at 08:53

## 2023-05-18 RX ADMIN — HYDROCODONE BITARTRATE AND ACETAMINOPHEN 1 TABLET: 5; 325 TABLET ORAL at 06:00

## 2023-05-18 RX ADMIN — DICLOFENAC SODIUM 4 G: 10 GEL TOPICAL at 08:54

## 2023-05-18 RX ADMIN — HEPARIN SODIUM 5000 UNITS: 5000 INJECTION INTRAVENOUS; SUBCUTANEOUS at 08:54

## 2023-05-18 RX ADMIN — ASPIRIN 81 MG: 81 TABLET, CHEWABLE ORAL at 08:53

## 2023-05-18 RX ADMIN — METOPROLOL TARTRATE 25 MG: 25 TABLET, FILM COATED ORAL at 20:15

## 2023-05-18 RX ADMIN — HEPARIN SODIUM 5000 UNITS: 5000 INJECTION INTRAVENOUS; SUBCUTANEOUS at 20:15

## 2023-05-18 RX ADMIN — MEMANTINE HYDROCHLORIDE 5 MG: 5 TABLET, FILM COATED ORAL at 08:53

## 2023-05-18 RX ADMIN — DONEPEZIL HYDROCHLORIDE 5 MG: 5 TABLET, FILM COATED ORAL at 20:15

## 2023-05-18 RX ADMIN — EMPAGLIFLOZIN 10 MG: 10 TABLET, FILM COATED ORAL at 08:53

## 2023-05-18 RX ADMIN — LOSARTAN POTASSIUM 100 MG: 50 TABLET, FILM COATED ORAL at 08:53

## 2023-05-18 NOTE — THERAPY TREATMENT NOTE
"Acute Care - Physical Therapy Treatment Note   Rodolfo     Patient Name: Azra Ohara  : 1935  MRN: [id number]  Today's Date: 2023   Onset of Illness/Injury or Date of Surgery: 05/15/23  Visit Dx:     ICD-10-CM ICD-9-CM   1. Acute congestive heart failure, unspecified heart failure type  I50.9 428.0     Patient Active Problem List   Diagnosis   • Chronic kidney disease, stage 4 (severe)   • Essential hypertension   • Left bundle branch block   • Malignant neoplasm of large intestine   • Polycythemia vera   • Long term (current) use of aspirin   • Acute on chronic heart failure with preserved ejection fraction (HFpEF)     Past Medical History:   Diagnosis Date   • Chronic kidney disease, stage 4 (severe) 2023   • Essential hypertension    • History of colon cancer    • Left bundle branch block 2023   • Long term (current) use of aspirin 2023   • Polycythemia vera 2023     Past Surgical History:   Procedure Laterality Date   • CARDIAC ELECTROPHYSIOLOGY PROCEDURE N/A 2023    Procedure: Pacemaker DC new;  Surgeon: Sampson Garvin MD;  Location: [facility] INVASIVE LOC[facility];  Service: Cardiology;  Laterality: N/A;   • COLON RESECTION     • PARATHYROIDECTOMY Right     Parathyroid adenoma     PT Assessment (last 12 hours)     PT Evaluation and Treatment     Row Name 23 1200          Physical Therapy Time and Intention    Subjective Information complains of;weakness;fatigue;pain  -AG     Document Type therapy note (daily note)  -AG     Mode of Treatment physical therapy  -AG     Patient Effort adequate  -AG     Row Name 23 1200          General Information    Patient Profile Reviewed yes  -AG     Patient Observations agree to therapy;cooperative;alert  -AG     Patient/Family/Caregiver Comments/Observations pt. supine on room air; reports pain \"all over\" but states mainly in B knees.  States she had a \"rough night\" and did not sleep well.  States she has never been " "this sick and is wondering \"if she's going to make it\".  She is agreeable to participation.  -AG     Existing Precautions/Restrictions fall  -AG     Limitations/Impairments hearing  -AG     Risks Reviewed patient:;increased discomfort;dizziness  -AG     Benefits Reviewed patient:;improve function;increase independence;increase strength;increase balance;increase knowledge;decrease risk of DVT  -AG     Row Name 05/18/23 1200          Living Environment    Current Living Arrangements residential facility  -AG     Row Name 05/18/23 1200          Pain    Additional Documentation Pain Scale: FACES Pre/Post-Treatment (Group)  -AG     [facility] Name 05/18/23 1200          Pain Scale: FACES Pre/Post-Treatment    Pain: FACES Scale, Pretreatment 4-->hurts little more  -AG     Posttreatment Pain Rating 6-->hurts even more  -AG     Pain Location - Side/Orientation Bilateral  -AG     Pain Location lower  -AG     Pain Location - extremity  -AG     Row Name 05/18/23 1200          Cognition    Affect/Mental Status (Cognition) WFL;anxious  -AG     Orientation Status (Cognition) oriented x 3  -AG     Follows Commands (Cognition) WFL  -AG     Row Name 05/18/23 1200          Sensory    Hearing Status hearing impairment, bilaterally  -AG     Row Name 05/18/23 1200          Bed Mobility    Bed Mobility scooting/bridging;supine-sit  -AG     Scooting/Bridging [place] (Bed Mobility) standby assist;verbal cues  -AG     Supine-Sit [place] (Bed Mobility) contact guard;verbal cues;minimum assist (75% patient effort)  -AG     Bed Mobility, Safety Issues decreased use of arms for pushing/pulling;decreased use of legs for bridging/pushing  -AG     Assistive Device (Bed Mobility) bed rails  -AG     Row Name 05/18/23 1200          Transfers    Transfers sit-stand transfer;stand-sit transfer;stand pivot/stand step transfer  -AG     Row Name 05/18/23 1200          Sit-Stand Transfer    Sit-Stand [place] (Transfers) verbal cues;nonverbal cues " (demo/gesture);contact guard  -AG     Assistive Device (Sit-Stand Transfers) walker, front-wheeled  -AG     Row Name 05/18/23 1200          Stand-Sit Transfer    Stand-Sit [place] (Transfers) verbal cues;nonverbal cues (demo/gesture);contact guard  -AG     Assistive Device (Stand-Sit Transfers) walker, front-wheeled  -AG     Row Name 05/18/23 1200          Stand Pivot/Stand Step Transfer    Stand Pivot/Stand Step [place] (Transfers) nonverbal cues (demo/gesture);verbal cues;contact guard  -AG     Assistive Device (Stand Pivot Stand Step Transfer) walker, front-wheeled  -AG     Row Name 05/18/23 1200          Gait/Stairs (Locomotion)    Gait/Stairs Locomotion gait/ambulation independence;gait/ambulation assistive device;distance ambulated;gait pattern;gait deviations;maintains weight-bearing status  -AG     [place] Level (Gait) contact guard;nonverbal cues (demo/gesture);verbal cues  -AG     Assistive Device (Gait) walker, front-wheeled  -AG     Distance in Feet (Gait) 5 steps forward, backward to bedside chair  -AG     Pattern (Gait) step-to  -AG     Deviations/Abnormal Patterns (Gait) antalgic;weight shifting decreased;stride length decreased  -AG     Bilateral Gait Deviations forward flexed posture  -AG     Comment, (Gait/Stairs) pt. able to step up/ down one small step onto scale to obtain a standing weight. Required min A, B HHA.  -AG     Row Name 05/18/23 1200          Safety Issues, Functional Mobility    Impairments Affecting Function (Mobility) balance;endurance/activity tolerance;strength;pain  -AG     Row Name 05/18/23 1200          Balance    Balance Assessment sitting static balance;sitting dynamic balance;sit to stand dynamic balance;standing static balance;standing dynamic balance  -AG     Static Sitting Balance standby assist  -AG     Dynamic Sitting Balance standby assist  -AG     Position, Sitting Balance unsupported;sitting edge of bed  -AG     Static Standing Balance verbal  cues;non-verbal cues (demo/gesture);contact guard  -     Dynamic Standing Balance verbal cues;non-verbal cues (demo/gesture);contact guard;minimal assist  -     Position/Device Used, Standing Balance walker, front-wheeled  -     Row Name 05/18/23 1200          Motor Skills    Motor Skills coordination;functional endurance;motor control/coordination interventions  -     Motor Control/Coordination Interventions stepping/walking;therapeutic exercise/ROM  -     Therapeutic Exercise ankle;knee;hip  -     Row Name 05/18/23 1200          Hip (Therapeutic Exercise)    Hip (Therapeutic Exercise) AAROM (active assistive range of motion)  -     Hip AAROM (Therapeutic Exercise) bilateral;flexion;extension;aBduction;aDduction;external rotation;internal rotation;supine  -     Row Name 05/18/23 1200          Knee (Therapeutic Exercise)    Knee (Therapeutic Exercise) AAROM (active assistive range of motion)  -     Knee AAROM (Therapeutic Exercise) bilateral;flexion;extension;supine  -AG     Knee Strengthening (Therapeutic Exercise) bilateral;flexion;extension;LAQ (long arc quad);sitting  -     Row Name 05/18/23 1200          Ankle (Therapeutic Exercise)    Ankle Strengthening (Therapeutic Exercise) bilateral;dorsiflexion;plantarflexion;supine  -     Row Name 05/18/23 1200          Advanced Stepping/Walking Interventions    Stepping/Walking Interventions backward walking  -     Row Name             Wound 05/15/23 2159 Right lower gluteal Pressure Injury    Wound - Properties Group Placement Date: 05/15/23  -NT Placement Time: 2159 -NT Present on Hospital Admission: Y  -NT Side: Right  -NT Orientation: lower  -NT Location: gluteal  -NT Primary Wound Type: Pressure inj  -NT    Retired Wound - Properties Group Placement Date: 05/15/23  -NT Placement Time: 2159 -NT Present on Hospital Admission: Y  -NT Side: Right  -NT Orientation: lower  -NT Location: gluteal  -NT Primary Wound Type: Pressure inj  -NT     Retired Wound - Properties Group Date first assessed: 05/15/23  -NT Time first assessed: 2159 -NT Present on Hospital Admission: Y  -NT Side: Right  -NT Location: gluteal  -NT Primary Wound Type: Pressure inj  -NT    Row Name             Wound 05/15/23 2201 Left lower gluteal Pressure Injury    Wound - Properties Group Placement Date: 05/15/23  -NT Placement Time: 2201 -NT Side: Left  -NT Orientation: lower  -NT Location: gluteal  -NT Primary Wound Type: Pressure inj  -NT    Retired Wound - Properties Group Placement Date: 05/15/23  -NT Placement Time: 2201 -NT Side: Left  -NT Orientation: lower  -NT Location: gluteal  -NT Primary Wound Type: Pressure inj  -NT    Retired Wound - Properties Group Date first assessed: 05/15/23  -NT Time first assessed: 2201 -NT Side: Left  -NT Location: gluteal  -NT Primary Wound Type: Pressure inj  -NT    Row Name             Wound 05/15/23 2202 Bilateral medial coccyx MASD (Moisture associated skin damage)    Wound - Properties Group Placement Date: 05/15/23  -NT Placement Time: 2202 -NT Present on Hospital Admission: Y  -NT Side: Bilateral  -NT Orientation: medial  -NT Location: coccyx  -NT Primary Wound Type: MASD  -NT    Retired Wound - Properties Group Placement Date: 05/15/23  -NT Placement Time: 2202 -NT Present on Hospital Admission: Y  -NT Side: Bilateral  -NT Orientation: medial  -NT Location: coccyx  -NT Primary Wound Type: MASD  -NT    Retired Wound - Properties Group Date first assessed: 05/15/23  -NT Time first assessed: 2202 -NT Present on Hospital Admission: Y  -NT Side: Bilateral  -NT Location: coccyx  -NT Primary Wound Type: MASD  -NT    Row Name             Wound 05/04/23 0250 Left gluteal    Wound - Properties Group Placement Date: 05/04/23  -BR Placement Time: 0250  -BR Side: Left  -BR Location: gluteal  -BR    Retired Wound - Properties Group Placement Date: 05/04/23  -BR Placement Time: 0250  -BR Side: Left  -BR Location: gluteal  -BR    Retired Wound -  Properties Group Date first assessed: 05/04/23  -BR Time first assessed: 0250  -BR Side: Left  -BR Location: gluteal  -BR    Row Name             Wound 05/04/23 0252 Right gluteal    Wound - Properties Group Placement Date: 05/04/23  -BR Placement Time: 0252  -BR Side: Right  -BR Location: gluteal  -BR    Retired Wound - Properties Group Placement Date: 05/04/23  -BR Placement Time: 0252  -BR Side: Right  -BR Location: gluteal  -BR    Retired Wound - Properties Group Date first assessed: 05/04/23  -BR Time first assessed: 0252  -BR Side: Right  -BR Location: gluteal  -BR    Row Name 05/18/23 1200          Coping    Observed Emotional State cooperative;anxious;pleasant  -AG     Verbalized Emotional State acceptance  -AG     Trust Relationship/Rapport care explained;choices provided;thoughts/feelings acknowledged  -AG     Family/Support Persons family  -AG     Involvement in Care not present at bedside  -AG     Family/Support System Care self-care encouraged;support provided  -AG     Row Name 05/18/23 1200          Plan of Care Review    Plan of Care Reviewed With patient  -AG     Progress improving  -AG     Outcome Evaluation PT treatment complete.  Pt. expresses more pain and discomfort today,however, is able toambulate ~5 steps forward, backward w/ RW; perform one small step up/ down on scale; perform seated and supine LE TE.  Will continue to follow.  -AG     Row Name 05/18/23 1200          Vital Signs    Intra SpO2 (%) 94  -AG     O2 Delivery Intra Treatment room air  -AG     Intra Patient Position Sitting  -AG     Row Name 05/18/23 1200          Positioning and Restraints    Pre-Treatment Position in bed  -AG     Post Treatment Position chair  -AG     In Chair sitting;call light within reach;encouraged to call for assist;with nsg  -AG     Row Name 05/18/23 1200          Therapy Plan Review/Discharge Plan (PT)    Therapy Plan Review (PT) evaluation/treatment results reviewed;care plan/treatment goals  reviewed;risks/benefits reviewed;current/potential barriers reviewed;participants voiced agreement with care plan;participants included;patient  -AG           User Key  (r) = Recorded By, (t) = Taken By, (c) = Cosigned By    Initials Name Provider Type    AG Montserrat Gale, PT Physical Therapist    Kaela Hobbs, RN Registered Nurse    Alyce Perez RN Registered Nurse                Physical Therapy Education     Title: PT OT SLP Therapies (Done)     Topic: Physical Therapy (Done)     Point: Mobility training (Done)     Learning Progress Summary           Patient Acceptance, E,D, VU,NR by  at 5/18/2023 1200    Acceptance, E,D, VU,NR by  at 5/17/2023 1139                   Point: Home exercise program (Done)     Learning Progress Summary           Patient Acceptance, E,D, VU,NR by  at 5/18/2023 1200    Acceptance, E,D, VU,NR by  at 5/17/2023 1139                   Point: Body mechanics (Done)     Learning Progress Summary           Patient Acceptance, E,D, VU,NR by  at 5/18/2023 1200    Acceptance, E,D, VU,NR by  at 5/17/2023 1139                   Point: Precautions (Done)     Learning Progress Summary           Patient Acceptance, E,D, VU,NR by  at 5/18/2023 1200    Acceptance, E,D, VU,NR by  at 5/17/2023 1139                               User Key     Initials Effective Dates Name Provider Type [facility] 06/16/21 -  Montserrat Gale, PT Physical Therapist PT              PT Recommendation and Plan     Plan of Care Reviewed With: patient  Progress: improving  Outcome Evaluation: PT treatment complete.  Pt. expresses more pain and discomfort today,however, is able toambulate ~5 steps forward, backward w/ RW; perform one small step up/ down on scale; perform seated and supine LE TE.  Will continue to follow.       Time Calculation:    PT Charges     Row Name 05/18/23 1200             Time Calculation    PT Received On 05/18/23  -            User Key  (r) = Recorded By, (t) =  Taken By, (c) = Cosigned By    Initials Name Provider Type    Montserrat Nails, PT Physical Therapist              Therapy Charges for Today     Code Description Service Date Service Provider Modifiers Qty    78221773911 HC GAIT TRAINING EA 15 MIN 5/17/2023 Montserrat Gael, PT GP 1    36356742142 HC PT THER PROC EA 15 MIN 5/17/2023 Montserrat Gale, PT GP 1    24460594697 HC GAIT TRAINING EA 15 MIN 5/18/2023 Montserrat Gale, PT GP 1    [id number]  PT THER PROC EA 15 MIN 5/18/2023 Montserrat Gale, PT GP 1    [id number]  PT THERAPEUTIC ACT EA 15 MIN 5/18/2023 Montserrat Gale, PT GP 1          PT G-Codes  AM-PAC 6 Clicks Score (PT): 17    Montserrat Gale, PT  5/18/2023

## 2023-05-18 NOTE — CASE MANAGEMENT/SOCIAL WORK
Discharge Planning Assessment   Geff     Patient Name: Azra Ohara  MRN: [id number]  Today's Date: 5/18/2023    Admit Date: 5/15/2023    Plan: Per Physician pt expected discharge date is 5/19/23.  [facility] per [facility] aware and agreeable.  SS will follow.     Discharge Plan     Row Name 05/18/23 0950       Plan    Plan Per Physician pt expected discharge date is 5/19/23.  [facility] per [facility] aware and agreeable.  SS will follow.    Row Name 05/18/23 0943       Plan    Plan Per [facility] has received approval from pt's insurance to accept pt back on this date if she is medically stable.  SS notified Physician and verified pt's pharmacy is correct in epic.  SS will follow.                RAFAEL FosterW

## 2023-05-18 NOTE — PROGRESS NOTES
[facility] HOSPITALIST PROGRESS NOTE     Patient Identification:  Name:  Azra Ohara  Age:  88 y.o.  Sex:  female  :  1935  MRN:  [id number]  Visit Number:  [id number]  Primary Care Provider:  Minal Salazar APRN    Length of stay:  0    Chief complaint: Shortness of breath    Subjective:    Patient continues to report improvement in shortness of breath.  However, patient states that she is not quite back to baseline.  Patient is requesting 1 more day of diuresis prior to returning to her nursing home.  Otherwise, patient denies any fevers, chills, sweats, rigors, nausea or vomiting and denies any new events overnight.  ----------------------------------------------------------------------------------------------------------------------  Current [facility] Meds:  aspirin, 81 mg, Oral, Daily  Diclofenac Sodium, 4 g, Topical, 4x Daily  docusate sodium, 100 mg, Oral, BID  donepezil, 5 mg, Oral, Nightly  empagliflozin, 10 mg, Oral, Daily  furosemide, 80 mg, Intravenous, BID  heparin (porcine), 5,000 Units, Subcutaneous, Q12H  losartan, 100 mg, Oral, Q24H  memantine, 5 mg, Oral, Daily  metoprolol tartrate, 25 mg, Oral, BID  multivitamin with minerals, 1 tablet, Oral, Daily         ----------------------------------------------------------------------------------------------------------------------  Vital Signs:  Temp:  [97.6 °F (36.4 °C)-98.4 °F (36.9 °C)] 97.8 °F (36.6 °C)  Heart Rate:  [60-89] 60  Resp:  [18-20] 20  BP: (121-149)/(50-76) 149/76      23  0500 23  0500 23  0559   Weight: 81.2 kg (179 lb) (new admit) 82.1 kg (180 lb 14.4 oz) 70 kg (154 lb 4.8 oz) (RN Mariann aware)     Body mass index is 25.68 kg/m².    Intake/Output Summary (Last 24 hours) at 2023 1005  Last data filed at 2023 0400  Gross per 24 hour   Intake 360 ml   Output 2350 ml   Net -1990 ml     Diet: Cardiac Diets; Healthy Heart (2-3 Na+); Texture: Regular Texture (IDDSI 7); Fluid  Consistency: Thin (IDDSI 0)  ----------------------------------------------------------------------------------------------------------------------  Physical exam:  Constitutional: Elderly appearing  female in no apparent distress.     HENT:  Head:  Normocephalic and atraumatic.  Mouth:  Moist mucous membranes.    Eyes:  Conjunctivae and EOM are normal.  Pupils are equal, round, and reactive to light.  No scleral icterus.    Neck:  Neck supple. No thyromegaly.  No JVD present.    Cardiovascular:  Regular rate and rhythm with no murmurs, rubs, clicks or gallops appreciated.  Pulmonary/Chest: Faint bibasilar crackles on deep inspiration with no wheezes or rhonchi appreciated.  Abdominal:  Soft. Nontender. Nondistended  Bowel sounds are normal in all four quadrants. No organomegally appreciated.   Musculoskeletal:  No edema, no tenderness, and no deformity.  No red or swollen joints anywhere.    Neurological:  Alert, Cranial nerves II-XII intact with no focal deficits.  No facial droop.  No slurred speech.   Skin:  Warm and dry to palpation with no rashes or lesions appreciated.  Peripheral vascular:  2+ radial and pedal pulses in bilateral upper and lower extremities.  Psychiatric:  Alert and oriented x3, demonstrates appropriate judgment and insight.  ---------------------------------------------------------------------------------   ----------------------------------------------------------------------------------------------------------------------  Results from last 7 days   Lab Units 05/15/23  1828 05/15/23  1633 05/14/23  0038   CK TOTAL U/L  --   --  40   HSTROP T ng/L 32* 31* 39*     Results from last 7 days   Lab Units 05/17/23  0222 05/16/23  0204 05/15/23  1633 05/15/23  1547 05/14/23  0038 05/12/23  1524 05/12/23  1414   CRP mg/dL  --   --  9.43*  --  3.58*  --   --    LACTATE mmol/L  --   --   --  1.2  --   --   --    WBC 10*3/mm3 8.08 9.68  --  11.70* 12.51*   < > 8.22   HEMOGLOBIN g/dL 11.9*  11.9*  --  13.3 12.3   < > 13.2   HEMATOCRIT % 38.4 38.4  --  42.8 38.4   < > 42.2   MCV fL 98.2* 97.2*  --  97.5* 95.8   < > 97.2*   MCHC g/dL 31.0* 31.0*  --  31.1* 32.0   < > 31.3*   PLATELETS 10*3/mm3 247 223  --  273 232   < > 211   INR   --   --   --   --   --   --  0.99    < > = values in this interval not displayed.     Results from last 7 days   Lab Units 05/15/23  1614   PH, ARTERIAL pH units 7.475*   PO2 ART mm Hg 76.8*   PCO2, ARTERIAL mm Hg 33.6*   HCO3 ART mmol/L 24.7     Results from last 7 days   Lab Units 05/18/23  0107 05/17/23  0222 05/16/23  1605 05/16/23  0204 05/15/23  1633 05/14/23  0038 05/12/23  1414   SODIUM mmol/L 139 139  --  142 142 139 143   POTASSIUM mmol/L 3.5 3.3* 3.6 3.1* 4.1 4.0 4.2   MAGNESIUM mg/dL  --   --   --   --   --  1.7  --    CHLORIDE mmol/L 101 100  --  104 106 106 108*   CO2 mmol/L 27.8 25.6  --  24.9 23.9 23.9 26.1   BUN mg/dL 35* 32*  --  25* 27* 27* 21   CREATININE mg/dL 1.05* 0.99  --  0.81 0.86 0.88 0.83   CALCIUM mg/dL 9.8 9.5  --  9.4 9.8 9.7 10.0   GLUCOSE mg/dL 103* 97  --  94 106* 128* 124*   ALBUMIN g/dL  --   --   --   --  3.2* 3.1* 3.6   BILIRUBIN mg/dL  --   --   --   --  0.5 0.4 0.4   ALK PHOS U/L  --   --   --   --  75 74 78   AST (SGOT) U/L  --   --   --   --  14 16 22   ALT (SGPT) U/L  --   --   --   --  5 5 6   Estimated Creatinine Clearance: 36.4 mL/min (A) (by C-G formula based on SCr of 1.05 mg/dL (H)).    No results found for: AMMONIA  Results from last 7 days   Lab Units 05/14/23  0038   CHOLESTEROL mg/dL 110   TRIGLYCERIDES mg/dL 72   HDL CHOL mg/dL 53   LDL CHOL mg/dL 42     No results found for: BLOODCX  No results found for: URINECX  No results found for: WOUNDCX  No results found for: STOOLCX    I have personally looked at the labs and they are summarized above.  ----------------------------------------------------------------------------------------------------------------------  Imaging Results (Last 24 Hours)     ** No results found for  the last 24 hours. **        ----------------------------------------------------------------------------------------------------------------------  Assessment and Plan:    1.  Acute on chronic HFpEF -we will reduce Lasix to 40 mg IV twice daily and continue to monitor renal function.  Continue Jardiance.      2.  Bilateral pleural effusions -likely secondary to #1, continue IV diuretics.    3.  Hypertensive urgency -now much improved, continue to monitor closely and make antihypertensive medication adjustments as necessary.    4.  Anterior subluxation of right shoulder -currently asymptomatic, will refer to orthopedic surgery in an outpatient setting    5.  CKD stage II -stable    6.  General debility -we will consult PT/OT while hospitalized    Disposition likely discharge tomorrow    Bradley Jacobo DO   05/18/23   10:05 EDT

## 2023-05-18 NOTE — PLAN OF CARE
Goal Outcome Evaluation:         Pt resting with no sign of discomfort, diuresing clear yellow urine

## 2023-05-18 NOTE — CASE MANAGEMENT/SOCIAL WORK
Discharge Planning Assessment   Rodolfo     Patient Name: Azra Ohara  MRN: [id number]  Today's Date: 5/18/2023    Admit Date: 5/15/2023    Plan: Per Joanna Bolden has received approval from pt's insurance to accept pt back on this date if she is medically stable.  SS notified Physician and verified pt's pharmacy is correct in [facility].  SS will follow.     Discharge Plan     Row Name 05/18/23 0943       Plan    Plan Per Joanna Bolden has received approval from pt's insurance to accept pt back on this date if she is medically stable.  SS notified Physician and verified pt's pharmacy is correct in epic.  SS will follow.                ELIOT Foster

## 2023-05-18 NOTE — PLAN OF CARE
Goal Outcome Evaluation:   Pt resting in bed this shift or up to chair w/ x1 assist. Aox4, VSS, complaints of pain to knees and joints, see MAR for interventions, pt reports relief. Pt is calm and cooperative w/ care and treatment. IV access and telemetry monitoring patent and maintained, will continue to follow plan of care.       Daily Care Plan Summary: Heart Failure    Diuretic in use (IV or PO):   IV        Daily weight (up or down):    gain: 3lbs      Output > Intake (yes/no):Yes      O2 Requirements (current, any change?): room air      Symptoms noted with Activity (Respiratory Tolerance, functional state):     Pt tolerating ambulation w/ x1 assistance, no reports of SoA or difficulty breathing.      Anticipated Discharge Plans:     SNF

## 2023-05-18 NOTE — THERAPY TREATMENT NOTE
Acute Care - Occupational Therapy Treatment Note   [place]     Patient Name: Azra Ohara  : 1935  MRN: [id number]  Today's Date: 2023  Onset of Illness/Injury or Date of Surgery: 05/15/23     Referring Physician: Lilia    Admit Date: 5/15/2023       ICD-10-CM ICD-9-CM   1. Acute congestive heart failure, unspecified heart failure type  I50.9 428.0     Patient Active Problem List   Diagnosis   • Chronic kidney disease, stage 4 (severe)   • Essential hypertension   • Left bundle branch block   • Malignant neoplasm of large intestine   • Polycythemia vera   • Long term (current) use of aspirin   • Acute on chronic heart failure with preserved ejection fraction (HFpEF)     Past Medical History:   Diagnosis Date   • Chronic kidney disease, stage 4 (severe) 2023   • Essential hypertension    • History of colon cancer    • Left bundle branch block 2023   • Long term (current) use of aspirin 2023   • Polycythemia vera 2023     Past Surgical History:   Procedure Laterality Date   • CARDIAC ELECTROPHYSIOLOGY PROCEDURE N/A 2023    Procedure: Pacemaker DC new;  Surgeon: Sampson Garvin MD;  Location: [facility] INVASIVE LOCATION;  Service: Cardiology;  Laterality: N/A;   • COLON RESECTION     • PARATHYROIDECTOMY Right     Parathyroid adenoma         OT ASSESSMENT FLOWSHEET (last 12 hours)     OT Evaluation and Treatment     Row Name 23 1107                   OT Time and Intention    Subjective Information complains of;weakness;fatigue  -LM        Document Type therapy note (daily note)  -LM        Mode of Treatment occupational therapy  -LM        Patient Effort adequate  -LM        Comment Patient see this date for adl retraining/education and fxl mobility.  Patient able to don/doff socks seated in bedside chair with setup/supervision, min assist with fxl transfer.  Requires mod assist overall with LE dressing, bathing, toileting.  setup with UE dressing, feeding,  grooming.  -LM           General Information    Existing Precautions/Restrictions fall  -LM        Limitations/Impairments hearing  -LM           Cognition    Affect/Mental Status (Cognition) WFL  -LM        Orientation Status (Cognition) oriented to;person;place;situation  -LM           Wound 05/15/23 2159 Right lower gluteal Pressure Injury    Wound - Properties Group Placement Date: 05/15/23  -NT Placement Time: 2159 -NT Present on Hospital Admission: Y  -NT Side: Right  -NT Orientation: lower  -NT Location: gluteal  -NT Primary Wound Type: Pressure inj  -NT    Retired Wound - Properties Group Placement Date: 05/15/23  -NT Placement Time: 2159 -NT Present on Hospital Admission: Y  -NT Side: Right  -NT Orientation: lower  -NT Location: gluteal  -NT Primary Wound Type: Pressure inj  -NT    Retired Wound - Properties Group Date first assessed: 05/15/23  -NT Time first assessed: 2159 -NT Present on Hospital Admission: Y  -NT Side: Right  -NT Location: gluteal  -NT Primary Wound Type: Pressure inj  -NT       Wound 05/15/23 2201 Left lower gluteal Pressure Injury    Wound - Properties Group Placement Date: 05/15/23  -NT Placement Time: 2201 -NT Side: Left  -NT Orientation: lower  -NT Location: gluteal  -NT Primary Wound Type: Pressure inj  -NT    Retired Wound - Properties Group Placement Date: 05/15/23  -NT Placement Time: 2201 -NT Side: Left  -NT Orientation: lower  -NT Location: gluteal  -NT Primary Wound Type: Pressure inj  -NT    Retired Wound - Properties Group Date first assessed: 05/15/23  -NT Time first assessed: 2201 -NT Side: Left  -NT Location: gluteal  -NT Primary Wound Type: Pressure inj  -NT       Wound 05/15/23 2202 Bilateral medial coccyx MASD (Moisture associated skin damage)    Wound - Properties Group Placement Date: 05/15/23  -NT Placement Time: 2202 -NT Present on Hospital Admission: Y  -NT Side: Bilateral  -NT Orientation: medial  -NT Location: coccyx  -NT Primary Wound Type: MASD  -NT     Retired Wound - Properties Group Placement Date: 05/15/23  -NT Placement Time: 2202 -NT Present on Hospital Admission: Y  -NT Side: Bilateral  -NT Orientation: medial  -NT Location: coccyx  -NT Primary Wound Type: MASD  -NT    Retired Wound - Properties Group Date first assessed: 05/15/23  -NT Time first assessed: 2202 -NT Present on Hospital Admission: Y  -NT Side: Bilateral  -NT Location: coccyx  -NT Primary Wound Type: MASD  -NT       Wound 05/04/23 0250 Left gluteal    Wound - Properties Group Placement Date: 05/04/23  -BR Placement Time: 0250  -BR Side: Left  -BR Location: gluteal  -BR    Retired Wound - Properties Group Placement Date: 05/04/23  -BR Placement Time: 0250  -BR Side: Left  -BR Location: gluteal  -BR    Retired Wound - Properties Group Date first assessed: 05/04/23  -BR Time first assessed: 0250  -BR Side: Left  -BR Location: gluteal  -BR       Wound 05/04/23 0252 Right gluteal    Wound - Properties Group Placement Date: 05/04/23  -BR Placement Time: 0252  -BR Side: Right  -BR Location: gluteal  -BR    Retired Wound - Properties Group Placement Date: 05/04/23  -BR Placement Time: 0252  -BR Side: Right  -BR Location: gluteal  -BR    Retired Wound - Properties Group Date first assessed: 05/04/23  -BR Time first assessed: 0252  -BR Side: Right  -BR Location: gluteal  -BR       Positioning and Restraints    Post Treatment Position chair  -LM        In Chair call light within reach;encouraged to call for assist  -LM              User Key  (r) = Recorded By, (t) = Taken By, (c) = Cosigned By    Initials Name Effective Dates    LM Kathy Guillory OT 06/16/21 -     NT Kaela Omalley RN 05/10/21 -     BR Alyce Suarez RN 11/08/22 -                        OT Recommendation and Plan              Time Calculation:     Therapy Charges for Today     Code Description Service Date Service Provider Modifiers Qty    [id number] HC OT SELF CARE/MGMT/TRAIN EA 15 MIN 5/18/2023 Kathy Guillory OT GO 1                Kathy Guillory, OT  5/18/2023

## 2023-05-19 VITALS
HEIGHT: 65 IN | RESPIRATION RATE: 18 BRPM | WEIGHT: 160.7 LBS | TEMPERATURE: 97.8 F | HEART RATE: 60 BPM | BODY MASS INDEX: 26.77 KG/M2 | SYSTOLIC BLOOD PRESSURE: 133 MMHG | DIASTOLIC BLOOD PRESSURE: 66 MMHG | OXYGEN SATURATION: 95 %

## 2023-05-19 PROBLEM — I50.9 ACUTE CONGESTIVE HEART FAILURE, UNSPECIFIED HEART FAILURE TYPE: Status: RESOLVED | Noted: 2023-05-18 | Resolved: 2023-05-19

## 2023-05-19 PROBLEM — I50.23 ACUTE ON CHRONIC HFREF (HEART FAILURE WITH REDUCED EJECTION FRACTION): Status: ACTIVE | Noted: 2023-05-19

## 2023-05-19 PROBLEM — I50.23 ACUTE ON CHRONIC HFREF (HEART FAILURE WITH REDUCED EJECTION FRACTION): Status: RESOLVED | Noted: 2023-05-19 | Resolved: 2023-05-19

## 2023-05-19 PROBLEM — I50.33 ACUTE ON CHRONIC HEART FAILURE WITH PRESERVED EJECTION FRACTION (HFPEF): Chronic | Status: RESOLVED | Noted: 2023-05-15 | Resolved: 2023-05-19

## 2023-05-19 LAB
ANION GAP SERPL CALCULATED.3IONS-SCNC: 10.5 MMOL/L (ref 5–15)
BUN SERPL-MCNC: 39 MG/DL (ref 8–23)
BUN/CREAT SERPL: 36.8 (ref 7–25)
CALCIUM SPEC-SCNC: 9.8 MG/DL (ref 8.6–10.5)
CHLORIDE SERPL-SCNC: 104 MMOL/L (ref 98–107)
CO2 SERPL-SCNC: 28.5 MMOL/L (ref 22–29)
CREAT SERPL-MCNC: 1.06 MG/DL (ref 0.57–1)
EGFRCR SERPLBLD CKD-EPI 2021: 50.6 ML/MIN/1.73
GLUCOSE SERPL-MCNC: 111 MG/DL (ref 65–99)
POTASSIUM SERPL-SCNC: 3.4 MMOL/L (ref 3.5–5.2)
POTASSIUM SERPL-SCNC: 4.4 MMOL/L (ref 3.5–5.2)
SODIUM SERPL-SCNC: 143 MMOL/L (ref 136–145)

## 2023-05-19 PROCEDURE — 25010000002 FUROSEMIDE PER 20 MG: Performed by: INTERNAL MEDICINE

## 2023-05-19 PROCEDURE — 84132 ASSAY OF SERUM POTASSIUM: CPT | Performed by: PHYSICIAN ASSISTANT

## 2023-05-19 PROCEDURE — 25010000002 HEPARIN (PORCINE) PER 1000 UNITS: Performed by: INTERNAL MEDICINE

## 2023-05-19 PROCEDURE — 80048 BASIC METABOLIC PNL TOTAL CA: CPT | Performed by: INTERNAL MEDICINE

## 2023-05-19 PROCEDURE — 99239 HOSP IP/OBS DSCHRG MGMT >30: CPT | Performed by: INTERNAL MEDICINE

## 2023-05-19 RX ORDER — POTASSIUM CHLORIDE 1.5 G/1.77G
40 POWDER, FOR SOLUTION ORAL EVERY 4 HOURS
Status: COMPLETED | OUTPATIENT
Start: 2023-05-19 | End: 2023-05-19

## 2023-05-19 RX ADMIN — TRAMADOL HYDROCHLORIDE 25 MG: 50 TABLET, COATED ORAL at 08:59

## 2023-05-19 RX ADMIN — FUROSEMIDE 40 MG: 10 INJECTION, SOLUTION INTRAMUSCULAR; INTRAVENOUS at 08:03

## 2023-05-19 RX ADMIN — ASPIRIN 81 MG: 81 TABLET, CHEWABLE ORAL at 08:03

## 2023-05-19 RX ADMIN — VITAMINS A AND D OINTMENT 1 APPLICATION: 15.5; 53.4 OINTMENT TOPICAL at 08:03

## 2023-05-19 RX ADMIN — POTASSIUM CHLORIDE 40 MEQ: 1.5 POWDER, FOR SOLUTION ORAL at 08:03

## 2023-05-19 RX ADMIN — DICLOFENAC SODIUM 4 G: 10 GEL TOPICAL at 08:04

## 2023-05-19 RX ADMIN — DICLOFENAC SODIUM 4 G: 10 GEL TOPICAL at 12:15

## 2023-05-19 RX ADMIN — MEMANTINE HYDROCHLORIDE 5 MG: 5 TABLET, FILM COATED ORAL at 08:03

## 2023-05-19 RX ADMIN — HEPARIN SODIUM 5000 UNITS: 5000 INJECTION INTRAVENOUS; SUBCUTANEOUS at 08:03

## 2023-05-19 RX ADMIN — POTASSIUM CHLORIDE 40 MEQ: 1.5 POWDER, FOR SOLUTION ORAL at 03:52

## 2023-05-19 RX ADMIN — EMPAGLIFLOZIN 10 MG: 10 TABLET, FILM COATED ORAL at 08:04

## 2023-05-19 RX ADMIN — LOSARTAN POTASSIUM 100 MG: 50 TABLET, FILM COATED ORAL at 08:03

## 2023-05-19 RX ADMIN — Medication 1 TABLET: at 08:03

## 2023-05-19 RX ADMIN — DOCUSATE SODIUM 100 MG: 100 CAPSULE, LIQUID FILLED ORAL at 08:03

## 2023-05-19 RX ADMIN — METOPROLOL TARTRATE 25 MG: 25 TABLET, FILM COATED ORAL at 08:03

## 2023-05-19 NOTE — SIGNIFICANT NOTE
"   05/19/23 1121   OTHER   Discipline physical therapist   Rehab Time/Intention   Session Not Performed patient/family declined, not feeling well  (Pt. deferred all treatment stating, \"I don't feel good and I don't need anyone telling me to do anything when I feel this way\".)       "

## 2023-05-19 NOTE — PLAN OF CARE
Goal Outcome Evaluation:    Daily Care Plan Summary: Heart Failure    Diuretic in use (IV or PO):   IV        Daily weight (up or down):    gain: 3 lbs      Output > Intake (yes/no):Yes      O2 Requirements (current, any change?): room air      Symptoms noted with Activity (Respiratory Tolerance, functional state):    activity intolerance       Anticipated Discharge Plans:    [facility]       Pt has been resting this shift. Potassium is currently being replaced. No signs and symptoms of acute distress noted. No complaints of chest pain or SOB reported.

## 2023-05-19 NOTE — DISCHARGE SUMMARY
[facility] HOSPITALIST MEDICINE DISCHARGE SUMMARY    Patient Identification:  Name:  Azra Ohara  Age:  88 y.o.  Sex:  female  :  1935  MRN:  [id number]  Visit Number:  [id number]    Date of Admission: 5/15/2023  Date of Discharge: 2023    PCP: Mnial Salazar, [place]    DISCHARGE DIAGNOSIS   1.  Acute on chronic HFpEF  2.  Bilateral pleural effusions  3.  Hypertensive urgency  4.  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  General debility      CONSULTS  None      PROCEDURES PERFORMED   None      HOSPITAL COURSE  Ms. Ohara is a 88 y.o. female who presented to [facility] ED on 5/15/2023 with a chief complaint of shortness of breath.  Patient has a past medical history remarkable for HFpEF, recent history of third-degree AV block status post pacemaker placement, essential hypertension.  Patient does reside at a local nursing home after being discharged from our facility on 2023 after receiving pacemaker placement for third-degree heart block.  Patient was brought back to our emergency department on 2023 secondary to tachycardia.  Patient patient was thought to be in volume overload and patient was given Lasix in the emergency department and then sent back to her nursing home.  Patient did return to our emergency department on 5/15/2023 with a chief complaint of shortness of breath.  Initial evaluation in the emergency department did consist of basic laboratory work as well as physical exam and vital signs.  Please see initial H&P for specific details patient did have CT of chest without contrast which demonstrated predominant interstitial opacities likely secondary to volume overload with bilateral pleural effusions.  This was felt to be secondary to acute on chronic HFpEF.  Patient was started on IV Lasix in the emergency department and admitted for further treatment and evaluation.    Patient was continued on Lasix 80 mg IV  twice daily and she did respond quite well to this medication.  She averaged a net -1.2 L daily throughout her hospital stay.  Patient was transition back to Lasix 40 mg p.o. daily on date of discharge.  Patient was educated on the necessity for strict adherence to 2 L volume restriction and 2 g sodium restricted diet.  Have also enrolled patient in heart failure clinic.  Patient was also started on Jardiance during this hospital stay.  Have recommended patient follow-up with cardiology services within 2 to 3 weeks of discharge.  Did discuss discharge plan with both the patient and her nephew via telephone.  Both the patient and her nephew expressed their understanding and willingness to proceed with the beforementioned plan.  With this in mind, it is felt patient has reached maximum medical benefit of current hospitalization and will be discharged back to her nursing home in stable condition today.      VITAL SIGNS:      05/18/23  0559 05/18/23  1009 05/19/23  0550   Weight: 70 kg (154 lb 4.8 oz) (RN Mariann aware) 71.6 kg (157 lb 12.8 oz) 72.9 kg (160 lb 11.2 oz)     Body mass index is 26.74 kg/m².    PHYSICAL EXAM:  Constitutional: Elderly appearing  female in no apparent distress.     HENT:  Head:  Normocephalic and atraumatic.  Mouth:  Moist mucous membranes.    Eyes:  Conjunctivae and EOM are normal.  Pupils are equal, round, and reactive to light.  No scleral icterus.    Neck:  Neck supple. No thyromegaly.  No JVD present.    Cardiovascular:  Regular rate and rhythm with no murmurs, rubs, clicks or gallops appreciated.  Pulmonary/Chest: Clear to auscultation bilaterally with no wheezes, rales or rhonchi appreciated  Abdominal:  Soft. Nontender. Nondistended  Bowel sounds are normal in all four quadrants. No organomegally appreciated.   Musculoskeletal:  No edema, no tenderness, and no deformity.  No red or swollen joints anywhere.    Neurological:  Alert, Cranial nerves II-XII intact with no focal  deficits.  No facial droop.  No slurred speech.   Skin:  Warm and dry to palpation with no rashes or lesions appreciated.  Peripheral vascular:  2+ radial and pedal pulses in bilateral upper and lower extremities.  Psychiatric:  Alert and oriented x3, demonstrates appropriate judgment and insight.    DISCHARGE DISPOSITION   Stable    DISCHARGE MEDICATIONS:     Discharge Medications      New Medications      Instructions Start Date   empagliflozin 10 MG tablet tablet  Commonly known as: JARDIANCE   10 mg, Oral, Daily   Start Date: May 20, 2023        Continue These Medications      Instructions Start Date   acetaminophen 500 MG tablet  Commonly known as: TYLENOL   500 mg, Oral, Every 6 Hours PRN      aspirin 81 MG chewable tablet   81 mg, Oral, Daily      bisacodyl 10 MG suppository  Commonly known as: DULCOLAX   10 mg, Rectal, Daily PRN      Diclofenac Sodium 1 % gel gel  Commonly known as: VOLTAREN   4 g, Topical, 4 Times Daily PRN      docusate sodium 100 MG capsule  Commonly known as: COLACE   100 mg, Oral, 2 Times Daily      donepezil 5 MG tablet  Commonly known as: ARICEPT   5 mg, Oral, Nightly      furosemide 20 MG tablet  Commonly known as: LASIX   20 mg, Oral, Daily      HYDROcodone-acetaminophen 5-325 MG per tablet  Commonly known as: NORCO   1 tablet, Oral, Every 8 Hours PRN      lactulose 10 GM/15ML solution  Commonly known as: CHRONULAC   20 g, Oral, Daily PRN      losartan 100 MG tablet  Commonly known as: COZAAR   100 mg, Oral, Every 24 Hours Scheduled      melatonin 3 MG tablet   3 mg, Oral, Nightly PRN      memantine 5 MG tablet  Commonly known as: NAMENDA   5 mg, Oral, Daily      metoprolol tartrate 25 MG tablet  Commonly known as: LOPRESSOR   25 mg, Oral, 2 Times Daily      multivitamin with minerals tablet tablet   1 tablet, Oral, Daily      traMADol 50 MG tablet  Commonly known as: ULTRAM   25 mg, Oral, Every 6 Hours PRN             Diet Instructions    Resume diet as tolerated          Your  Scheduled Appointments    No  apointments  made  pt  discharge  to  [facility]  an d  rehab         Activity Instructions    Resume activity as tolerated          Additional Instructions for the Follow-ups that You Need to Schedule     Discharge Follow-up with PCP   As directed       Currently Documented PCP:    Minal Salazar APRN    PCP Phone Number:    [phone]     Follow Up Details: please follow up with pcp in 2-3 weeks            Follow-up Information     Minal Salazar APRN .    Specialty: Family Medicine  Why: please follow up with pcp in 2-3 weeks  Contact information:  [address]  [facility] [address]  [place] [address]  [phone]                         TEST  RESULTS PENDING AT DISCHARGE  Pending Labs     Order Current Status    Blood Culture - Blood, Arm, Left Preliminary result    Blood Culture - Blood, Arm, Right Preliminary result           The ASCVD Risk score (Zackary GILLILAND, et al., 2019) failed to calculate for the following reasons:    The 2019 ASCVD risk score is only valid for ages 40 to 79     Bradley Jacobo DO  05/19/23  11:56 EDT    Please note that this discharge summary required more than 30 minutes to complete.    Please send a copy of this dictation to the following providers:  Minal Salazar APRN

## 2023-05-19 NOTE — CONSULTS
Patient is a resident of a nursing home where diet, medications, activity, blood pressure monitoring, and weight monitoring is completed by staff.     Per Dr. [place] H&P:  88-year-old female, current nursing home resident with past medical history significant for chronic HFpEF, left bundle branch block with recent history of complete heart block status post permanent pacemaker placement earlier this month, hypertension, CKD 4 and previous history of left bundle branch block who presents from a local nursing facility complaining of shortness of air.     Patient was discharged from our service on or about 5/11/2023.  The patient presented to the emergency department on 5/12/2023 with reports of tachycardia at the nursing facility.  Patient had no respiratory complaints at that time.  Work-up in the ED revealed a chest x-ray consistent with volume overload thought to be due to heart failure exacerbation; patient was given IV Lasix x1 dose in the ED and was given a prescription for 20 mg p.o. Lasix daily x30 days.       According to the ED provider, nursing facility documentation reveals that patient has received her daily Lasix for the past 2 days since her discharge from the nursing home.  Patient now reportedly with complaints of shortness of air.    Most recent EF 61 - 65% (5/4/23)    Most recent ProBNP 6216 (5/15/23)    Most recent ReDS 42% (5/16/23)        Will add information to AVS for SNF staff on low sodium diet and daily weight monitoring.      If any concerns or questions please re-consult or call. Thank you.     Electronically signed by,  Lesly uHtchison RN  05/19/23 09:36 EDT

## 2023-05-19 NOTE — DISCHARGE PLACEMENT REQUEST
"Wenceslao Gómez (88 y.o. Female)     Date of Birth   1935    Social Security Number       Address   [address] [address]    Home Phone   [phone]    MRN   [id number]       Restorationism   None    Marital Status   Single                            Admission Date   5/15/23    Admission Type   Emergency    Admitting Provider   Rebecca Rodrigues DO    Attending Provider   Bradley Jacobo DO    Department, Room/Bed   [address], 3302/1S       Discharge Date       Discharge Disposition   Skilled Nursing Facility (DC - External)    Discharge Destination                               Attending Provider: Bradley Jacobo DO    Allergies: Lipitor [Atorvastatin], Penicillins    Isolation: None   Infection: None   Code Status: No CPR    Ht: 165.1 cm (65\")   Wt: 72.9 kg (160 lb 11.2 oz)    Admission Cmt: None   Principal Problem: Acute on chronic heart failure with preserved ejection fraction (HFpEF) [I50.33]                 Active Insurance as of 5/15/2023     Primary Coverage     Payor Plan Insurance Group Employer/Plan Group    [facility] MEDICARE REPLACEMENT [facility] MEDICARE REPLACEMENT [id number]     Payor Plan Address Payor Plan Phone Number Payor Plan Fax Number Effective Dates    [address] [address]   1/1/2023 - None Entered    [facility] [id number]       Subscriber Name Subscriber Birth Date Member ID       WENCESLAO GÓMEZ 1935 [id number]                 Emergency Contacts      (Rel.) Home Phone Work Phone Mobile Phone    Alexandru Schmidt (healthcare surrogate/ nephew) (Other) -- -- [phone]    MELISSA SCHMIDT (Sister) [phone] -- --    Gonzalez Barrera (nephew) (Other) -- -- [phone]            Emergency Contact Information     Name Relation Home Work Mobile    Alexandru Schmidt (healthcare surrogate/ nephew) Other   [phone]    MELISSA SCHMIDT Sister [phone]      Gonzalez Barrera (nephew) Other   [phone]          Insurance " Information                [facility] MEDICARE REPLACEMENT/[facility] MEDICARE REPLACEMENT Phone: --    Subscriber: Azra Ohara Subscriber#: [id number]    Group#: [id number] Precert#: --          Problem List           Codes Noted - Resolved       Non-Hospital    Essential hypertension ICD-10-CM: I10  ICD-9-CM: 401.9 2023 - Present    Chronic kidney disease, stage 4 (severe) ICD-10-CM: N18.4  ICD-9-CM: 585.4 2023 - Present    Left bundle branch block ICD-10-CM: I44.7  ICD-9-CM: 426.3 2023 - Present    Malignant neoplasm of large intestine ICD-10-CM: C18.9  ICD-9-CM: 153.9 2023 - Present    Polycythemia vera ICD-10-CM: D45  ICD-9-CM: 238.4 2023 - Present    Long term (current) use of aspirin ICD-10-CM: Z79.82  ICD-9-CM: V58.66 2023 - Present        History & Physical      Rebecca Rodrigues DO at 05/15/23 2202          [facility]   HISTORY AND PHYSICAL    Patient Name: Azra Ohara  : 1935  MRN: [id number]  Primary Care Physician:  Minal Salazar APRN  Date of admission: 5/15/2023    Subjective   Subjective     Chief Complaint: Shortness of air    History of Present Illness the patient is an 88-year-old female, current nursing home resident with past medical history significant for chronic HFpEF, left bundle branch block with recent history of complete heart block status post permanent pacemaker placement earlier this month, hypertension, CKD 4 and previous history of left bundle branch block who presents from a local nursing facility complaining of shortness of air.    Patient was discharged from our service on or about 2023.  The patient presented to the emergency department on 2023 with reports of tachycardia at the nursing facility.  Patient had no respiratory complaints at that time.  Work-up in the ED revealed a chest x-ray consistent with volume overload thought to be due to heart failure exacerbation; patient was given IV Lasix x1 dose  in the ED and was given a prescription for 20 mg p.o. Lasix daily x30 days.      According to the ED provider, nursing facility documentation reveals that patient has received her daily Lasix for the past 2 days since her discharge from the nursing home.  Patient now reportedly with complaints of shortness of air.    Patient was seen and examined in 303-1.  Her nephew Gonzalez is present at bedside.    The patient reports a 4-day history of progressive shortness of air.  She denies fever and/or chills.  She denies significant cough.  She denies any chest pains or pressure.  Patient further denies any palpitations.  She denied any nausea or and/or vomiting.    Review of Systems   Constitutional: Negative for chills and fever.   HENT: Negative for hearing loss and trouble swallowing.    Eyes: Negative for visual disturbance.   Respiratory: Positive for shortness of breath. Negative for cough and chest tightness.    Cardiovascular: Negative for chest pain and palpitations.   Gastrointestinal: Negative for abdominal pain, diarrhea, nausea and vomiting.   Genitourinary: Negative for decreased urine volume and dysuria.   Musculoskeletal: Positive for arthralgias (chronic, mostly knees. Denied any R shoulder pain.).   Skin: Negative for rash.   Neurological: Positive for weakness. Negative for syncope.   Psychiatric/Behavioral: Negative for agitation and confusion.      Personal History     Past Medical History:   Diagnosis Date   • Chronic kidney disease, stage 4 (severe) 01/20/2023   • Essential hypertension    • History of colon cancer    • Left bundle branch block 01/20/2023   • Long term (current) use of aspirin 01/20/2023   • Polycythemia vera 01/20/2023       Past Surgical History:   Procedure Laterality Date   • CARDIAC ELECTROPHYSIOLOGY PROCEDURE N/A 5/5/2023    Procedure: Pacemaker DC new;  Surgeon: Sampson Garvin MD;  Location: [facility] INVASIVE LOCATION;  Service: Cardiology;  Laterality: N/A;   • COLON  RESECTION  2004   • PARATHYROIDECTOMY Right     Parathyroid adenoma       Family History: family history includes Heart disease in her father and mother. Otherwise pertinent FHx was reviewed and not pertinent to current issue.    Social History:  reports that she has never smoked. She does not have any smokeless tobacco history on file. She reports that she does not drink alcohol and does not use drugs.    Home Medications:  Diclofenac Sodium, HYDROcodone-acetaminophen, acetaminophen, aspirin, docusate sodium, furosemide, losartan, melatonin, metoprolol tartrate, multivitamin with minerals, and traMADol    Allergies:  Allergies   Allergen Reactions   • Lipitor [Atorvastatin] Myalgia     Patient stated she had tried several of the statins and couldn't tolerate any of them and the doctor told her to continue her fish oil   • Penicillins Hives       Objective    Objective     Vitals:   Temp:  [98 °F (36.7 °C)] 98 °F (36.7 °C)  Heart Rate:  [68-97] 76  Resp:  [22] 22  BP: (148-180)/() 148/75  Flow (L/min):  [2] 2    Physical Exam  Constitutional:       General: She is not in acute distress.     Appearance: She is well-developed.      Interventions: Nasal cannula in place.   HENT:      Head: Normocephalic and atraumatic.   Eyes:      Conjunctiva/sclera: Conjunctivae normal.   Neck:      Trachea: No tracheal deviation.   Cardiovascular:      Rate and Rhythm: Normal rate and regular rhythm.      Pulses:           Dorsalis pedis pulses are 2+ on the right side and 2+ on the left side.      Heart sounds: No murmur heard.    No friction rub. No gallop.      Comments: 1-2+ pitting edema B/L lower extremities  Pulmonary:      Effort: No respiratory distress.      Breath sounds: Examination of the right-lower field reveals decreased breath sounds. Examination of the left-lower field reveals decreased breath sounds. Decreased breath sounds present. No wheezing or rales.   Chest:      Comments: L chest with PPM dressing;  dressing with scant amount of dried blood  Abdominal:      General: Bowel sounds are normal. There is no distension.      Palpations: Abdomen is soft.      Tenderness: There is no abdominal tenderness. There is no guarding.   Skin:     General: Skin is warm and dry.      Findings: No erythema or rash.   Neurological:      Mental Status: She is alert.      Cranial Nerves: No cranial nerve deficit.      Comments: Oriented to self, place and situation; follows commands         Result Review    Result Review:  I have personally reviewed the results from the time of this admission to 5/15/2023 22:02 EDT and agree with these findings:  [x]  Laboratory list / accordion  []  Microbiology  [x]  Radiology  [x]  EKG/Telemetry   [x]  Cardiology/Vascular   []  Pathology  []  Old records  []  Other:  Most notable findings include: Troponin T improved at 31 with a repeat of 32.  proBNP slightly more elevated at 6,126.    CMP largely unremarkable; BUN/creatinine ratio 31.4, glucose 106, albumin 3.2.    CBC with WBC 11.70, MCV 97.5.0    EKG per my view reveals an A-sensed, V-paced rhythm    Echocardiogram 5/4/23:  Interpretation Summary       •  Left ventricular systolic function is normal. Left ventricular ejection fraction appears to be 61 - 65%.  •  Left ventricular diastolic function is consistent with (grade Ia w/high LAP) impaired relaxation.  •  The left atrial cavity is moderately dilated.  •  The right atrial cavity is moderately  dilated.  •  There is mild calcification of the aortic valve mainly affecting the non-coronary, left coronary and right coronary cusp(s).  •  Severe mitral valve regurgitation is present.  •  Severe tricuspid valve regurgitation is present.  •  Estimated right ventricular systolic pressure from tricuspid regurgitation is markedly elevated (>55 mmHg).    CXR 5/15/23 (images reviewed and compared to CXR from 5/12/23):  FINDINGS:    Lungs and pleural spaces:  Interstitial edema is noted.   Pulmonary  vascular congestion.  Small effusions.  Bibasilar airspace disease.  No  pneumothorax.    Heart:  Cardiomegaly stable.    Mediastinum:  Unremarkable.    Bones/joints:  Stable bony structures.    Tubes, lines and devices:  Left cardiac pacer device.     IMPRESSION:    Slight increase in changes of CHF/edema.    CT chest without contrast:  FINDINGS:  Moderate cardiomegaly. Negative for thoracic aortic aneurysm. Small to  moderate volume right and small left pleural effusions. No pericardial  effusion. No pneumothorax.     Central predominant interstitial opacities.     Degenerative changes in the spine. Anterior subluxation of the right  shoulder which could be acute or chronic with large right joint  effusion.     IMPRESSION:  1.  Central predominant interstitial opacities could represent fluid  overload, less likely atypical pneumonia.  2.  Bilateral pleural effusions, right larger than left.  3.  Anterior subluxation of the right shoulder which could be acute or  chronic with large right joint effusion.      Assessment / Plan     Brief Patient Summary:  Azra Ohara is a 88 y.o. female who presents from a local nursing facility with a 4-day history of progressive shortness of air despite outpatient therapy with oral Lasix.  This is in the setting of known HFpEF with known left and right atrial cavity dilatation, severe MVR, severe TVR with an estimated RVSP markedly elevated at >55 mmHg.  Patient also status post recent pacemaker placement for complete heart block.    #Acute on chronic HFpEF, failed outpatient therapy  #Bilateral pleural effusions due to above  #Hypertensive urgency, present on admission and improved  #Mild troponin T elevation that is overall down-trending with flat trend  #Chronic microcytosis without anemia  #Incidentally noted anterior subluxation of the right shoulder, acute or chronic in nature with large right-sided joint effusion; patient asymptomatic  #Complete heart block  status post recent permanent pacemaker placement  #Stage II decubitus ulcer, present on admission  #Generalized weakness/physical debility due to advanced age and prolonged hospitalization  -Patient has been placed on telemetry for observation  -HF pathway initiated; she received 40 mg IV lasix x 2 in ED; started patient on 80 mg IV BID to begin in a.m.  -Will give a dose of IV acetazolamide, 500 mg x 1 (ADVOR trial revealed decrease in LOS and successful de-congestion in the acute phase though no change in mortality rate)  -Currently awaiting pharmacy completion of home medication list; consider changing metoprolol tartrate to Toprol XL  -I have started the patient on Jardiance in hopes to decrease rate of mortality and frequency of HF hospitalizations  -Monitor intake/output and daily weights; patient currently with external catheter  -Will hold on repeat echocardiogram for now as recently completed earlier this month  -Consider Cardiology consult pending results and clinical course  -Repeat chemistry panel in a.m. to follow electrolytes and renal function  -ReDs Vest ordered   -CHF Navigator consult placed  -Will hold on orthopedic surgery consult for now given incidentally noted right shoulder anterior subluxation with joint effusion as patient is asymptomatic  -Images of decubitus ulcer reviewed under media tab; turn per protocol; magic barrier cream ordered  -Plan to consult PT in a.m.    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    CODE STATUS:    Medical Intervention Limits: NO  intubation (DNI)  Code Status (Patient has no pulse and is not breathing): No CPR (Do Not Attempt to Resuscitate)  Medical Interventions (Patient has pulse or is breathing): Limited Support    Admission Status:  I believe this patient meets Observation status.    Patient's medical record from her recent ER visit here in addition to her recent hospitalization here at [facility] has been reviewed and summarized in the HPI.    Case discussed with: Patient, nephew at bedside and NATALY Sherwood    Patient is considered to be high risk due to: Advanced age, recent prolonged hospitalization, CKD    Rebecca Rodrigues DO    Electronically signed by Rebecca Rodrigues DO at 05/15/23 2352       Lines, Drains & Airways     Active LDAs     Name Placement date Placement time Site Days    Peripheral IV 05/15/23 1535 Left Antecubital 05/15/23  1535  Antecubital  3    External Urinary Catheter 05/17/23  1900  --  1                  Current Facility-Administered Medications   Medication Dose Route Frequency Provider Last Rate Last Admin   • acetaminophen (TYLENOL) tablet 500 mg  500 mg Oral Q6H PRN Rebecca Rodrigues DO       • aspirin chewable tablet 81 mg  81 mg Oral Daily Rebecca Rodrigues DO   81 mg at 05/19/23 0803   • bisacodyl (DULCOLAX) suppository 10 mg  10 mg Rectal Daily PRN Rebecca Rodrigues DO       • Diclofenac Sodium (VOLTAREN) 1 % gel 4 g  4 g Topical 4x Daily Rebecca Rodrigues DO   4 g at 05/19/23 0804   • docusate sodium (COLACE) capsule 100 mg  100 mg Oral BID Rebecca Rodrigues DO   100 mg at 05/19/23 0803   • donepezil (ARICEPT) tablet 5 mg  5 mg Oral Nightly Rebecca Rodrigues DO   5 mg at 05/18/23 2015   • empagliflozin (JARDIANCE) tablet 10 mg  10 mg Oral Daily Rebecca Rodrigues DO   10 mg at 05/19/23 0804   • furosemide (LASIX) injection 40 mg  40 mg Intravenous BID Bradley Jacobo DO   40 mg at 05/19/23 0803   • heparin (porcine) 5000 UNIT/ML injection 5,000  "Units  5,000 Units Subcutaneous Q12H Rebecca Rodrigues, DO   5,000 Units at 05/19/23 0803   • HYDROcodone-acetaminophen (NORCO) 5-325 MG per tablet 1 tablet  1 tablet Oral Q8H PRN Rebecca Rodrigues DO   1 tablet at 05/18/23 1503   • lactulose (CHRONULAC) 10 GM/15ML solution 20 g  20 g Oral Daily PRN Rebecca Rodrigues, DO       • losartan (COZAAR) tablet 100 mg  100 mg Oral Q24H Rebecca Rodrigues DO   100 mg at 05/19/23 0803   • magic barrier cream 1 application  1 application Topical 4x Daily PRN Rebecca Rodrigues DO   1 application at 05/19/23 0803   • melatonin tablet 3 mg  3 mg Oral Nightly PRN Rebecca Rodrigues DO   3 mg at 05/16/23 2018   • memantine (NAMENDA) tablet 5 mg  5 mg Oral Daily Rebecca Rodrigues DO   5 mg at 05/19/23 0803   • metoprolol tartrate (LOPRESSOR) tablet 25 mg  25 mg Oral BID Rebecca Rodrigues DO   25 mg at 05/19/23 0803   • multivitamin with minerals 1 tablet  1 tablet Oral Daily Rebecca Rodrigues DO   1 tablet at 05/19/23 0803   • Potassium Replacement - Follow Nurse / BPA Driven Protocol   Does not apply PRN Alyce Lau PA-C       • sodium chloride 0.9 % flush 10 mL  10 mL Intravenous PRN Rebecca Rodrigues DO   10 mL at 05/18/23 0853   • traMADol (ULTRAM) tablet 25 mg  25 mg Oral Q6H PRN Rebecca Rodrigues DO   25 mg at 05/19/23 0859     Physician Progress Notes (last 24 hours)  Notes from 05/18/23 1133 through 05/19/23 1133   No notes of this type exist for this encounter.         Consult Notes (most recent note)    No notes of this type exist for this encounter.            Physical Therapy Notes (most recent note)      Cameron Salazar, PT at 05/19/23 1122  Version 1 of 1            05/19/23 1121   OTHER   Discipline physical therapist   Rehab Time/Intention   Session Not Performed patient/family declined, not feeling well  (Pt. deferred all treatment stating, \"I don't feel good and I don't need anyone telling me to do anything when I " "feel this way\".)         Electronically signed by Cameron Salazar, PT at 05/19/23 1122          Occupational Therapy Notes (most recent note)      Kathy Guillory OT at 05/19/23 1049             05/19/23 1048   OTHER   Discipline occupational therapist   Rehab Time/Intention   Session Not Performed patient/family declined treatment  (patient c/o of generalized pain \"all over\".  notified RN)         Electronically signed by Kathy Guillory OT at 05/19/23 1049       Speech Language Pathology Notes (most recent note)    No notes exist for this encounter.         ADL Documentation (last day)     Date/Time Transferring Toileting Bathing Dressing Eating Communication Swallowing    05/19/23 0900 2 - assistive person 2 - assistive person 2 - assistive person 2 - assistive person 0 - independent 0 - understands/communicates without difficulty 0 - swallows foods/liquids without difficulty    05/18/23 2000 2 - assistive person 2 - assistive person 2 - assistive person 2 - assistive person 0 - independent 0 - understands/communicates without difficulty 0 - swallows foods/liquids without difficulty    05/18/23 0900 2 - assistive person 2 - assistive person 2 - assistive person 2 - assistive person 0 - independent 0 - understands/communicates without difficulty 0 - swallows foods/liquids without difficulty            Discharge Summary    No notes of this type exist for this encounter.         Discharge Order (From admission, onward)     Start     Ordered    05/19/23 1105  Discharge patient  Once        Expected Discharge Date: 05/19/23    Expected Discharge Time: Morning    Discharge Disposition: Skilled Nursing Facility (DC - External)    Physician of Record for Attribution - Please select from Treatment Team: NO MCKEON [[id number]]    Review needed by CMO to determine Physician of Record: No       Question Answer Comment   Physician of Record for Attribution - Please select from Treatment Team NO MCKEON  "   Review needed by CMO to determine Physician of Record No        05/19/23 1108

## 2023-05-19 NOTE — CASE MANAGEMENT/SOCIAL WORK
Discharge Planning Assessment   Rodolfo     Patient Name: Azra Ohara  MRN: [id number]  Today's Date: 5/19/2023    Admit Date: 5/15/2023    Plan: Per Physician pt expected discharge date is 5/19/23.  [facility] per Joanna aware and agreeable.  SS will follow.     Discharge Plan     Row Name 05/19/23 1159       Plan    Final Discharge Disposition Code 03 - skilled nursing facility (SNF)    Final Note Pt to be discharged back to [facility] on this date.  Per RN pt does not meet criteria for EMS transport.  Pt requested SS call Healthcare surrogate Alexandru.  SS spoke with Alexandru who quetioned SS regarding clinical information.  SS notified Alexandru that SS could request Physician call him with clinical updates.  Alexandru to arrange transport via private auto.  SS notified Physician of [employer] surrogate request.              Continued Care and Services - Admitted Since 5/15/2023     Destination     Service Provider Request Status Selected Services Address Phone Fax Patient Preferred    [facility] Pending - Request Sent N/A [address] [place] [address] [phone] --           Alison Santoyo, RAFAELW

## 2023-05-19 NOTE — SIGNIFICANT NOTE
"   05/19/23 1048   OTHER   Discipline occupational therapist   Rehab Time/Intention   Session Not Performed patient/family declined treatment  (patient c/o of generalized pain \"all over\".  notified RN)       "

## 2023-05-20 LAB
BACTERIA SPEC AEROBE CULT: NORMAL
BACTERIA SPEC AEROBE CULT: NORMAL

## 2023-05-20 NOTE — PAYOR COMM NOTE
"[facility]  PILY FOURNIER  PHONE  [phone]  FAX  [phone]  NPI:  [id number]    PATIENT D/C 5/19/2023    Wencesalo Gómez (88 y.o. Female)     Date of Birth   1935    Social Security Number       Address   [address] [address]    Home Phone   [phone]    MRN   [id number]       Religious   None    Marital Status   Single                            Admission Date   5/15/23    Admission Type   Emergency    Admitting Provider   Rebecca Rodrigues DO    Attending Provider       Department, Room/Bed   [facility], 3302/1S       Discharge Date   5/19/2023    Discharge Disposition   Skilled Nursing Facility (DC - External)    Discharge Destination                               Attending Provider: (none)   Allergies: Lipitor [Atorvastatin], Penicillins    Isolation: None   Infection: None   Code Status: Prior    Ht: 165.1 cm (65\")   Wt: 72.9 kg (160 lb 11.2 oz)    Admission Cmt: None   Principal Problem: Acute on chronic heart failure with preserved ejection fraction (HFpEF) [I50.33]                 Active Insurance as of 5/15/2023     Primary Coverage     Payor Plan Insurance Group Employer/Plan Group    [facility] MEDICARE REPLACEMENT [facility] MEDICARE REPLACEMENT [id number]     Payor Plan Address Payor Plan Phone Number Payor Plan Fax Number Effective Dates    [address] [address]   1/1/2023 - None Entered    [facility] [address]       Subscriber Name Subscriber Birth Date Member ID       WENCESLAO GÓMEZ 1935 [id number]                 Emergency Contacts      (Rel.) Home Phone Work Phone Mobile Phone    Alexandru Schmidt (healthcare surrogate/ nephew) (Other) -- -- [phone]    MELISSA SCHMIDT (Sister) [phone] -- --    Gonzalez Barrera (nephew) (Other) -- -- [phone]              "

## 2023-05-23 ENCOUNTER — LAB REQUISITION (OUTPATIENT)
Dept: LAB | Facility: HOSPITAL | Age: 88
End: 2023-05-23
Payer: MEDICARE

## 2023-05-23 DIAGNOSIS — I10 ESSENTIAL (PRIMARY) HYPERTENSION: ICD-10-CM

## 2023-05-23 LAB
ALBUMIN SERPL-MCNC: 3 G/DL (ref 3.5–5.2)
ALBUMIN/GLOB SERPL: 1 G/DL
ALP SERPL-CCNC: 63 U/L (ref 39–117)
ALT SERPL W P-5'-P-CCNC: 7 U/L (ref 1–33)
ANION GAP SERPL CALCULATED.3IONS-SCNC: 10.4 MMOL/L (ref 5–15)
AST SERPL-CCNC: 12 U/L (ref 1–32)
BASOPHILS # BLD AUTO: 0.07 10*3/MM3 (ref 0–0.2)
BASOPHILS NFR BLD AUTO: 0.7 % (ref 0–1.5)
BILIRUB SERPL-MCNC: 0.2 MG/DL (ref 0–1.2)
BUN SERPL-MCNC: 37 MG/DL (ref 8–23)
BUN/CREAT SERPL: 42 (ref 7–25)
CALCIUM SPEC-SCNC: 9.5 MG/DL (ref 8.6–10.5)
CHLORIDE SERPL-SCNC: 106 MMOL/L (ref 98–107)
CHOLEST SERPL-MCNC: 144 MG/DL (ref 0–200)
CO2 SERPL-SCNC: 23.6 MMOL/L (ref 22–29)
CREAT SERPL-MCNC: 0.88 MG/DL (ref 0.57–1)
DEPRECATED RDW RBC AUTO: 51.5 FL (ref 37–54)
EGFRCR SERPLBLD CKD-EPI 2021: 63.3 ML/MIN/1.73
EOSINOPHIL # BLD AUTO: 0.23 10*3/MM3 (ref 0–0.4)
EOSINOPHIL NFR BLD AUTO: 2.2 % (ref 0.3–6.2)
ERYTHROCYTE [DISTWIDTH] IN BLOOD BY AUTOMATED COUNT: 14.5 % (ref 12.3–15.4)
GLOBULIN UR ELPH-MCNC: 3 GM/DL
GLUCOSE SERPL-MCNC: 97 MG/DL (ref 65–99)
HCT VFR BLD AUTO: 38.1 % (ref 34–46.6)
HDLC SERPL-MCNC: 43 MG/DL (ref 40–60)
HGB BLD-MCNC: 11.9 G/DL (ref 12–15.9)
IMM GRANULOCYTES # BLD AUTO: 0.04 10*3/MM3 (ref 0–0.05)
IMM GRANULOCYTES NFR BLD AUTO: 0.4 % (ref 0–0.5)
LDLC SERPL CALC-MCNC: 78 MG/DL (ref 0–100)
LDLC/HDLC SERPL: 1.76 {RATIO}
LYMPHOCYTES # BLD AUTO: 2.34 10*3/MM3 (ref 0.7–3.1)
LYMPHOCYTES NFR BLD AUTO: 22.8 % (ref 19.6–45.3)
MCH RBC QN AUTO: 30.1 PG (ref 26.6–33)
MCHC RBC AUTO-ENTMCNC: 31.2 G/DL (ref 31.5–35.7)
MCV RBC AUTO: 96.2 FL (ref 79–97)
MONOCYTES # BLD AUTO: 0.61 10*3/MM3 (ref 0.1–0.9)
MONOCYTES NFR BLD AUTO: 5.9 % (ref 5–12)
NEUTROPHILS NFR BLD AUTO: 6.99 10*3/MM3 (ref 1.7–7)
NEUTROPHILS NFR BLD AUTO: 68 % (ref 42.7–76)
NRBC BLD AUTO-RTO: 0 /100 WBC (ref 0–0.2)
PLATELET # BLD AUTO: 265 10*3/MM3 (ref 140–450)
PMV BLD AUTO: 11.3 FL (ref 6–12)
POTASSIUM SERPL-SCNC: 4.3 MMOL/L (ref 3.5–5.2)
PROT SERPL-MCNC: 6 G/DL (ref 6–8.5)
RBC # BLD AUTO: 3.96 10*6/MM3 (ref 3.77–5.28)
SODIUM SERPL-SCNC: 140 MMOL/L (ref 136–145)
TRIGL SERPL-MCNC: 126 MG/DL (ref 0–150)
VLDLC SERPL-MCNC: 23 MG/DL (ref 5–40)
WBC NRBC COR # BLD: 10.28 10*3/MM3 (ref 3.4–10.8)

## 2023-05-23 PROCEDURE — 80061 LIPID PANEL: CPT | Performed by: INTERNAL MEDICINE

## 2023-05-23 PROCEDURE — 85025 COMPLETE CBC W/AUTO DIFF WBC: CPT | Performed by: INTERNAL MEDICINE

## 2023-05-23 PROCEDURE — 80053 COMPREHEN METABOLIC PANEL: CPT | Performed by: INTERNAL MEDICINE

## 2023-05-26 NOTE — PROGRESS NOTES
Enter Query Response Below      Query Response:     Acute on chronic heart failure with preserved EF             If applicable, please update the problem list.     Patient: Azra Ohara        : 1935  Account: [id number]           Admit Date: 5/3/2023        How to Respond to this query:       a. Click New Note     b. Answer query within the yellow box.                c. Update the Problem List, if applicable.      If you have any questions about this query contact me at: [email or web address]     ,     87 y/o noted with a Complete Heart Block, Chronic Heart Failure with preserved EF, Hypertension, Chronic Kidney Disease, Sacral Decubitus, and severe Mitral/Tricuspid Regurgitation. H&P noted History of heart failure with preserved ejection fraction, suspect mild exacerbation that was present on admission. On admit proBNP 6565.0. Patient treated with IV Lasix , and Cozaar.     After study, can the Heart Failure with preserved EF be clarified as:     >>Acute on Chronic HF with preserved EF  >>Chronic HF with preserved EF only  >>Other (please specify):_____________  >>Unable to determine     By submitting this query, we are merely seeking further clarification of documentation to accurately reflect all conditions that you are monitoring, evaluating, treating or that extend the hospitalization or utilize additional resources of care. Please utilize your independent clinical judgment when addressing the question(s) above.     This query and your response, once completed, will be entered into the legal medical record.    Sincerely,  Radha Chan RN CCDS   Clinical Documentation Integrity Program

## 2023-05-30 ENCOUNTER — OFFICE VISIT (OUTPATIENT)
Dept: CARDIOLOGY | Facility: CLINIC | Age: 88
End: 2023-05-30

## 2023-05-30 ENCOUNTER — TELEPHONE (OUTPATIENT)
Dept: CARDIAC SURGERY | Facility: CLINIC | Age: 88
End: 2023-05-30

## 2023-05-30 ENCOUNTER — LAB REQUISITION (OUTPATIENT)
Dept: LAB | Facility: HOSPITAL | Age: 88
End: 2023-05-30

## 2023-05-30 VITALS
HEIGHT: 65 IN | SYSTOLIC BLOOD PRESSURE: 142 MMHG | OXYGEN SATURATION: 94 % | WEIGHT: 160 LBS | HEART RATE: 60 BPM | BODY MASS INDEX: 26.66 KG/M2 | DIASTOLIC BLOOD PRESSURE: 69 MMHG

## 2023-05-30 DIAGNOSIS — I34.0 SEVERE MITRAL VALVE REGURGITATION: ICD-10-CM

## 2023-05-30 DIAGNOSIS — I44.2 THIRD DEGREE HEART BLOCK: ICD-10-CM

## 2023-05-30 DIAGNOSIS — M62.561 MUSCLE WASTING AND ATROPHY, NOT ELSEWHERE CLASSIFIED, RIGHT LOWER LEG: ICD-10-CM

## 2023-05-30 DIAGNOSIS — I50.32 CHRONIC HEART FAILURE WITH PRESERVED EJECTION FRACTION (HFPEF): Primary | ICD-10-CM

## 2023-05-30 LAB
ANION GAP SERPL CALCULATED.3IONS-SCNC: 9 MMOL/L (ref 5–15)
BUN SERPL-MCNC: 36 MG/DL (ref 8–23)
BUN/CREAT SERPL: 36 (ref 7–25)
CALCIUM SPEC-SCNC: 9.7 MG/DL (ref 8.6–10.5)
CHLORIDE SERPL-SCNC: 105 MMOL/L (ref 98–107)
CO2 SERPL-SCNC: 23 MMOL/L (ref 22–29)
CREAT SERPL-MCNC: 1 MG/DL (ref 0.57–1)
EGFRCR SERPLBLD CKD-EPI 2021: 54.3 ML/MIN/1.73
GLUCOSE SERPL-MCNC: 100 MG/DL (ref 65–99)
POTASSIUM SERPL-SCNC: 4.9 MMOL/L (ref 3.5–5.2)
SODIUM SERPL-SCNC: 137 MMOL/L (ref 136–145)

## 2023-05-30 PROCEDURE — 80048 BASIC METABOLIC PNL TOTAL CA: CPT | Performed by: INTERNAL MEDICINE

## 2023-05-30 RX ORDER — SPIRONOLACTONE 25 MG/1
25 TABLET ORAL DAILY
Qty: 30 TABLET | Refills: 11 | Status: SHIPPED | OUTPATIENT
Start: 2023-05-30

## 2023-05-30 NOTE — PROGRESS NOTES
Minal Salazar APRN  Azra Ohara  1935 05/30/2023    Patient Active Problem List   Diagnosis   • Chronic kidney disease, stage 4 (severe)   • Essential hypertension   • Left bundle branch block   • Malignant neoplasm of large intestine   • Polycythemia vera   • Long term (current) use of aspirin       Dear Minal Salazar APRN:    Subjective     History of Present Illness:    Chief Complaint   Patient presents with   • Follow-up     Post pacer implant. Staples were removed, no sign of infection.        Azra Ohara is a pleasant 88 y.o. female with a past medical history significant for recent diagnosis of a complete heart block status post permanent pacemaker implantation with [employer] device on 5/5/2023, she also at the time had echocardiogram which showed grade 1 diastolic dysfunction, severe mitral valve regurgitation, and reportedly CKD stage IV although creatinine has been stable at around 0.8.  Kimberly comes in for recent hospitalization.    Kimberly comes in with her family members today she reviewing chart initial hospitalization was in early May she was subsequently seen in the emergency department on 5/12/2023 where she was given IV Lasix and discharged with oral Lasix.  She subsequently was really hospitalized from 5/15/2023 to 5/19/2023 for acute on chronic HFpEF where she was given IV Lasix with excellent diuresis with average of -1.2 L daily during her 4-day stay.  She subsequently improved and was discharged in stable condition and comes in today for follow-up.  Upon further discussion both her and her family was unaware of recent echocardiogram that showed severe mitral valve regurgitation a fairly significant change from echocardiogram in January which showed only trace mitral valve regurgitation.  Clinically today she reports her breathing is at around her baseline since her original hospitalization she has chronic two-pillow orthopnea and props her bed up slightly but has done so  for the last month.  Her biggest problem has been difficulty with ambulating mostly due to restrictions placed on her after pacemaker implantation.  She denies any overt chest pains or syncopal episodes since pacemaker implantation.  Overall her family have been upset over lack of communication with recent diagnoses with permanent pacemaker implantation and HFpEF.              Allergies   Allergen Reactions   • Lipitor [Atorvastatin] Myalgia     Patient stated she had tried several of the statins and couldn't tolerate any of them and the doctor told her to continue her fish oil   • Penicillins Hives   :      Current Outpatient Medications:   •  aspirin 81 MG chewable tablet, Chew 1 tablet Daily., Disp: , Rfl:   •  bisacodyl (DULCOLAX) 10 MG suppository, Insert 1 suppository into the rectum Daily As Needed for Constipation., Disp: , Rfl:   •  Diclofenac Sodium (VOLTAREN) 1 % gel gel, Apply 4 g topically to the appropriate area as directed 4 (Four) Times a Day As Needed (joint pain)., Disp: 50 g, Rfl: 0  •  docusate sodium (COLACE) 100 MG capsule, Take 1 capsule by mouth 2 (Two) Times a Day., Disp: , Rfl:   •  donepezil (ARICEPT) 5 MG tablet, Take 1 tablet by mouth Every Night., Disp: , Rfl:   •  empagliflozin (JARDIANCE) 10 MG tablet tablet, Take 1 tablet by mouth Daily., Disp: 30 tablet, Rfl: 1  •  furosemide (LASIX) 20 MG tablet, Take 1 tablet by mouth Daily for 30 days., Disp: 30 tablet, Rfl: 0  •  HYDROcodone-acetaminophen (NORCO) 5-325 MG per tablet, Take 1 tablet by mouth Every 8 (Eight) Hours As Needed for Moderate Pain (pain)., Disp: , Rfl:   •  lactulose (CHRONULAC) 10 GM/15ML solution, Take 30 mL by mouth Daily As Needed., Disp: , Rfl:   •  losartan (COZAAR) 100 MG tablet, Take 1 tablet by mouth Daily., Disp: 30 tablet, Rfl: 0  •  melatonin 3 MG tablet, Take 1 tablet by mouth At Night As Needed for Sleep., Disp: 30 tablet, Rfl: 0  •  memantine (NAMENDA) 5 MG tablet, Take 1 tablet by mouth Daily., Disp: ,  "Rfl:   •  metoprolol tartrate (LOPRESSOR) 25 MG tablet, Take 1 tablet by mouth 2 (Two) Times a Day for 30 days., Disp: 60 tablet, Rfl: 0  •  multivitamin with minerals tablet tablet, Take 1 tablet by mouth Daily., Disp: 30 each, Rfl: 0  •  traMADol (ULTRAM) 50 MG tablet, Take 0.5 tablets by mouth Every 6 (Six) Hours As Needed for Moderate Pain., Disp: 12 tablet, Rfl: 0  •  spironolactone (ALDACTONE) 25 MG tablet, Take 1 tablet by mouth Daily., Disp: 30 tablet, Rfl: 11    The following portions of the patient's history were reviewed and updated as appropriate: allergies, current medications, past family history, past medical history, past social history, past surgical history and problem list.    Social History     Tobacco Use   • Smoking status: Never   Vaping Use   • Vaping Use: Never used   Substance Use Topics   • Alcohol use: Never   • Drug use: Never         Objective   Vitals:    05/30/23 0916   BP: 142/69   Pulse: 60   SpO2: 94%   Weight: 72.6 kg (160 lb)   Height: 165.1 cm (65\")     Body mass index is 26.63 kg/m².    Constitutional:       General: Not in acute distress.     Appearance: Healthy appearance. Well-developed and not in distress. Not diaphoretic.   Eyes:      Conjunctiva/sclera: Conjunctivae normal.      Pupils: Pupils are equal, round, and reactive to light.   HENT:      Head: Normocephalic and atraumatic.   Neck:      Vascular: No carotid bruit or JVD.   Pulmonary:      Effort: Pulmonary effort is normal. No respiratory distress.      Breath sounds: Normal breath sounds.   Cardiovascular:      Normal rate. Regular rhythm.      Comments: Pacemaker wound site appears to be well-healed.  She is also wheelchair-bound.  Skin:     General: Skin is cool.   Neurological:      Mental Status: Alert, oriented to person, place, and time and oriented to person, place and time.         Lab Results   Component Value Date     05/23/2023    K 4.3 05/23/2023     05/23/2023    CO2 23.6 05/23/2023    " BUN 37 (H) 05/23/2023    CREATININE 0.88 05/23/2023    GLUCOSE 97 05/23/2023    CALCIUM 9.5 05/23/2023    AST 12 05/23/2023    ALT 7 05/23/2023    ALKPHOS 63 05/23/2023     Lab Results   Component Value Date    CKTOTAL 40 05/14/2023     Lab Results   Component Value Date    WBC 10.28 05/23/2023    HGB 11.9 (L) 05/23/2023    HCT 38.1 05/23/2023     05/23/2023     Lab Results   Component Value Date    INR 0.99 05/12/2023     Lab Results   Component Value Date    MG 1.7 05/14/2023     Lab Results   Component Value Date    TSH 0.998 05/03/2023    TRIG 126 05/23/2023    HDL 43 05/23/2023    LDL 78 05/23/2023      No results found for: BNP    During this visit the following were done:  Labs Reviewed []    Labs Ordered []    Radiology Reports Reviewed []    Radiology Ordered []    PCP Records Reviewed []    Referring Provider Records Reviewed []    ER Records Reviewed []    Hospital Records Reviewed []    History Obtained From Family []    Radiology Images Reviewed []    Other Reviewed []    Records Requested []       Procedures    Assessment & Plan    Diagnosis Plan   1. Chronic heart failure with preserved ejection fraction (HFpEF)  Ambulatory Referral to Heart Failure Clinic    Ambulatory Referral to Cardiology    Basic Metabolic Panel      2. Third degree heart block  Ambulatory Referral to Cardiology      3. Severe mitral valve regurgitation  Ambulatory Referral to Cardiothoracic Surgery               Recommendations:  1. Severe mitral valve regurgitation  a. I did discuss this with her which she was unaware nor was her family.  Ultimately I did recommend referral to cardiothoracic surgery for consideration of JEFFRY.  I did discuss with her possibility of possible need mitral valve repair/replacement.  I did express understanding with this.  2. Chronic HFpEF  a. I will refer to heart failure clinic.  b. Renal function has been stable I will pursue continued GDMT with addition of Aldactone 25 mg daily.  c. I asked  her to get BMP done in 7 to 10 days.  3. Complete heart block status post permanent pacemaker implantation  a. Discussed the case with Dr. Garvin can clear patient to resume normal daily activities with her arm such as ambulating..  b. Patient also requests referral to outside cardiology office so I did send referral to Dr. Walsh at [facility] [place].    Return in about 6 weeks (around 7/11/2023).    As always, I appreciate very much the opportunity to participate in the cardiovascular care of your patients.      With Best Regards,    Freddy Sanchez PA-C

## 2023-05-30 NOTE — TELEPHONE ENCOUNTER
Called patient to tell them about their upcoming appointment from referral. Patients family member became belligerent and hung up the phone. Routing referral back to Dr Garvin's office.

## 2023-06-02 ENCOUNTER — LAB REQUISITION (OUTPATIENT)
Dept: LAB | Facility: HOSPITAL | Age: 88
End: 2023-06-02
Payer: MEDICARE

## 2023-06-02 DIAGNOSIS — N18.30 CHRONIC KIDNEY DISEASE, STAGE 3 UNSPECIFIED: ICD-10-CM

## 2023-06-02 LAB
ANION GAP SERPL CALCULATED.3IONS-SCNC: 8.3 MMOL/L (ref 5–15)
BUN SERPL-MCNC: 28 MG/DL (ref 8–23)
BUN/CREAT SERPL: 35.9 (ref 7–25)
CALCIUM SPEC-SCNC: 9.5 MG/DL (ref 8.6–10.5)
CHLORIDE SERPL-SCNC: 104 MMOL/L (ref 98–107)
CO2 SERPL-SCNC: 23.7 MMOL/L (ref 22–29)
CREAT SERPL-MCNC: 0.78 MG/DL (ref 0.57–1)
EGFRCR SERPLBLD CKD-EPI 2021: 73.2 ML/MIN/1.73
GLUCOSE SERPL-MCNC: 67 MG/DL (ref 65–99)
MAGNESIUM SERPL-MCNC: 2.1 MG/DL (ref 1.6–2.4)
POTASSIUM SERPL-SCNC: 5.6 MMOL/L (ref 3.5–5.2)
SODIUM SERPL-SCNC: 136 MMOL/L (ref 136–145)

## 2023-06-02 PROCEDURE — 80048 BASIC METABOLIC PNL TOTAL CA: CPT | Performed by: INTERNAL MEDICINE

## 2023-06-02 PROCEDURE — 83735 ASSAY OF MAGNESIUM: CPT | Performed by: INTERNAL MEDICINE

## 2023-06-06 ENCOUNTER — LAB REQUISITION (OUTPATIENT)
Dept: LAB | Facility: HOSPITAL | Age: 88
End: 2023-06-06
Payer: MEDICARE

## 2023-06-06 DIAGNOSIS — I10 ESSENTIAL (PRIMARY) HYPERTENSION: ICD-10-CM

## 2023-06-06 LAB
ANION GAP SERPL CALCULATED.3IONS-SCNC: 12.8 MMOL/L (ref 5–15)
BUN SERPL-MCNC: 43 MG/DL (ref 8–23)
BUN/CREAT SERPL: 41.3 (ref 7–25)
CALCIUM SPEC-SCNC: 9.8 MG/DL (ref 8.6–10.5)
CHLORIDE SERPL-SCNC: 104 MMOL/L (ref 98–107)
CO2 SERPL-SCNC: 23.2 MMOL/L (ref 22–29)
CREAT SERPL-MCNC: 1.04 MG/DL (ref 0.57–1)
EGFRCR SERPLBLD CKD-EPI 2021: 51.8 ML/MIN/1.73
GLUCOSE SERPL-MCNC: 101 MG/DL (ref 65–99)
POTASSIUM SERPL-SCNC: 4.5 MMOL/L (ref 3.5–5.2)
SODIUM SERPL-SCNC: 140 MMOL/L (ref 136–145)

## 2023-06-06 PROCEDURE — 80048 BASIC METABOLIC PNL TOTAL CA: CPT | Performed by: INTERNAL MEDICINE

## 2023-06-09 ENCOUNTER — LAB REQUISITION (OUTPATIENT)
Dept: LAB | Facility: HOSPITAL | Age: 88
End: 2023-06-09
Payer: MEDICARE

## 2023-06-09 DIAGNOSIS — E11.9 TYPE 2 DIABETES MELLITUS WITHOUT COMPLICATIONS: ICD-10-CM

## 2023-06-09 LAB
ANION GAP SERPL CALCULATED.3IONS-SCNC: 9.6 MMOL/L (ref 5–15)
BUN SERPL-MCNC: 35 MG/DL (ref 8–23)
BUN/CREAT SERPL: 36.1 (ref 7–25)
CALCIUM SPEC-SCNC: 9.8 MG/DL (ref 8.6–10.5)
CHLORIDE SERPL-SCNC: 106 MMOL/L (ref 98–107)
CO2 SERPL-SCNC: 24.4 MMOL/L (ref 22–29)
CREAT SERPL-MCNC: 0.97 MG/DL (ref 0.57–1)
EGFRCR SERPLBLD CKD-EPI 2021: 56.3 ML/MIN/1.73
GLUCOSE SERPL-MCNC: 101 MG/DL (ref 65–99)
MAGNESIUM SERPL-MCNC: 2.2 MG/DL (ref 1.6–2.4)
POTASSIUM SERPL-SCNC: 5 MMOL/L (ref 3.5–5.2)
SODIUM SERPL-SCNC: 140 MMOL/L (ref 136–145)

## 2023-06-09 PROCEDURE — 83735 ASSAY OF MAGNESIUM: CPT | Performed by: INTERNAL MEDICINE

## 2023-06-09 PROCEDURE — 80048 BASIC METABOLIC PNL TOTAL CA: CPT | Performed by: INTERNAL MEDICINE

## 2023-06-12 ENCOUNTER — LAB REQUISITION (OUTPATIENT)
Dept: LAB | Facility: HOSPITAL | Age: 88
End: 2023-06-12
Payer: MEDICARE

## 2023-06-12 DIAGNOSIS — R68.89 OTHER GENERAL SYMPTOMS AND SIGNS: ICD-10-CM

## 2023-06-12 LAB
ANION GAP SERPL CALCULATED.3IONS-SCNC: 11.5 MMOL/L (ref 5–15)
BUN SERPL-MCNC: 31 MG/DL (ref 8–23)
BUN/CREAT SERPL: 34.8 (ref 7–25)
CALCIUM SPEC-SCNC: 9.5 MG/DL (ref 8.6–10.5)
CHLORIDE SERPL-SCNC: 103 MMOL/L (ref 98–107)
CO2 SERPL-SCNC: 22.5 MMOL/L (ref 22–29)
CREAT SERPL-MCNC: 0.89 MG/DL (ref 0.57–1)
EGFRCR SERPLBLD CKD-EPI 2021: 62.4 ML/MIN/1.73
GLUCOSE SERPL-MCNC: 90 MG/DL (ref 65–99)
POTASSIUM SERPL-SCNC: 4.4 MMOL/L (ref 3.5–5.2)
SODIUM SERPL-SCNC: 137 MMOL/L (ref 136–145)

## 2023-06-12 PROCEDURE — 80048 BASIC METABOLIC PNL TOTAL CA: CPT | Performed by: INTERNAL MEDICINE

## 2023-07-11 PROBLEM — I34.0 SEVERE MITRAL VALVE REGURGITATION: Status: ACTIVE | Noted: 2023-07-11

## 2023-07-11 PROBLEM — I50.32 CHRONIC HEART FAILURE WITH PRESERVED EJECTION FRACTION (HFPEF): Status: ACTIVE | Noted: 2023-05-15

## 2023-07-25 ENCOUNTER — DOCUMENTATION (OUTPATIENT)
Dept: CARDIOLOGY | Facility: HOSPITAL | Age: 88
End: 2023-07-25
Payer: MEDICARE

## 2023-07-25 NOTE — PROGRESS NOTES
Called again to follow up on referral for MR. Spoke with nephew/healthcare surrogate Alexandru Gómez to discuss. Patient is agreeable to do JEFFRY and consult with Dr. Real, but is hesitant to proceed with the MitraClip procedure if she is a candidate. Discussed MR, pre-procedural testing requirements, hospitalization and recovery expectations. We will schedule the JEFFRY and consult and call him with the date and time when scheduled. Educational materials reviewed and mailed to his address today- [address] [place] [address]. My contact information is included for future needs or questions. Will follow up after JEFFRY.

## 2023-08-02 ENCOUNTER — PREP FOR SURGERY (OUTPATIENT)
Dept: OTHER | Facility: HOSPITAL | Age: 88
End: 2023-08-02
Payer: MEDICARE

## 2023-08-02 RX ORDER — FLUMAZENIL 0.1 MG/ML
0.5 INJECTION INTRAVENOUS ONCE AS NEEDED
OUTPATIENT
Start: 2023-08-02

## 2023-08-02 RX ORDER — SODIUM CHLORIDE 0.9 % (FLUSH) 0.9 %
10 SYRINGE (ML) INJECTION EVERY 12 HOURS SCHEDULED
OUTPATIENT
Start: 2023-08-02

## 2023-08-02 RX ORDER — SODIUM CHLORIDE 0.9 % (FLUSH) 0.9 %
10 SYRINGE (ML) INJECTION AS NEEDED
OUTPATIENT
Start: 2023-08-02

## 2023-08-02 RX ORDER — SODIUM CHLORIDE 9 MG/ML
40 INJECTION, SOLUTION INTRAVENOUS AS NEEDED
OUTPATIENT
Start: 2023-08-02

## 2023-08-02 RX ORDER — NALOXONE HCL 0.4 MG/ML
0.4 VIAL (ML) INJECTION ONCE AS NEEDED
OUTPATIENT
Start: 2023-08-02

## 2023-08-02 RX ORDER — MIDAZOLAM HYDROCHLORIDE 1 MG/ML
2-8 INJECTION INTRAMUSCULAR; INTRAVENOUS ONCE AS NEEDED
OUTPATIENT
Start: 2023-08-02

## 2023-08-02 RX ORDER — FENTANYL CITRATE 50 UG/ML
50-100 INJECTION, SOLUTION INTRAMUSCULAR; INTRAVENOUS ONCE AS NEEDED
OUTPATIENT
Start: 2023-08-02

## 2023-08-08 ENCOUNTER — HOSPITAL ENCOUNTER (OUTPATIENT)
Dept: CARDIOLOGY | Facility: HOSPITAL | Age: 88
Discharge: HOME | End: 2023-08-08
Admitting: INTERNAL MEDICINE
Payer: MEDICARE

## 2023-08-08 VITALS
RESPIRATION RATE: 13 BRPM | DIASTOLIC BLOOD PRESSURE: 64 MMHG | HEIGHT: 65 IN | WEIGHT: 154 LBS | SYSTOLIC BLOOD PRESSURE: 109 MMHG | OXYGEN SATURATION: 95 % | BODY MASS INDEX: 25.66 KG/M2 | HEART RATE: 60 BPM

## 2023-08-08 DIAGNOSIS — I34.0 SEVERE MITRAL VALVE REGURGITATION: ICD-10-CM

## 2023-08-08 DIAGNOSIS — I05.8 OTHER RHEUMATIC MITRAL VALVE DISEASES: ICD-10-CM

## 2023-08-08 PROCEDURE — 25010000002 FENTANYL CITRATE (PF) 50 MCG/ML SOLUTION: Performed by: INTERNAL MEDICINE

## 2023-08-08 PROCEDURE — 93320 DOPPLER ECHO COMPLETE: CPT

## 2023-08-08 PROCEDURE — 93325 DOPPLER ECHO COLOR FLOW MAPG: CPT

## 2023-08-08 PROCEDURE — 25010000002 MIDAZOLAM PER 1 MG: Performed by: INTERNAL MEDICINE

## 2023-08-08 PROCEDURE — 76376 3D RENDER W/INTRP POSTPROCES: CPT

## 2023-08-08 RX ORDER — MIDAZOLAM HYDROCHLORIDE 1 MG/ML
INJECTION INTRAMUSCULAR; INTRAVENOUS
Status: COMPLETED | OUTPATIENT
Start: 2023-08-08 | End: 2023-08-08

## 2023-08-08 RX ORDER — FENTANYL CITRATE 50 UG/ML
INJECTION, SOLUTION INTRAMUSCULAR; INTRAVENOUS
Status: COMPLETED | OUTPATIENT
Start: 2023-08-08 | End: 2023-08-08

## 2023-08-08 RX ORDER — METOPROLOL TARTRATE 50 MG/1
50 TABLET, FILM COATED ORAL 2 TIMES DAILY
Qty: 60 TABLET | Refills: 5 | Status: SHIPPED | OUTPATIENT
Start: 2023-08-08

## 2023-08-08 RX ADMIN — MIDAZOLAM 2 MG: 1 INJECTION INTRAMUSCULAR; INTRAVENOUS at 09:32

## 2023-08-08 RX ADMIN — FENTANYL CITRATE 50 MCG: 50 INJECTION, SOLUTION INTRAMUSCULAR; INTRAVENOUS at 09:32

## 2023-08-08 RX ADMIN — FENTANYL CITRATE 50 MCG: 50 INJECTION, SOLUTION INTRAMUSCULAR; INTRAVENOUS at 09:29

## 2023-08-08 RX ADMIN — MIDAZOLAM 2 MG: 1 INJECTION INTRAMUSCULAR; INTRAVENOUS at 09:29

## 2023-08-08 NOTE — H&P
Pre-Procedure History and Physical  [facility]      Patient: Azra Ohara  : 1935  MRN:[id number]    PCP: Minal Salazar APRN  PHONE: [phone]    DATE: 2023  ID: Azra Ohara is a 88 y.o. female     CC: Severe mitral valve regurgitation    PROBLEM LIST:   Severe mitral vlave regurgitation/Valvular heart disease  Echo (2023): EF 56-60%. The following LV wall segments are hypokinetic: Apical anterior, apical lateral, apical inferior, apical septal and apex.  LV diastolic function is consistent with (grade 1) impaired relaxation.  Mild calcification of the aortic valve.  Mild thickening of the noncoronary, left coronary and right coronary cusps of the aortic valve.  Aortic valve appears trileaflet.  Mild calcification of the mitral valve posterior leaflets.  Trace MR and TR. RVSP 35 mmHg.  Echo (2023): EF 61-65%. LV diastolic function is consistent with (grade Ia w/high LAP) impaired relaxation. LA and RA cavity are moderately dilated. Mild calcification of aortic valve mainly affection the non-coronary, left coronary and right coronary cusp(s). Severe MR and TR. RVSP 68.4 mmHg. MR max delmy 461 cm/sec. MR mean PG 60.5 mmHg. MR max PG 85.1 mmHg. MR mean PG 60.5 mmHg. MR  cm.  Coronary artery disease  H/o apical infarction per chart review, patient has never had a [facility], data deficit  [facility]): Indeterminate EKG stress test. Small-sized infarct located in the apex with no significant ischemia noted. Gated SPECT scanning was of suboptimal quality and hence could no comment upon the LV wall motion and systolic function accurately.   Apical infarction  Hypertension  Chronic HFpEF  LBBB  Complete heart block s/p PPM implantation, 2023  Abbott device  Right ankle/ankle swelling  US Duplex bilateral LEs (2023): Normal.  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 She is a patient of Dr. Sampson Garvin and Freddy Sanchez PA-C with [facility]. She reports increased fatigue and shortness of breath that abruptly came on in 05/2023 and has continued since. She was hospitalized at that time with new onset acute HFpEF. Since that time, she has remained short of breath and worn out after minimal exertion.  She denies requiring home oxygen or orthopnea at this time. In terms of her daily life, she is able to perform all of her ADLs but does have a caregiver that comes a few hours each week to help her ever since 05/2023 when the fatigue and shortness of breath began. She denies chest pain, jaw/arm/neck pain, palpitations, edema, lightheadedness, dizziness, presyncope, syncope, nausea, vomiting, diarrhea, abdominal discomfort, numbness or tingling.      Cardiac Risk Factors: advanced age (older than 55 for men, 65 for women), hypertension, and sedentary lifestyle    Allergies:      Allergies   Allergen Reactions    Lipitor [Atorvastatin] Myalgia     Patient stated she had tried several of the statins and couldn't tolerate any of them and the doctor told her to continue her fish oil    Penicillins Hives       MEDICATIONS:  Current Outpatient Medications   Medication Instructions    acetaminophen (TYLENOL) 500 mg, Oral, Every 6 Hours PRN    aspirin 81 mg, Oral, Daily    bisacodyl (DULCOLAX) 10 mg, Rectal, Daily PRN    Diclofenac Sodium (VOLTAREN) 4 g, Topical, 4 Times Daily PRN    docusate sodium (COLACE) 100 mg, Oral, 2 Times Daily    donepezil (ARICEPT) 5 mg, Oral, Nightly    empagliflozin (JARDIANCE) 10 mg, Oral, Daily     furosemide (LASIX) 20 mg, Oral, Daily    HYDROcodone-acetaminophen (NORCO) 5-325 MG per tablet 1 tablet, Oral, Every 8 Hours PRN    lactulose (CHRONULAC) 20 g, Oral, Daily PRN    losartan (COZAAR) 100 mg, Oral, Every 24 Hours Scheduled    melatonin 3 mg, Oral, Nightly PRN    memantine (NAMENDA) 5 mg, Oral, Daily    metoprolol tartrate (LOPRESSOR) 25 mg, Oral, 2 Times Daily    multivitamin with minerals tablet tablet 1 tablet, Oral, Daily    spironolactone (ALDACTONE) 25 mg, Oral, Daily    traMADol (ULTRAM) 25 mg, Oral, Every 6 Hours PRN       Past medical & surgical history, social and family history reviewed in the electronic medical record.    ROS: Pertinent positives listed in the HPI and problem list above. All others reviewed and negative.     Physical Exam:   /96   Pulse 60   Resp 15   SpO2 100%   Constitutional:    Alert, cooperative, in no acute distress   Neck:     No Jugular venous distention, adenopathy, or thyromegaly noted.    Heart:    Regular rhythm and normal rate, normal S1 and S2, grade 2-3 holosystolic ejection murmur at apex, no gallops, rubs, or clicks. No distinct PMI noted.    Lungs:     Clear to auscultation bilaterally, respirations regular, even and unlabored    Abdomen:     Soft, nontender, nondistended, normal bowel sounds   Extremities:   No gross deformities, no edema, clubbing, or cyanosis.    Pulses:   Peripheral pulses palpable and equal bilaterally.     Labs and Diagnostic Data:    Lab Results   Component Value Date    CHOL 144 05/23/2023    TRIG 126 05/23/2023    HDL 43 05/23/2023    LDL 78 05/23/2023    AST 12 05/23/2023    ALT 7 05/23/2023       Tele: NSR, 60 BPM    IMPRESSION:  Azra Ohara presents today for a JEFFRY to further evaluate her mitral valve for consideration for a MitraClip or MR due to her severe, symptomatic mitral valve regurgitation. The patient has remained n.p.o. since 9 PM last night. She denies any loose teeth or known obstructive sleep apnea. She  does have dentures which she has removed.     PLAN:  Procedure to perform: JEFFRY. Risks, benefits and alternatives to the procedure explained to the patient and she understands and wishes to proceed.   Further recommendations to follow.    NEHEMIAS Sarmiento MD, FACC

## 2023-08-09 ENCOUNTER — DOCUMENTATION (OUTPATIENT)
Dept: CARDIOLOGY | Facility: HOSPITAL | Age: 88
End: 2023-08-09
Payer: MEDICARE

## 2023-08-09 LAB
BH CV ECHO MEAS - IVSD: 1.2 CM
BH CV ECHO MEAS - LV MAX PG: 3.3 MMHG
BH CV ECHO MEAS - LV MEAN PG: 2 MMHG
BH CV ECHO MEAS - LV V1 MAX: 91 CM/SEC
BH CV ECHO MEAS - LV V1 VTI: 18.7 CM
BH CV ECHO MEAS - LVOT AREA: 2.8 CM2
BH CV ECHO MEAS - LVOT DIAM: 1.9 CM
BH CV ECHO MEAS - MV MAX PG: 1.99 MMHG
BH CV ECHO MEAS - MV MEAN PG: 1 MMHG
BH CV ECHO MEAS - MV V2 VTI: 20.2 CM
BH CV ECHO MEAS - MVA(VTI): 2.6 CM2
BH CV ECHO MEAS - SV(LVOT): 53 ML
LV EF 2D ECHO EST: 55 %

## 2023-08-09 NOTE — PROGRESS NOTES
Dr. Real communicated JEFFRY findings with Dr. Garvin. Needs medication management for HTN, not a MitraClip at this time. Dr. Real will follow up with patient in 6weeks.

## 2023-09-19 ENCOUNTER — TELEPHONE (OUTPATIENT)
Dept: CARDIOLOGY | Facility: CLINIC | Age: 88
End: 2023-09-19
Payer: MEDICARE

## 2023-09-20 ENCOUNTER — OFFICE VISIT (OUTPATIENT)
Dept: CARDIOLOGY | Facility: CLINIC | Age: 88
End: 2023-09-20
Payer: MEDICARE

## 2023-09-20 VITALS
OXYGEN SATURATION: 98 % | WEIGHT: 169 LBS | HEART RATE: 62 BPM | HEIGHT: 65 IN | BODY MASS INDEX: 28.16 KG/M2 | DIASTOLIC BLOOD PRESSURE: 82 MMHG | SYSTOLIC BLOOD PRESSURE: 126 MMHG

## 2023-09-20 DIAGNOSIS — I25.10 CORONARY ARTERY DISEASE INVOLVING NATIVE CORONARY ARTERY OF NATIVE HEART WITHOUT ANGINA PECTORIS: ICD-10-CM

## 2023-09-20 DIAGNOSIS — I50.32 CHRONIC HEART FAILURE WITH PRESERVED EJECTION FRACTION (HFPEF): ICD-10-CM

## 2023-09-20 DIAGNOSIS — I34.0 SEVERE MITRAL VALVE REGURGITATION: Primary | ICD-10-CM

## 2023-09-20 DIAGNOSIS — I10 ESSENTIAL HYPERTENSION: ICD-10-CM

## 2023-09-20 RX ORDER — CELECOXIB 200 MG/1
200 CAPSULE ORAL 2 TIMES DAILY
COMMUNITY
Start: 2023-08-27

## 2023-09-20 RX ORDER — LOSARTAN POTASSIUM 50 MG/1
50 TABLET ORAL 2 TIMES DAILY
COMMUNITY

## 2023-09-20 RX ORDER — TERAZOSIN 1 MG/1
1 CAPSULE ORAL NIGHTLY
Qty: 30 CAPSULE | Refills: 1 | Status: SHIPPED | OUTPATIENT
Start: 2023-09-20

## 2023-09-20 NOTE — PROGRESS NOTES
[facility] Cardiology    Patient ID: Azra Ohara is a 88 y.o. female.  : 1935   Contact: [phone]    Encounter date: 2023    PCP: Minal Salazar APRN      Chief complaint:   Chief Complaint   Patient presents with    Severe mitral valve regurgitation       Problem List:  Severe mitral vlave regurgitation/Valvular heart disease  Echo (2023): EF 56-60%. The following LV wall segments are hypokinetic: Apical anterior, apical lateral, apical inferior, apical septal and apex.  LV diastolic function is consistent with (grade 1) impaired relaxation.  Mild calcification of the aortic valve.  Mild thickening of the noncoronary, left coronary and right coronary cusps of the aortic valve.  Aortic valve appears trileaflet.  Mild calcification of the mitral valve posterior leaflets.  Trace MR and TR. RVSP 35 mmHg.  Echo transthoracic (2023): /72 mmHg EF 61-65%. LV diastolic function is consistent with (grade Ia w/high LAP) impaired relaxation. LA and RA cavity are moderately dilated. Mild calcification of aortic valve mainly affection the non-coronary, left coronary and right coronary cusp(s). Severe MR and TR. RVSP 68.4 mmHg.   JEFFRY, 2023, /63 mmHg: EF 55%. Mild MR/TR.   Coronary artery disease  H/o apical infarction per chart review, patient has never had a [facility], data deficit  [facility] (2023): Indeterminate EKG stress test. Small-sized infarct located in the apex with no significant ischemia noted. Gated SPECT scanning was of suboptimal quality and hence could no comment upon the LV wall motion and systolic function accurately.   Hypertension  Chronic HFpEF  LBBB  Complete heart block s/p PPM implantation, 2023  Hill device by Dr. Garvin  Right ankle/ankle swelling  US Duplex bilateral LEs (2023): Normal.  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 with preserved systolic function per echocardiogram  Peripheral arterial  disease  Chronic kidney disease, stage IV  Polycythemia vera  H/o colon cancer s/p resection, 2004    Allergies   Allergen Reactions    Lipitor [Atorvastatin] Myalgia     Patient stated she had tried several of the statins and couldn't tolerate any of them and the doctor told her to continue her fish oil    Penicillins Hives       Current Medications:    Current Outpatient Medications:     acetaminophen (TYLENOL) 500 MG tablet, Take 1 tablet by mouth Every 6 (Six) Hours As Needed for Mild Pain., Disp: , Rfl:     aspirin 81 MG chewable tablet, Chew 1 tablet Daily., Disp: , Rfl:     bisacodyl (DULCOLAX) 10 MG suppository, Insert 1 suppository into the rectum Daily As Needed for Constipation., Disp: , Rfl:     celecoxib (CeleBREX) 200 MG capsule, Take 1 capsule by mouth 2 (Two) Times a Day., Disp: , Rfl:     Diclofenac Sodium (VOLTAREN) 1 % gel gel, Apply 4 g topically to the appropriate area as directed 4 (Four) Times a Day As Needed (joint pain)., Disp: 50 g, Rfl: 0    docusate sodium (COLACE) 100 MG capsule, Take 1 capsule by mouth 2 (Two) Times a Day., Disp: , Rfl:     donepezil (ARICEPT) 5 MG tablet, Take 1 tablet by mouth Every Night., Disp: , Rfl:     empagliflozin (JARDIANCE) 10 MG tablet tablet, Take 1 tablet by mouth Daily., Disp: 30 tablet, Rfl: 1    lactulose (CHRONULAC) 10 GM/15ML solution, Take 30 mL by mouth Daily As Needed., Disp: , Rfl:     losartan (COZAAR) 50 MG tablet, Take 1 tablet by mouth 2 (Two) Times a Day., Disp: , Rfl:     melatonin 3 MG tablet, Take 1 tablet by mouth At Night As Needed for Sleep., Disp: 30 tablet, Rfl: 0    memantine (NAMENDA) 5 MG tablet, Take 1 tablet by mouth Daily., Disp: , Rfl:     metoprolol tartrate (LOPRESSOR) 25 MG tablet, Take 1 tablet by mouth 2 (Two) Times a Day., Disp: , Rfl:     spironolactone (ALDACTONE) 25 MG tablet, Take 1 tablet by mouth Daily., Disp: 30 tablet, Rfl: 11    traMADol (ULTRAM) 50 MG tablet, Take 0.5 tablets by mouth Every 6 (Six) Hours As  "Needed for Moderate Pain., Disp: 12 tablet, Rfl: 0    terazosin (HYTRIN) 1 MG capsule, Take 1 capsule by mouth Every Night., Disp: 30 capsule, Rfl: 1    HPI    Azra Ohara is a 88 y.o. female who presents today for a follow up of severe MR, CAD, CHF, and cardiac risk factors. Since last visit, she had a JEFFRY that showed only mild MR.  Her blood pressure at the time of her transthoracic echo was 175/72 mmHg and at the time of JEFFRY was 109/63 mmHg.  It was felt that her mitral regurgitation that could be managed medically with better blood pressure control.     Patient has been living at home with a caregiver coming in daily for a few hours. She is still managing her medications and there is some confusion. She has been taking Metoprolol 25mg BID and losartan 50mg BID. She did not increase the dosages. She started spironolactone 25mg. She states that her shortness of breath is improved. She is walking for 10 minutes nightly and still able to transfer herself. She is having some edema throughout the day that is resolving over night. Her blood pressure has been running higher 180's/100's at home, but the caregiver states they find pills around the house and unsure that she is taking it appropriately. They did not set up the device to transmit for her pacemaker so it has not been interrogated. Hoping to transfer care of device.  She denies chest pain, palpitations, lightheadedness, dizziness and syncope.     The following portions of the patient's history were reviewed and updated as appropriate: allergies, current medications and problem list.    Pertinent positives as listed in the HPI.  All other systems reviewed are negative.         Vitals:    09/20/23 0920   BP: 126/82   BP Location: Left arm   Patient Position: Sitting   Pulse: 62   SpO2: 98%   Weight: 76.7 kg (169 lb)   Height: 165.1 cm (65\")       Physical Exam:  General: Alert and oriented.  Neck: Jugular venous pressure is within normal limits. Carotids " have normal upstrokes without bruits.   Cardiovascular: Heart has a nondisplaced focal PMI. Regular rate and rhythm. No murmur, gallop or rub.  Lungs: Clear, decreased breath sounds, no rales or wheezes. Equal expansion is noted.   Extremities: 1+ edema in bilateral feet.  Skin: Warm and dry.  Neurologic: Nonfocal.     Diagnostic Data (reviewed with patient):  Lab Results   Component Value Date    GLUCOSE 90 06/12/2023    BUN 31 (H) 06/12/2023    CREATININE 0.89 06/12/2023    BCR 34.8 (H) 06/12/2023     06/12/2023    K 4.4 06/12/2023     06/12/2023    CO2 22.5 06/12/2023    CALCIUM 9.5 06/12/2023    ALBUMIN 3.0 (L) 05/23/2023    ALKPHOS 63 05/23/2023    AST 12 05/23/2023    ALT 7 05/23/2023     Lab Results   Component Value Date    CHOL 144 05/23/2023    TRIG 126 05/23/2023    HDL 43 05/23/2023    LDL 78 05/23/2023      Lab Results   Component Value Date    WBC 10.28 05/23/2023    RBC 3.96 05/23/2023    HGB 11.9 (L) 05/23/2023    HCT 38.1 05/23/2023    MCV 96.2 05/23/2023     05/23/2023      Lab Results   Component Value Date    TSH 0.998 05/03/2023        Advance Care Planning   ACP discussion was held with the patient during this visit. Patient has an advance directive (not in EMR), copy requested.       Procedures    DEVICE INTERROGATION:  St. Ta/2023, DDD: RA pacing 66%, RV pacing >99%. P wave is 1.0 mV with a threshold of 0.75 V at 0.5 msec and an impedance of 530 ohms. R wave is 0@40 mV with a threshold of 0.75 V at 0.5 msec and an impedance of 530 ohms. Battery voltage is 3.01. 9.8-10 years. Events: 0 Updates: DDD to DDDR for flat histogram; Enabled Acap confirm for battery life.  Rate responsiveness was turned on as the patient is completely pacemaker dependent.        Assessment:    ICD-10-CM ICD-9-CM   1.  Mitral valve regurgitation (Severe by TTE, mild by JEFFRY) I34.0 424.0   2. Coronary artery disease involving native coronary artery of native heart without angina pectoris  I25.10  414.01   3. Chronic heart failure with preserved ejection fraction (HFpEF)  I50.32 428.9   4. Essential hypertension  I10 401.9       Plan:  Continue on aspirin 81 mg for antiplatelet therapy.   Continue on metoprolol 25 mg BID for rate control and hypertension.   Continue on spironolactone 25 mg daily for fluid retention and hypertension.   Continue on losartan 50 mg BID for hypertension.   Continue on Jardiance 10mg for heart failure symptoms.   Start on Terazosin 1mg at night for hypertension.  Goal systolic pressure is 100 to 130 mmHg.  Setting up overnight oxygen saturation monitor to screen for hypoxia which may be contributing to her hypertension or mitral regurgitation.  Continue all other current medications.  F/up in 6 weeks, sooner if needed.      Scribed for Emani Real MD by WASHINGTON Dumont.    I Emani Real MD personally performed the services described in this documentation as scribed by the above individual in my presence, and it is both accurate and complete.    Emani Real MD, FACC

## 2023-09-20 NOTE — TELEPHONE ENCOUNTER
Pt, accompanied by her son, seen today to establish care with Dr. Real. Reviewed remote cardiac device home monitoring benefits & rationale w/pt & son, Sergio/ROLANDA. Both verbalized understanding and stated they will set up the pt's Merlin home monitor.

## 2023-09-21 NOTE — TELEPHONE ENCOUNTER
Patients son returned my call to let me know they are establishing Care in [facility] and will no longer follow with Freddy in [place].  I have released her from Dr Garvin's [facility] clinic and enrolled her into [facility]'s for Dr Real.  Please cancel upcoming appointment with Freddy Sanchez.

## 2023-10-04 RX ORDER — TERAZOSIN 1 MG/1
1 CAPSULE ORAL NIGHTLY
Qty: 90 CAPSULE | OUTPATIENT
Start: 2023-10-04

## 2023-10-05 ENCOUNTER — TELEPHONE (OUTPATIENT)
Dept: CARDIOLOGY | Facility: CLINIC | Age: 88
End: 2023-10-05
Payer: MEDICARE

## 2023-10-05 NOTE — TELEPHONE ENCOUNTER
Telephone voice mail from roman/POA with multiple questions about patient.  He requests a return call.  He would like to know if they need to keep their appointments with Dr. Garvin.  He is instructed he does not need two cardiologist and he states he prefers [facility].  Patients device has been taken over by our device clinic.  Also, patient is on celebrex.  He has done some research and states he has found it is not good with patients with CHF.  He is asked to follow up with patients PCP about changing this medication.  He asks about overnight pulse ox.  They have not received the device. He is instructed that the company providing them has a back log of orders and her order has been delayed.  He verbalizes understanding.

## 2023-10-24 NOTE — ED PROVIDER NOTES
Subjective   History of Present Illness  88-year-old female with past medical history of polycythemia vera, left bundle branch block, colon cancer, hypertension, and stage IV chronic kidney disease presents to the emergency room accompanied by family for generalized weakness worsening over the past month.  Patient tells me this today she is having some abdominal pain and just does not know why she is staying weak.  She tells me that she battles constipation on a regular basis.  She denies any chest pain, shortness of breath, back pain, nausea, or vomiting.  She does endorse some dysuria and per family states that they think she may have a urinary tract infection.  Denies being around any sick contacts or travel.  Patient has 2 family members at bedside 1 of which states that she goes and helps patient daily with activities of daily living and medicine.  She states that she did recently noticed that all her Aldactone was missing and she does not know if patient has taken all of it or if she has spilled it somewhere.  Denies any other complaints or concerns at this time.    History provided by:  Patient and relative   used: No        Review of Systems   Constitutional:  Negative for fever.   HENT: Negative.     Respiratory: Negative.     Cardiovascular: Negative.  Negative for chest pain.   Gastrointestinal:  Positive for abdominal pain and constipation.   Endocrine: Negative.    Genitourinary:  Positive for decreased urine volume. Negative for dysuria.   Skin: Negative.    Neurological:  Positive for weakness.   Psychiatric/Behavioral: Negative.     All other systems reviewed and are negative.      Past Medical History:   Diagnosis Date    Chronic kidney disease, stage 4 (severe) 01/20/2023    Essential hypertension     History of colon cancer     Left bundle branch block 01/20/2023    Long term (current) use of aspirin 01/20/2023    Polycythemia vera 01/20/2023       Allergies   Allergen  Reactions    Lipitor [Atorvastatin] Myalgia     Patient stated she had tried several of the statins and couldn't tolerate any of them and the doctor told her to continue her fish oil    Penicillins Hives       Past Surgical History:   Procedure Laterality Date    CARDIAC ELECTROPHYSIOLOGY PROCEDURE N/A 5/5/2023    Procedure: Pacemaker DC new;  Surgeon: Sampson Garvin MD;  Location: [facility] INVASIVE LOCATION;  Service: Cardiology;  Laterality: N/A;    COLON RESECTION  2004    PARATHYROIDECTOMY Right     Parathyroid adenoma       Family History   Problem Relation Age of Onset    Heart disease Mother     Heart disease Father        Social History     Socioeconomic History    Marital status:    Tobacco Use    Smoking status: Never   Vaping Use    Vaping Use: Never used   Substance and Sexual Activity    Alcohol use: Never    Drug use: Never    Sexual activity: Defer           Objective   Physical Exam  Vitals and nursing note reviewed.   Constitutional:       General: She is not in acute distress.     Appearance: She is well-developed. She is not diaphoretic.   HENT:      Head: Normocephalic and atraumatic.      Right Ear: External ear normal.      Left Ear: External ear normal.      Nose: Nose normal.   Eyes:      Conjunctiva/sclera: Conjunctivae normal.      Pupils: Pupils are equal, round, and reactive to light.   Neck:      Vascular: No JVD.      Trachea: No tracheal deviation.   Cardiovascular:      Rate and Rhythm: Normal rate and regular rhythm.      Heart sounds: Normal heart sounds. No murmur heard.  Pulmonary:      Effort: Pulmonary effort is normal. No respiratory distress.      Breath sounds: Normal breath sounds. No wheezing.   Abdominal:      General: Bowel sounds are normal. There is no distension.      Palpations: Abdomen is soft.      Tenderness: There is no abdominal tenderness in the suprapubic area. There is no right CVA tenderness or left CVA tenderness.   Musculoskeletal:          General: No deformity. Normal range of motion.      Cervical back: Normal range of motion and neck supple.   Skin:     General: Skin is warm and dry.      Coloration: Skin is not pale.      Findings: No erythema or rash.   Neurological:      General: No focal deficit present.      Mental Status: She is alert and oriented to person, place, and time.      Cranial Nerves: Cranial nerves 2-12 are intact. No cranial nerve deficit.      Sensory: Sensation is intact.      Motor: Motor function is intact. Weakness present.      Coordination: Coordination is intact.      Gait: Gait is intact.      Comments: Bilateral, symmetric upper/lower extremity weakness   Psychiatric:         Behavior: Behavior normal.         Thought Content: Thought content normal.         Procedures           ED Course  ED Course as of 10/24/23 2135   Tue Oct 24, 2023   1300 EKG performed at 1244 shows paced rhythm with broad complexes no acute ischemic changes [PL]   1345 XR Chest 1 View [TK]   1521 CT Abdomen Pelvis With Contrast [TK]   1535 Bladder scan revealed 769cc; 1200cc drained via catheter. [TK]   1823  has spoken to patient and family at bedside. I have written patient order for hospital bed, bedside commode, and rollator per family request. [facility] has agreed to come set equipment up at patient home tomorrow morning. Family and patient agreeable with this plan. [TK]   1919 Cynthia RN went to discharge patient and patient states she'd prefer to be admitted to the hospital while catheter is in place because she doesn't know how to take care of it. Education has been provided by nurse. [TK]      ED Course User Index  [PL] Faby You MD  [TK] Florencia Real, PASALUD                                           Medical Decision Making  88-year-old female with past medical history of polycythemia vera, left bundle branch block, colon cancer, hypertension, and stage IV chronic kidney disease presents to the emergency  room accompanied by family for generalized weakness worsening over the past month.  Patient tells me this today she is having some abdominal pain and just does not know why she is staying weak.  She tells me that she battles constipation on a regular basis.  She denies any chest pain, shortness of breath, back pain, nausea, or vomiting.  She does endorse some dysuria and per family states that they think she may have a urinary tract infection.  Denies being around any sick contacts or travel.  Patient has 2 family members at bedside 1 of which states that she goes and helps patient daily with activities of daily living and medicine.  She states that she did recently noticed that all her Aldactone was missing and she does not know if patient has taken all of it or if she has spilled it somewhere.  Denies any other complaints or concerns at this time.      Problems Addressed:  Acute urinary retention: complicated acute illness or injury  Constipation, unspecified constipation type: complicated acute illness or injury    Amount and/or Complexity of Data Reviewed  Labs: ordered. Decision-making details documented in ED Course.  Radiology: ordered. Decision-making details documented in ED Course.  ECG/medicine tests: ordered. Decision-making details documented in ED Course.  Discussion of management or test interpretation with external provider(s): [place]    Risk  OTC drugs.  Prescription drug management.        Final diagnoses:   Acute urinary retention   Constipation, unspecified constipation type       ED Disposition  ED Disposition       ED Disposition   Discharge    Condition   Stable    Comment   --               Minal Salazar APRN  [address]  [place] [address]  [phone]    In 2 days      Willy Harding MD  [address]  [address]  [place] [address]  [phone]    In 2 days           Medication List        New Prescriptions      oxybutynin XL 10 MG 24 hr tablet  Commonly known as:  Ditropan XL  Take 1 tablet by mouth Daily.     polyethylene glycol 17 GM/SCOOP powder  Commonly known as: MIRALAX  Take 17 g by mouth 2 (Two) Times a Day.     sennosides-docusate 8.6-50 MG per tablet  Commonly known as: PERICOLACE  Take 1 tablet by mouth 2 (Two) Times a Day.            Changed      * lactulose 10 GM/15ML solution  Commonly known as: CHRONULAC  What changed: Another medication with the same name was added. Make sure you understand how and when to take each.     * lactulose 10 GM/15ML solution  Commonly known as: CHRONULAC  Take 30 mL by mouth 2 (Two) Times a Day.  What changed: You were already taking a medication with the same name, and this prescription was added. Make sure you understand how and when to take each.           * This list has 2 medication(s) that are the same as other medications prescribed for you. Read the directions carefully, and ask your doctor or other care provider to review them with you.                   Where to Get Your Medications        These medications were sent to [id number] - [place] - [address] AT [address] [facility] [address] [phone] [place] [phone]   [address] [address]      Phone: [phone]   lactulose 10 GM/15ML solution  oxybutynin XL 10 MG 24 hr tablet  polyethylene glycol 17 GM/SCOOP powder  sennosides-docusate 8.6-50 MG per tablet            Florencia Real PA-C  10/24/23 8759

## 2023-10-24 NOTE — CASE MANAGEMENT/SOCIAL WORK
Case Management/Social Work    Patient Name:  Azra Ohara  YOB: 1935  MRN: [id number]  Admit Date:  10/24/2023    Received consult for patient needing BCS, hospital bed and rollator walker at home. Contacted [facility] pharmacy waiting on call back.     Electronically signed by:  Brooklyn Stein  10/24/23 18:34 EDT

## 2023-10-24 NOTE — CASE MANAGEMENT/SOCIAL WORK
Case Management/Social Work    Patient Name:  Azra Ohara  YOB: 1935  MRN: [id number]  Admit Date:  10/24/2023      Spoke with [facility] they will deliver patient hospital bed, bsc and rollator walker to home tomorrow. Order was faxed to them and also given to family to give to [employer].       Electronically signed by:  Brooklyn Stein  10/24/23 18:44 EDT

## 2023-10-25 NOTE — ED NOTES
[facility] called back and said they would come get pt but it maybe a couple hours. Facesheet and pcp form faxed.

## 2023-10-27 NOTE — ED NOTES
Patient brought by EMS and states that she is hurting in her lower abdomen. Pt states that the pain just started en route to her home with EMS. Pt is pale, and extremities are cool. Pts clothing is saturated with urine, supplies at bedside to clean and change the patient.

## 2023-10-27 NOTE — ED PROVIDER NOTES
Subjective   History of Present Illness        Review of Systems    Past Medical History:   Diagnosis Date    Chronic kidney disease, stage 4 (severe) 2023    Essential hypertension     History of colon cancer     Left bundle branch block 2023    Long term (current) use of aspirin 2023    Polycythemia vera 2023       Allergies   Allergen Reactions    Lipitor [Atorvastatin] Myalgia     Patient stated she had tried several of the statins and couldn't tolerate any of them and the doctor told her to continue her fish oil    Penicillins Hives       Past Surgical History:   Procedure Laterality Date    CARDIAC ELECTROPHYSIOLOGY PROCEDURE N/A 2023    Procedure: Pacemaker DC new;  Surgeon: Sampson Garvin MD;  Location: [facility] CATH INVASIVE LOC[facility];  Service: Cardiology;  Laterality: N/A;    COLON RESECTION      PARATHYROIDECTOMY Right     Parathyroid adenoma       Family History   Problem Relation Age of Onset    Heart disease Mother     Heart disease Father        Social History     Socioeconomic History    Marital status:    Tobacco Use    Smoking status: Never   Vaping Use    Vaping Use: Never used   Substance and Sexual Activity    Alcohol use: Never    Drug use: Never    Sexual activity: Defer           Objective   Physical Exam    Procedures           ED Course                                           Medical Decision Making    Placed on max dose levophed and epinephrine gtt  Unable to maintain cardiac activity  Bedside ultrasound utilized, cardiac standstill  Pupils fixed and dilated  Time of death 14:59    Family updated regarding patient passing.    Final diagnoses:   Death due to cardiac arrest   Atherosclerosis of coronary artery of native heart, unspecified vessel or lesion type, unspecified whether angina present   Stage 4 chronic kidney disease       ED Disposition  ED Disposition       ED Disposition       Condition   --    Comment   --               No  follow-up provider specified.       Medication List      No changes were made to your prescriptions during this visit.

## 2023-10-27 NOTE — ED NOTES
Pt being triaged as vitals were being assessed the patient became unresponsive, patient appeared to have cyanosis around her lips.  Code blue button pressed, No pulse felt and CPR started.

## 2023-11-13 RX ORDER — TERAZOSIN 1 MG/1
1 CAPSULE ORAL NIGHTLY
Qty: 30 CAPSULE | Refills: 1 | OUTPATIENT
Start: 2023-11-13

## 2025-01-08 NOTE — DISCHARGE PLACEMENT REQUEST
"Wenceslao Gómez (88 y.o. Female)     Date of Birth   1935    Social Security Number       Address   [address] [address]    Home Phone   [phone]    MRN   [id number]       Anabaptist   None    Marital Status   Single                            Admission Date   5/15/23    Admission Type   Emergency    Admitting Provider   Rebecca Rodrigues DO    Attending Provider   Bradley Jacobo DO    Department, Room/Bed   [address], 3302/1S       Discharge Date       Discharge Disposition       Discharge Destination                               Attending Provider: Bradley Jacobo DO    Allergies: Lipitor [Atorvastatin], Penicillins    Isolation: None   Infection: None   Code Status: No CPR    Ht: 165.1 cm (65\")   Wt: 82.1 kg (180 lb 14.4 oz)    Admission Cmt: None   Principal Problem: Acute congestive heart failure, unspecified heart failure type [I50.9]                 Active Insurance as of 5/15/2023     Primary Coverage     Payor Plan Insurance Group Employer/Plan Group    [facility] MEDICARE REPLACEMENT [facility] MEDICARE REPLACEMENT [id number]     Payor Plan Address Payor Plan Phone Number Payor Plan Fax Number Effective Dates    [address] [address]   2023 - None Entered    [facility] [id number]       Subscriber Name Subscriber Birth Date Member ID       WENCESLAO GÓMEZ 1935 [id number]                 Emergency Contacts      (Rel.) Home Phone Work Phone Mobile Phone    Alexandru Schmidt (healthcare surrogate/ nephew) (Other) -- -- [phone]    MELISSA SCHMIDT (Sister) [phone] -- --    Gonzalez Barrera (nephew) (Other) -- -- [phone]               Physical Therapy Notes (all)      Sonya Jackson, PT at 23 1119  Version 1 of 1         Acute Care - Physical Therapy Initial Evaluation  [facility]     Patient Name: Wenceslao Gómez  : 1935  MRN: [id number]  Today's Date: 2023   Onset of Illness/Injury or Date of Surgery: " 05/15/23  Visit Dx:     ICD-10-CM ICD-9-CM   1. Acute congestive heart failure, unspecified heart failure type  I50.9 428.0     Patient Active Problem List   Diagnosis   • Chronic kidney disease, stage 4 (severe)   • Essential hypertension   • Left bundle branch block   • Malignant neoplasm of large intestine   • Polycythemia vera   • Long term (current) use of aspirin   • Acute congestive heart failure, unspecified heart failure type     Past Medical History:   Diagnosis Date   • Chronic kidney disease, stage 4 (severe) 01/20/2023   • Essential hypertension    • History of colon cancer    • Left bundle branch block 01/20/2023   • Long term (current) use of aspirin 01/20/2023   • Polycythemia vera 01/20/2023     Past Surgical History:   Procedure Laterality Date   • CARDIAC ELECTROPHYSIOLOGY PROCEDURE N/A 5/5/2023    Procedure: Pacemaker DC new;  Surgeon: Sampson Garvin MD;  Location: [facility] INVASIVE LOCATION;  Service: Cardiology;  Laterality: N/A;   • COLON RESECTION  2004   • PARATHYROIDECTOMY Right     Parathyroid adenoma     PT Assessment (last 12 hours)     PT Evaluation and Treatment     Row Name 05/16/23 1105          Physical Therapy Time and Intention    Subjective Information complains of;pain  -     Document Type evaluation  -     Mode of Treatment physical therapy  -     Patient Effort adequate  -     Symptoms Noted During/After Treatment other (see comments);increased pain  Pt reported not feeling well and moaned with mobility.  Reported pain all over arms, legs and back.  Deferred standing today.  -     Comment Pt willing to attempt therapy and some mobility today.  Declined standing this date.  -     Row Name 05/16/23 1108          General Information    Patient Profile Reviewed yes  -     Onset of Illness/Injury or Date of Surgery 05/15/23  -     Referring Physician Lilia  -     Patient Observations alert;cooperative;agree to therapy  -     Prior Level of Function min  "assist:;gait;transfer  -     Equipment Currently Used at Home walker, rolling;wheelchair  -     Existing Precautions/Restrictions fall  -     Row Name 05/16/23 1105          Previous Level of Function/Home Environm    Previous Level of Function, Premorbid Pt reports she was ambulating with walker and some assistance at NH and would also sit on w/c most of the day.  -     Row Name 05/16/23 1105          Living Environment    Current Living Arrangements residential facility  -     Home Accessibility wheelchair accessible  -     People in Home facility resident  -     Row Name 05/16/23 1105          Home Use of Assistive/Adaptive Equipment    Equipment Currently Used at Home oxygen;walker, standard;cane, straight  -[facility] Name 05/16/23 1105          Pain    Additional Documentation Pain Scale: FACES Pre/Post-Treatment (Group)  -[facility] Name 05/16/23 1105          Pain Scale: FACES Pre/Post-Treatment    Pain: FACES Scale, Pretreatment 2-->hurts little bit  -     Posttreatment Pain Rating 4-->hurts little more  -     Pre/Posttreatment Pain Comment Pt did not rate pain but reports pain at rest and with movement, \"all over,\" arms, legs and back  -     Row Name 05/16/23 1105          Cognition    Affect/Mental Status (Cognition) [facility]     Orientation Status (Cognition) oriented to;person;place;situation  -     Follows Commands (Cognition) follows one-step commands;over 90% accuracy  -     Personal Safety Interventions fall prevention program maintained;muscle strengthening facilitated;nonskid shoes/slippers when out of bed;supervised activity  -     Row Name 05/16/23 1105          Range of Motion (ROM)    Range of Motion ROM is WFL  -[facility] Name 05/16/23 1105          Strength Comprehensive (MMT)    Comment, General Manual Muscle Testing (MMT) Assessment Strength 3-/5 throughout LE  -     Row Name 05/16/23 1105          Sensory Assessment (Somatosensory)    Sensory Assessment Pt " reports some numbness and tingling in legs  -     Row Name 05/16/23 1105          Bed Mobility    Bed Mobility supine-sit;sit-supine  -     Supine-Sit [place] (Bed Mobility) contact guard;1 person assist  -     Sit-Supine [place] (Bed Mobility) contact guard;1 person assist  -     Bed Mobility, Safety Issues decreased use of arms for pushing/pulling;decreased use of legs for bridging/pushing  -     Assistive Device (Bed Mobility) bed rails;draw sheet;head of bed elevated  -     Row Name 05/16/23 1105          Transfers    Transfers --  Pt declined attempting transfer today due to pain and not feeling well  -     Row Name 05/16/23 1105          Balance    Balance Assessment sitting static balance;sitting dynamic balance  -     Static Sitting Balance standby assist;1-person assist  -     Dynamic Sitting Balance standby assist;1-person assist;contact guard  -     Position, Sitting Balance sitting edge of bed  -     Row Name             Wound 05/15/23 2159 Right lower gluteal Pressure Injury    Wound - Properties Group Placement Date: 05/15/23  -NT Placement Time: 2159  -NT Present on Hospital Admission: Y  -NT Side: Right  -NT Orientation: lower  -NT Location: gluteal  -NT Primary Wound Type: Pressure inj  -NT    Retired Wound - Properties Group Placement Date: 05/15/23  -NT Placement Time: 2159  -NT Present on Hospital Admission: Y  -NT Side: Right  -NT Orientation: lower  -NT Location: gluteal  -NT Primary Wound Type: Pressure inj  -NT    Retired Wound - Properties Group Date first assessed: 05/15/23  -NT Time first assessed: 2159 -NT Present on Hospital Admission: Y  -NT Side: Right  -NT Location: gluteal  -NT Primary Wound Type: Pressure inj  -NT    Row Name             Wound 05/15/23 2201 Left lower gluteal Pressure Injury    Wound - Properties Group Placement Date: 05/15/23  -NT Placement Time: 2201  -NT Side: Left  -NT Orientation: lower  -NT Location: gluteal  -NT Primary Wound  Type: Pressure inj  -NT    Retired Wound - Properties Group Placement Date: 05/15/23  -NT Placement Time: 2201 -NT Side: Left  -NT Orientation: lower  -NT Location: gluteal  -NT Primary Wound Type: Pressure inj  -NT    Retired Wound - Properties Group Date first assessed: 05/15/23  -NT Time first assessed: 2201  -NT Side: Left  -NT Location: gluteal  -NT Primary Wound Type: Pressure inj  -NT    Row Name             Wound 05/15/23 2202 Bilateral medial coccyx MASD (Moisture associated skin damage)    Wound - Properties Group Placement Date: 05/15/23  -NT Placement Time: 2202 -NT Present on Hospital Admission: Y  -NT Side: Bilateral  -NT Orientation: medial  -NT Location: coccyx  -NT Primary Wound Type: MASD  -NT    Retired Wound - Properties Group Placement Date: 05/15/23  -NT Placement Time: 2202 -NT Present on Hospital Admission: Y  -NT Side: Bilateral  -NT Orientation: medial  -NT Location: coccyx  -NT Primary Wound Type: MASD  -NT    Retired Wound - Properties Group Date first assessed: 05/15/23  -NT Time first assessed: 2202 -NT Present on Hospital Admission: Y  -NT Side: Bilateral  -NT Location: coccyx  -NT Primary Wound Type: MASD  -NT    Row Name             Wound 05/04/23 0250 Left gluteal    Wound - Properties Group Placement Date: 05/04/23  -BR Placement Time: 0250  -BR Side: Left  -BR Location: gluteal  -BR    Retired Wound - Properties Group Placement Date: 05/04/23  -BR Placement Time: 0250  -BR Side: Left  -BR Location: gluteal  -BR    Retired Wound - Properties Group Date first assessed: 05/04/23  -BR Time first assessed: 0250  -BR Side: Left  -BR Location: gluteal  -BR    Row Name             Wound 05/04/23 0252 Right gluteal    Wound - Properties Group Placement Date: 05/04/23  -BR Placement Time: 0252  -BR Side: Right  -BR Location: gluteal  -BR    Retired Wound - Properties Group Placement Date: 05/04/23  -BR Placement Time: 0252  -BR Side: Right  -BR Location: gluteal  -BR    Retired Wound -  double   • TUMOR REMOVAL  06/06/2014    positive bladder tumor   • UPPER GASTROINTESTINAL ENDOSCOPY N/A 10/23/2024    ENDOSCOPIC ULTRASOUND with biopsy performed by Tom Dias MD at [facility] ENDOSCOPY   • UPPER GASTROINTESTINAL ENDOSCOPY N/A 11/20/2024    ESOPHAGOGASTRODUODENOSCOPY SUBMUCOSAL INJECTION performed by Tom Dias MD at [facility] ENDOSCOPY   • UPPER GASTROINTESTINAL ENDOSCOPY N/A 12/9/2024    ESOPHAGOGASTRODUODENOSCOPY WITH NASAL-JEJUNAL TUBE PLACEMENT performed by Tom Dias MD at [facility] ENDOSCOPY   • VASECTOMY         Social History:    Social History     Tobacco Use   • Smoking status: Never   • Smokeless tobacco: Never   Substance Use Topics   • Alcohol use: No                                Counseling given: Not Answered      Vital Signs (Current):   Vitals:    01/07/25 2018 01/07/25 2323 01/08/25 0342 01/08/25 0834   BP: 136/83 120/88 123/86 136/84   Pulse: 68 70 72 72   Resp: 17 16 17 16   Temp: 97.9 °F (36.6 °C) 97.5 °F (36.4 °C) 97.9 °F (36.6 °C) 97.7 °F (36.5 °C)   TempSrc: Oral Oral Skin Oral   SpO2: 97% 96% 97% 97%   Weight:       Height:                                                  BP Readings from Last 3 Encounters:   01/08/25 136/84   12/28/24 116/70   12/18/24 130/83       NPO Status: Time of last liquid consumption: 0000                        Time of last solid consumption: 0000                        Date of last liquid consumption: 01/04/25                        Date of last solid food consumption: 01/04/25    BMI:   Wt Readings from Last 3 Encounters:   01/05/25 64.9 kg (143 lb 1.3 oz)   12/28/24 69.9 kg (154 lb)   12/18/24 78 kg (172 lb)     Body mass index is 20.53 kg/m².    CBC:   Lab Results   Component Value Date/Time    WBC 8.8 01/06/2025 10:04 AM    RBC 3.73 01/06/2025 10:04 AM    HGB 9.9 01/07/2025 05:18 AM    HCT 30.6 01/07/2025 05:18 AM    MCV 89.8 01/06/2025 10:04 AM    RDW 15.5 01/06/2025 10:04 AM     01/06/2025 10:04 AM       CMP:   Lab Results   Component Value  Properties Group Date first assessed: 05/04/23  -BR Time first assessed: 0252  -BR Side: Right  -BR Location: gluteal  -BR    Row Name 05/16/23 1105          Plan of Care Review    Plan of Care Reviewed With patient  -     Outcome Evaluation PT evaluation complete.  Patient reports not feeling well and being in pain.  She declined standing attempt today but performed bed mobility with CGA.  She would continue to benefit from skilled PT this admission to improve strength, endurance, balance and progress with transfer training and gait training.  -     Row Name 05/16/23 1105          Positioning and Restraints    Pre-Treatment Position in bed  -KP     Post Treatment Position bed  -KP     In Bed side lying left;call light within reach;encouraged to call for assist;exit alarm on;side rails up x3  Lines in tact and needs in reach  -     Row Name 05/16/23 1102          Therapy Assessment/Plan (PT)    Functional Level at Time of Evaluation (PT) CGA - unable to attempt standing  -     PT Diagnosis (PT) Decreased strength, endurance and balance  -     Rehab Potential (PT) good, to achieve stated therapy goals  -     Criteria for Skilled Interventions Met (PT) yes;meets criteria;skilled treatment is necessary  -     Therapy Frequency (PT) 2 times/wk  -     Predicted Duration of Therapy Intervention (PT) 2 wks  -     Problem List (PT) problems related to;balance;mobility;strength  -     Activity Limitations Related to Problem List (PT) unable to ambulate safely;unable to transfer safely  -     Row Name 05/16/23 1105          PT Evaluation Complexity    History, PT Evaluation Complexity 3 or more personal factors and/or comorbidities  -     Examination of Body Systems (PT Eval Complexity) total of 3 or more elements  -     Clinical Presentation (PT Evaluation Complexity) evolving  -     Clinical Decision Making (PT Evaluation Complexity) moderate complexity  -     Overall Complexity (PT Evaluation  Complexity) moderate complexity  -     Row Name 05/16/23 1105          Physical Therapy Goals    Bed Mobility Goal Selection (PT) bed mobility, PT goal 1  -KP     Transfer Goal Selection (PT) transfer, PT goal 1  -KP     Gait Training Goal Selection (PT) gait training, PT goal 1  -     Row Name 05/16/23 1105          Bed Mobility Goal 1 (PT)    Activity/Assistive Device (Bed Mobility Goal 1, PT) sit to supine/supine to sit  -KP     [place] Level/Cues Needed (Bed Mobility Goal 1, PT) modified independence  -KP     Time Frame (Bed Mobility Goal 1, PT) long term goal (LTG);by discharge  -     Row Name 05/16/23 1105          Transfer Goal 1 (PT)    Activity/Assistive Device (Transfer Goal 1, PT) sit-to-stand/stand-to-sit;walker, rolling  -KP     [place] Level/Cues Needed (Transfer Goal 1, PT) contact guard required  -KP     Time Frame (Transfer Goal 1, PT) long term goal (LTG);by discharge  -[facility] Name 05/16/23 1105          Gait Training Goal 1 (PT)    Activity/Assistive Device (Gait Training Goal 1, PT) gait (walking locomotion);assistive device use;walker, rolling  -KP     [place] Level (Gait Training Goal 1, PT) contact guard required  -KP     Distance (Gait Training Goal 1, PT) 25ft  -KP     Time Frame (Gait Training Goal 1, PT) long term goal (LTG);by discharge  -           User Key  (r) = Recorded By, (t) = Taken By, (c) = Cosigned By    Initials Name Provider Type    NT Kaela Omalley, RN Registered Nurse    Sonya Funes PT Physical Therapist    Alyce Perez, RN Registered Nurse                  PT Recommendation and Plan  Planned Therapy Interventions (PT): balance training, bed mobility training, gait training, home exercise program, motor coordination training, neuromuscular re-education, patient/family education, postural re-education, ROM (range of motion), strengthening, stretching, transfer training, wheelchair management/propulsion training  Therapy Frequency  (PT): 2 times/wk  Plan of Care Reviewed With: patient  Outcome Evaluation: PT evaluation complete.  Patient reports not feeling well and being in pain.  She declined standing attempt today but performed bed mobility with CGA.  She would continue to benefit from skilled PT this admission to improve strength, endurance, balance and progress with transfer training and gait training.       Time Calculation:    PT Charges     Row Name 23 1118             Time Calculation    PT Received On 23  -      PT Goal Re-Cert Due Date 23  -            User Key  (r) = Recorded By, (t) = Taken By, (c) = Cosigned By    Initials Name Provider Type     Sonya Jackson, PT Physical Therapist              Therapy Charges for Today     Code Description Service Date Service Provider Modifiers Qty    [id number] HC PT EVAL MOD COMPLEXITY 4 2023 Sonya Jackson, PT GP 1          PT G-Codes  AM-PAC 6 Clicks Score (PT): 15    Sonya Jackson PT  2023      Electronically signed by Sonya Jackson PT at 23 1120          Occupational Therapy Notes (all)      Dalton Calle, OT at 23 1512          Acute Care - Occupational Therapy Initial Evaluation   [facility]     Patient Name: Azra Ohara  : 1935  MRN: [id number]  Today's Date: 2023  Onset of Illness/Injury or Date of Surgery: 05/15/23     Referring Physician: Lilia    Admit Date: 5/15/2023       ICD-10-CM ICD-9-CM   1. Acute congestive heart failure, unspecified heart failure type  I50.9 428.0     Patient Active Problem List   Diagnosis   • Chronic kidney disease, stage 4 (severe)   • Essential hypertension   • Left bundle branch block   • Malignant neoplasm of large intestine   • Polycythemia vera   • Long term (current) use of aspirin   • Acute congestive heart failure, unspecified heart failure type     Past Medical History:   Diagnosis Date   • Chronic kidney disease, stage 4 (severe) 2023   • Essential hypertension    • History of  colon cancer    • Left bundle branch block 01/20/2023   • Long term (current) use of aspirin 01/20/2023   • Polycythemia vera 01/20/2023     Past Surgical History:   Procedure Laterality Date   • CARDIAC ELECTROPHYSIOLOGY PROCEDURE N/A 5/5/2023    Procedure: Pacemaker DC new;  Surgeon: Sampson Garvin MD;  Location: [facility] CATH INVASIVE LOCATION;  Service: Cardiology;  Laterality: N/A;   • COLON RESECTION  2004   • PARATHYROIDECTOMY Right     Parathyroid adenoma         OT ASSESSMENT FLOWSHEET (last 12 hours)     OT Evaluation and Treatment     Row Name 05/16/23 1506                   OT Time and Intention    Document Type evaluation  -KR        Mode of Treatment occupational therapy  -KR        Patient Effort adequate  -KR           General Information    General Observations of Patient pleasant/cooperative  -KR           Living Environment    Current Living Arrangements residential facility  -KR        People in Home facility resident  -KR           Cognition    Affect/Mental Status (Cognition) WFL  -KR        Orientation Status (Cognition) oriented to;person;situation  -KR        Follows Commands (Cognition) WFL  -KR           Range of Motion Comprehensive    Comment, General Range of Motion BUE WFL  -KR           Strength Comprehensive (MMT)    Comment, General Manual Muscle Testing (MMT) Assessment BUE 3/5  -KR           Activities of Daily Living    BADL Assessment/Intervention bathing;upper body dressing;lower body dressing;grooming;toileting  -KR           Bathing Assessment/Intervention    [place] Level (Bathing) bathing skills;moderate assist (50% patient effort)  -KR           Upper Body Dressing Assessment/Training    [place] Level (Upper Body Dressing) upper body dressing skills;moderate assist (50% patient effort)  -KR           Lower Body Dressing Assessment/Training    [place] Level (Lower Body Dressing) lower body dressing skills;moderate assist (50% patient effort)  -KR            Grooming Assessment/Training    [place] Level (Grooming) grooming skills;minimum assist (75% patient effort)  -KR           Toileting Assessment/Training    [place] Level (Toileting) toileting skills;maximum assist (25% patient effort)  -KR           Wound 05/15/23 2159 Right lower gluteal Pressure Injury    Wound - Properties Group Placement Date: 05/15/23  -NT Placement Time: 2159 -NT Present on Hospital Admission: Y  -NT Side: Right  -NT Orientation: lower  -NT Location: gluteal  -NT Primary Wound Type: Pressure inj  -NT    Retired Wound - Properties Group Placement Date: 05/15/23  -NT Placement Time: 2159 -NT Present on Hospital Admission: Y  -NT Side: Right  -NT Orientation: lower  -NT Location: gluteal  -NT Primary Wound Type: Pressure inj  -NT    Retired Wound - Properties Group Date first assessed: 05/15/23  -NT Time first assessed: 2159 -NT Present on Hospital Admission: Y  -NT Side: Right  -NT Location: gluteal  -NT Primary Wound Type: Pressure inj  -NT       Wound 05/15/23 2201 Left lower gluteal Pressure Injury    Wound - Properties Group Placement Date: 05/15/23  -NT Placement Time: 2201 -NT Side: Left  -NT Orientation: lower  -NT Location: gluteal  -NT Primary Wound Type: Pressure inj  -NT    Retired Wound - Properties Group Placement Date: 05/15/23  -NT Placement Time: 2201 -NT Side: Left  -NT Orientation: lower  -NT Location: gluteal  -NT Primary Wound Type: Pressure inj  -NT    Retired Wound - Properties Group Date first assessed: 05/15/23  -NT Time first assessed: 2201 -NT Side: Left  -NT Location: gluteal  -NT Primary Wound Type: Pressure inj  -NT       Wound 05/15/23 2202 Bilateral medial coccyx MASD (Moisture associated skin damage)    Wound - Properties Group Placement Date: 05/15/23  -NT Placement Time: 2202 -NT Present on Hospital Admission: Y  -NT Side: Bilateral  -NT Orientation: medial  -NT Location: coccyx  -NT Primary Wound Type: MASD  -NT    Retired Wound - Properties  Group Placement Date: 05/15/23  -NT Placement Time: 2202 -NT Present on Hospital Admission: Y  -NT Side: Bilateral  -NT Orientation: medial  -NT Location: coccyx  -NT Primary Wound Type: MASD  -NT    Retired Wound - Properties Group Date first assessed: 05/15/23  -NT Time first assessed: 2202 -NT Present on Hospital Admission: Y  -NT Side: Bilateral  -NT Location: coccyx  -NT Primary Wound Type: MASD  -NT       Wound 05/04/23 0250 Left gluteal    Wound - Properties Group Placement Date: 05/04/23  -BR Placement Time: 0250  -BR Side: Left  -BR Location: gluteal  -BR    Retired Wound - Properties Group Placement Date: 05/04/23  -BR Placement Time: 0250  -BR Side: Left  -BR Location: gluteal  -BR    Retired Wound - Properties Group Date first assessed: 05/04/23  -BR Time first assessed: 0250  -BR Side: Left  -BR Location: gluteal  -BR       Wound 05/04/23 0252 Right gluteal    Wound - Properties Group Placement Date: 05/04/23  -BR Placement Time: 0252  -BR Side: Right  -BR Location: gluteal  -BR    Retired Wound - Properties Group Placement Date: 05/04/23  -BR Placement Time: 0252  -BR Side: Right  -BR Location: gluteal  -BR    Retired Wound - Properties Group Date first assessed: 05/04/23  -BR Time first assessed: 0252  -BR Side: Right  -BR Location: gluteal  -BR       Plan of Care Review    Plan of Care Reviewed With patient  -KR           Therapy Assessment/Plan (OT)    Planned Therapy Interventions (OT) activity tolerance training  -KR           OT Goals    Dressing Goal Selection (OT) dressing, OT goal 1  -KR        Activity Tolerance Goal Selection (OT) activity tolerance, OT goal 1  -KR           Dressing Goal 1 (OT)    Activity/Device (Dressing Goal 1, OT) dressing skills, all  -KR        [place]/Cues Needed (Dressing Goal 1, OT) minimum assist (75% or more patient effort)  -KR        Time Frame (Dressing Goal 1, OT) by discharge  -KR            Activity Tolerance Goal 1 (OT)    Activity Tolerance Goal 1  (OT) Increase to enhance performance of self care/mobility  -KR        Activity Level (Endurance Goal 1, OT) 15 min activity  -KR        Time Frame (Activity Tolerance Goal 1, OT) by discharge  -KR              User Key  (r) = Recorded By, (t) = Taken By, (c) = Cosigned By    Initials Name Effective Dates    KR Dalton Calle OT 06/16/21 -     NT Kaela Omalley, NATALY 05/10/21 -     BR Alyce Suarez RN 11/08/22 -                        OT Recommendation and Plan  Planned Therapy Interventions (OT): activity tolerance training  Plan of Care Review  Plan of Care Reviewed With: patient  Plan of Care Reviewed With: patient        Time Calculation:     Therapy Charges for Today     Code Description Service Date Service Provider Modifiers Qty    [id number] HC OT EVAL MOD COMPLEXITY 4 5/16/2023 Dalton Calle OT GO 1               Dalton Calle OT  5/16/2023    Electronically signed by Dalton Calle OT at 05/16/23 1512       Speech Language Pathology Notes (all)    No notes exist for this encounter.

## (undated) DEVICE — PROXIMATE PLUS MD MULTI-DIRECTIONAL RELEASE SKIN STAPLERS CONTAINS 35 STAINLESS STEEL STAPLES APPROXIMATE CLOSED DIMENSIONS: 6.9MM X 3.9MM WIDE: Brand: PROXIMATE

## (undated) DEVICE — RADIFOCUS GLIDEWIRE: Brand: GLIDEWIRE

## (undated) DEVICE — KT MINI ACC MAK COAXL 5F 10CM

## (undated) DEVICE — SHOULDER IMMOBILIZER: Brand: DEROYAL

## (undated) DEVICE — ADULT DISPOSABLE SINGLE-PATIENT USE PULSE OXIMETER SENSOR: Brand: NONIN

## (undated) DEVICE — PAD, DEFIB, ADULT, RADIOTRANS, ZOLL: Brand: MEDLINE

## (undated) DEVICE — ST INF PRI SMRTSTE 20DRP 2VLV 24ML 117

## (undated) DEVICE — HI-TORQUE WHISPER MS GUIDE WIRE .014 STRAIGHT TIP 3.0 CM X 190 CM: Brand: HI-TORQUE WHISPER

## (undated) DEVICE — COR PACEMAKER: Brand: MEDLINE INDUSTRIES, INC.

## (undated) DEVICE — ST EXT IV SMRTSTE 2VLV FIX M LL 6ML 41

## (undated) DEVICE — LN FLTR ORL/NASL MICROSTREAM NONINTUB A/LNG

## (undated) DEVICE — TBG PENCL TELESCP MEGADYNE SMOKE EVAC 10FT